# Patient Record
Sex: FEMALE | Race: WHITE | NOT HISPANIC OR LATINO | Employment: OTHER | URBAN - METROPOLITAN AREA
[De-identification: names, ages, dates, MRNs, and addresses within clinical notes are randomized per-mention and may not be internally consistent; named-entity substitution may affect disease eponyms.]

---

## 2017-04-21 RX ORDER — CITALOPRAM 20 MG/1
TABLET ORAL
Qty: 30 TABLET | Refills: 0 | Status: SHIPPED | OUTPATIENT
Start: 2017-04-21 | End: 2017-05-03

## 2017-05-03 ENCOUNTER — OFFICE VISIT (OUTPATIENT)
Dept: OBSTETRICS AND GYNECOLOGY | Facility: CLINIC | Age: 72
End: 2017-05-03

## 2017-05-03 VITALS
BODY MASS INDEX: 32.2 KG/M2 | HEIGHT: 62 IN | SYSTOLIC BLOOD PRESSURE: 116 MMHG | DIASTOLIC BLOOD PRESSURE: 78 MMHG | WEIGHT: 175 LBS

## 2017-05-03 DIAGNOSIS — Z12.31 ENCOUNTER FOR SCREENING MAMMOGRAM FOR MALIGNANT NEOPLASM OF BREAST: ICD-10-CM

## 2017-05-03 DIAGNOSIS — Z12.4 CERVICAL CANCER SCREENING: ICD-10-CM

## 2017-05-03 DIAGNOSIS — N32.81 OAB (OVERACTIVE BLADDER): ICD-10-CM

## 2017-05-03 DIAGNOSIS — Z13.820 ENCOUNTER FOR SCREENING FOR OSTEOPOROSIS: ICD-10-CM

## 2017-05-03 DIAGNOSIS — Z11.51 SPECIAL SCREENING EXAMINATION FOR HUMAN PAPILLOMAVIRUS (HPV): ICD-10-CM

## 2017-05-03 DIAGNOSIS — Z79.890 HORMONE REPLACEMENT THERAPY: ICD-10-CM

## 2017-05-03 DIAGNOSIS — Z13.9 SCREENING: Primary | ICD-10-CM

## 2017-05-03 LAB
BILIRUB BLD-MCNC: NEGATIVE MG/DL
CLARITY, POC: CLEAR
COLOR UR: YELLOW
GLUCOSE UR STRIP-MCNC: NEGATIVE MG/DL
KETONES UR QL: NEGATIVE
LEUKOCYTE EST, POC: NEGATIVE
NITRITE UR-MCNC: NEGATIVE MG/ML
PH UR: 5 [PH] (ref 5–8)
PROT UR STRIP-MCNC: NEGATIVE MG/DL
RBC # UR STRIP: NEGATIVE /UL
SP GR UR: 1.01 (ref 1–1.03)
UROBILINOGEN UR QL: NORMAL

## 2017-05-03 PROCEDURE — G0101 CA SCREEN;PELVIC/BREAST EXAM: HCPCS | Performed by: OBSTETRICS & GYNECOLOGY

## 2017-05-03 PROCEDURE — 81002 URINALYSIS NONAUTO W/O SCOPE: CPT | Performed by: OBSTETRICS & GYNECOLOGY

## 2017-05-05 LAB
CYTOLOGIST CVX/VAG CYTO: NORMAL
CYTOLOGY CVX/VAG DOC THIN PREP: NORMAL
DX ICD CODE: NORMAL
HIV 1 & 2 AB SER-IMP: NORMAL
HPV I/H RISK 1 DNA CVX QL PROBE+SIG AMP: NEGATIVE
OTHER STN SPEC: NORMAL
PATH REPORT.FINAL DX SPEC: NORMAL
STAT OF ADQ CVX/VAG CYTO-IMP: NORMAL

## 2017-05-08 PROBLEM — N32.81 OAB (OVERACTIVE BLADDER): Status: ACTIVE | Noted: 2017-05-08

## 2017-05-08 PROBLEM — Z79.890 HORMONE REPLACEMENT THERAPY: Status: ACTIVE | Noted: 2017-05-08

## 2017-05-08 RX ORDER — ESTRADIOL 0.5 MG/1
0.5 TABLET ORAL DAILY
Qty: 30 TABLET | Refills: 6 | Status: SHIPPED | OUTPATIENT
Start: 2017-05-08 | End: 2018-08-14

## 2017-05-08 RX ORDER — TOLTERODINE 2 MG/1
2 CAPSULE, EXTENDED RELEASE ORAL DAILY
Qty: 30 CAPSULE | Refills: 11 | Status: SHIPPED | OUTPATIENT
Start: 2017-05-08 | End: 2017-05-09

## 2017-05-09 ENCOUNTER — OFFICE VISIT (OUTPATIENT)
Dept: FAMILY MEDICINE CLINIC | Facility: CLINIC | Age: 72
End: 2017-05-09

## 2017-05-09 VITALS
BODY MASS INDEX: 32.7 KG/M2 | DIASTOLIC BLOOD PRESSURE: 79 MMHG | HEIGHT: 61 IN | TEMPERATURE: 97.7 F | HEART RATE: 81 BPM | SYSTOLIC BLOOD PRESSURE: 124 MMHG | WEIGHT: 173.2 LBS | OXYGEN SATURATION: 96 %

## 2017-05-09 DIAGNOSIS — K21.9 GASTROESOPHAGEAL REFLUX DISEASE WITHOUT ESOPHAGITIS: ICD-10-CM

## 2017-05-09 DIAGNOSIS — Z23 NEED FOR PNEUMOCOCCAL VACCINE: ICD-10-CM

## 2017-05-09 DIAGNOSIS — F41.9 ANXIETY: Primary | ICD-10-CM

## 2017-05-09 PROCEDURE — 99213 OFFICE O/P EST LOW 20 MIN: CPT | Performed by: NURSE PRACTITIONER

## 2017-05-09 RX ORDER — CITALOPRAM 20 MG/1
10 TABLET ORAL DAILY
Qty: 15 TABLET | Refills: 6 | Status: SHIPPED | OUTPATIENT
Start: 2017-05-09 | End: 2018-02-05 | Stop reason: SDUPTHER

## 2017-05-09 RX ORDER — OMEPRAZOLE 40 MG/1
40 CAPSULE, DELAYED RELEASE ORAL DAILY
Qty: 30 CAPSULE | Refills: 6 | Status: SHIPPED | OUTPATIENT
Start: 2017-05-09 | End: 2017-06-08

## 2017-05-10 LAB
ALBUMIN SERPL-MCNC: 4.3 G/DL (ref 3.5–4.8)
ALBUMIN/GLOB SERPL: 1.7 {RATIO} (ref 1.2–2.2)
ALP SERPL-CCNC: 136 IU/L (ref 39–117)
ALT SERPL-CCNC: 21 IU/L (ref 0–32)
AST SERPL-CCNC: 17 IU/L (ref 0–40)
BASOPHILS # BLD AUTO: 0 X10E3/UL (ref 0–0.2)
BASOPHILS NFR BLD AUTO: 0 %
BILIRUB SERPL-MCNC: 0.5 MG/DL (ref 0–1.2)
BUN SERPL-MCNC: 16 MG/DL (ref 8–27)
BUN/CREAT SERPL: 18 (ref 12–28)
CALCIUM SERPL-MCNC: 9.6 MG/DL (ref 8.7–10.3)
CHLORIDE SERPL-SCNC: 103 MMOL/L (ref 96–106)
CO2 SERPL-SCNC: 21 MMOL/L (ref 18–29)
CREAT SERPL-MCNC: 0.88 MG/DL (ref 0.57–1)
EOSINOPHIL # BLD AUTO: 0.1 X10E3/UL (ref 0–0.4)
EOSINOPHIL NFR BLD AUTO: 3 %
ERYTHROCYTE [DISTWIDTH] IN BLOOD BY AUTOMATED COUNT: 14.6 % (ref 12.3–15.4)
GLOBULIN SER CALC-MCNC: 2.5 G/DL (ref 1.5–4.5)
GLUCOSE SERPL-MCNC: 77 MG/DL (ref 65–99)
HCT VFR BLD AUTO: 43 % (ref 34–46.6)
HGB BLD-MCNC: 14.3 G/DL (ref 11.1–15.9)
IMM GRANULOCYTES # BLD: 0 X10E3/UL (ref 0–0.1)
IMM GRANULOCYTES NFR BLD: 0 %
LYMPHOCYTES # BLD AUTO: 1.4 X10E3/UL (ref 0.7–3.1)
LYMPHOCYTES NFR BLD AUTO: 27 %
MCH RBC QN AUTO: 30.4 PG (ref 26.6–33)
MCHC RBC AUTO-ENTMCNC: 33.3 G/DL (ref 31.5–35.7)
MCV RBC AUTO: 91 FL (ref 79–97)
MONOCYTES # BLD AUTO: 0.5 X10E3/UL (ref 0.1–0.9)
MONOCYTES NFR BLD AUTO: 10 %
NEUTROPHILS # BLD AUTO: 3.1 X10E3/UL (ref 1.4–7)
NEUTROPHILS NFR BLD AUTO: 60 %
PLATELET # BLD AUTO: 214 X10E3/UL (ref 150–379)
POTASSIUM SERPL-SCNC: 4.1 MMOL/L (ref 3.5–5.2)
PROT SERPL-MCNC: 6.8 G/DL (ref 6–8.5)
RBC # BLD AUTO: 4.71 X10E6/UL (ref 3.77–5.28)
SODIUM SERPL-SCNC: 142 MMOL/L (ref 134–144)
TSH SERPL DL<=0.005 MIU/L-ACNC: 2.13 UIU/ML (ref 0.45–4.5)
WBC # BLD AUTO: 5.3 X10E3/UL (ref 3.4–10.8)

## 2017-05-26 ENCOUNTER — HOSPITAL ENCOUNTER (OUTPATIENT)
Dept: MAMMOGRAPHY | Facility: HOSPITAL | Age: 72
Discharge: HOME | End: 2017-05-26
Attending: OBSTETRICS & GYNECOLOGY | Admitting: OBSTETRICS & GYNECOLOGY

## 2017-05-26 ENCOUNTER — HOSPITAL ENCOUNTER (OUTPATIENT)
Dept: BONE DENSITY | Facility: HOSPITAL | Age: 72
Discharge: HOME | End: 2017-05-26
Attending: OBSTETRICS & GYNECOLOGY

## 2017-05-26 DIAGNOSIS — Z13.820 ENCOUNTER FOR SCREENING FOR OSTEOPOROSIS: ICD-10-CM

## 2017-05-26 DIAGNOSIS — Z13.9 SCREENING: ICD-10-CM

## 2017-05-26 DIAGNOSIS — Z12.31 ENCOUNTER FOR SCREENING MAMMOGRAM FOR MALIGNANT NEOPLASM OF BREAST: ICD-10-CM

## 2017-05-26 PROCEDURE — G0202 SCR MAMMO BI INCL CAD: HCPCS

## 2017-05-26 PROCEDURE — 77080 DXA BONE DENSITY AXIAL: CPT

## 2018-02-05 DIAGNOSIS — F41.9 ANXIETY: ICD-10-CM

## 2018-02-06 ENCOUNTER — RESULTS ENCOUNTER (OUTPATIENT)
Dept: FAMILY MEDICINE CLINIC | Facility: CLINIC | Age: 73
End: 2018-02-06

## 2018-02-06 DIAGNOSIS — K21.9 GASTROESOPHAGEAL REFLUX DISEASE, ESOPHAGITIS PRESENCE NOT SPECIFIED: ICD-10-CM

## 2018-02-06 DIAGNOSIS — F32.A ANXIETY AND DEPRESSION: ICD-10-CM

## 2018-02-06 DIAGNOSIS — F32.A ANXIETY AND DEPRESSION: Primary | ICD-10-CM

## 2018-02-06 DIAGNOSIS — F41.9 ANXIETY AND DEPRESSION: ICD-10-CM

## 2018-02-06 DIAGNOSIS — F41.9 ANXIETY AND DEPRESSION: Primary | ICD-10-CM

## 2018-02-06 RX ORDER — CITALOPRAM 20 MG/1
TABLET ORAL
Qty: 15 TABLET | Refills: 0 | Status: SHIPPED | OUTPATIENT
Start: 2018-02-06 | End: 2018-02-15 | Stop reason: DRUGHIGH

## 2018-02-12 ENCOUNTER — RESULTS ENCOUNTER (OUTPATIENT)
Dept: FAMILY MEDICINE CLINIC | Facility: CLINIC | Age: 73
End: 2018-02-12

## 2018-02-12 DIAGNOSIS — I10 ESSENTIAL HYPERTENSION: ICD-10-CM

## 2018-02-12 DIAGNOSIS — I10 ESSENTIAL HYPERTENSION: Primary | ICD-10-CM

## 2018-02-12 LAB
ALBUMIN SERPL-MCNC: 4.4 G/DL (ref 3.5–5.2)
ALBUMIN/GLOB SERPL: 1.6 G/DL
ALP SERPL-CCNC: 121 U/L (ref 39–117)
ALT SERPL-CCNC: 16 U/L (ref 1–33)
AST SERPL-CCNC: 13 U/L (ref 1–32)
BASOPHILS # BLD AUTO: 0.03 10*3/MM3 (ref 0–0.2)
BASOPHILS NFR BLD AUTO: 0.5 % (ref 0–1.5)
BILIRUB SERPL-MCNC: 0.5 MG/DL (ref 0.1–1.2)
BUN SERPL-MCNC: 14 MG/DL (ref 8–23)
BUN/CREAT SERPL: 15.4 (ref 7–25)
CALCIUM SERPL-MCNC: 9.4 MG/DL (ref 8.6–10.5)
CHLORIDE SERPL-SCNC: 104 MMOL/L (ref 98–107)
CO2 SERPL-SCNC: 26.8 MMOL/L (ref 22–29)
CREAT SERPL-MCNC: 0.91 MG/DL (ref 0.57–1)
EOSINOPHIL # BLD AUTO: 0.16 10*3/MM3 (ref 0–0.7)
EOSINOPHIL NFR BLD AUTO: 2.7 % (ref 0.3–6.2)
ERYTHROCYTE [DISTWIDTH] IN BLOOD BY AUTOMATED COUNT: 14 % (ref 11.7–13)
GFR SERPLBLD CREATININE-BSD FMLA CKD-EPI: 61 ML/MIN/1.73
GFR SERPLBLD CREATININE-BSD FMLA CKD-EPI: 74 ML/MIN/1.73
GLOBULIN SER CALC-MCNC: 2.7 GM/DL
GLUCOSE SERPL-MCNC: 92 MG/DL (ref 65–99)
HCT VFR BLD AUTO: 40.6 % (ref 35.6–45.5)
HGB BLD-MCNC: 14.1 G/DL (ref 11.9–15.5)
IMM GRANULOCYTES # BLD: 0 10*3/MM3 (ref 0–0.03)
IMM GRANULOCYTES NFR BLD: 0 % (ref 0–0.5)
LYMPHOCYTES # BLD AUTO: 1.65 10*3/MM3 (ref 0.9–4.8)
LYMPHOCYTES NFR BLD AUTO: 27.7 % (ref 19.6–45.3)
MCH RBC QN AUTO: 32.1 PG (ref 26.9–32)
MCHC RBC AUTO-ENTMCNC: 34.7 G/DL (ref 32.4–36.3)
MCV RBC AUTO: 92.5 FL (ref 80.5–98.2)
MONOCYTES # BLD AUTO: 0.51 10*3/MM3 (ref 0.2–1.2)
MONOCYTES NFR BLD AUTO: 8.6 % (ref 5–12)
NEUTROPHILS # BLD AUTO: 3.61 10*3/MM3 (ref 1.9–8.1)
NEUTROPHILS NFR BLD AUTO: 60.5 % (ref 42.7–76)
PLATELET # BLD AUTO: 209 10*3/MM3 (ref 140–500)
POTASSIUM SERPL-SCNC: 4.4 MMOL/L (ref 3.5–5.2)
PROT SERPL-MCNC: 7.1 G/DL (ref 6–8.5)
RBC # BLD AUTO: 4.39 10*6/MM3 (ref 3.9–5.2)
SODIUM SERPL-SCNC: 143 MMOL/L (ref 136–145)
TSH SERPL DL<=0.005 MIU/L-ACNC: 2.76 MIU/ML (ref 0.27–4.2)
WBC # BLD AUTO: 5.96 10*3/MM3 (ref 4.5–10.7)

## 2018-02-15 ENCOUNTER — OFFICE VISIT (OUTPATIENT)
Dept: FAMILY MEDICINE CLINIC | Facility: CLINIC | Age: 73
End: 2018-02-15

## 2018-02-15 VITALS
TEMPERATURE: 98.4 F | OXYGEN SATURATION: 97 % | DIASTOLIC BLOOD PRESSURE: 70 MMHG | HEART RATE: 72 BPM | BODY MASS INDEX: 32.97 KG/M2 | SYSTOLIC BLOOD PRESSURE: 104 MMHG | WEIGHT: 174.6 LBS | HEIGHT: 61 IN

## 2018-02-15 DIAGNOSIS — M62.838 MUSCLE SPASM: ICD-10-CM

## 2018-02-15 DIAGNOSIS — K21.9 GASTROESOPHAGEAL REFLUX DISEASE WITHOUT ESOPHAGITIS: ICD-10-CM

## 2018-02-15 DIAGNOSIS — Z00.00 HEALTHCARE MAINTENANCE: ICD-10-CM

## 2018-02-15 DIAGNOSIS — F41.9 ANXIETY: Primary | ICD-10-CM

## 2018-02-15 PROCEDURE — 99213 OFFICE O/P EST LOW 20 MIN: CPT | Performed by: NURSE PRACTITIONER

## 2018-02-15 RX ORDER — BACLOFEN 10 MG/1
10 TABLET ORAL 3 TIMES DAILY PRN
Qty: 90 TABLET | Refills: 6 | Status: SHIPPED | OUTPATIENT
Start: 2018-02-15 | End: 2018-04-02

## 2018-02-15 RX ORDER — CITALOPRAM 10 MG/1
10 TABLET ORAL DAILY
Qty: 90 TABLET | Refills: 3 | Status: SHIPPED | OUTPATIENT
Start: 2018-02-15 | End: 2019-06-25

## 2018-02-15 RX ORDER — OMEPRAZOLE 40 MG/1
40 CAPSULE, DELAYED RELEASE ORAL DAILY
Qty: 90 CAPSULE | Refills: 3 | Status: SHIPPED | OUTPATIENT
Start: 2018-02-15 | End: 2018-04-11

## 2018-02-15 RX ORDER — OMEPRAZOLE 40 MG/1
40 CAPSULE, DELAYED RELEASE ORAL DAILY
COMMUNITY
End: 2018-02-15 | Stop reason: SDUPTHER

## 2018-02-15 NOTE — PROGRESS NOTES
Subjective   Kourtney Lorenzana is a 72 y.o. female. Patient is being seen today for medication check/refill for Anxiety and GERD. She had recent lab work and is to go over the results with you today in office.     History of Present Illness 72 yr.old white female here today for F/U on meds for anxiety, Celexa 20 mg QD. GERD treated with Prilosec 40 mg capsule once a day Offers no c/o med. Pt fasting for labs. Patient needs lipids to be drawn all other labs have been drawn and results. Will review during office visit.    The following portions of the patient's history were reviewed and updated as appropriate: allergies, current medications, past family history, past medical history, past social history, past surgical history and problem list.    Review of Systems   Gastrointestinal:        GERD.   Psychiatric/Behavioral:        Anxiety.    All other systems reviewed and are negative.      Objective   Physical Exam   Constitutional: She is oriented to person, place, and time. She appears well-developed and well-nourished.   HENT:   Head: Normocephalic and atraumatic.   Eyes: EOM are normal. Pupils are equal, round, and reactive to light.   Neck: Normal range of motion. Neck supple.   Cardiovascular: Normal rate, regular rhythm and normal heart sounds.    Pulmonary/Chest: Effort normal and breath sounds normal.   Abdominal: Soft. Bowel sounds are normal.   Musculoskeletal: Normal range of motion.   Neurological: She is alert and oriented to person, place, and time.   Skin: Skin is warm and dry.   Psychiatric: She has a normal mood and affect. Her behavior is normal. Judgment and thought content normal.   Nursing note and vitals reviewed.      Assessment/Plan   Kourtney was seen today for med refill.    Diagnoses and all orders for this visit:    Anxiety  -     citalopram (CELEXA) 10 MG tablet; Take 1 tablet by mouth Daily.  -     baclofen (LIORESAL) 10 MG tablet; Take 1 tablet by mouth 3 (Three) Times a Day As Needed for  Muscle Spasms.    Gastroesophageal reflux disease without esophagitis  -     Ambulatory Referral to Gastroenterology  -     omeprazole (priLOSEC) 40 MG capsule; Take 1 capsule by mouth Daily.    Muscle spasm  -     baclofen (LIORESAL) 10 MG tablet; Take 1 tablet by mouth 3 (Three) Times a Day As Needed for Muscle Spasms.    Patient will return fasting for lipid panel at her earliest convenience. Referral to gastroenterology for possible upper endoscopy. Renewed Prilosec 40 mg capsule once a day for her GERD, ordered baclofen for muscle spasms and to help her relax so she can sleep well at night. Will continue on Celexa 10 mg tablet per day. Patient had been cutting her 20 mg tablets in half. This will be easier for her. To continue eating a heart healthy diet full of fruits and vegetables, drinking 6-8 glasses of water a day as able and to exercise by walking briskly every day. This will help maintain bone density, muscle mass, heart health, and has been known to elevate the psyche. Patient will keep the office alert of any decline in health status. Will follow-up every 6 months and as needed with office. Patient's last T dap was in 2012 per patient she does not need another one until as and 23. Patient declines Pneumovax and influenza. Will consider zoster vaccine possibly at a later date. We will get hepatitis screening for hep C when she returns to get her lipid panel drawn. All health maintenance issues have now been addressed.

## 2018-04-11 ENCOUNTER — OFFICE VISIT (OUTPATIENT)
Dept: GASTROENTEROLOGY | Facility: CLINIC | Age: 73
End: 2018-04-11

## 2018-04-11 VITALS
BODY MASS INDEX: 32.23 KG/M2 | DIASTOLIC BLOOD PRESSURE: 72 MMHG | TEMPERATURE: 97.5 F | WEIGHT: 176.2 LBS | SYSTOLIC BLOOD PRESSURE: 140 MMHG

## 2018-04-11 DIAGNOSIS — R10.13 EPIGASTRIC PAIN: ICD-10-CM

## 2018-04-11 DIAGNOSIS — R13.10 DYSPHAGIA, UNSPECIFIED TYPE: ICD-10-CM

## 2018-04-11 DIAGNOSIS — Z23 NEED FOR PNEUMOCOCCAL VACCINE: Primary | ICD-10-CM

## 2018-04-11 DIAGNOSIS — K21.9 GASTROESOPHAGEAL REFLUX DISEASE WITHOUT ESOPHAGITIS: ICD-10-CM

## 2018-04-11 PROCEDURE — 99214 OFFICE O/P EST MOD 30 MIN: CPT | Performed by: NURSE PRACTITIONER

## 2018-04-11 RX ORDER — PANTOPRAZOLE SODIUM 40 MG/1
40 TABLET, DELAYED RELEASE ORAL DAILY
Qty: 30 TABLET | Refills: 11 | Status: SHIPPED | OUTPATIENT
Start: 2018-04-11 | End: 2018-04-24

## 2018-04-11 NOTE — PROGRESS NOTES
Chief Complaint   Patient presents with   • Heartburn       Kourtney Lorenzana is a  72 y.o. female here for a follow up visit for GERD and epigastric abd pain.     HPI  71 yo f presents today for follow up visit for GERD, dysphagia and epigastric abd pain. She is a patient of Dr. Andrade. She was last seen in the clinic on 10/2016. At the time she was having similar issues and had been taking Zantac 150 mg BID without relief. She was changed to Omeprazole 40 mg daily and Zantac 300 mg at night and patient was instructed to follow up in 1 year. Since then she admits the omeprazole 40 mg daily is not working anymore. She doesn't remember why she stopped taking the Zantac at bedtime. She is having worsening dysphagia, epigastric pain and nausea. She cannot remember ever having EGD done. If she did it was a long time ago per her report.     She denies any vomiting, constipation, diarrhea, rectal bleeding or melena. She admits her appetite is good and her weight has been stable.     Last Colonoscopy was done 11/2015 and was normal per patient report.     Past Medical History:   Diagnosis Date   • Anxiety    • Bruising tendency    • Cerumen impaction    • Depression    • Diverticula, intestine    • Diverticulitis of colon    • Essential familial hypercholesterolemia    • Gastroesophageal reflux disease without esophagitis 2/11/2016   • GERD (gastroesophageal reflux disease)    • H/O colonoscopy    • IBS (irritable bowel syndrome)    • Internal hemorrhoids    • Low back pain    • Lumbar canal stenosis    • Lumbosacral neuritis    • Multiple vitamin deficiency    • Nephrolithiasis     July 2014 passage of kidney stone.   • Panic disorder without agoraphobia    • Postmenopausal HRT (hormone replacement therapy)    • UTI symptoms    • Vagina, candidiasis        Past Surgical History:   Procedure Laterality Date   • CATARACT EXTRACTION     • CATARACT EXTRACTION  09/05/2017    Dr. Jefe Barakat OD, MD   • COLONOSCOPY  11/2015     "normal   • FOOT SURGERY     • HYSTERECTOMY     • INCONTINENCE SURGERY         Scheduled Meds:    Continuous Infusions:  No current facility-administered medications for this visit.     PRN Meds:.    Allergies   Allergen Reactions   • Other      Unknown \"strong\" pain killer   • Hydrocodone-Acetaminophen GI Intolerance   • Tramadol GI Intolerance       Social History     Social History   • Marital status:      Spouse name: N/A   • Number of children: N/A   • Years of education: N/A     Occupational History   • Not on file.     Social History Main Topics   • Smoking status: Never Smoker   • Smokeless tobacco: Never Used   • Alcohol use No   • Drug use: No   • Sexual activity: No     Other Topics Concern   • Not on file     Social History Narrative   • No narrative on file       Family History   Problem Relation Age of Onset   • Atrial fibrillation Father    • Atrial fibrillation Sister    • Atrial fibrillation Brother    • Other Brother      Coronary arteriosclerosis   • Cancer Mother      colon cancer   • Colon cancer Mother        Review of Systems   Constitutional: Negative for appetite change, chills, diaphoresis, fatigue, fever and unexpected weight change.   HENT: Positive for trouble swallowing. Negative for nosebleeds, postnasal drip, sore throat and voice change.    Respiratory: Positive for cough and choking. Negative for chest tightness, shortness of breath and wheezing.    Cardiovascular: Negative for chest pain.   Gastrointestinal: Positive for abdominal pain and nausea. Negative for abdominal distention, anal bleeding, blood in stool, constipation, diarrhea, rectal pain and vomiting.   Endocrine: Negative for polydipsia, polyphagia and polyuria.   Musculoskeletal: Negative for gait problem.   Skin: Negative for rash and wound.   Allergic/Immunologic: Negative for food allergies.   Neurological: Negative for dizziness, speech difficulty and light-headedness.   Psychiatric/Behavioral: Negative for " confusion, self-injury, sleep disturbance and suicidal ideas.       Vitals:    04/11/18 1302   BP: 140/72   Temp: 97.5 °F (36.4 °C)       Physical Exam   Constitutional: She is oriented to person, place, and time. She appears well-developed and well-nourished. She does not appear ill. No distress.   HENT:   Head: Normocephalic.   Eyes: Pupils are equal, round, and reactive to light.   Cardiovascular: Normal rate, regular rhythm and normal heart sounds.    Pulmonary/Chest: Effort normal and breath sounds normal.   Abdominal: Soft. Bowel sounds are normal. She exhibits no distension and no mass. There is no hepatosplenomegaly. There is no tenderness. There is no rebound and no guarding. No hernia.   Musculoskeletal: Normal range of motion.   Neurological: She is alert and oriented to person, place, and time.   Skin: Skin is warm and dry.   Psychiatric: She has a normal mood and affect. Her speech is normal and behavior is normal. Judgment normal.       No images are attached to the encounter.    Assessment & Plan       1. Gastroesophageal reflux disease without esophagitis  - Case Request; Standing  - Case Request    2. Epigastric pain  - Case Request; Standing  - Case Request    3. Dysphagia, unspecified type  - Case Request; Standing  - Case Request    I will change the omeprazole to Protonix 40 mg daily and see how that helps her. I think she needs EGD given her worsening symptoms. I did discuss with her the potential risks involved with the procedure and she has agreed to proceed with EGD with Dr. Andrade. Follow up with Dr. Andrade after the scope.

## 2018-04-24 ENCOUNTER — OFFICE VISIT (OUTPATIENT)
Dept: FAMILY MEDICINE CLINIC | Facility: CLINIC | Age: 73
End: 2018-04-24

## 2018-04-24 VITALS
OXYGEN SATURATION: 98 % | WEIGHT: 174.6 LBS | SYSTOLIC BLOOD PRESSURE: 110 MMHG | HEART RATE: 83 BPM | DIASTOLIC BLOOD PRESSURE: 72 MMHG | TEMPERATURE: 98.3 F | HEIGHT: 62 IN | BODY MASS INDEX: 32.13 KG/M2

## 2018-04-24 DIAGNOSIS — G89.29 CHRONIC PAIN OF LEFT KNEE: Primary | ICD-10-CM

## 2018-04-24 DIAGNOSIS — K21.9 GASTROESOPHAGEAL REFLUX DISEASE WITHOUT ESOPHAGITIS: ICD-10-CM

## 2018-04-24 DIAGNOSIS — M25.562 CHRONIC PAIN OF LEFT KNEE: Primary | ICD-10-CM

## 2018-04-24 PROCEDURE — 73560 X-RAY EXAM OF KNEE 1 OR 2: CPT | Performed by: NURSE PRACTITIONER

## 2018-04-24 PROCEDURE — 99214 OFFICE O/P EST MOD 30 MIN: CPT | Performed by: NURSE PRACTITIONER

## 2018-04-24 RX ORDER — RANITIDINE 150 MG/1
150 CAPSULE ORAL 2 TIMES DAILY PRN
Qty: 60 CAPSULE | Refills: 6 | Status: SHIPPED | OUTPATIENT
Start: 2018-04-24 | End: 2018-08-14

## 2018-04-24 RX ORDER — TRAMADOL HYDROCHLORIDE 50 MG/1
50 TABLET ORAL EVERY 8 HOURS PRN
Qty: 90 TABLET | Refills: 0 | Status: SHIPPED | OUTPATIENT
Start: 2018-04-24 | End: 2018-08-14

## 2018-04-24 NOTE — PROGRESS NOTES
Subjective   Kourtney Lorenzana is a 72 y.o. female. Patient is being evaluated today for left knee pain. The pain started about  2 months ago, the last 2 weeks has gotten a lot worse. She does not recall doing anything to cause the pain. She believes it to be arthritis.     History of Present Illness 72-year-old  female presents to the office today to evaluate sudden onset of intermittent left knee pain that began about 2 months ago and over the last 2 weeks has gotten worse. Patient believes it to be arthritis and has treated it with  Tylenol, warm wraps massage and slow stretches. Nothing really makes it better and walking makes worse. Cannot tolerate NSAIDs due to her history of GERD. Was taking Protonix but would like to try ranitidine since she can take it for a longer period of time . She does not recall injuring the knee in any way. On a 1-10 scale rates the knee pain as a 10.    The following portions of the patient's history were reviewed and updated as appropriate: allergies, current medications, past family history, past medical history, past social history, past surgical history and problem list.    Review of Systems   Gastrointestinal:        HX GERD   Musculoskeletal:        Knee pain.    All other systems reviewed and are negative.      Objective   Physical Exam   Constitutional: She is oriented to person, place, and time. She appears well-developed and well-nourished.   HENT:   Head: Normocephalic and atraumatic.   Eyes: EOM are normal. Pupils are equal, round, and reactive to light.   Neck: Normal range of motion. Neck supple.   Cardiovascular: Normal rate and regular rhythm.    Pulmonary/Chest: Effort normal and breath sounds normal.   Abdominal: Soft. Bowel sounds are normal.   Intermittent GERD. Patient concerned with osteoporosis encouraged to try ranitidine   Musculoskeletal:   Left Knee pain increased with movement described as aching. Patient has full range of motion but movement causes  increased pain   Neurological: She is alert and oriented to person, place, and time.   Skin: Skin is warm and dry. Capillary refill takes 2 to 3 seconds.   Psychiatric: She has a normal mood and affect. Her behavior is normal. Judgment and thought content normal.   Nursing note and vitals reviewed.      Assessment/Plan   Kourtney was seen today for knee pain.    Diagnoses and all orders for this visit:    Chronic pain of left knee  -     XR Knee 1 or 2 View Left (In Office)  -     Ambulatory Referral to Orthopedic Surgery  -     traMADol (ULTRAM) 50 MG tablet; Take 1 tablet by mouth Every 8 (Eight) Hours As Needed for Moderate Pain .  -     Compliance Drug Analysis, Ur - Urine, Clean Catch    Gastroesophageal reflux disease without esophagitis  -     ranitidine (ZANTAC) 150 MG capsule; Take 1 capsule by mouth 2 (Two) Times a Day As Needed for Indigestion or Heartburn.    Patient unable to tolerate baclofen, due to mental changes or meloxicam severe stomach upset. Will treat her chronic knee pain with low-dose tramadol 50 mg 3 times a day as needed. Will get in office x-ray of left knee to determine cause of her pain. No knee film available for comparison. Will send to radiology for formal over reading. Film read as osteoarthritic changes to the joint space in the left knee, with osteophyte/fragment formation of bone behind the knee. This would account for the pain she feels in the back of her knee whenever she stands straight or bends the knee. Referring to orthopedic surgeon Dr. Kramer if still in practice per patient request. This will call with date and time of visit. Medication agreement explained and signed by patient, Klever requested. Compliance drug screen collected. Patient will notify office immediately of any decline in health or adverse effects from tramadol. And understands its a controlled substance and she may only filled at one pharmacy and may only collect prescription from me. To follow-up with this  office after being seen by orthopedic surgery

## 2018-04-25 ENCOUNTER — TELEPHONE (OUTPATIENT)
Dept: GASTROENTEROLOGY | Facility: CLINIC | Age: 73
End: 2018-04-25

## 2018-04-25 NOTE — TELEPHONE ENCOUNTER
----- Message from Martha Mcdaniel sent at 4/25/2018 12:06 PM EDT -----  Regarding: PT CALLED - MED QUESTION  Contact: [phone]  PT HAS EGD ON FRI. IS IT OK TO TAKE TRAMADOL 50MG. PT WAS PRESCRIBED THIS FOR HER KNEES. SHE HASN'T GOT SCRIPT FILLED YET.

## 2018-04-26 ENCOUNTER — TELEPHONE (OUTPATIENT)
Dept: FAMILY MEDICINE CLINIC | Facility: CLINIC | Age: 73
End: 2018-04-26

## 2018-04-26 NOTE — TELEPHONE ENCOUNTER
Pt is having an upper GI scope done tomorrow. She is currently prescribed Tramadol by you. Should she be on this for procedure tomorrow? Pt will have anesthetic given to her.   Please advise, thank you!

## 2018-04-27 ENCOUNTER — OUTSIDE FACILITY SERVICE (OUTPATIENT)
Dept: GASTROENTEROLOGY | Facility: CLINIC | Age: 73
End: 2018-04-27

## 2018-04-27 PROCEDURE — 43235 EGD DIAGNOSTIC BRUSH WASH: CPT | Performed by: INTERNAL MEDICINE

## 2018-04-27 PROCEDURE — 43450 DILATE ESOPHAGUS 1/MULT PASS: CPT | Performed by: INTERNAL MEDICINE

## 2018-04-30 ENCOUNTER — TELEPHONE (OUTPATIENT)
Dept: FAMILY MEDICINE CLINIC | Facility: CLINIC | Age: 73
End: 2018-04-30

## 2018-04-30 LAB — DRUGS UR: NORMAL

## 2018-04-30 NOTE — TELEPHONE ENCOUNTER
Patient called and said since starting the Tramadol, she has been itching  Her best call back is [phone]

## 2018-04-30 NOTE — TELEPHONE ENCOUNTER
If the itching is not that intense she could take a Benadryl and drink more water. If the itching is bothering her we can stop the tramadol. She has any idea what else she would like to try for her pain?

## 2018-05-22 ENCOUNTER — OFFICE VISIT (OUTPATIENT)
Dept: ORTHOPEDIC SURGERY | Facility: CLINIC | Age: 73
End: 2018-05-22

## 2018-05-22 VITALS — HEIGHT: 62 IN | TEMPERATURE: 98.3 F | BODY MASS INDEX: 32.02 KG/M2 | WEIGHT: 174 LBS

## 2018-05-22 DIAGNOSIS — M25.562 CHRONIC PAIN OF LEFT KNEE: Primary | ICD-10-CM

## 2018-05-22 DIAGNOSIS — G89.29 CHRONIC PAIN OF LEFT KNEE: Primary | ICD-10-CM

## 2018-05-22 DIAGNOSIS — M70.52 PES ANSERINUS BURSITIS OF LEFT KNEE: ICD-10-CM

## 2018-05-22 PROCEDURE — 73562 X-RAY EXAM OF KNEE 3: CPT | Performed by: ORTHOPAEDIC SURGERY

## 2018-05-22 PROCEDURE — 99203 OFFICE O/P NEW LOW 30 MIN: CPT | Performed by: ORTHOPAEDIC SURGERY

## 2018-05-22 PROCEDURE — 20610 DRAIN/INJ JOINT/BURSA W/O US: CPT | Performed by: ORTHOPAEDIC SURGERY

## 2018-05-22 RX ORDER — METHYLPREDNISOLONE ACETATE 80 MG/ML
80 INJECTION, SUSPENSION INTRA-ARTICULAR; INTRALESIONAL; INTRAMUSCULAR; SOFT TISSUE
Status: COMPLETED | OUTPATIENT
Start: 2018-05-22 | End: 2018-05-22

## 2018-05-22 RX ORDER — BUPIVACAINE HYDROCHLORIDE 5 MG/ML
2 INJECTION, SOLUTION PERINEURAL
Status: COMPLETED | OUTPATIENT
Start: 2018-05-22 | End: 2018-05-22

## 2018-05-22 RX ORDER — BUPIVACAINE HYDROCHLORIDE 5 MG/ML
2 INJECTION, SOLUTION EPIDURAL; INTRACAUDAL
Status: COMPLETED | OUTPATIENT
Start: 2018-05-22 | End: 2018-05-22

## 2018-05-22 RX ADMIN — METHYLPREDNISOLONE ACETATE 80 MG: 80 INJECTION, SUSPENSION INTRA-ARTICULAR; INTRALESIONAL; INTRAMUSCULAR; SOFT TISSUE at 10:35

## 2018-05-22 RX ADMIN — BUPIVACAINE HYDROCHLORIDE 2 ML: 5 INJECTION, SOLUTION EPIDURAL; INTRACAUDAL at 10:35

## 2018-05-22 RX ADMIN — BUPIVACAINE HYDROCHLORIDE 2 ML: 5 INJECTION, SOLUTION PERINEURAL at 10:35

## 2018-05-22 NOTE — PROGRESS NOTES
"Patient: Kourtney Lorenzana  YOB: 1945 72 y.o. female  Medical Record Number: [id number]    Chief Complaints:   Chief Complaint   Patient presents with   • Left Knee - Pain, Establish Care       History of Present Illness:Kourtney Lorenzana is a 72 y.o. female who presents with Complains of moderate to severe left medial knee pain.  She has pain with any activity.  Describes a constant stabbing aching pain about the medial joint.  She rates it as a 4 out of 10.  This is worse with standing grabbing or walking.  She was sent by Sonam Zayas for consultation.    Allergies:   Allergies   Allergen Reactions   • Other      Unknown \"strong\" pain killer   • Hydrocodone-Acetaminophen GI Intolerance   • Tramadol GI Intolerance       Medications:   Current Outpatient Prescriptions   Medication Sig Dispense Refill   • citalopram (CELEXA) 10 MG tablet Take 1 tablet by mouth Daily. 90 tablet 3   • estradiol (ESTRACE) 0.5 MG tablet Take 1 tablet by mouth Daily. Patient takes every other night. (Patient taking differently: Take 0.5 mg by mouth 1 (One) Time Per Week. Patient takes every other night.  ) 30 tablet 6   • ranitidine (ZANTAC) 150 MG capsule Take 1 capsule by mouth 2 (Two) Times a Day As Needed for Indigestion or Heartburn. 60 capsule 6   • traMADol (ULTRAM) 50 MG tablet Take 1 tablet by mouth Every 8 (Eight) Hours As Needed for Moderate Pain . 90 tablet 0     No current facility-administered medications for this visit.          The following portions of the patient's history were reviewed and updated as appropriate: allergies, current medications, past family history, past medical history, past social history, past surgical history and problem list.    Review of Systems:   A 14 point review of systems was performed. All systems negative except pertinent positives/negative listed in HPI above    Physical Exam:   Vitals:    05/22/18 0917   Temp: 98.3 °F (36.8 °C)   Weight: 78.9 kg (174 lb)   Height: 157.5 cm " "(62\")   PainSc:   4       General: A and O x 3, ASA, NAD    SCLERA:    Normal    DENTITION:   Normal   Knee:  left    ALIGNMENT:     Neutral  ,   Patella tracks   midline    GAIT:     Nonantalgic    SKIN:    No abnormality    RANGE OF MOTION:   0  -  135   DEG    STRENGTH:   5 / 5    LIGAMENTS:    No varus / valgus instability.   Negative  Lachman.    MENISCUS:     Negative   Akiko       DISTAL PULSES:    Paplable    DISTAL SENSATION :   Intact    LYMPHATICS:     No   lymphadenopathy    OTHER:          - No  effusion      - No crepitance with ROM      +Swelling and tenderness to palpation pes anserine bursa       Radiology:  Xrays 3views left knee (ap,lateral, sunrise) were ordered and reviewed for evaluation of knee pain demonstrating advanced varus osteoarthritis with near bone on bone articulation, subchondral cysts, and periarticular osteophytes. There are no previous films for comparision.    Assessment/Plan: Left knee osteoarthritis and pes anserine bursitis.  She has severe bursitis today so we'll try and calm it down.  I injected the bursa as below.  I also recommended therapy but she is not interested in that.  I did explain to her she would get to the point where we have to discuss knee replacement I would not consider any surgery on her knee and less she will willing to 100% comply with my therapy request for prehabilitation and postop therapy.  I be happy to see her back at any point in the future.      Large Joint Arthrocentesis  Date/Time: 5/22/2018 10:35 AM  Consent given by: patient  Site marked: site marked  Timeout: Immediately prior to procedure a time out was called to verify the correct patient, procedure, equipment, support staff and site/side marked as required   Supporting Documentation  Indications: joint swelling and pain   Procedure Details  Location: knee - L knee  Preparation: Patient was prepped and draped in the usual sterile fashion  Needle size: 18 G  Approach: medial  Medications " administered: 2 mL bupivacaine (PF) 0.5 %; 2 mL bupivacaine 0.5 %; 80 mg methylPREDNISolone acetate 80 MG/ML  Patient tolerance: patient tolerated the procedure well with no immediate complications            Catarino De La Cruz MD  5/22/2018

## 2018-07-02 ENCOUNTER — OFFICE VISIT (OUTPATIENT)
Dept: GASTROENTEROLOGY | Facility: CLINIC | Age: 73
End: 2018-07-02

## 2018-07-02 VITALS
DIASTOLIC BLOOD PRESSURE: 78 MMHG | TEMPERATURE: 98.2 F | SYSTOLIC BLOOD PRESSURE: 108 MMHG | WEIGHT: 169.4 LBS | BODY MASS INDEX: 31.17 KG/M2 | HEIGHT: 62 IN

## 2018-07-02 DIAGNOSIS — K21.9 GASTROESOPHAGEAL REFLUX DISEASE WITHOUT ESOPHAGITIS: Primary | ICD-10-CM

## 2018-07-02 DIAGNOSIS — R10.13 EPIGASTRIC PAIN: ICD-10-CM

## 2018-07-02 DIAGNOSIS — R13.10 DYSPHAGIA, UNSPECIFIED TYPE: ICD-10-CM

## 2018-07-02 DIAGNOSIS — K44.9 HIATAL HERNIA: ICD-10-CM

## 2018-07-02 PROCEDURE — 99214 OFFICE O/P EST MOD 30 MIN: CPT | Performed by: NURSE PRACTITIONER

## 2018-07-02 RX ORDER — PANTOPRAZOLE SODIUM 40 MG/1
40 TABLET, DELAYED RELEASE ORAL DAILY
COMMUNITY
End: 2018-07-02 | Stop reason: SDUPTHER

## 2018-07-02 RX ORDER — MELATONIN
1000 DAILY
COMMUNITY
End: 2018-08-14

## 2018-07-02 RX ORDER — PANTOPRAZOLE SODIUM 40 MG/1
40 TABLET, DELAYED RELEASE ORAL 2 TIMES DAILY
Qty: 180 TABLET | Refills: 3 | Status: SHIPPED | OUTPATIENT
Start: 2018-07-02 | End: 2019-11-21 | Stop reason: SDUPTHER

## 2018-07-02 NOTE — PROGRESS NOTES
Chief Complaint   Patient presents with   • Follow-up     from EGD   • Heartburn       Kourtney Lorenzana is a  72 y.o. female here for a follow up visit for GERD, dysphagia and epigastric pain.     HPI  73 yo f presents today for follow up visit for GERD, dysphagia and epigastric abd pain. She is a patient of Dr. Andrade. She was last seen in the office on 4/11/18 by me. At that time she was having worsening reflux symptoms so I changed the omeprazole to pantoprazole 40 mg daily and scheduled her for EGD. EGD was done and it was normal except for a small hiatal hernia. Esophagus was dilated. No specimens were collected. Patient admits her swallowing has improved since the EGD but she is still having episodes of epigastric pain and worsening reflux. She does feel the protonix is working better than the omeprazole did but not 100%. She does seem to notice some foods make her issues worse like onions. She denies any dysphagia, N&V, diarrhea, constipation, rectal bleeding or melena. She admits her appetite is good and her weight is stable.     Past Medical History:   Diagnosis Date   • Anxiety    • Bruising tendency    • Cerumen impaction    • Depression    • Diverticula, intestine    • Diverticulitis of colon    • Essential familial hypercholesterolemia    • Gastroesophageal reflux disease without esophagitis 2/11/2016   • GERD (gastroesophageal reflux disease)    • H/O colonoscopy    • IBS (irritable bowel syndrome)    • Internal hemorrhoids    • Low back pain    • Lumbar canal stenosis    • Lumbosacral neuritis    • Multiple vitamin deficiency    • Nephrolithiasis     July 2014 passage of kidney stone.   • Panic disorder without agoraphobia    • Postmenopausal HRT (hormone replacement therapy)    • UTI symptoms    • Vagina, candidiasis        Past Surgical History:   Procedure Laterality Date   • CATARACT EXTRACTION     • CATARACT EXTRACTION  09/05/2017    Dr. Jefe Barakat OD, MD   • COLONOSCOPY  11/2015    normal   •  "ENDOSCOPY  04/27/2018    , no specimen collected   • FOOT SURGERY     • HYSTERECTOMY     • INCONTINENCE SURGERY         Scheduled Meds:    Continuous Infusions:  No current facility-administered medications for this visit.     PRN Meds:.    Allergies   Allergen Reactions   • Other      Unknown \"strong\" pain killer   • Hydrocodone-Acetaminophen GI Intolerance   • Tramadol GI Intolerance       Social History     Social History   • Marital status:      Spouse name: N/A   • Number of children: N/A   • Years of education: N/A     Occupational History   • Not on file.     Social History Main Topics   • Smoking status: Never Smoker   • Smokeless tobacco: Never Used   • Alcohol use No   • Drug use: No   • Sexual activity: No     Other Topics Concern   • Not on file     Social History Narrative   • No narrative on file       Family History   Problem Relation Age of Onset   • Atrial fibrillation Father    • Atrial fibrillation Sister    • Atrial fibrillation Brother    • Other Brother         Coronary arteriosclerosis   • Cancer Mother         colon cancer   • Colon cancer Mother        Review of Systems   Constitutional: Negative for appetite change, chills, diaphoresis, fatigue, fever and unexpected weight change.   HENT: Negative for nosebleeds, postnasal drip, sore throat, trouble swallowing and voice change.    Respiratory: Negative for cough, choking, chest tightness, shortness of breath and wheezing.    Cardiovascular: Negative for chest pain.   Gastrointestinal: Positive for abdominal pain. Negative for abdominal distention, anal bleeding, blood in stool, constipation, diarrhea, nausea, rectal pain and vomiting.   Endocrine: Negative for polydipsia, polyphagia and polyuria.   Musculoskeletal: Negative for gait problem.   Skin: Negative for rash and wound.   Allergic/Immunologic: Negative for food allergies.   Neurological: Negative for dizziness, speech difficulty and light-headedness. "   Psychiatric/Behavioral: Negative for confusion, self-injury, sleep disturbance and suicidal ideas.       Vitals:    07/02/18 1102   BP: 108/78   Temp: 98.2 °F (36.8 °C)       Physical Exam   Constitutional: She is oriented to person, place, and time. She appears well-developed and well-nourished. She does not appear ill. No distress.   HENT:   Head: Normocephalic.   Eyes: Pupils are equal, round, and reactive to light.   Cardiovascular: Normal rate, regular rhythm and normal heart sounds.    Pulmonary/Chest: Effort normal and breath sounds normal.   Abdominal: Soft. Bowel sounds are normal. She exhibits no distension and no mass. There is no hepatosplenomegaly. There is no tenderness. There is no rebound and no guarding. No hernia.   Musculoskeletal: Normal range of motion.   Neurological: She is alert and oriented to person, place, and time.   Skin: Skin is warm and dry.   Psychiatric: She has a normal mood and affect. Her speech is normal and behavior is normal. Judgment normal.       No images are attached to the encounter.    Assessment & Plan     1. Gastroesophageal reflux disease without esophagitis  - pantoprazole (PROTONIX) 40 MG EC tablet; Take 1 tablet by mouth 2 (Two) Times a Day.  Dispense: 180 tablet; Refill: 3    2. Dysphagia, unspecified type    3. Epigastric pain    4. Hiatal hernia    I reviewed EGD results with the patient. Swallowing is better after the dilation. She is still having reflux and epigastric abd pain worse with certain foods. Will increase the protonix to 40 mg BID. Patient to call office in 1-2 weeks with update. Follow up with Dr. Andrade in 3 months.

## 2018-07-05 ENCOUNTER — DOCUMENTATION (OUTPATIENT)
Dept: GASTROENTEROLOGY | Facility: CLINIC | Age: 73
End: 2018-07-05

## 2018-07-05 NOTE — PROGRESS NOTES
PA for Pantoprazole in progress thru [facility]    PA approved for Pantoprazole BID dosing yhru 12/31/2018  Approval scanned into Media tab

## 2018-08-14 ENCOUNTER — OFFICE VISIT (OUTPATIENT)
Dept: FAMILY MEDICINE CLINIC | Facility: CLINIC | Age: 73
End: 2018-08-14

## 2018-08-14 VITALS
HEIGHT: 62 IN | RESPIRATION RATE: 16 BRPM | BODY MASS INDEX: 31.14 KG/M2 | HEART RATE: 63 BPM | DIASTOLIC BLOOD PRESSURE: 72 MMHG | TEMPERATURE: 98.4 F | WEIGHT: 169.2 LBS | SYSTOLIC BLOOD PRESSURE: 112 MMHG | OXYGEN SATURATION: 99 %

## 2018-08-14 DIAGNOSIS — N89.8 VAGINAL DISCHARGE: Primary | ICD-10-CM

## 2018-08-14 DIAGNOSIS — R10.2 PELVIC PAIN: ICD-10-CM

## 2018-08-14 LAB
BILIRUB BLD-MCNC: NEGATIVE MG/DL
CLARITY, POC: CLEAR
COLOR UR: YELLOW
GLUCOSE UR STRIP-MCNC: NEGATIVE MG/DL
KETONES UR QL: NEGATIVE
LEUKOCYTE EST, POC: ABNORMAL
NITRITE UR-MCNC: NEGATIVE MG/ML
PH UR: 7 [PH] (ref 5–8)
PROT UR STRIP-MCNC: NEGATIVE MG/DL
RBC # UR STRIP: NEGATIVE /UL
SP GR UR: 1.01 (ref 1–1.03)
UROBILINOGEN UR QL: NORMAL

## 2018-08-14 PROCEDURE — 99213 OFFICE O/P EST LOW 20 MIN: CPT | Performed by: NURSE PRACTITIONER

## 2018-08-14 PROCEDURE — 81003 URINALYSIS AUTO W/O SCOPE: CPT | Performed by: NURSE PRACTITIONER

## 2018-08-14 NOTE — PROGRESS NOTES
Subjective   Kourtney Lorenzana is a 72 y.o. female. Presents today for vaginal discharge, occurring off and on for approximately 3 weeks.     History of Present Illness 72 yr old white female here today for sudden onset of brownish vaginal discharge that has been occurring intermittently over the last 3 weeks. Patient has had a full hysterectomy and a bladder lift several years ago.       The following portions of the patient's history were reviewed and updated as appropriate: allergies, current medications, past family history, past medical history, past social history, past surgical history and problem list.    Review of Systems   Genitourinary: Positive for frequency and vaginal discharge.   All other systems reviewed and are negative.      Objective   Physical Exam   Constitutional: She is oriented to person, place, and time. She appears well-developed and well-nourished.   HENT:   Head: Normocephalic and atraumatic.   Eyes: Pupils are equal, round, and reactive to light. EOM are normal.   Neck: Normal range of motion. Neck supple.   Cardiovascular: Normal rate and regular rhythm.    Pulmonary/Chest: Effort normal and breath sounds normal.   Abdominal: Soft. Bowel sounds are normal.   Genitourinary:   Genitourinary Comments: Spotting brown vaginal discharge. Postmenopausal total hysterectomy.   Musculoskeletal: Normal range of motion.   Neurological: She is alert and oriented to person, place, and time.   Skin: Skin is warm and dry. Capillary refill takes less than 2 seconds.   Psychiatric: She has a normal mood and affect. Her behavior is normal. Judgment and thought content normal.   Nursing note and vitals reviewed.      Assessment/Plan   Kourtney was seen today for vaginal discharge.    Diagnoses and all orders for this visit:    Vaginal discharge  -     POCT urinalysis dipstick, automated  -     Ambulatory Referral to Gynecology    Pelvic pain  -     Ambulatory Referral to Gynecology  -     Urinalysis With  Culture If Indicated - Urine, Clean Catch      Referring to gynecology for further workup. In office UA read as trace leukocytes. Based on patient's symptoms will send to lab for further study.

## 2018-08-15 ENCOUNTER — OFFICE VISIT (OUTPATIENT)
Dept: OBSTETRICS AND GYNECOLOGY | Facility: CLINIC | Age: 73
End: 2018-08-15

## 2018-08-15 VITALS
WEIGHT: 170.4 LBS | SYSTOLIC BLOOD PRESSURE: 118 MMHG | DIASTOLIC BLOOD PRESSURE: 74 MMHG | HEIGHT: 62 IN | BODY MASS INDEX: 31.36 KG/M2

## 2018-08-15 DIAGNOSIS — N83.8 OVARIAN MASS: ICD-10-CM

## 2018-08-15 DIAGNOSIS — R10.30 LOWER ABDOMINAL PAIN: ICD-10-CM

## 2018-08-15 DIAGNOSIS — N76.0 ACUTE VAGINITIS: ICD-10-CM

## 2018-08-15 DIAGNOSIS — R14.0 ABDOMINAL BLOATING: Primary | ICD-10-CM

## 2018-08-15 DIAGNOSIS — R31.9 HEMATURIA, UNSPECIFIED TYPE: ICD-10-CM

## 2018-08-15 DIAGNOSIS — R19.09 OTHER INTRA-ABDOMINAL AND PELVIC SWELLING, MASS AND LUMP: ICD-10-CM

## 2018-08-15 PROCEDURE — 99213 OFFICE O/P EST LOW 20 MIN: CPT | Performed by: OBSTETRICS & GYNECOLOGY

## 2018-08-15 RX ORDER — NAPROXEN SODIUM 220 MG
220 TABLET ORAL 2 TIMES DAILY PRN
COMMUNITY
End: 2019-10-02

## 2018-08-15 NOTE — PROGRESS NOTES
"      Kourtney Lorenzana is a 72 y.o. patient who presents for follow up of   Chief Complaint   Patient presents with   • Follow-up     brownish discharge       73 yo est pt here for brownish discharge when she wipes and in the toilet when urinating. . She thinks it is coming from her bladder and her primary MD found blood in her urine and  sent a culture earlier this week and the results are pending. She denies dysuria, fevers, frequency or pressure She has had a hysterectomy with BSO. She is positive her bleeding is not rectal. She is having abdominal bloating as well.       The following portions of the patient's history were reviewed and updated as appropriate: allergies, current medications and problem list.    Review of Systems   Constitutional: Negative for chills, fever and unexpected weight change.   Gastrointestinal: Positive for abdominal distention.   Genitourinary: Positive for hematuria. Negative for difficulty urinating, dysuria, flank pain, frequency, genital sores, pelvic pain, urgency, vaginal bleeding and vaginal discharge.   Musculoskeletal: Negative for back pain.   All other systems reviewed and are negative.      /74   Ht 157.5 cm (62\")   Wt 77.3 kg (170 lb 6.4 oz)   BMI 31.17 kg/m²     Physical Exam   Constitutional: She is oriented to person, place, and time. She appears well-developed and well-nourished.   HENT:   Head: Normocephalic and atraumatic.   Abdominal: Soft. Bowel sounds are normal. She exhibits no distension and no mass. There is no tenderness. There is no rebound and no guarding. No hernia.   Genitourinary: Pelvic exam was performed with patient supine. There is no rash, tenderness, lesion or injury on the right labia. There is no rash, tenderness, lesion or injury on the left labia. Right adnexum displays no mass, no tenderness and no fullness. Left adnexum displays no mass, no tenderness and no fullness. No erythema, tenderness or bleeding in the vagina. No foreign body " in the vagina. No signs of injury around the vagina. No vaginal discharge found.   Genitourinary Comments: Normal cuff   no blood noted  No masses   Neurological: She is alert and oriented to person, place, and time.   Skin: Skin is warm and dry.   Psychiatric: She has a normal mood and affect. Her behavior is normal. Judgment and thought content normal.   Nursing note and vitals reviewed.      A/P:  1. Hematuria- culture pending with primary MD. Check NuSwab Y/M/L. Doubt vaginal origin.   2. Abdominal bloating- s/p JASPREET BSO. Check .   3. RHM- UTD 5/2017    Assessment/Plan   Kourtney was seen today for follow-up.    Diagnoses and all orders for this visit:    Abdominal bloating  -         Ovarian mass    Lower abdominal pain  -         Other intra-abdominal and pelvic swelling, mass and lump   -         Acute vaginitis  -     Genital Mycoplasmas BOB, Swab - Swab, Vagina  -     NuSwab BV & Candida - Swab, Vagina                   No Follow-up on file.      Maria Luisa Rojas MD    8/15/18  11:10 PM

## 2018-08-16 ENCOUNTER — TELEPHONE (OUTPATIENT)
Dept: FAMILY MEDICINE CLINIC | Facility: CLINIC | Age: 73
End: 2018-08-16

## 2018-08-16 LAB
APPEARANCE UR: CLEAR
BACTERIA #/AREA URNS HPF: NORMAL /[HPF]
BACTERIA UR CULT: NORMAL
BACTERIA UR CULT: NORMAL
BILIRUB UR QL STRIP: NEGATIVE
CANCER AG125 SERPL-ACNC: 22.8 U/ML (ref 0–38.1)
COLOR UR: YELLOW
EPI CELLS #/AREA URNS HPF: NORMAL /HPF
GLUCOSE UR QL: NEGATIVE
HGB UR QL STRIP: NEGATIVE
KETONES UR QL STRIP: NEGATIVE
LEUKOCYTE ESTERASE UR QL STRIP: ABNORMAL
MICRO URNS: ABNORMAL
NITRITE UR QL STRIP: NEGATIVE
PH UR STRIP: 7 [PH] (ref 5–7.5)
PROT UR QL STRIP: NEGATIVE
RBC #/AREA URNS HPF: NORMAL /HPF
SP GR UR: 1.01 (ref 1–1.03)
URINALYSIS REFLEX: ABNORMAL
UROBILINOGEN UR STRIP-MCNC: 0.2 MG/DL (ref 0.2–1)
WBC #/AREA URNS HPF: NORMAL /HPF

## 2018-08-16 NOTE — TELEPHONE ENCOUNTER
Pt saw her GYN yesterday. All of the information is in her chart. She is awaiting those test results. They did suggest she see a urologist.

## 2018-08-20 DIAGNOSIS — R31.0 GROSS HEMATURIA: Primary | ICD-10-CM

## 2018-08-20 LAB
A VAGINAE DNA VAG QL NAA+PROBE: NORMAL SCORE
BVAB2 DNA VAG QL NAA+PROBE: NORMAL SCORE
C ALBICANS DNA VAG QL NAA+PROBE: NEGATIVE
C GLABRATA DNA VAG QL NAA+PROBE: NEGATIVE
LABORATORY COMMENT REPORT: NORMAL
M GENITALIUM DNA SPEC QL NAA+PROBE: NEGATIVE
M HOMINIS DNA SPEC QL NAA+PROBE: NEGATIVE
MEGA1 DNA VAG QL NAA+PROBE: NORMAL SCORE
UREAPLASMA DNA SPEC QL NAA+PROBE: NEGATIVE

## 2018-08-20 NOTE — TELEPHONE ENCOUNTER
Patient has been sent to [facility].  [facility] does not have Urology OB/GYN

## 2018-08-20 NOTE — TELEPHONE ENCOUNTER
PT is needing for you to place the referral. All of the test from Gynecologist came back negative. She would like for her Urologist to be a female, if possible.

## 2018-08-21 NOTE — PROGRESS NOTES
PIP= vaginal swabs are all normal. I do not think that her bleeding is vaginal and I recommend she continue her workup with urology.

## 2018-11-01 ENCOUNTER — OFFICE VISIT (OUTPATIENT)
Dept: GASTROENTEROLOGY | Facility: CLINIC | Age: 73
End: 2018-11-01

## 2018-11-01 VITALS
TEMPERATURE: 98.7 F | SYSTOLIC BLOOD PRESSURE: 116 MMHG | HEIGHT: 62 IN | DIASTOLIC BLOOD PRESSURE: 70 MMHG | BODY MASS INDEX: 31.65 KG/M2 | WEIGHT: 172 LBS

## 2018-11-01 DIAGNOSIS — K21.9 GASTROESOPHAGEAL REFLUX DISEASE WITHOUT ESOPHAGITIS: ICD-10-CM

## 2018-11-01 DIAGNOSIS — R10.10 UPPER ABDOMINAL PAIN: Primary | ICD-10-CM

## 2018-11-01 DIAGNOSIS — K44.9 HIATAL HERNIA: ICD-10-CM

## 2018-11-01 DIAGNOSIS — K58.0 IRRITABLE BOWEL SYNDROME WITH DIARRHEA: ICD-10-CM

## 2018-11-01 PROCEDURE — 99213 OFFICE O/P EST LOW 20 MIN: CPT | Performed by: INTERNAL MEDICINE

## 2018-11-01 RX ORDER — LORATADINE 10 MG/1
10 TABLET ORAL DAILY
COMMUNITY
End: 2019-06-25

## 2018-11-01 NOTE — PROGRESS NOTES
Chief Complaint   Patient presents with   • Heartburn   • loose bowels-change of bowel habits       Kourtney Lorenzana is a  73 y.o. female here for a follow up visit for GERD, dysphagia and epigastric pain.      HPI  71 yo f presents today for follow up visit for GERD, dysphagia and epigastric abd pain. She was last seen in the office on 7/18 by June Castanon. She has a history of worsening reflux symptoms so changed omeprazole changed to pantoprazole 40 mg daily.    EGD was done and it was normal except for a small hiatal hernia. Esophagus was dilated. No specimens were collected.     Swallowing  improved since the EGD but she at 7/18 visit she was still having episodes of epigastric pain and worsening reflux. She felt like the protonix is working better than the omeprazole did but not 100%. She does seem to notice some foods make her issues worse like onions. She denies any dysphagia, N&V, diarrhea, constipation, rectal bleeding or melena. She admits her appetite is good and her weight is stable.     She complains of upper abdominal discomfort and sensitivity.  Feels like she cant wear a bra all day.  She complains of change in her BMs - bowels are soft, hard to get clean.  Not diarrhea - no blood in stool.  HPI  Past Medical History:   Diagnosis Date   • Anxiety    • Bruising tendency (CMS/HCC)    • Cerumen impaction    • Depression    • Diverticula, intestine    • Diverticulitis of colon    • Essential familial hypercholesterolemia    • Gastroesophageal reflux disease without esophagitis 2/11/2016   • GERD (gastroesophageal reflux disease)    • H/O colonoscopy    • IBS (irritable bowel syndrome)    • Internal hemorrhoids    • Low back pain    • Lumbar canal stenosis    • Lumbosacral neuritis    • Multiple vitamin deficiency    • Nephrolithiasis     July 2014 passage of kidney stone.   • Panic disorder without agoraphobia    • Postmenopausal HRT (hormone replacement therapy)    • UTI symptoms    • Vagina, candidiasis   "    Past Surgical History:   Procedure Laterality Date   • CATARACT EXTRACTION     • CATARACT EXTRACTION  09/05/2017    Dr. Jefe Barakat OD, MD   • COLONOSCOPY  11/2015    normal   • ENDOSCOPY  04/27/2018    [facility], no specimen collected   • FOOT SURGERY     • HYSTERECTOMY     • INCONTINENCE SURGERY         Current Outpatient Prescriptions:   •  citalopram (CELEXA) 10 MG tablet, Take 1 tablet by mouth Daily., Disp: 90 tablet, Rfl: 3  •  loratadine (CLARITIN) 10 MG tablet, Take 10 mg by mouth Daily., Disp: , Rfl:   •  naproxen sodium (ALEVE) 220 MG tablet, Take 220 mg by mouth 2 (Two) Times a Day As Needed., Disp: , Rfl:   •  pantoprazole (PROTONIX) 40 MG EC tablet, Take 1 tablet by mouth 2 (Two) Times a Day., Disp: 180 tablet, Rfl: 3  •  rifaximin (XIFAXAN) 550 MG tablet, Take 1 tablet by mouth 3 (Three) Times a Day., Disp: 42 tablet, Rfl: 2  PRN Meds:.  Allergies   Allergen Reactions   • Other      Unknown \"strong\" pain killer   • Hydrocodone-Acetaminophen GI Intolerance   • Tramadol GI Intolerance     Social History     Social History   • Marital status:      Spouse name: N/A   • Number of children: N/A   • Years of education: N/A     Occupational History   • Not on file.     Social History Main Topics   • Smoking status: Never Smoker   • Smokeless tobacco: Never Used   • Alcohol use No   • Drug use: No   • Sexual activity: No     Other Topics Concern   • Not on file     Social History Narrative   • No narrative on file     Family History   Problem Relation Age of Onset   • Atrial fibrillation Father    • Atrial fibrillation Sister    • Atrial fibrillation Brother    • Other Brother         Coronary arteriosclerosis   • Cancer Mother         colon cancer   • Colon cancer Mother      Review of Systems   Constitutional: Negative for appetite change and unexpected weight change.   Gastrointestinal: Positive for abdominal distention and abdominal pain.   All other systems reviewed and are negative.    Vitals:    " 11/01/18 1127   BP: 116/70   Temp: 98.7 °F (37.1 °C)     1    11/01/18  1127   Weight: 78 kg (172 lb)     Physical Exam   Constitutional: She appears well-developed and well-nourished.   HENT:   Head: Normocephalic and atraumatic.   Eyes: No scleral icterus.   Pulmonary/Chest: Effort normal.   Abdominal: Soft. She exhibits no distension.   Neurological: She is alert.   Skin: Skin is warm and dry.   Psychiatric: She has a normal mood and affect.     No images are attached to the encounter.  Diagnoses and all orders for this visit:    Upper abdominal pain  -     CT Abdomen Pelvis With Contrast; Future    Hiatal hernia    Gastroesophageal reflux disease without esophagitis    Irritable bowel syndrome with diarrhea    Other orders  -     loratadine (CLARITIN) 10 MG tablet; Take 10 mg by mouth Daily.  -     rifaximin (XIFAXAN) 550 MG tablet; Take 1 tablet by mouth 3 (Three) Times a Day.         Plan:  - decrease pantoprazole to once daily - no sig improvement with bid dosing  - get CT abd/pelvis for further evaluation of abdominal discomfort  - trial xifaxan for IBs-d

## 2018-11-06 ENCOUNTER — OFFICE VISIT (OUTPATIENT)
Dept: ORTHOPEDIC SURGERY | Facility: CLINIC | Age: 73
End: 2018-11-06

## 2018-11-06 ENCOUNTER — PRIOR AUTHORIZATION (OUTPATIENT)
Dept: GASTROENTEROLOGY | Facility: CLINIC | Age: 73
End: 2018-11-06

## 2018-11-06 VITALS — WEIGHT: 170 LBS | BODY MASS INDEX: 31.28 KG/M2 | HEIGHT: 62 IN

## 2018-11-06 DIAGNOSIS — M25.562 CHRONIC PAIN OF BOTH KNEES: Primary | ICD-10-CM

## 2018-11-06 DIAGNOSIS — G89.29 CHRONIC PAIN OF BOTH KNEES: Primary | ICD-10-CM

## 2018-11-06 DIAGNOSIS — M25.561 CHRONIC PAIN OF BOTH KNEES: Primary | ICD-10-CM

## 2018-11-06 PROCEDURE — 20610 DRAIN/INJ JOINT/BURSA W/O US: CPT | Performed by: NURSE PRACTITIONER

## 2018-11-06 PROCEDURE — 73562 X-RAY EXAM OF KNEE 3: CPT | Performed by: NURSE PRACTITIONER

## 2018-11-06 PROCEDURE — 99213 OFFICE O/P EST LOW 20 MIN: CPT | Performed by: NURSE PRACTITIONER

## 2018-11-06 RX ORDER — METHYLPREDNISOLONE ACETATE 80 MG/ML
80 INJECTION, SUSPENSION INTRA-ARTICULAR; INTRALESIONAL; INTRAMUSCULAR; SOFT TISSUE
Status: COMPLETED | OUTPATIENT
Start: 2018-11-06 | End: 2018-11-06

## 2018-11-06 RX ORDER — LIDOCAINE HYDROCHLORIDE 10 MG/ML
2 INJECTION, SOLUTION EPIDURAL; INFILTRATION; INTRACAUDAL; PERINEURAL
Status: COMPLETED | OUTPATIENT
Start: 2018-11-06 | End: 2018-11-06

## 2018-11-06 RX ADMIN — LIDOCAINE HYDROCHLORIDE 2 ML: 10 INJECTION, SOLUTION EPIDURAL; INFILTRATION; INTRACAUDAL; PERINEURAL at 11:00

## 2018-11-06 RX ADMIN — METHYLPREDNISOLONE ACETATE 80 MG: 80 INJECTION, SUSPENSION INTRA-ARTICULAR; INTRALESIONAL; INTRAMUSCULAR; SOFT TISSUE at 11:00

## 2018-11-06 NOTE — PROGRESS NOTES
"Patient: Kourtney Lorenzana  YOB: 1945 73 y.o. female  Medical Record Number: [id number]    Chief Complaints:   Chief Complaint   Patient presents with   • Left Knee - Pain, Follow-up   • Right Knee - Pain, Establish Care       History of Present Illness:Kourtney Lorenzana is a 73 y.o. female who presents with complaints of bilateral knee pain.  She has been seen previously for her left knee but reports her right knee became very sore and painful just a few weeks ago after a fall.  She describes the bilateral knee pain as a severe constant burning type pain with clicking and swelling.  Pain is worse with sitting standing driving walking, better with anti-inflammatories.    Allergies:   Allergies   Allergen Reactions   • Other      Unknown \"strong\" pain killer   • Hydrocodone-Acetaminophen GI Intolerance   • Tramadol GI Intolerance       Medications:   Current Outpatient Prescriptions   Medication Sig Dispense Refill   • citalopram (CELEXA) 10 MG tablet Take 1 tablet by mouth Daily. 90 tablet 3   • loratadine (CLARITIN) 10 MG tablet Take 10 mg by mouth Daily.     • pantoprazole (PROTONIX) 40 MG EC tablet Take 1 tablet by mouth 2 (Two) Times a Day. 180 tablet 3   • naproxen sodium (ALEVE) 220 MG tablet Take 220 mg by mouth 2 (Two) Times a Day As Needed.     • rifaximin (XIFAXAN) 550 MG tablet Take 1 tablet by mouth 3 (Three) Times a Day. 42 tablet 2     No current facility-administered medications for this visit.          The following portions of the patient's history were reviewed and updated as appropriate: allergies, current medications, past family history, past medical history, past social history, past surgical history and problem list.    Review of Systems:   A 14 point review of systems was performed. All systems negative except pertinent positives/negative listed in HPI above    Physical Exam:   Vitals:    11/06/18 1034   Weight: 77.1 kg (170 lb)   Height: 157.5 cm (62\")       General: A and O x 3, " ASA, NAD    SCLERA:    Normal    DENTITION:   Normal  Skin clear no unusual lesions noted  Bilateral knees patient has trace amount of effusion noted bilaterally with 100° flexion neutron extension with a positive Akiko negative Lockman calf soft nontender    Radiology:  Xrays 3views (ap,lateral, sunrise) bilateral knees were ordered and reviewed today secondary to pain and show significant arthritic changes.  Comparative views are unchanged     Assessment/Plan:  Osteoarthritis bilateral knees    Patient and I discussed treatment options.  She would like to proceed with bilateral knee cortisone injections.  Prior to injections risks were discussed including pain, infection, elevated blood sugar.  Patient verbalized understanding would elect proceed with injections.  She was referred outpatient physical therapy and we will see her back as needed also she was given samples of Pennsaid gel will let us know she would like a prescription    Large Joint Arthrocentesis  Date/Time: 11/6/2018 11:00 AM  Consent given by: patient  Site marked: site marked  Timeout: Immediately prior to procedure a time out was called to verify the correct patient, procedure, equipment, support staff and site/side marked as required   Supporting Documentation  Indications: pain and joint swelling   Procedure Details  Location: knee - R knee  Preparation: Patient was prepped and draped in the usual sterile fashion  Needle size: 22 G  Approach: anterolateral  Medications administered: 80 mg methylPREDNISolone acetate 80 MG/ML; 2 mL lidocaine PF 1% 1 %  Patient tolerance: patient tolerated the procedure well with no immediate complications    Large Joint Arthrocentesis  Date/Time: 11/6/2018 11:00 AM  Consent given by: patient  Site marked: site marked  Timeout: Immediately prior to procedure a time out was called to verify the correct patient, procedure, equipment, support staff and site/side marked as required   Supporting  Documentation  Indications: pain and joint swelling   Procedure Details  Location: knee - L knee  Preparation: Patient was prepped and draped in the usual sterile fashion  Needle size: 22 G  Approach: anterolateral  Medications administered: 2 mL lidocaine PF 1% 1 %; 80 mg methylPREDNISolone acetate 80 MG/ML  Patient tolerance: patient tolerated the procedure well with no immediate complications

## 2018-11-07 ENCOUNTER — TELEPHONE (OUTPATIENT)
Dept: GASTROENTEROLOGY | Facility: CLINIC | Age: 73
End: 2018-11-07

## 2018-11-07 ENCOUNTER — HOSPITAL ENCOUNTER (OUTPATIENT)
Dept: CT IMAGING | Facility: HOSPITAL | Age: 73
Discharge: HOME | End: 2018-11-07
Attending: INTERNAL MEDICINE | Admitting: INTERNAL MEDICINE

## 2018-11-07 DIAGNOSIS — R10.10 UPPER ABDOMINAL PAIN: ICD-10-CM

## 2018-11-07 PROCEDURE — 0 DIATRIZOATE MEGLUMINE & SODIUM PER 1 ML: Performed by: INTERNAL MEDICINE

## 2018-11-07 PROCEDURE — 82565 ASSAY OF CREATININE: CPT

## 2018-11-07 PROCEDURE — 74177 CT ABD & PELVIS W/CONTRAST: CPT

## 2018-11-07 PROCEDURE — 25010000002 IOPAMIDOL 61 % SOLUTION: Performed by: INTERNAL MEDICINE

## 2018-11-07 RX ADMIN — IOPAMIDOL 85 ML: 612 INJECTION, SOLUTION INTRAVENOUS at 11:31

## 2018-11-07 RX ADMIN — DIATRIZOATE MEGLUMINE AND DIATRIZOATE SODIUM 30 ML: 660; 100 LIQUID ORAL; RECTAL at 10:35

## 2018-11-07 NOTE — TELEPHONE ENCOUNTER
----- Message from Ivan aVzquez sent at 11/7/2018  2:01 PM EST -----  Regarding: xifaxan   Contact: [phone]  Can't afford medication

## 2018-11-07 NOTE — TELEPHONE ENCOUNTER
Called pt and the xifaxan would cost her $682.  She states she can not afford this.  Also pt is wanting to know if taking a probiotic would help her.  Advised pt would send message to Dr Andrade.

## 2018-11-08 LAB — CREAT BLDA-MCNC: 0.9 MG/DL (ref 0.6–1.3)

## 2018-11-08 NOTE — TELEPHONE ENCOUNTER
Call to pt.  Advise per Dr Andrade that can try a probiotic - no comparable alternative to xifaxan.  Call with progress report in 2-4 weeks.  Pt verb understanding.

## 2018-11-08 NOTE — TELEPHONE ENCOUNTER
Call to pt.  Advise per Dr Andrade that no explanation for abd discomfort seen on recent ct - no concerning changes were seen. Pt verb understanding.

## 2018-11-08 NOTE — TELEPHONE ENCOUNTER
She can try a probiotic - no comparable alternative to xifaxan - call with progress report in 2-4 weeks

## 2018-11-19 ENCOUNTER — TREATMENT (OUTPATIENT)
Dept: PHYSICAL THERAPY | Facility: CLINIC | Age: 73
End: 2018-11-19

## 2018-11-19 DIAGNOSIS — M17.0 PRIMARY OSTEOARTHRITIS OF BOTH KNEES: ICD-10-CM

## 2018-11-19 DIAGNOSIS — M25.562 BILATERAL CHRONIC KNEE PAIN: Primary | ICD-10-CM

## 2018-11-19 DIAGNOSIS — G89.29 BILATERAL CHRONIC KNEE PAIN: Primary | ICD-10-CM

## 2018-11-19 DIAGNOSIS — M25.561 BILATERAL CHRONIC KNEE PAIN: Primary | ICD-10-CM

## 2018-11-19 PROCEDURE — G8979 MOBILITY GOAL STATUS: HCPCS | Performed by: PHYSICAL THERAPIST

## 2018-11-19 PROCEDURE — G8978 MOBILITY CURRENT STATUS: HCPCS | Performed by: PHYSICAL THERAPIST

## 2018-11-19 PROCEDURE — 97530 THERAPEUTIC ACTIVITIES: CPT | Performed by: PHYSICAL THERAPIST

## 2018-11-19 PROCEDURE — 97162 PT EVAL MOD COMPLEX 30 MIN: CPT | Performed by: PHYSICAL THERAPIST

## 2018-11-19 PROCEDURE — 97110 THERAPEUTIC EXERCISES: CPT | Performed by: PHYSICAL THERAPIST

## 2018-11-19 NOTE — PROGRESS NOTES
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[place] -  Pain  Current pain ratin  At worst pain rating: 10 (Before shots - )  Location: (B) Medial Knee and anterior knee -   Quality: Ache; Sharp stabbing -   Alleviating factors: Injection (most recent 18); Rest; NWB; Tylenol; Alleve;   Exacerbated by: Prolonged sitting; standing; walking; Stairs; squatting; LLE activities -   Progression: improved (Since injection 18 - )    Social Support  Patient lives at: Single story home w/o basement - with Granddaughter -     Diagnostic Tests  X-ray: abnormal (Per Patient - OA )    Treatments  Current treatment: injection treatment  Patient Goals  Patient/family treatment goals: Decreased pain; Improved gait, strength, function, and return to hobby activities -          ___________________________________________________  Objective       Observations     Additional Observation Details  L>R knee diffusely swollen anterior, medial, posterior -   Genu Vaurs L>R; Genu recurvatuum    WS to (R) with flexed (L) Knee  (B) Pes planus   Mildly antalgic gait -     Palpation     Additional Palpation Details  Severely tender anterior knee / Pat tendon and infrapatellar region; Medial joint line and pes region  and ant/medial prox tib (R) >(L) -       Active Range of Motion   Left Knee   Normal active range  of motion    Right Knee   Normal active range of motion    Tests     Additional Tests Details  (+) Step-Up; Mini Squat (B) knees (PF Jt)  (-) ACL, MCL/LCL (B)  Pain medial knee w/ Thessaly (B)       See Treatment Flow sheet for Exercises, Manual therapy, and modalities.   FUNCTIONAL ACTIVITIES: X 25 min  · TAPING / BRACING: K-Tape to Unload PF Jt (B)  · Extensive instruction in anatomy, OA, why PT is required and important, etc  · Jt protection, ADL modification; Posture and     ___________________________________________________  Assessment & Plan     Assessment  Assessment details: (B) Knee pain; OA; PF Jt Dysfunction (B);   Taping very helpful in alleviating knee pain    PROBLEMS: Pain; Abnormal gait; Weakness; Intolerance to ADL's and hobby activities -   PROGNOSIS: Good    GOALS:  SHORT TERM GOALS: 2 weeks:  1) HEP Initiated; 2) Pain decreased 50%:   3) AROM  increased:  4) Improved gait / functional ability grossly;     LONG TERM GOALS: 4 weeks (or at time of DISCHARGE): 1) (I) HEP; 2) AROM WFL and pain free; 3) Strength / mobility / gait to be able to perform all ADL's and hobby-related activities w/o restrictions;       Plan  Planned therapy interventions: flexibility, home exercise program, joint mobilization, manual therapy, neuromuscular re-education, orthotic fitting/training, soft tissue mobilization, stretching, therapeutic activities and strengthening (Modalities prn; Taping prn; Bracing prn; )  Frequency: 2x week  Duration in weeks: 4  Treatment plan discussed with: patient    ___________________________________________________  Manual Therapy:         mins  68603;   Therapeutic Exercise:    15     mins  27101;     Neuromuscular Jenn:        mins  63884;   Therapeutic Activity:     25     mins  87771;     Ultrasound:          mins  14964;    Electrical Stimulation:        mins  25960 ( );  Dry Needling          mins self-pay   Gait Training:          mins  88539;  EVAL TIME:   25  mins    Timed Treatment:   40   mins                Total Treatment:     65   mins    PT SIGNATURE:   Jason Bower, PT  DATE TREATMENT INITIATED: 11/19/2018  ___________________________________________________  Initial Certification  Certification Period: 2/17/2019  I certify that the therapy services are furnished while this patient is under my care.  The services outlined above are required by this patient, and will be reviewed every 90 days.     PHYSICIAN: ________________________________  DATE: ______  Elsa Chiang APRN        Please sign and return via fax to [phone].. Thank you, [facility].  ______________________________________________________________________  [address] [place] [address]  Phone: [phone] Fax: [phone]

## 2018-11-28 ENCOUNTER — TREATMENT (OUTPATIENT)
Dept: PHYSICAL THERAPY | Facility: CLINIC | Age: 73
End: 2018-11-28

## 2018-11-28 DIAGNOSIS — M25.561 BILATERAL CHRONIC KNEE PAIN: Primary | ICD-10-CM

## 2018-11-28 DIAGNOSIS — M25.562 BILATERAL CHRONIC KNEE PAIN: Primary | ICD-10-CM

## 2018-11-28 DIAGNOSIS — M17.0 PRIMARY OSTEOARTHRITIS OF BOTH KNEES: ICD-10-CM

## 2018-11-28 DIAGNOSIS — G89.29 BILATERAL CHRONIC KNEE PAIN: Primary | ICD-10-CM

## 2018-11-28 PROCEDURE — 97110 THERAPEUTIC EXERCISES: CPT | Performed by: PHYSICAL THERAPIST

## 2018-11-28 PROCEDURE — 97140 MANUAL THERAPY 1/> REGIONS: CPT | Performed by: PHYSICAL THERAPIST

## 2018-11-28 PROCEDURE — 97530 THERAPEUTIC ACTIVITIES: CPT | Performed by: PHYSICAL THERAPIST

## 2018-12-03 ENCOUNTER — TREATMENT (OUTPATIENT)
Dept: PHYSICAL THERAPY | Facility: CLINIC | Age: 73
End: 2018-12-03

## 2018-12-03 DIAGNOSIS — M17.0 PRIMARY OSTEOARTHRITIS OF BOTH KNEES: ICD-10-CM

## 2018-12-03 DIAGNOSIS — M25.562 BILATERAL CHRONIC KNEE PAIN: Primary | ICD-10-CM

## 2018-12-03 DIAGNOSIS — G89.29 BILATERAL CHRONIC KNEE PAIN: Primary | ICD-10-CM

## 2018-12-03 DIAGNOSIS — M25.561 BILATERAL CHRONIC KNEE PAIN: Primary | ICD-10-CM

## 2018-12-03 PROCEDURE — 97140 MANUAL THERAPY 1/> REGIONS: CPT | Performed by: PHYSICAL THERAPIST

## 2018-12-03 PROCEDURE — 97110 THERAPEUTIC EXERCISES: CPT | Performed by: PHYSICAL THERAPIST

## 2018-12-03 PROCEDURE — 97530 THERAPEUTIC ACTIVITIES: CPT | Performed by: PHYSICAL THERAPIST

## 2018-12-03 NOTE — PROGRESS NOTES
Physical Therapy Daily Progress Note    Patient Name: Kourtney Lorenzana         :  1945  Referring Physician: Elsa Chiang APRN    Subjective   Kourtney Lorenzana reports: Taping very helpful in decreasing pain and allowing improved gait and function - Notes some cramping in (L) lower leg - Today pain in medial (L) knee and minimal in (R) knee -     Objective   Improved / less antalgic gait -   Swelling medial (L) knee -   Very tender medial joint line (L) knee and medial PF Jt -     See Exercise, Manual, and Modality Logs for complete treatment.     Functional / Therapeutic Activities:  10 min  · TAPING / BRACING: K-Tape to 1) Unload PF Jt; 2) Unload Medial knee (L)  · Jt protection, ADL modification; Posture and      Assessment/Plan  (B) Knee pain; OA; PF Jt Dysfunction (B); (L>R)  Taping very helpful in alleviating knee pain    Progress strengthening /stabilization /functional activity     _________________________________________________  Manual Therapy:    10     mins  78396;  Therapeutic Exercise:    25     mins  13061;     Neuromuscular Jenn:        mins  73182;    Therapeutic Activity:     10     mins  78397;     Gait Training:           mins  99343;     Ultrasound:     10     mins  15756;    Electrical Stimulation:         mins  50695 ( );  Dry Needling          mins self-pay    Timed Treatment:   55   mins                  Total Treatment:     55   mins    Jason Bower PT  Physical Therapist

## 2018-12-06 NOTE — PROGRESS NOTES
Physical Therapy Daily Progress Note     Patient Name: Kourtney Lorenzana         :  1945  Referring Physician: Elsa Chiang APRN     Subjective   Kourtney Lorenzana reports: Taping very helpful in decreasing pain and allowing improved gait and function - - Today pain in medial (L) knee and minimal in (R) knee -      Objective   Improved / less antalgic gait -   Swelling medial (L) knee -   Very tender medial joint line (L) knee and medial PF Jt -      See Exercise, Manual, and Modality Logs for complete treatment.     Functional / Therapeutic Activities:  10 min  · TAPING / BRACING: K-Tape to 1) Unload PF Jt; 2) Unload Medial knee (L)  · Jt protection, ADL modification; Posture and       Assessment/Plan  (B) Knee pain; OA; PF Jt Dysfunction (B); (L>R)  Taping very helpful in alleviating knee pain     Progress strengthening /stabilization /functional activity  _________________________________________________  Manual Therapy:            10     mins  82569;  Therapeutic Exercise:    25     mins  38950;     Neuromuscular Jenn:        mins  88336;    Therapeutic Activity:      10     mins  30358;     Gait Training:                      mins  85169;     Ultrasound:                     10     mins  83950;    Electrical Stimulation:         mins  88915 ( );  Dry Needling                       mins self-pay     Timed Treatment:   55   mins                  Total Treatment:     55   mins     Jason Bower PT  Physical Therapist

## 2018-12-10 ENCOUNTER — TREATMENT (OUTPATIENT)
Dept: PHYSICAL THERAPY | Facility: CLINIC | Age: 73
End: 2018-12-10

## 2018-12-10 DIAGNOSIS — M25.562 BILATERAL CHRONIC KNEE PAIN: Primary | ICD-10-CM

## 2018-12-10 DIAGNOSIS — G89.29 BILATERAL CHRONIC KNEE PAIN: Primary | ICD-10-CM

## 2018-12-10 DIAGNOSIS — M17.0 PRIMARY OSTEOARTHRITIS OF BOTH KNEES: ICD-10-CM

## 2018-12-10 DIAGNOSIS — M25.561 BILATERAL CHRONIC KNEE PAIN: Primary | ICD-10-CM

## 2018-12-10 PROCEDURE — 97110 THERAPEUTIC EXERCISES: CPT | Performed by: PHYSICAL THERAPIST

## 2018-12-10 PROCEDURE — 97530 THERAPEUTIC ACTIVITIES: CPT | Performed by: PHYSICAL THERAPIST

## 2018-12-10 PROCEDURE — 97140 MANUAL THERAPY 1/> REGIONS: CPT | Performed by: PHYSICAL THERAPIST

## 2018-12-17 NOTE — PROGRESS NOTES
Physical Therapy Daily Progress Note     Patient Name: Kourtney Lorenzana         :  1945  Referring Physician: Elsa Chiang APRN     Subjective   Kourtney Lorenzana reports: Taping very helpful in decreasing pain and allowing improved gait and function - - Today pain in medial (L) knee and minimal in (R) knee -      Objective   Improved / less antalgic gait -   Swelling medial (L) knee -   Very tender medial joint line (L) knee and medial PF Jt -      See Exercise, Manual, and Modality Logs for complete treatment.     Functional / Therapeutic Activities:  10 min  · TAPING / BRACING: K-Tape to 1) Unload PF Jt; 2) Unload Medial knee (L)  · Jt protection, ADL modification; Posture and       Assessment/Plan  (B) Knee pain; OA; PF Jt Dysfunction (B); (L>R)  Taping very helpful in alleviating knee pain     Progress strengthening /stabilization /functional activity  _________________________________________________  Manual Therapy:            10     mins  26393;  Therapeutic Exercise:    25     mins  59375;     Neuromuscular Jenn:        mins  70847;    Therapeutic Activity:      10     mins  55775;     Gait Training:                      mins  64384;     Ultrasound:                     10     mins  52971;    Electrical Stimulation:         mins  35717 ( );  Dry Needling                       mins self-pay     Timed Treatment:   55   mins                  Total Treatment:     55   mins     Jason Bower PT  Physical Therapist

## 2019-01-11 ENCOUNTER — TREATMENT (OUTPATIENT)
Dept: PHYSICAL THERAPY | Facility: CLINIC | Age: 74
End: 2019-01-11

## 2019-01-11 DIAGNOSIS — M25.561 BILATERAL CHRONIC KNEE PAIN: Primary | ICD-10-CM

## 2019-01-11 DIAGNOSIS — G89.29 BILATERAL CHRONIC KNEE PAIN: Primary | ICD-10-CM

## 2019-01-11 DIAGNOSIS — M25.562 BILATERAL CHRONIC KNEE PAIN: Primary | ICD-10-CM

## 2019-01-11 DIAGNOSIS — M17.0 PRIMARY OSTEOARTHRITIS OF BOTH KNEES: ICD-10-CM

## 2019-01-11 PROCEDURE — 97110 THERAPEUTIC EXERCISES: CPT | Performed by: PHYSICAL THERAPIST

## 2019-01-11 PROCEDURE — 97530 THERAPEUTIC ACTIVITIES: CPT | Performed by: PHYSICAL THERAPIST

## 2019-01-14 NOTE — PROGRESS NOTES
Physical Therapy Daily Progress Note     Patient Name: Kourtney Lorenzana         :  1945  Referring Physician: Elsa Chiang APRN     Subjective   Kourtney Lorenzana reports: doing much better with decreased pain and improved function - even w/o tape - Taping very helpful in decreasing pain and allowing improved gait and function - - Minimal pain today and for some time -      Objective   Much improved gait -   Less tender       See Exercise, Manual, and Modality Logs for complete treatment.     Functional / Therapeutic Activities:  10 min  · TAPING / BRACING: K-Tape to 1) Unload PF Jt; 2) Unload Medial knee (L)- HELD  · SEE EXERCISE FLOW SHEET -   · Jt protection, ADL modification; Posture and       Assessment/Plan  (B) Knee pain; OA; PF Jt Dysfunction (B); (L>R)  Taping very helpful in alleviating knee pain     Progress strengthening /stabilization /functional activity prn  _________________________________________________  Manual Therapy:                 mins  61677;  Therapeutic Exercise:    45     mins  64833;     Neuromuscular Jenn:        mins  70754;    Therapeutic Activity:      10     mins  92117;     Gait Training:                      mins  70504;     Ultrasound:                          mins  27452;    Electrical Stimulation:         mins  45741 ( );  Dry Needling                       mins self-pay     Timed Treatment:   55   mins                  Total Treatment:     55   mins     Jason Bower PT  Physical Therapist

## 2019-06-25 ENCOUNTER — OFFICE VISIT (OUTPATIENT)
Dept: FAMILY MEDICINE CLINIC | Facility: CLINIC | Age: 74
End: 2019-06-25

## 2019-06-25 VITALS
HEIGHT: 62 IN | HEART RATE: 80 BPM | TEMPERATURE: 97.6 F | SYSTOLIC BLOOD PRESSURE: 114 MMHG | DIASTOLIC BLOOD PRESSURE: 70 MMHG | BODY MASS INDEX: 32.97 KG/M2 | WEIGHT: 179.2 LBS | OXYGEN SATURATION: 97 %

## 2019-06-25 DIAGNOSIS — R53.82 CHRONIC FATIGUE: Primary | ICD-10-CM

## 2019-06-25 DIAGNOSIS — M54.12 CERVICAL RADICULOPATHY: ICD-10-CM

## 2019-06-25 DIAGNOSIS — M54.2 CERVICALGIA: ICD-10-CM

## 2019-06-25 DIAGNOSIS — R20.2 PARESTHESIAS: ICD-10-CM

## 2019-06-25 DIAGNOSIS — B37.2 CANDIDIASIS, CUTANEOUS: ICD-10-CM

## 2019-06-25 DIAGNOSIS — R79.9 ABNORMAL FINDING OF BLOOD CHEMISTRY: ICD-10-CM

## 2019-06-25 DIAGNOSIS — E55.9 VITAMIN D DEFICIENCY: ICD-10-CM

## 2019-06-25 PROCEDURE — 99214 OFFICE O/P EST MOD 30 MIN: CPT | Performed by: PHYSICIAN ASSISTANT

## 2019-06-25 RX ORDER — ACETAMINOPHEN 500 MG
500-1000 TABLET ORAL EVERY 6 HOURS PRN
COMMUNITY

## 2019-06-25 NOTE — PROGRESS NOTES
Subjective   Kourtney Lorenzana is a 73 y.o. female here today to discuss increased fatigue, rash under breasts     History of Present Illness     Talked with Urogyn and was advised that spotting was from vaginal dryness and is no longer having abdominal symptoms and rarely spotting.     Orthopedist- got PT and shots in her knee. States right knee improved and left knee still hurts. Return as needed for shots.     Was seeing GI- was to follow up and missed appt. Continues with GERD    States she has a scar on her abdomen- states she has pain in the area of her scar from hysterectomy. When rubs it, the skin comes off.     Also itching under breasts - now also has a rash- scratches.     Has been very fatigued all the time- does something for a few hours and is tired for the rest of the day. Sometimes is out of breath and fatigued with exertion. Bilateral toes are blue- noticed with last pedicure.     Pain in neck and down left upper arm. Not sore to touch or feel but is a deep pain. Bilateral hands go numb.     Now is cold all the time.    Also feels heart pounding in her ears.     Concerned about diabetes- father had that late in life. States has had elevated glucose in the past.     The following portions of the patient's history were reviewed and updated as appropriate: allergies, current medications, past family history, past medical history, past social history, past surgical history and problem list.    Review of Systems   Constitutional: Positive for fatigue.   Skin: Positive for rash (under bilateral breasts ).   Psychiatric/Behavioral:        Insomnia        Objective   Physical Exam   Constitutional: She is oriented to person, place, and time. She appears well-developed and well-nourished.   HENT:   Head: Normocephalic and atraumatic.   Right Ear: External ear normal.   Left Ear: External ear normal.   Nose: Nose normal.   Eyes: Conjunctivae and lids are normal.   Neck: Neck supple. Carotid bruit is not present.    Cardiovascular: Normal rate, regular rhythm, normal heart sounds and intact distal pulses. Exam reveals no gallop and no friction rub.   No murmur heard.  Pulmonary/Chest: Effort normal and breath sounds normal. No respiratory distress. She has no wheezes. She has no rhonchi. She has no rales.   Erythema with whitish d/c and flaking/ sloughing skin and at lower abdomen, has pink base with slight flaking   Musculoskeletal: She exhibits no edema or deformity.   Neurological: She is alert and oriented to person, place, and time. Gait normal.   Skin: Skin is warm and dry.   Psychiatric: She has a normal mood and affect. Her speech is normal and behavior is normal. Judgment and thought content normal. Cognition and memory are normal.   Nursing note and vitals reviewed.      Assessment/Plan   Kourtney was seen today for insomnia, rash and fatigue.    Diagnoses and all orders for this visit:    Chronic fatigue  -     CBC & Differential  -     Comprehensive Metabolic Panel  -     CK  -     Lipid Panel With LDL / HDL Ratio  -     Thyroid Panel With TSH  -     Iron and TIBC  -     Ferritin  -     Vitamin B12 & Folate  -     Vitamin D 25 Hydroxy  -     Urinalysis With Microscopic - Urine, Clean Catch  -     Hemoglobin A1c    Candidiasis, cutaneous  -     CBC & Differential  -     Comprehensive Metabolic Panel  -     CK  -     Lipid Panel With LDL / HDL Ratio  -     Thyroid Panel With TSH  -     Iron and TIBC  -     Ferritin  -     Vitamin B12 & Folate  -     Vitamin D 25 Hydroxy  -     Urinalysis With Microscopic - Urine, Clean Catch  -     Hemoglobin A1c    Cervicalgia  -     CBC & Differential  -     Comprehensive Metabolic Panel  -     CK  -     Lipid Panel With LDL / HDL Ratio  -     Thyroid Panel With TSH  -     Iron and TIBC  -     Ferritin  -     Vitamin B12 & Folate  -     Vitamin D 25 Hydroxy  -     Urinalysis With Microscopic - Urine, Clean Catch  -     Hemoglobin A1c    Cervical radiculopathy  -     CBC &  Differential  -     Comprehensive Metabolic Panel  -     CK  -     Lipid Panel With LDL / HDL Ratio  -     Thyroid Panel With TSH  -     Iron and TIBC  -     Ferritin  -     Vitamin B12 & Folate  -     Vitamin D 25 Hydroxy  -     Urinalysis With Microscopic - Urine, Clean Catch  -     Hemoglobin A1c    Paresthesias  -     CBC & Differential  -     Comprehensive Metabolic Panel  -     CK  -     Lipid Panel With LDL / HDL Ratio  -     Thyroid Panel With TSH  -     Iron and TIBC  -     Ferritin  -     Vitamin B12 & Folate  -     Vitamin D 25 Hydroxy  -     Urinalysis With Microscopic - Urine, Clean Catch  -     Hemoglobin A1c    Abnormal finding of blood chemistry   -     Lipid Panel With LDL / HDL Ratio  -     Iron and TIBC  -     Ferritin  -     Hemoglobin A1c    Vitamin D deficiency  -     Vitamin D 25 Hydroxy    Other orders  -     Microscopic Examination -      Patient Instructions   73 year old female who presents today in follow up of specialists with complaints of chronic fatigue and rash. Patient talked with Urogyn and was advised that her spotting was from vaginal dryness. She is no longer having abdominal symptoms and rarely has spotting. She should see them as directed.      She was seeing GI and was to follow up. She missed appt and continues with GERD symptoms. I advised that she follow up with GI. Patient is agreeable.     Patient was also seeing an orthopedist. She got PT and got shots in her knee. She states her right knee pain improved and her left knee still hurts. She will return as needed for shots.     She has been very fatigued all the time. She will do something for a few hours then is tired for the rest of the day. Sometimes she is out of breath and fatigued with exertion. Patient has also noted bilateral toes are blue. She noticed this with her last pedicure. She has normal DP and PT pulses bilaterally with normal temperature of the feet as well. She feels overall cool/ cold all the time. I  will check labs today- call if no results in 1 week. Further recommendations and follow up pending results. Consideration of sleep study.       She states she has a scar on her abdomen and states she has pain in the area of her scar from hysterectomy. When she rubs it, the skin comes off. Also, she has itching under bilateral breasts and now also has a rash. On exam, she appears to have cutaneous candidiasis. I will have her use Nystatin cream and powder in both areas and call or return if worsening, new or changing symptoms. I will check labs to include A1c today. She is concerned about diabetes as well due to family history of diabetes.     Patient also noted pain in her neck and down her left upper arm. She is not sore to touch or feel but is a deep pain. Bilaterally, hands go numb. We will start with labs but will consider PT and additional workup.     She also feels heart pounding in her ears. Benign exam today.     I will check labs today- call if no results in 1 week. Further recommendations pending labs.

## 2019-06-26 LAB
25(OH)D3+25(OH)D2 SERPL-MCNC: 30.3 NG/ML (ref 30–100)
ALBUMIN SERPL-MCNC: 4.4 G/DL (ref 3.5–4.8)
ALBUMIN/GLOB SERPL: 1.6 {RATIO} (ref 1.2–2.2)
ALP SERPL-CCNC: 143 IU/L (ref 39–117)
ALT SERPL-CCNC: 20 IU/L (ref 0–32)
APPEARANCE UR: CLEAR
AST SERPL-CCNC: 9 IU/L (ref 0–40)
BACTERIA #/AREA URNS HPF: NORMAL /[HPF]
BASOPHILS # BLD AUTO: 0 X10E3/UL (ref 0–0.2)
BASOPHILS NFR BLD AUTO: 0 %
BILIRUB SERPL-MCNC: 0.5 MG/DL (ref 0–1.2)
BILIRUB UR QL STRIP: NEGATIVE
BUN SERPL-MCNC: 18 MG/DL (ref 8–27)
BUN/CREAT SERPL: 20 (ref 12–28)
CALCIUM SERPL-MCNC: 9.6 MG/DL (ref 8.7–10.3)
CHLORIDE SERPL-SCNC: 107 MMOL/L (ref 96–106)
CHOLEST SERPL-MCNC: 227 MG/DL (ref 100–199)
CK SERPL-CCNC: 92 U/L (ref 24–173)
CO2 SERPL-SCNC: 21 MMOL/L (ref 20–29)
COLOR UR: YELLOW
CREAT SERPL-MCNC: 0.92 MG/DL (ref 0.57–1)
EOSINOPHIL # BLD AUTO: 0.1 X10E3/UL (ref 0–0.4)
EOSINOPHIL NFR BLD AUTO: 2 %
EPI CELLS #/AREA URNS HPF: NORMAL /HPF
ERYTHROCYTE [DISTWIDTH] IN BLOOD BY AUTOMATED COUNT: 14.2 % (ref 12.3–15.4)
FERRITIN SERPL-MCNC: 96 NG/ML (ref 15–150)
FOLATE SERPL-MCNC: 8.8 NG/ML
FT4I SERPL CALC-MCNC: 1.8 (ref 1.2–4.9)
GLOBULIN SER CALC-MCNC: 2.7 G/DL (ref 1.5–4.5)
GLUCOSE SERPL-MCNC: 83 MG/DL (ref 65–99)
GLUCOSE UR QL: NEGATIVE
HBA1C MFR BLD: 5.6 % (ref 4.8–5.6)
HCT VFR BLD AUTO: 38.6 % (ref 34–46.6)
HDLC SERPL-MCNC: 58 MG/DL
HGB BLD-MCNC: 13.1 G/DL (ref 11.1–15.9)
HGB UR QL STRIP: NEGATIVE
IMM GRANULOCYTES # BLD AUTO: 0 X10E3/UL (ref 0–0.1)
IMM GRANULOCYTES NFR BLD AUTO: 0 %
IRON SATN MFR SERPL: 33 % (ref 15–55)
IRON SERPL-MCNC: 104 UG/DL (ref 27–139)
KETONES UR QL STRIP: NEGATIVE
LDLC SERPL CALC-MCNC: 148 MG/DL (ref 0–99)
LDLC/HDLC SERPL: 2.6 RATIO (ref 0–3.2)
LEUKOCYTE ESTERASE UR QL STRIP: NEGATIVE
LYMPHOCYTES # BLD AUTO: 1.9 X10E3/UL (ref 0.7–3.1)
LYMPHOCYTES NFR BLD AUTO: 32 %
MCH RBC QN AUTO: 30.5 PG (ref 26.6–33)
MCHC RBC AUTO-ENTMCNC: 33.9 G/DL (ref 31.5–35.7)
MCV RBC AUTO: 90 FL (ref 79–97)
MICRO URNS: (no result)
MICRO URNS: (no result)
MONOCYTES # BLD AUTO: 0.7 X10E3/UL (ref 0.1–0.9)
MONOCYTES NFR BLD AUTO: 12 %
NEUTROPHILS # BLD AUTO: 3.3 X10E3/UL (ref 1.4–7)
NEUTROPHILS NFR BLD AUTO: 54 %
NITRITE UR QL STRIP: NEGATIVE
PH UR STRIP: 6.5 [PH] (ref 5–7.5)
PLATELET # BLD AUTO: 217 X10E3/UL (ref 150–450)
POTASSIUM SERPL-SCNC: 3.9 MMOL/L (ref 3.5–5.2)
PROT SERPL-MCNC: 7.1 G/DL (ref 6–8.5)
PROT UR QL STRIP: NEGATIVE
RBC # BLD AUTO: 4.29 X10E6/UL (ref 3.77–5.28)
RBC #/AREA URNS HPF: NORMAL /HPF
SODIUM SERPL-SCNC: 144 MMOL/L (ref 134–144)
SP GR UR: 1 (ref 1–1.03)
T3RU NFR SERPL: 25 % (ref 24–39)
T4 SERPL-MCNC: 7.1 UG/DL (ref 4.5–12)
TIBC SERPL-MCNC: 319 UG/DL (ref 250–450)
TRIGL SERPL-MCNC: 105 MG/DL (ref 0–149)
TSH SERPL DL<=0.005 MIU/L-ACNC: 1.62 UIU/ML (ref 0.45–4.5)
UIBC SERPL-MCNC: 215 UG/DL (ref 118–369)
UROBILINOGEN UR STRIP-MCNC: 0.2 MG/DL (ref 0.2–1)
VIT B12 SERPL-MCNC: 309 PG/ML (ref 232–1245)
VLDLC SERPL CALC-MCNC: 21 MG/DL (ref 5–40)
WBC # BLD AUTO: 6 X10E3/UL (ref 3.4–10.8)
WBC #/AREA URNS HPF: NORMAL /HPF

## 2019-07-02 ENCOUNTER — TELEPHONE (OUTPATIENT)
Dept: FAMILY MEDICINE CLINIC | Facility: CLINIC | Age: 74
End: 2019-07-02

## 2019-07-02 NOTE — TELEPHONE ENCOUNTER
Patient called and she is needing her lab results from last week, her best call back is [phone]

## 2019-07-12 DIAGNOSIS — R74.8 ELEVATED ALKALINE PHOSPHATASE LEVEL: Primary | ICD-10-CM

## 2019-10-02 ENCOUNTER — OFFICE VISIT (OUTPATIENT)
Dept: FAMILY MEDICINE CLINIC | Facility: CLINIC | Age: 74
End: 2019-10-02

## 2019-10-02 VITALS
DIASTOLIC BLOOD PRESSURE: 71 MMHG | HEART RATE: 67 BPM | TEMPERATURE: 98.4 F | BODY MASS INDEX: 29.52 KG/M2 | RESPIRATION RATE: 16 BRPM | HEIGHT: 62 IN | OXYGEN SATURATION: 94 % | SYSTOLIC BLOOD PRESSURE: 108 MMHG | WEIGHT: 160.4 LBS

## 2019-10-02 DIAGNOSIS — R53.82 CHRONIC FATIGUE: ICD-10-CM

## 2019-10-02 DIAGNOSIS — E78.49 OTHER HYPERLIPIDEMIA: Primary | ICD-10-CM

## 2019-10-02 DIAGNOSIS — R74.8 ELEVATED ALKALINE PHOSPHATASE LEVEL: ICD-10-CM

## 2019-10-02 DIAGNOSIS — E53.8 B12 DEFICIENCY: ICD-10-CM

## 2019-10-02 DIAGNOSIS — E55.9 VITAMIN D DEFICIENCY: ICD-10-CM

## 2019-10-02 PROCEDURE — 99214 OFFICE O/P EST MOD 30 MIN: CPT | Performed by: PHYSICIAN ASSISTANT

## 2019-10-02 RX ORDER — CHOLECALCIFEROL (VITAMIN D3) 125 MCG
1000 CAPSULE ORAL DAILY
COMMUNITY
End: 2020-03-04

## 2019-10-02 RX ORDER — CHOLECALCIFEROL (VITAMIN D3) 125 MCG
2000 CAPSULE ORAL EVERY MORNING
COMMUNITY
End: 2021-05-15 | Stop reason: HOSPADM

## 2019-10-02 NOTE — PROGRESS NOTES
Subjective   Kourtney Lorenzana is a 73 y.o. female present today to follow up on lipid panel.     History of Present Illness     Has joined weight [employer] 3 months ago. Has lost 19 lbs.     Was taking Aleve and stopped that after visit. Taking Tylenol- 2 at bedtime.     Waking up 4 x nightly to urinate but can usually go back to sleep.     The following portions of the patient's history were reviewed and updated as appropriate: allergies, current medications, past family history, past medical history, past social history, past surgical history and problem list.    Review of Systems   All other systems reviewed and are negative.      Objective    Vitals:    10/02/19 1001   BP: 108/71   Pulse: 67   Resp: 16   Temp: 98.4 °F (36.9 °C)   SpO2: 94%     Body mass index is 29.33 kg/m².    Physical Exam   Constitutional: She is oriented to person, place, and time. She appears well-developed and well-nourished.   HENT:   Head: Normocephalic and atraumatic.   Right Ear: External ear normal.   Left Ear: External ear normal.   Nose: Nose normal.   Eyes: Conjunctivae and lids are normal.   Neck: Neck supple. Carotid bruit is not present.   Cardiovascular: Normal rate, regular rhythm, normal heart sounds and intact distal pulses. Exam reveals no gallop and no friction rub.   No murmur heard.  Pulmonary/Chest: Effort normal and breath sounds normal. No respiratory distress. She has no wheezes. She has no rhonchi. She has no rales.   Musculoskeletal: She exhibits no edema or deformity.   Neurological: She is alert and oriented to person, place, and time. Gait normal.   Skin: Skin is warm and dry.   Psychiatric: She has a normal mood and affect. Her speech is normal and behavior is normal. Judgment and thought content normal. Cognition and memory are normal.   Nursing note and vitals reviewed.      Assessment/Plan   Kourtney was seen today for follow-up.    Diagnoses and all orders for this visit:    Other hyperlipidemia  -      Comprehensive Metabolic Panel  -     CK  -     Lipid Panel With LDL / HDL Ratio    Elevated alkaline phosphatase level  -     Comprehensive Metabolic Panel  -     Alkaline Phosphatase, Isoenzymes    Chronic fatigue  -     CBC & Differential  -     Comprehensive Metabolic Panel  -     Vitamin B12 & Folate    Vitamin D deficiency  -     Comprehensive Metabolic Panel  -     Vitamin D 25 Hydroxy    B12 deficiency  -     CBC & Differential  -     Alkaline Phosphatase, Isoenzymes      Patient Instructions   73 year old female who presents today in follow up of hyperlipidemia, elevated alkaline phosphatase, and B12 and vitamin D deficiencies, as well as specialists and chronic fatigue. She was seen 6/2019 and had been very fatigued all the time. She will do something for a few hours then is tired for the rest of the day. Sometimes she is out of breath and fatigued with exertion. Patient had also noted bilateral toes were blue. She noticed this with her last pedicure. She had normal DP and PT pulses bilaterally with normal temperature of the feet as well. She felt overall cool/ cold all the time. Since last appt, she joined weight [employer] 3 months ago and lost 19 lbs. She was taking Aleve and stopped that after last visit. She is now taking 2 Tylenol at bedtime. Patient continues waking up 4 x nightly to urinate but states she can usually go back to sleep. She is overall feeling better. I will check labs today. Call if no results in 1 week. Further recommendations, stability of conditions, plan, and follow up pending labs. If she has persistent sleep disturbances or fatigue, we will consider sleep study.     At her previous visit, she stated she has a scar on her abdomen with pain in the area of her scar from hysterectomy. When she rubbed it, the skin came off. Also, she had itching under bilateral breasts with a rash. On exam, she appeared to have cutaneous candidiasis and was treated with Nystatin cream and powder in  both areas. She has had improvement.       Patient also noted pain in her neck and down her left upper arm. She was not sore to touch or feel but is a deep pain. Bilaterally, her hands go numb. We started with labs and advised we would consider PT and additional workup if no improvement or recurrence.      She talked with Urogyn and was advised that her spotting was from vaginal dryness. She is no longer having abdominal symptoms and rarely has spotting. She should see them as directed.       She was seeing GI and was to follow up but missed her appt and continued with GERD symptoms. I advised that she follow up with GI. Patient did not see them in follow up but has had improvement with dietary changes.      Patient was also seeing an orthopedist. She got PT and knee injections. She states her right knee pain improved and her left knee still hurts. She will return as needed for injections with orthopedist.

## 2019-10-03 LAB
25(OH)D3+25(OH)D2 SERPL-MCNC: 55.6 NG/ML (ref 30–100)
ALBUMIN SERPL-MCNC: 4.4 G/DL (ref 3.5–5.2)
ALBUMIN/GLOB SERPL: 1.6 G/DL
ALP BONE CFR SERPL: 50 % (ref 14–68)
ALP INTEST CFR SERPL: 1 % (ref 0–18)
ALP LIVER CFR SERPL: 49 % (ref 18–85)
ALP SERPL-CCNC: 137 U/L (ref 39–117)
ALT SERPL-CCNC: 33 U/L (ref 1–33)
AST SERPL-CCNC: 13 U/L (ref 1–32)
BASOPHILS # BLD AUTO: 0.02 10*3/MM3 (ref 0–0.2)
BASOPHILS NFR BLD AUTO: 0.5 % (ref 0–1.5)
BILIRUB SERPL-MCNC: 0.5 MG/DL (ref 0.2–1.2)
BUN SERPL-MCNC: 12 MG/DL (ref 8–23)
BUN/CREAT SERPL: 12.4 (ref 7–25)
CALCIUM SERPL-MCNC: 9.8 MG/DL (ref 8.6–10.5)
CHLORIDE SERPL-SCNC: 105 MMOL/L (ref 98–107)
CHOLEST SERPL-MCNC: 214 MG/DL (ref 0–200)
CK SERPL-CCNC: 76 U/L (ref 20–180)
CO2 SERPL-SCNC: 25.2 MMOL/L (ref 22–29)
CREAT SERPL-MCNC: 0.97 MG/DL (ref 0.57–1)
EOSINOPHIL # BLD AUTO: 0.09 10*3/MM3 (ref 0–0.4)
EOSINOPHIL NFR BLD AUTO: 2 % (ref 0.3–6.2)
ERYTHROCYTE [DISTWIDTH] IN BLOOD BY AUTOMATED COUNT: 13.3 % (ref 12.3–15.4)
FOLATE SERPL-MCNC: 7.4 NG/ML (ref 4.78–24.2)
GLOBULIN SER CALC-MCNC: 2.8 GM/DL
GLUCOSE SERPL-MCNC: 94 MG/DL (ref 65–99)
HCT VFR BLD AUTO: 38.8 % (ref 34–46.6)
HDLC SERPL-MCNC: 52 MG/DL (ref 40–60)
HGB BLD-MCNC: 14.2 G/DL (ref 12–15.9)
IMM GRANULOCYTES # BLD AUTO: 0.02 10*3/MM3 (ref 0–0.05)
IMM GRANULOCYTES NFR BLD AUTO: 0.5 % (ref 0–0.5)
LDLC SERPL CALC-MCNC: 137 MG/DL (ref 0–100)
LDLC/HDLC SERPL: 2.64 {RATIO}
LYMPHOCYTES # BLD AUTO: 1.35 10*3/MM3 (ref 0.7–3.1)
LYMPHOCYTES NFR BLD AUTO: 30.4 % (ref 19.6–45.3)
MCH RBC QN AUTO: 33.4 PG (ref 26.6–33)
MCHC RBC AUTO-ENTMCNC: 36.6 G/DL (ref 31.5–35.7)
MCV RBC AUTO: 91.3 FL (ref 79–97)
MONOCYTES # BLD AUTO: 0.35 10*3/MM3 (ref 0.1–0.9)
MONOCYTES NFR BLD AUTO: 7.9 % (ref 5–12)
NEUTROPHILS # BLD AUTO: 2.61 10*3/MM3 (ref 1.7–7)
NEUTROPHILS NFR BLD AUTO: 58.7 % (ref 42.7–76)
NRBC BLD AUTO-RTO: 0 /100 WBC (ref 0–0.2)
PLATELET # BLD AUTO: 206 10*3/MM3 (ref 140–450)
POTASSIUM SERPL-SCNC: 4.5 MMOL/L (ref 3.5–5.2)
PROT SERPL-MCNC: 7.2 G/DL (ref 6–8.5)
RBC # BLD AUTO: 4.25 10*6/MM3 (ref 3.77–5.28)
SODIUM SERPL-SCNC: 143 MMOL/L (ref 136–145)
TRIGL SERPL-MCNC: 123 MG/DL (ref 0–150)
VIT B12 SERPL-MCNC: 690 PG/ML (ref 211–946)
VLDLC SERPL CALC-MCNC: 24.6 MG/DL
WBC # BLD AUTO: 4.44 10*3/MM3 (ref 3.4–10.8)

## 2019-10-07 ENCOUNTER — TELEPHONE (OUTPATIENT)
Dept: FAMILY MEDICINE CLINIC | Facility: CLINIC | Age: 74
End: 2019-10-07

## 2019-10-07 ENCOUNTER — TELEPHONE (OUTPATIENT)
Dept: ORTHOPEDIC SURGERY | Facility: CLINIC | Age: 74
End: 2019-10-07

## 2019-10-07 DIAGNOSIS — G89.29 CHRONIC PAIN OF RIGHT KNEE: Primary | ICD-10-CM

## 2019-10-07 DIAGNOSIS — M25.561 CHRONIC PAIN OF BOTH KNEES: ICD-10-CM

## 2019-10-07 DIAGNOSIS — M25.562 CHRONIC PAIN OF BOTH KNEES: ICD-10-CM

## 2019-10-07 DIAGNOSIS — M25.561 CHRONIC PAIN OF RIGHT KNEE: Primary | ICD-10-CM

## 2019-10-07 DIAGNOSIS — G89.29 CHRONIC PAIN OF BOTH KNEES: ICD-10-CM

## 2019-10-07 NOTE — TELEPHONE ENCOUNTER
----- Message from Romina Graymarytien sent at 10/7/2019 11:04 AM EDT -----  Regarding: REFERRAL TO PT FOR KNEE  Patient called and I advised her that Romina had not seen her for this problem and we should schedule appt.  She is going to call Dr. De La Cruz office and discuss if he would send her to PT since he is treating her for this.

## 2019-10-07 NOTE — TELEPHONE ENCOUNTER
Okay.  She should let me know if she needs evaluation for this and we can determine most appropriate treatment.

## 2019-10-08 ENCOUNTER — TREATMENT (OUTPATIENT)
Dept: PHYSICAL THERAPY | Facility: CLINIC | Age: 74
End: 2019-10-08

## 2019-10-08 DIAGNOSIS — R26.9 GAIT DISTURBANCE: ICD-10-CM

## 2019-10-08 DIAGNOSIS — M17.11 PRIMARY OSTEOARTHRITIS OF RIGHT KNEE: ICD-10-CM

## 2019-10-08 DIAGNOSIS — M25.561 CHRONIC PAIN OF RIGHT KNEE: Primary | ICD-10-CM

## 2019-10-08 DIAGNOSIS — G89.29 CHRONIC PAIN OF RIGHT KNEE: Primary | ICD-10-CM

## 2019-10-08 PROCEDURE — G0283 ELEC STIM OTHER THAN WOUND: HCPCS | Performed by: PHYSICAL THERAPIST

## 2019-10-08 PROCEDURE — 97140 MANUAL THERAPY 1/> REGIONS: CPT | Performed by: PHYSICAL THERAPIST

## 2019-10-08 PROCEDURE — 97162 PT EVAL MOD COMPLEX 30 MIN: CPT | Performed by: PHYSICAL THERAPIST

## 2019-10-08 PROCEDURE — 97530 THERAPEUTIC ACTIVITIES: CPT | Performed by: PHYSICAL THERAPIST

## 2019-10-08 NOTE — PROGRESS NOTES
Orthopedic / Sports / Industrial Physical Therapy  Physical Therapy Initial Evaluation and Plan of Care    Patient Name: Kourtney Lorenzana          :  1945  Referring Physician: Catarino De La Cruz MD  Diagnosis: Chronic pain of right knee [M25.561, G89.29]    Date of Evaluation: 10/8/2019  ______________________________________________________________________    Subjective Evaluation    History of Present Illness  Onset date: Long history of KNee pain - Most recently 2 weeks ago -   Mechanism of injury: Insidious onset (R) knee pain vs Driving car a lot more than normal [place] [facility] [place] then [place] [facility] [place] a [place] and helped friend get in/out of car and friend lived on 2nd floor and used stairs to go down due to faulty elevator for multiple days ;  Pain started that night and progressively got worse -       Patient Occupation: Retired -  Pain  Location: Posterior and anterior, down calf and up posterior thigh (R) - Swelling diffusely - Knee will pop and painful -   Quality: Throbbing; Sharp stabbing pain with walking.  Alleviating factors: Rest and tylenol   Exacerbated by: Walking, RLE WB-ing; Stairs; squatting / RLE Activities -  Full knee extension -   Progression: worsening    Social Support  Patient lives at: Lives alone in a home w/o stairs-    Diagnostic Tests  Abnormal x-ray: None since 2018 which were positive for OA per Provider encounter     Treatments  Previous treatment: physical therapy and injection treatment  Current treatment comments: Nothing other than Tylenol - No longer ibuprofen  - .     Patient Goals  Patient/family treatment goals: Pain decreased; Functional mobility / ROM; Strength; Endurance and gait to perform ADL's and hobby activities -          ___________________________________________________  Objective       Observations     Additional Observation Details  Pt presents with diffusely swollen (R) knee; Pt stands with WS to  (L) and flexed (R) knee posture- Antalgic gait with decreased WB-ing and flexed (R) LE -   Genu Valgus (R) knee;     Palpation     Additional Palpation Details  (R) Knee; Severely tender medial joint line; Medial PF Jt; Pes region; Infrapatellar region; Mildly tender popliteal region (R) knee; Tender medial knee jt and distal ITB; Tender post/medial and post-lat knee/HS region (R);     Active Range of Motion     Additional Active Range of Motion Details  (R) Knee; Flexion 110-deg w/ pain anterior knee; Extension near full with pain across anterior knee joint;   (L) Knee: WFL    Tests     Additional Tests Details  (+) Step-Up; Squat Test (R) knee (PF Jt)  (+) McMurrays Medial knee (R);  Pseudo Valgus / Varus w/o pain (R) knee;     Swelling     Right Knee Girth Measurement (cm)   Joint line: 1.5 cm > (L)    See Treatment Flow sheet for Exercises, Manual therapy, and modalities.   FUNCTIONAL ACTIVITIES: X 25 min  · TAPING / BRACING: K-Tape to 1) Unload PF Jt; 2) Unload infrapatellar region; 3) Unload Medial knee x 2 strips; 4) Unload Post-lat knee; 5) Unload Post-Medial knee; 6: Unload lateral knee / ITB vs jt pain;  · Jt protection, ADL modification; Posture and     ___________________________________________________  Assessment & Plan     Assessment  Assessment details: R>L Knee pain; Knee OA;     PROBLEMS: Pain; limited mobility / ROM; Antalgic gait; Intolerance to ADL's and hobby activities involving RLE  PROGNOSIS: Good    GOALS:   SHORT TERM GOALS: 2 weeks:  1) HEP Initiated; 2) Pain decreased 50%:   3) AROM  increased:  4) Improved gait / functional ability grossly;     LONG TERM GOALS: 4 weeks (or at time of DISCHARGE): 1) (I) HEP; 2) AROM WFL and pain free; 3) Strength / mobility / Gait / Endurance to be able to perform all ADL's and job-related activities w/o restrictions;       Plan  Planned therapy interventions: flexibility, home exercise program, joint mobilization, manual therapy,  neuromuscular re-education, postural training, soft tissue mobilization, strengthening, stretching and therapeutic activities (Modalities prn; Taping / bracing prn; )  Frequency: 2-3x week.  Duration in weeks: 4  Treatment plan discussed with: patient  Plan details: Pt has an appointment with Dr. Catarino De La Cruz's office on 10/18/19.         ___________________________________________________  Manual Therapy:    10     mins  61764;   Therapeutic Exercise:    05     mins  57514;     Neuromuscular Jenn:        mins  14412;   Therapeutic Activity:     25     mins  06249;     Ultrasound:     05     mins  18182;    Electrical Stimulation:   20     mins  95070 ( );  Dry Needling          mins self-pay   Gait Training:          mins  82308;  EVAL TIME:   25 min    Timed Treatment:   45   mins                Total Treatment:     95   mins    PT SIGNATURE:   Jason Bower PT  DATE TREATMENT INITIATED: 10/8/2019  ___________________________________________________  Initial Certification  Certification Period: 1/6/2020  I certify that the therapy services are furnished while this patient is under my care.  The services outlined above are required by this patient, and will be reviewed every 90 days.     PHYSICIAN: ________________________________  DATE: ______  Catarino De La Cruz MD        Please sign and return via fax to [phone].. Thank you, [facility].  ______________________________________________________________________  [address] [place] [address]  Phone: [phone] Fax: [phone]

## 2019-10-09 ENCOUNTER — TELEPHONE (OUTPATIENT)
Dept: FAMILY MEDICINE CLINIC | Facility: CLINIC | Age: 74
End: 2019-10-09

## 2019-10-09 DIAGNOSIS — R74.8 ELEVATED ALKALINE PHOSPHATASE LEVEL: Primary | ICD-10-CM

## 2019-10-09 NOTE — TELEPHONE ENCOUNTER
Regarding: Test Results Question  Contact: [phone]  ----- Message from [employer], Generic sent at 10/8/2019  5:32 PM EDT -----    Junito Vanegas  I saw my test results. The numbers DID go down on total and ldl  cholesterol levels. Yayyy. As I continue on my new way of eating I'm believing they will go down more.  What can you tell me about the  isoenzymes  results? Or anything else that I don't have a clue of what they mean.? Lito.  Kourtney Lorenzana

## 2019-10-09 NOTE — TELEPHONE ENCOUNTER
The numbers did go down and the 10-year risk also went down.  Continue working diligently on diet and exercise and we will recheck in 3 months.    I do not have a good answer for the isoenzymes right now.  It just shows that the majority of the alkaline phosphatase level is coming from the bone and then liver.  I would like to look at them better with imaging-bone scan and liver ultrasound.  I should know more once we have these tests.  If all testing is normal, I may refer her to a specialist.

## 2019-10-11 ENCOUNTER — TREATMENT (OUTPATIENT)
Dept: PHYSICAL THERAPY | Facility: CLINIC | Age: 74
End: 2019-10-11

## 2019-10-11 DIAGNOSIS — M25.561 CHRONIC PAIN OF RIGHT KNEE: Primary | ICD-10-CM

## 2019-10-11 DIAGNOSIS — M17.11 PRIMARY OSTEOARTHRITIS OF RIGHT KNEE: ICD-10-CM

## 2019-10-11 DIAGNOSIS — G89.29 CHRONIC PAIN OF RIGHT KNEE: Primary | ICD-10-CM

## 2019-10-11 DIAGNOSIS — R26.9 GAIT DISTURBANCE: ICD-10-CM

## 2019-10-11 PROCEDURE — 97140 MANUAL THERAPY 1/> REGIONS: CPT | Performed by: PHYSICAL THERAPIST

## 2019-10-11 PROCEDURE — 97110 THERAPEUTIC EXERCISES: CPT | Performed by: PHYSICAL THERAPIST

## 2019-10-11 PROCEDURE — 97530 THERAPEUTIC ACTIVITIES: CPT | Performed by: PHYSICAL THERAPIST

## 2019-10-13 NOTE — PATIENT INSTRUCTIONS
73 year old female who presents today in follow up of hyperlipidemia, elevated alkaline phosphatase, and B12 and vitamin D deficiencies, as well as specialists and chronic fatigue. She was seen 6/2019 and had been very fatigued all the time. She will do something for a few hours then is tired for the rest of the day. Sometimes she is out of breath and fatigued with exertion. Patient had also noted bilateral toes were blue. She noticed this with her last pedicure. She had normal DP and PT pulses bilaterally with normal temperature of the feet as well. She felt overall cool/ cold all the time. Since last appt, she joined weight [employer] 3 months ago and lost 19 lbs. She was taking Aleve and stopped that after last visit. She is now taking 2 Tylenol at bedtime. Patient continues waking up 4 x nightly to urinate but states she can usually go back to sleep. She is overall feeling better. I will check labs today. Call if no results in 1 week. Further recommendations, stability of conditions, plan, and follow up pending labs. If she has persistent sleep disturbances or fatigue, we will consider sleep study.     At her previous visit, she stated she has a scar on her abdomen with pain in the area of her scar from hysterectomy. When she rubbed it, the skin came off. Also, she had itching under bilateral breasts with a rash. On exam, she appeared to have cutaneous candidiasis and was treated with Nystatin cream and powder in both areas. She has had improvement.       Patient also noted pain in her neck and down her left upper arm. She was not sore to touch or feel but is a deep pain. Bilaterally, her hands go numb. We started with labs and advised we would consider PT and additional workup if no improvement or recurrence.      She talked with Urogyn and was advised that her spotting was from vaginal dryness. She is no longer having abdominal symptoms and rarely has spotting. She should see them as directed.       She was  seeing GI and was to follow up but missed her appt and continued with GERD symptoms. I advised that she follow up with GI. Patient did not see them in follow up but has had improvement with dietary changes.      Patient was also seeing an orthopedist. She got PT and knee injections. She states her right knee pain improved and her left knee still hurts. She will return as needed for injections with orthopedist.

## 2019-10-14 ENCOUNTER — TREATMENT (OUTPATIENT)
Dept: PHYSICAL THERAPY | Facility: CLINIC | Age: 74
End: 2019-10-14

## 2019-10-14 DIAGNOSIS — G89.29 CHRONIC PAIN OF RIGHT KNEE: Primary | ICD-10-CM

## 2019-10-14 DIAGNOSIS — G89.29 BILATERAL CHRONIC KNEE PAIN: ICD-10-CM

## 2019-10-14 DIAGNOSIS — M25.561 CHRONIC PAIN OF RIGHT KNEE: Primary | ICD-10-CM

## 2019-10-14 DIAGNOSIS — R26.9 GAIT DISTURBANCE: ICD-10-CM

## 2019-10-14 DIAGNOSIS — M25.561 BILATERAL CHRONIC KNEE PAIN: ICD-10-CM

## 2019-10-14 DIAGNOSIS — M17.11 PRIMARY OSTEOARTHRITIS OF RIGHT KNEE: ICD-10-CM

## 2019-10-14 DIAGNOSIS — M25.562 BILATERAL CHRONIC KNEE PAIN: ICD-10-CM

## 2019-10-14 PROCEDURE — 97530 THERAPEUTIC ACTIVITIES: CPT | Performed by: PHYSICAL THERAPIST

## 2019-10-14 PROCEDURE — 97140 MANUAL THERAPY 1/> REGIONS: CPT | Performed by: PHYSICAL THERAPIST

## 2019-10-14 PROCEDURE — 97110 THERAPEUTIC EXERCISES: CPT | Performed by: PHYSICAL THERAPIST

## 2019-10-14 NOTE — PROGRESS NOTES
Physical Therapy Daily Progress Note    Patient Name: Kourtney Lorenzana         :  1945  Referring Physician: Catarino De La Cruz MD    Subjective   Kourtney Lorenzana reports: tape helpful in decreasing pain and improved mobility and function -   Pain posterior knee into back of thigh and into upper gastroc (R) - Pain ant/medial knee (R)   Pain with walking -     Objective   Pt ambulating with antalgic gait RLE -  Swelling ant/medial (R) knee into pes region -   Very tender with multiple painful TP's along distal lateral HS, central popliteal fossa and proximal lateral gastrocs > medial (R)-    Tender ant/medial knee jt and pes region (R)-    See Exercise, Manual, and Modality Logs for complete treatment.     Functional / Therapeutic Activities:  15 min  · TAPING / BRACING: K-Tape to 1) Unload HS Med/Lat; 2) Unload Popliteal region X; 3) Unload ant/medial (R) knee x 2 strips  · Jt protection, ADL modification; Posture and      Assessment/Plan  R>L Knee pain; Knee OA;  HS strain (R) LE -   Pain decreased with taping and had couple of days w/ less pain -     Progress strengthening /stabilization /functional activity       _________________________________________________  Manual Therapy:    10     mins  57023;  Therapeutic Exercise:    30     mins  26105;     Neuromuscular Jenn:        mins  51909;    Therapeutic Activity:     15     mins  23455;     Gait Training:           mins  74044;     Ultrasound:     05     mins  26931;    Electrical Stimulation:         mins  27818 ( );  Dry Needling          mins self-pay    Timed Treatment:   60   mins                  Total Treatment:     75   mins    Jason Bower, PT  Physical Therapist

## 2019-10-16 NOTE — PROGRESS NOTES
Physical Therapy Daily Progress Note     Patient Name: Kourtney Lorenzana         :  1945  Referring Physician: Caatrino De La Cruz MD     Subjective   Kourtney Lorenzana reports: tape helpful in decreasing pain and improved mobility and function -   Pain posterior knee into back of thigh and into upper gastroc (R) - Pain ant/medial knee (R)   Pain with walking - Pain is decreasing overall -      Objective   Pt ambulating with antalgic gait RLE -  Swelling ant/medial (R) knee into pes region -   Very tender with multiple painful TP's along distal lateral HS, central popliteal fossa and proximal lateral gastrocs > medial (R)-    Tender ant/medial knee jt and pes region (R)-     See Exercise, Manual, and Modality Logs for complete treatment.     Functional / Therapeutic Activities:  15 min  · TAPING / BRACING: K-Tape to 1) Unload HS Med/Lat; 2) Unload Popliteal region X; 3) Unload ant/medial (R) knee x 2 strips  · SEE EXERCISE FLOW SHEET -   · Jt protection, ADL modification; Posture and       Assessment/Plan  R>L Knee pain; Knee OA;  HS strain (R) LE -   Decreasing pain and improved mobility and function -   Pain decreased with taping a -   Poor tolerance to STM/DTM/TPR -      Progress strengthening /stabilization /functional activity     _________________________________________________  Manual Therapy:            10     mins  87944;  Therapeutic Exercise:    30     mins  56016;     Neuromuscular Jenn:        mins  79938;    Therapeutic Activity:      15     mins  15639;     Gait Training:                      mins  20438;     Ultrasound:                     05     mins  16682;    Electrical Stimulation:         mins  77085 ( );  Dry Needling                       mins self-pay     Timed Treatment:   60   mins                  Total Treatment:     75   mins     Jason Bower PT  Physical Therapist

## 2019-10-17 ENCOUNTER — TREATMENT (OUTPATIENT)
Dept: PHYSICAL THERAPY | Facility: CLINIC | Age: 74
End: 2019-10-17

## 2019-10-17 DIAGNOSIS — R26.9 GAIT DISTURBANCE: ICD-10-CM

## 2019-10-17 DIAGNOSIS — M17.11 PRIMARY OSTEOARTHRITIS OF RIGHT KNEE: ICD-10-CM

## 2019-10-17 DIAGNOSIS — M25.561 CHRONIC PAIN OF RIGHT KNEE: Primary | ICD-10-CM

## 2019-10-17 DIAGNOSIS — G89.29 CHRONIC PAIN OF RIGHT KNEE: Primary | ICD-10-CM

## 2019-10-17 DIAGNOSIS — M17.0 PRIMARY OSTEOARTHRITIS OF BOTH KNEES: ICD-10-CM

## 2019-10-17 DIAGNOSIS — G89.29 BILATERAL CHRONIC KNEE PAIN: ICD-10-CM

## 2019-10-17 DIAGNOSIS — M25.562 BILATERAL CHRONIC KNEE PAIN: ICD-10-CM

## 2019-10-17 DIAGNOSIS — M25.561 BILATERAL CHRONIC KNEE PAIN: ICD-10-CM

## 2019-10-17 PROCEDURE — G0283 ELEC STIM OTHER THAN WOUND: HCPCS | Performed by: PHYSICAL THERAPIST

## 2019-10-17 PROCEDURE — 97530 THERAPEUTIC ACTIVITIES: CPT | Performed by: PHYSICAL THERAPIST

## 2019-10-17 PROCEDURE — 97110 THERAPEUTIC EXERCISES: CPT | Performed by: PHYSICAL THERAPIST

## 2019-10-18 ENCOUNTER — CLINICAL SUPPORT (OUTPATIENT)
Dept: ORTHOPEDIC SURGERY | Facility: CLINIC | Age: 74
End: 2019-10-18

## 2019-10-18 VITALS — BODY MASS INDEX: 28.89 KG/M2 | WEIGHT: 157 LBS | HEIGHT: 62 IN

## 2019-10-18 DIAGNOSIS — M25.561 CHRONIC PAIN OF BOTH KNEES: Primary | ICD-10-CM

## 2019-10-18 DIAGNOSIS — G89.29 CHRONIC PAIN OF BOTH KNEES: Primary | ICD-10-CM

## 2019-10-18 DIAGNOSIS — M17.11 PRIMARY OSTEOARTHRITIS OF RIGHT KNEE: ICD-10-CM

## 2019-10-18 DIAGNOSIS — M25.562 CHRONIC PAIN OF BOTH KNEES: Primary | ICD-10-CM

## 2019-10-18 PROCEDURE — 20610 DRAIN/INJ JOINT/BURSA W/O US: CPT | Performed by: NURSE PRACTITIONER

## 2019-10-18 RX ORDER — METHYLPREDNISOLONE ACETATE 80 MG/ML
80 INJECTION, SUSPENSION INTRA-ARTICULAR; INTRALESIONAL; INTRAMUSCULAR; SOFT TISSUE
Status: COMPLETED | OUTPATIENT
Start: 2019-10-18 | End: 2019-10-18

## 2019-10-18 RX ADMIN — METHYLPREDNISOLONE ACETATE 80 MG: 80 INJECTION, SUSPENSION INTRA-ARTICULAR; INTRALESIONAL; INTRAMUSCULAR; SOFT TISSUE at 07:44

## 2019-10-18 NOTE — PROGRESS NOTES
10/18/2019    Kourtney Lorenzana is here today for worsening knee pain. Pt has undergone injection of the knee in the past with good resolution of symptoms. Pt is requesting a repeat injection.     KNEE Injection Procedure Note:    Large Joint Arthrocentesis: R knee  Date/Time: 10/18/2019 7:44 AM  Consent given by: patient  Site marked: site marked  Timeout: Immediately prior to procedure a time out was called to verify the correct patient, procedure, equipment, support staff and site/side marked as required   Supporting Documentation  Indications: pain and joint swelling   Procedure Details  Location: knee - R knee  Preparation: Patient was prepped and draped in the usual sterile fashion  Needle gauge: 21.  Approach: anterolateral  Medications administered: 4 mL lidocaine (cardiac); 80 mg methylPREDNISolone acetate 80 MG/ML  Patient tolerance: patient tolerated the procedure well with no immediate complications      Prior to injection risks were discussed including pain and infection.  Patient verbalized understanding would like to proceed with injection    At the conclusion of the injection I discussed the importance of continued quad strengthening exercises on a daily basis. I will see the patient back if the symptoms should fail to improve or worsen.    Elsa Chiang APRN  10/18/2019

## 2019-10-20 NOTE — PROGRESS NOTES
Physical Therapy Daily Progress Note     Patient Name: Kourtney Lorenzana         :  1945  Referring Physician: Catarino De La Cruz MD     Subjective   Kourtney Lorenzana reports: tape helpful in decreasing pain and improved mobility and function -   Pain posterior knee into back of thigh and into upper gastroc (R) - Pain ant/medial knee (R)   Pain with walking - Pain is decreasing overall - but increased after walking thru [facility] -  To see Dr. De La Cruz tomorrow -     Objective   Pt ambulating with antalgic gait RLE -  Swelling ant/medial (R) knee into pes region -   Very tender with multiple painful TP's along distal lateral HS, central popliteal fossa and proximal lateral gastrocs > medial (R)-    Tender ant/medial knee jt and pes region (R)-     See Exercise, Manual, and Modality Logs for complete treatment.     Functional / Therapeutic Activities:  10 min  · TAPING / BRACING: K-Tape to 1) Unload HS Med/Lat; 2) Unload Popliteal region X; 3) Unload ant/medial (R) knee x 2 strips  · Jt protection, ADL modification; Posture and       Assessment/Plan  R>L Knee pain; Knee OA;  HS strain (R) LE -   Decreasing pain and improved mobility and function -   Pain decreased with taping  -   Poor tolerance to STM/DTM/TPR -   Pt may benefit from further intervention via Dr De La Cruz -      Progress strengthening /stabilization /functional activity - Pt to see Dr. De La Cruz tomorrow -      _________________________________________________  Manual Therapy:            05     mins  32015;  Therapeutic Exercise:    25     mins  22775;     Neuromuscular Jenn:        mins  21367;    Therapeutic Activity:      10     mins  88705;     Gait Training:                      mins  41899;     Ultrasound:                     05     mins  38278;    Electrical Stimulation:    20     mins  40340 ( );  Dry Needling                       mins self-pay     Timed Treatment:   45   mins                  Total Treatment:     75   mins     Jason Bower  PT  Physical Therapist

## 2019-10-22 ENCOUNTER — TREATMENT (OUTPATIENT)
Dept: PHYSICAL THERAPY | Facility: CLINIC | Age: 74
End: 2019-10-22

## 2019-10-22 DIAGNOSIS — M25.562 BILATERAL CHRONIC KNEE PAIN: ICD-10-CM

## 2019-10-22 DIAGNOSIS — G89.29 CHRONIC PAIN OF RIGHT KNEE: Primary | ICD-10-CM

## 2019-10-22 DIAGNOSIS — M17.11 PRIMARY OSTEOARTHRITIS OF RIGHT KNEE: ICD-10-CM

## 2019-10-22 DIAGNOSIS — R26.9 GAIT DISTURBANCE: ICD-10-CM

## 2019-10-22 DIAGNOSIS — G89.29 BILATERAL CHRONIC KNEE PAIN: ICD-10-CM

## 2019-10-22 DIAGNOSIS — M25.561 CHRONIC PAIN OF RIGHT KNEE: Primary | ICD-10-CM

## 2019-10-22 DIAGNOSIS — M25.561 BILATERAL CHRONIC KNEE PAIN: ICD-10-CM

## 2019-10-22 PROCEDURE — 97530 THERAPEUTIC ACTIVITIES: CPT | Performed by: PHYSICAL THERAPIST

## 2019-10-22 PROCEDURE — G0283 ELEC STIM OTHER THAN WOUND: HCPCS | Performed by: PHYSICAL THERAPIST

## 2019-10-22 PROCEDURE — 97110 THERAPEUTIC EXERCISES: CPT | Performed by: PHYSICAL THERAPIST

## 2019-10-22 PROCEDURE — 97140 MANUAL THERAPY 1/> REGIONS: CPT | Performed by: PHYSICAL THERAPIST

## 2019-10-23 ENCOUNTER — HOSPITAL ENCOUNTER (OUTPATIENT)
Dept: ULTRASOUND IMAGING | Facility: HOSPITAL | Age: 74
Discharge: HOME | End: 2019-10-23
Admitting: PHYSICIAN ASSISTANT

## 2019-10-23 ENCOUNTER — HOSPITAL ENCOUNTER (OUTPATIENT)
Dept: NUCLEAR MEDICINE | Facility: HOSPITAL | Age: 74
Discharge: HOME | End: 2019-10-23

## 2019-10-23 PROCEDURE — 78306 BONE IMAGING WHOLE BODY: CPT

## 2019-10-23 PROCEDURE — 0 TECHNETIUM MEDRONATE KIT: Performed by: PHYSICIAN ASSISTANT

## 2019-10-23 PROCEDURE — 76705 ECHO EXAM OF ABDOMEN: CPT

## 2019-10-23 PROCEDURE — A9503 TC99M MEDRONATE: HCPCS | Performed by: PHYSICIAN ASSISTANT

## 2019-10-23 RX ORDER — TC 99M MEDRONATE 20 MG/10ML
20.9 INJECTION, POWDER, LYOPHILIZED, FOR SOLUTION INTRAVENOUS
Status: COMPLETED | OUTPATIENT
Start: 2019-10-23 | End: 2019-10-23

## 2019-10-23 RX ADMIN — Medication 20.9 MILLICURIE: at 09:25

## 2019-10-25 ENCOUNTER — TREATMENT (OUTPATIENT)
Dept: PHYSICAL THERAPY | Facility: CLINIC | Age: 74
End: 2019-10-25

## 2019-10-25 DIAGNOSIS — G89.29 CHRONIC PAIN OF RIGHT KNEE: Primary | ICD-10-CM

## 2019-10-25 DIAGNOSIS — M25.561 BILATERAL CHRONIC KNEE PAIN: ICD-10-CM

## 2019-10-25 DIAGNOSIS — M17.11 PRIMARY OSTEOARTHRITIS OF RIGHT KNEE: ICD-10-CM

## 2019-10-25 DIAGNOSIS — G89.29 BILATERAL CHRONIC KNEE PAIN: ICD-10-CM

## 2019-10-25 DIAGNOSIS — R26.9 GAIT DISTURBANCE: ICD-10-CM

## 2019-10-25 DIAGNOSIS — M25.562 BILATERAL CHRONIC KNEE PAIN: ICD-10-CM

## 2019-10-25 DIAGNOSIS — M25.561 CHRONIC PAIN OF RIGHT KNEE: Primary | ICD-10-CM

## 2019-10-25 PROCEDURE — 97140 MANUAL THERAPY 1/> REGIONS: CPT | Performed by: PHYSICAL THERAPIST

## 2019-10-25 PROCEDURE — 97110 THERAPEUTIC EXERCISES: CPT | Performed by: PHYSICAL THERAPIST

## 2019-10-25 PROCEDURE — G0283 ELEC STIM OTHER THAN WOUND: HCPCS | Performed by: PHYSICAL THERAPIST

## 2019-10-25 PROCEDURE — 97530 THERAPEUTIC ACTIVITIES: CPT | Performed by: PHYSICAL THERAPIST

## 2019-10-27 NOTE — PROGRESS NOTES
Physical Therapy Daily Progress Note     Patient Name: Kourtney Lorenzana         :  1945  Referring Physician: Catarino De La Cruz MD     Subjective   Kourtney Lorenzana reports: tape helpful in decreasing pain and improved mobility and function -   Pain posterior knee into back of thigh and into upper gastroc (R) - Pain ant/medial knee (R)   Pain with walking - Pain is decreasing overall - but increased after walking thru [facility] -  To see Dr. De La Cruz tomorrow -     Objective   Pt ambulating with antalgic gait RLE -  Swelling ant/medial (R) knee into pes region -   Very tender with multiple painful TP's along distal lateral HS, central popliteal fossa and proximal lateral gastrocs > medial (R)-    Tender ant/medial knee jt and pes region (R)-     See Exercise, Manual, and Modality Logs for complete treatment.     Functional / Therapeutic Activities:  15 min  · TAPING / BRACING: K-Tape to 1) Unload HS Med/Lat; 2) Unload PF Jt and infrapatellar region 3) Unload ant/medial (R) knee x 2 strips  · Jt protection, ADL modification; Posture and       Assessment/Plan  R>L Knee pain; Knee OA;  HS strain (R) LE -   Decreasing pain and improved mobility and function -   Pain decreased with taping  -   Poor tolerance to STM/DTM/TPR -   Pt may benefit from further intervention via Dr De La Cruz -      Progress strengthening /stabilization /functional activity - Pt to see Dr. De La Cruz tomorrow -      _________________________________________________  Manual Therapy:            10     mins  52105;  Therapeutic Exercise:    30     mins  79128;     Neuromuscular Jenn:        mins  38398;    Therapeutic Activity:      15     mins  57083;     Gait Training:                      mins  19017;     Ultrasound:                     05     mins  81622;    Electrical Stimulation:    20     mins  77910 ( );  Dry Needling                       mins self-pay     Timed Treatment:   60   mins                  Total Treatment:      90   mins     Jason Bower, PT  Physical Therapist

## 2019-10-28 NOTE — PROGRESS NOTES
Physical Therapy Daily Progress Note     Patient Name: Kourtney Lorenzana         :  1945  Referring Physician: Catarino De La Cruz MD     Subjective   Kourtney Lorenzana reports: tape helpful in decreasing pain and improved mobility and function -   Pain posterior knee into back of thigh and into upper gastroc (R) - Pain ant/medial knee (R)   Pain with walking - Pain is decreasing overall - but increased with prolonged walking -  Pt reports having bone scan -  Complaining of anterior knee pain (B) as well -      Objective   Pt ambulating with antalgic gait RLE -  Swelling ant/medial (R) knee into pes region -   Very tender with multiple painful TP's along distal lateral HS, central popliteal fossa and proximal lateral gastrocs > medial (R)-    Tender ant/medial knee jt and pes region (R) and (L)-     See Exercise, Manual, and Modality Logs for complete treatment.     Functional / Therapeutic Activities:  15 min  · TAPING / BRACING: K-Tape to 1)  Unload PF Jt and infrapatellar region (B) knees   · Reviewed Bone Scan report w/ Pt -   · Jt protection, ADL modification; Posture and       Assessment/Plan  R>L Knee pain; Knee OA;  HS strain (R) LE -   Decreasing pain and improved mobility and function -   Pain decreased with taping  -     Pt may benefit from further intervention via Dr De La Cruz -      Progress strengthening /stabilization /functional activity - Pt to see Dr. De La Cruz tomorrow -      _________________________________________________  Manual Therapy:            10     mins  94460;  Therapeutic Exercise:    30     mins  80944;     Neuromuscular Jenn:        mins  62592;    Therapeutic Activity:      15     mins  19713;     Gait Training:                      mins  07075;     Ultrasound:                     05     mins  89052;    Electrical Stimulation:    20     mins  64894 ( );  Dry Needling                       mins self-pay     Timed Treatment:   60   mins                  Total Treatment:      90   mins     Jason Bower, PT  Physical Therapist

## 2019-11-06 DIAGNOSIS — R74.8 ELEVATED ALKALINE PHOSPHATASE LEVEL: Primary | ICD-10-CM

## 2019-11-08 LAB
GGT SERPL-CCNC: 16 U/L (ref 5–36)
MITOCHONDRIA M2 IGG SER-ACNC: 43.8 UNITS (ref 0–20)

## 2019-11-11 ENCOUNTER — OFFICE VISIT (OUTPATIENT)
Dept: FAMILY MEDICINE CLINIC | Facility: CLINIC | Age: 74
End: 2019-11-11

## 2019-11-11 VITALS
RESPIRATION RATE: 16 BRPM | BODY MASS INDEX: 28.34 KG/M2 | WEIGHT: 154 LBS | HEART RATE: 70 BPM | SYSTOLIC BLOOD PRESSURE: 128 MMHG | DIASTOLIC BLOOD PRESSURE: 78 MMHG | OXYGEN SATURATION: 99 % | TEMPERATURE: 98 F | HEIGHT: 62 IN

## 2019-11-11 DIAGNOSIS — M54.50 ACUTE BILATERAL LOW BACK PAIN WITHOUT SCIATICA: Primary | ICD-10-CM

## 2019-11-11 PROCEDURE — 96372 THER/PROPH/DIAG INJ SC/IM: CPT | Performed by: FAMILY MEDICINE

## 2019-11-11 PROCEDURE — 99214 OFFICE O/P EST MOD 30 MIN: CPT | Performed by: FAMILY MEDICINE

## 2019-11-11 RX ORDER — BACLOFEN 10 MG/1
10 TABLET ORAL NIGHTLY PRN
Qty: 14 TABLET | Refills: 0 | Status: SHIPPED | OUTPATIENT
Start: 2019-11-11 | End: 2019-11-26

## 2019-11-11 RX ORDER — KETOROLAC TROMETHAMINE 30 MG/ML
15 INJECTION, SOLUTION INTRAMUSCULAR; INTRAVENOUS ONCE
Status: COMPLETED | OUTPATIENT
Start: 2019-11-11 | End: 2019-11-11

## 2019-11-11 RX ORDER — MELOXICAM 7.5 MG/1
7.5 TABLET ORAL DAILY
Qty: 14 TABLET | Refills: 0 | Status: SHIPPED | OUTPATIENT
Start: 2019-11-11 | End: 2019-12-10

## 2019-11-11 RX ADMIN — KETOROLAC TROMETHAMINE 15 MG: 30 INJECTION, SOLUTION INTRAMUSCULAR; INTRAVENOUS at 13:44

## 2019-11-11 NOTE — PROGRESS NOTES
Subjective   Kourtney Lorenzana is a 74 y.o. female. Presents today to be evaluated for back pain.     History of Present Illness     Lumbar back pain x 1 day starting at 3am.  It hurts all across the bottom of the back.  Hx of arthritis and no changes in urination.  Taken Tylenol and some aleve with only modest results.  Bone scan last month showed some arthritis of the spine.      The following portions of the patient's history were reviewed and updated as appropriate: allergies, current medications, past family history, past medical history, past social history, past surgical history and problem list.    Review of Systems   Musculoskeletal: Positive for back pain.   All other systems reviewed and are negative.      Objective   Physical Exam   Constitutional: She appears well-developed and well-nourished. No distress.   Cardiovascular: Normal rate, regular rhythm and normal heart sounds. Exam reveals no gallop and no friction rub.   No murmur heard.  Pulmonary/Chest: Effort normal and breath sounds normal. She has no wheezes. She has no rales.   Musculoskeletal:        Cervical back: Normal.        Thoracic back: Normal.        Lumbar back: She exhibits pain and spasm.   Skin: Skin is warm. Capillary refill takes less than 2 seconds. She is not diaphoretic.   Nursing note and vitals reviewed.      Assessment/Plan   Kourtney was seen today for back pain.    Diagnoses and all orders for this visit:    Acute bilateral low back pain without sciatica  -     ketorolac (TORADOL) injection 15 mg  -     meloxicam (MOBIC) 7.5 MG tablet; Take 1 tablet by mouth Daily.  -     baclofen (LIORESAL) 10 MG tablet; Take 1 tablet by mouth At Night As Needed for Muscle Spasms.      Will give low-dose Toradol in the office.  Tomorrow she can start meloxicam for a total of 2 weeks same thing with baclofen at night only as needed.  Could consider steroids if no better in a few weeks.

## 2019-11-11 NOTE — PATIENT INSTRUCTIONS
Acute Back Pain, Adult  Acute back pain is sudden and usually short-lived. It is often caused by an injury to the muscles and tissues in the back. The injury may result from:  · A muscle or ligament getting overstretched or torn (strained). Ligaments are tissues that connect bones to each other. Lifting something improperly can cause a back strain.  · Wear and tear (degeneration) of the spinal disks. Spinal disks are circular tissue that provides cushioning between the bones of the spine (vertebrae).  · Twisting motions, such as while playing sports or doing yard work.  · A hit to the back.  · Arthritis.  You may have a physical exam, lab tests, and imaging tests to find the cause of your pain. Acute back pain usually goes away with rest and home care.  Follow these instructions at home:  Managing pain, stiffness, and swelling  · Take over-the-counter and prescription medicines only as told by your health care provider.  · Your health care provider may recommend applying ice during the first 24-48 hours after your pain starts. To do this:  ? Put ice in a plastic bag.  ? Place a towel between your skin and the bag.  ? Leave the ice on for 20 minutes, 2-3 times a day.  · If directed, apply heat to the affected area as often as told by your health care provider. Use the heat source that your health care provider recommends, such as a moist heat pack or a heating pad.  ? Place a towel between your skin and the heat source.  ? Leave the heat on for 20-30 minutes.  ? Remove the heat if your skin turns bright red. This is especially important if you are unable to feel pain, heat, or cold. You have a greater risk of getting burned.  Activity    · Do not stay in bed. Staying in bed for more than 1-2 days can delay your recovery.  · Sit up and stand up straight. Avoid leaning forward when you sit, or hunching over when you stand.  ? If you work at a desk, sit close to it so you do not need to lean over. Keep your chin tucked  "in. Keep your neck drawn back, and keep your elbows bent at a right angle. Your arms should look like the letter \"L.\"  ? Sit high and close to the steering wheel when you drive. Add lower back (lumbar) support to your car seat, if needed.  · Take short walks on even surfaces as soon as you are able. Try to increase the length of time you walk each day.  · Do not sit, drive, or  one place for more than 30 minutes at a time. Sitting or standing for long periods of time can put stress on your back.  · Do not drive or use heavy machinery while taking prescription pain medicine.  · Use proper lifting techniques. When you bend and lift, use positions that put less stress on your back:  ? Bend your knees.  ? Keep the load close to your body.  ? Avoid twisting.  · Exercise regularly as told by your health care provider. Exercising helps your back heal faster and helps prevent back injuries by keeping muscles strong and flexible.  · Work with a physical therapist to make a safe exercise program, as recommended by your health care provider. Do any exercises as told by your physical therapist.  Lifestyle  · Maintain a healthy weight. Extra weight puts stress on your back and makes it difficult to have good posture.  · Avoid activities or situations that make you feel anxious or stressed. Stress and anxiety increase muscle tension and can make back pain worse. Learn ways to manage anxiety and stress, such as through exercise.  General instructions  · Sleep on a firm mattress in a comfortable position. Try lying on your side with your knees slightly bent. If you lie on your back, put a pillow under your knees.  · Follow your treatment plan as told by your health care provider. This may include:  ? Cognitive or behavioral therapy.  ? Acupuncture or massage therapy.  ? Meditation or yoga.  Contact a health care provider if:  · You have pain that is not relieved with rest or medicine.  · You have increasing pain going down " into your legs or buttocks.  · Your pain does not improve after 2 weeks.  · You have pain at night.  · You lose weight without trying.  · You have a fever or chills.  Get help right away if:  · You develop new bowel or bladder control problems.  · You have unusual weakness or numbness in your arms or legs.  · You develop nausea or vomiting.  · You develop abdominal pain.  · You feel faint.  Summary  · Acute back pain is sudden and usually short-lived.  · Use proper lifting techniques. When you bend and lift, use positions that put less stress on your back.  · Take over-the-counter and prescription medicines and apply heat or ice as directed by your health care provider.  This information is not intended to replace advice given to you by your health care provider. Make sure you discuss any questions you have with your health care provider.  Document Released: 12/18/2006 Document Revised: 07/25/2019 Document Reviewed: 08/01/2018  [employer] Patient Education © 2019 [employer]

## 2019-11-12 DIAGNOSIS — R76.8 ANTIMITOCHONDRIAL ANTIBODY POSITIVE: ICD-10-CM

## 2019-11-12 DIAGNOSIS — R74.8 ELEVATED ALKALINE PHOSPHATASE LEVEL: Primary | ICD-10-CM

## 2019-11-12 DIAGNOSIS — K21.9 GASTROESOPHAGEAL REFLUX DISEASE WITHOUT ESOPHAGITIS: ICD-10-CM

## 2019-11-15 ENCOUNTER — OFFICE VISIT (OUTPATIENT)
Dept: FAMILY MEDICINE CLINIC | Facility: CLINIC | Age: 74
End: 2019-11-15

## 2019-11-15 VITALS
WEIGHT: 154 LBS | SYSTOLIC BLOOD PRESSURE: 122 MMHG | HEART RATE: 68 BPM | TEMPERATURE: 97.7 F | DIASTOLIC BLOOD PRESSURE: 68 MMHG | HEIGHT: 62 IN | BODY MASS INDEX: 28.34 KG/M2 | OXYGEN SATURATION: 98 %

## 2019-11-15 DIAGNOSIS — M54.50 ACUTE BILATERAL LOW BACK PAIN WITHOUT SCIATICA: Primary | ICD-10-CM

## 2019-11-15 DIAGNOSIS — R74.8 ELEVATED ALKALINE PHOSPHATASE LEVEL: ICD-10-CM

## 2019-11-15 DIAGNOSIS — R76.8 ANTIMITOCHONDRIAL ANTIBODY POSITIVE: ICD-10-CM

## 2019-11-15 PROBLEM — R10.2 PELVIC PAIN: Status: RESOLVED | Noted: 2018-08-14 | Resolved: 2019-11-15

## 2019-11-15 PROBLEM — N89.8 VAGINAL DISCHARGE: Status: RESOLVED | Noted: 2018-08-14 | Resolved: 2019-11-15

## 2019-11-15 PROBLEM — Z79.890 HORMONE REPLACEMENT THERAPY: Status: RESOLVED | Noted: 2017-05-08 | Resolved: 2019-11-15

## 2019-11-15 PROCEDURE — 99213 OFFICE O/P EST LOW 20 MIN: CPT | Performed by: PHYSICIAN ASSISTANT

## 2019-11-15 NOTE — PROGRESS NOTES
Subjective   Kourtney Lorenzana is a 74 y.o. female here today to discuss lab results from 10/2019, low back pain seen by Dr Mitchell on Monday     History of Present Illness     Made a trip to [place]- drove 6 hours up and 6 hours back. Stopped every 2 hours to walk around. Reports she left on 10/29/19 because her aunt was dying. After her death, aunt's  fell and had to go to the hospital. She reports she then sat at the hospital with him. She reports she was ok initially, however, 11/10/19, she woke up in the middle of the night- 3 am and could not move. She was seen 19. No GI symptoms.     Family History - Cousin with Lupus, Aunt just  of metastatic liver cancer- dx at 80,  at age 85.     Patient has never had auto-immune testing.     The following portions of the patient's history were reviewed and updated as appropriate: allergies, current medications, past family history, past medical history, past social history, past surgical history and problem list.    Review of Systems   Constitutional: Negative.    HENT: Negative.    Eyes: Negative.    Respiratory: Negative.    Cardiovascular: Negative.    Gastrointestinal: Negative.    Endocrine: Negative.    Genitourinary: Negative.    Musculoskeletal: Positive for back pain (low back ).   Skin: Negative.    Neurological: Negative.    Hematological: Negative.    Psychiatric/Behavioral: Negative.        Objective    Vitals:    11/15/19 1301   BP: 122/68   Pulse: 68   Temp: 97.7 °F (36.5 °C)   SpO2: 98%     Body mass index is 28.16 kg/m².    Physical Exam   Constitutional: She is oriented to person, place, and time. She appears well-developed and well-nourished.   HENT:   Head: Normocephalic and atraumatic.   Right Ear: External ear normal.   Left Ear: External ear normal.   Cardiovascular: Normal rate, regular rhythm, normal heart sounds and intact distal pulses.   Pulmonary/Chest: Effort normal and breath sounds normal. She has no wheezes. She has no  rales.   Musculoskeletal: Normal range of motion. She exhibits no edema or deformity.        Lumbar back: She exhibits no tenderness, no bony tenderness, no swelling, no edema, no spasm and normal pulse.   Neurological: She is alert and oriented to person, place, and time. She has normal strength.   Reflex Scores:       Patellar reflexes are 2+ on the right side and 2+ on the left side.       Achilles reflexes are 2+ on the right side and 2+ on the left side.  Psychiatric: She has a normal mood and affect. Her speech is normal and behavior is normal. Judgment and thought content normal. Cognition and memory are normal.   Nursing note and vitals reviewed.      Assessment/Plan   Kourtney was seen today for discuss lab results and back pain.    Diagnoses and all orders for this visit:    Acute bilateral low back pain without sciatica  -     Cancel: Ambulatory Referral to Physical Therapy  -     Ambulatory Referral to Physical Therapy    Elevated alkaline phosphatase level    Antimitochondrial antibody positive      Patient Instructions   74 year old female who presents today with low back pain since 11/10/19. Patient traveled to [place], driving 6 hours each way 10/29/19 due to her aunts decline in health. She stopped every 2 hours to walk around. After her aunt's death, her aunt's  fell and had to go to the hospital. She then sat at the hospital with him. 11/10/19, she woke up at 3 am and could not move due to low back pain without radicular symptoms. She denies weakness or numbness/ tingling or GI/ symptoms as well as saddle anesthesia. She was seen 11/11/19 by Dr Mitchell, given Toradol injection, Mobic and Baclofen. Patient reports she noticed improvement in pain with medication. I will refer to physical therapy as well. To be seen ASAP if worsening, new or changing symptoms.     Patient had elevated alk phos with Liver and Bone etiology on isoenzymes. Patient had liver US with extensive cholelithiasis and bone  scan. She denies symptoms with gallstones. Her GI wanted further labs. She has positive AMA and will see GI in follow up. She does have family history significant for a cousin with Lupus. Patient has never had auto-immune testing. I will await GI evaluation to see if we should do further workup for bone etiology or autoimmune testing.

## 2019-11-21 ENCOUNTER — OFFICE VISIT (OUTPATIENT)
Dept: GASTROENTEROLOGY | Facility: CLINIC | Age: 74
End: 2019-11-21

## 2019-11-21 VITALS
WEIGHT: 151 LBS | SYSTOLIC BLOOD PRESSURE: 110 MMHG | HEIGHT: 62 IN | BODY MASS INDEX: 27.79 KG/M2 | DIASTOLIC BLOOD PRESSURE: 84 MMHG | TEMPERATURE: 97.5 F

## 2019-11-21 DIAGNOSIS — K21.9 GASTROESOPHAGEAL REFLUX DISEASE WITHOUT ESOPHAGITIS: ICD-10-CM

## 2019-11-21 DIAGNOSIS — R74.8 ELEVATED ALKALINE PHOSPHATASE LEVEL: ICD-10-CM

## 2019-11-21 DIAGNOSIS — R10.10 PAIN OF UPPER ABDOMEN: ICD-10-CM

## 2019-11-21 DIAGNOSIS — K80.20 GALLSTONES: Primary | ICD-10-CM

## 2019-11-21 PROCEDURE — 99214 OFFICE O/P EST MOD 30 MIN: CPT | Performed by: NURSE PRACTITIONER

## 2019-11-21 RX ORDER — PANTOPRAZOLE SODIUM 40 MG/1
40 TABLET, DELAYED RELEASE ORAL DAILY
Qty: 90 TABLET | Refills: 3 | Status: SHIPPED | OUTPATIENT
Start: 2019-11-21 | End: 2020-03-04

## 2019-11-21 NOTE — PROGRESS NOTES
Chief Complaint   Patient presents with   • Abnormal US   • Abdominal Pain       Kourtney Lorenzana is a  74 y.o. female here for a follow up visit for upper abdominal pain.    HPI  74-year-old female presents today for follow-up visit for upper abdominal pain and gallstones.  She is a patient of Dr. Andrade.  She was last seen in the office on 11/1/2018.  She is a history of GERD and admits she is done really well lately on Protonix 40 mg once daily until she ran out.  When she ran out she admits she has had horrible breakthrough reflux symptoms.  She does have a history of chronic right upper quadrant pain as well as upper abdominal pain and did have a liver ultrasound done recently that showed extensive gallstones.  She had a CT scan of the abdomen and pelvis last year but did not show any gallstones at that time.  Patient also had a slightly elevated alkaline phosphatase of 137 and her primary care did a GGT which was negative.  She also did a fractionation of bone and liver and it did show significantly higher with bone.  Patient does have an extensive history of arthritis.  All other liver enzymes were normal.  Patient also had a slightly elevated mitochondrial antibody at 43.8.  Her last EGD was done on 4/2018.  She continues to have upper abdominal pain from time to time especially depending on when she eats.  She denies any dysphasia, nausea and vomiting, diarrhea, constipation, rectal bleeding or melena.  She notes her appetite is good and her weight is stable.  Past Medical History:   Diagnosis Date   • Anxiety    • Bruising tendency (CMS/HCC)    • Cerumen impaction    • Cholelithiasis Nov 7, 2019    Discovered by liver ultrasound   • Depression    • Diverticula, intestine    • Diverticulitis of colon    • Essential familial hypercholesterolemia    • Gastroesophageal reflux disease without esophagitis 2/11/2016   • GERD (gastroesophageal reflux disease)    • H/O colonoscopy    • IBS (irritable bowel syndrome)   "  • Internal hemorrhoids    • Low back pain    • Lumbar canal stenosis    • Lumbosacral neuritis    • Multiple vitamin deficiency    • Nephrolithiasis     July 2014 passage of kidney stone.   • Panic disorder without agoraphobia    • Postmenopausal HRT (hormone replacement therapy)    • UTI symptoms    • Vagina, candidiasis        Past Surgical History:   Procedure Laterality Date   • CATARACT EXTRACTION     • CATARACT EXTRACTION  09/05/2017    Dr. Jefe Barakta OD, MD   • COLONOSCOPY  11/2015    normal   • ENDOSCOPY  04/27/2018    HH, no specimen collected   • FOOT SURGERY     • HYSTERECTOMY     • INCONTINENCE SURGERY     • UPPER GASTROINTESTINAL ENDOSCOPY  2017?       Scheduled Meds:    Continuous Infusions:  No current facility-administered medications for this visit.     PRN Meds:.    Allergies   Allergen Reactions   • Other      Unknown \"strong\" pain killer   • Hydrocodone-Acetaminophen GI Intolerance   • Tramadol GI Intolerance       Social History     Socioeconomic History   • Marital status:      Spouse name: Not on file   • Number of children: Not on file   • Years of education: Not on file   • Highest education level: Not on file   Tobacco Use   • Smoking status: Never Smoker   • Smokeless tobacco: Never Used   Substance and Sexual Activity   • Alcohol use: No   • Drug use: No   • Sexual activity: No       Family History   Problem Relation Age of Onset   • Atrial fibrillation Father    • Atrial fibrillation Sister    • Atrial fibrillation Brother    • Other Brother         Coronary arteriosclerosis   • Cancer Mother         colon cancer   • Colon cancer Mother    • Liver disease Paternal Aunt        Review of Systems   Constitutional: Negative for appetite change, chills, diaphoresis, fatigue, fever and unexpected weight change.   HENT: Negative for nosebleeds, postnasal drip, sore throat, trouble swallowing and voice change.    Respiratory: Negative for cough, choking, chest tightness, shortness of " breath and wheezing.    Cardiovascular: Negative for chest pain, palpitations and leg swelling.   Gastrointestinal: Positive for abdominal distention and abdominal pain. Negative for anal bleeding, blood in stool, constipation, diarrhea, nausea, rectal pain and vomiting.   Endocrine: Negative for polydipsia, polyphagia and polyuria.   Musculoskeletal: Negative for gait problem.   Skin: Negative for rash and wound.   Allergic/Immunologic: Negative for food allergies.   Neurological: Negative for dizziness, speech difficulty and light-headedness.   Psychiatric/Behavioral: Negative for confusion, self-injury, sleep disturbance and suicidal ideas.       Vitals:    11/21/19 1507   BP: 110/84   Temp: 97.5 °F (36.4 °C)       Physical Exam   Constitutional: She is oriented to person, place, and time. She appears well-developed and well-nourished. She does not appear ill. No distress.   HENT:   Head: Normocephalic.   Eyes: Pupils are equal, round, and reactive to light.   Cardiovascular: Normal rate, regular rhythm and normal heart sounds.   Pulmonary/Chest: Effort normal and breath sounds normal.   Abdominal: Soft. Bowel sounds are normal. She exhibits distension. She exhibits no mass. There is no hepatosplenomegaly. There is tenderness. There is no rebound and no guarding. No hernia.       Musculoskeletal: Normal range of motion.   Neurological: She is alert and oriented to person, place, and time.   Skin: Skin is warm and dry.   Psychiatric: She has a normal mood and affect. Her speech is normal and behavior is normal. Judgment normal.       No images are attached to the encounter.    Assessment and plan    1. Gallstones    2. Pain of upper abdomen    3. Gastroesophageal reflux disease without esophagitis  - pantoprazole (PROTONIX) 40 MG EC tablet; Take 1 tablet by mouth Daily.  Dispense: 90 tablet; Refill: 3    4. Elevated alkaline phosphatase level        Reviewed her liver imaging and her most recent labs with her  today.  Given her continued symptoms and history we will refer her to Dr. Schneider to evaluate her gallstones.  GERD seems well controlled at this time on Protonix 40 mill grams once daily.  Continue GERD precautions.  Her elevated alkaline phosphatase is only minimally elevated and her GGT was normal.  The fractionation did show higher evidence in the bone not the liver.  Patient to call the office with any issues.  Patient to follow-up with me or Dr. Andrade in 3 months.

## 2019-11-22 ENCOUNTER — TREATMENT (OUTPATIENT)
Dept: PHYSICAL THERAPY | Facility: CLINIC | Age: 74
End: 2019-11-22

## 2019-11-22 DIAGNOSIS — M54.50 ACUTE BILATERAL LOW BACK PAIN WITHOUT SCIATICA: Primary | ICD-10-CM

## 2019-11-22 PROCEDURE — 97140 MANUAL THERAPY 1/> REGIONS: CPT | Performed by: PHYSICAL THERAPIST

## 2019-11-22 PROCEDURE — G0283 ELEC STIM OTHER THAN WOUND: HCPCS | Performed by: PHYSICAL THERAPIST

## 2019-11-22 PROCEDURE — 97110 THERAPEUTIC EXERCISES: CPT | Performed by: PHYSICAL THERAPIST

## 2019-11-22 PROCEDURE — 97162 PT EVAL MOD COMPLEX 30 MIN: CPT | Performed by: PHYSICAL THERAPIST

## 2019-11-22 PROCEDURE — 97530 THERAPEUTIC ACTIVITIES: CPT | Performed by: PHYSICAL THERAPIST

## 2019-11-22 NOTE — PROGRESS NOTES
Orthopedic / Sports / Industrial Physical Therapy  Physical Therapy Initial Evaluation and Plan of Care    Patient Name: Kourtney Lorenzana          :  1945  Referring Physician: Romina George PA  Diagnosis: Acute bilateral low back pain without sciatica [M54.5]    Date of Evaluation: 2019  ______________________________________________________________________    Subjective Evaluation    History of Present Illness  Date of onset: 11/10/2019  Mechanism of injury: Per OV note w/ RENE Robbins on 11/15/2019:  (Made a trip to [place]- drove 6 hours up and 6 hours back. Stopped every 2 hours to walk around. Reports she left on 10/29/19 because her aunt was dying. After her death, aunt's  fell and had to go to the hospital. She reports she then sat at the hospital with him. She reports she was ok initially, however, 11/10/19, she woke up in the middle of the night- 3 am and could not move.)      Patient Occupation: Retired -  Pain  Current pain ratin  At worst pain rating: 10  Location: Central LBP L>R; No LE symptoms;   Alleviating factors: Medication; Heat; (R) Side-lying;   Exacerbated by: Off medicine (meloxicam and Baclofen); Bending, walking, standing, damp cold weather;  Rising ;   Progression: improved (After starting medication; )    Diagnostic Tests  Bone scan: abnormal (10/23/2019 - See report in Epic)    Treatments  Current treatment: medication  Patient Goals  Patient/family treatment goals: Decreased pain; Mobility, strength, endurance to perform ADL's, etc           ___________________________________________________  Objective       Postural Observations    Additional Postural Observation Details  (R) Ilium / ASIS / PSIS higher vs (L)  Hyperlordosis    Palpation     Additional Palpation Details  Tender (R) SI region; Tender L4-S1 central and PS R>L    Active Range of Motion     Additional Active Range of Motion Details  Lumbar Spine: Flexion 1/2 norm with limited reversal of  lumbar lordosis;                            Extension; 1/2 norm with limited mobility (R)                           SB 1/3 norm to (R) w/ pain (R) SI region                           SB 2/3 norm     Strength/Myotome Testing     Additional Strength Details  LE Motomes WNL (B)     Tests     Additional Tests Details  (-) SLR;   (+) SI Jt Dysfunction / upshear (R)  (+) Flexed sacrum        See Treatment Flow sheet for Exercises, Manual therapy, and modalities.   FUNCTIONAL ACTIVITIES: X 10 min  · TAPING / BRACING: NA  · Jt protection, ADL modification; Posture and     ___________________________________________________  Assessment & Plan     Assessment  Assessment details: (B) LBP / SI Jt dysfunction -     PROBLEMS: Pain; Limited mobility;  Postural dysfunction; Intolerance to ADL's.   PROGNOSIS: Fair/Good    GOALS:  SHORT TERM GOALS: 2 weeks:  1) HEP Initiated; 2) Pain decreased 50%:   3) AROM  increased:  4) Improved functional ability grossly;     LONG TERM GOALS: 4 weeks (or at time of DISCHARGE): 1) (I) HEP; 2) AROM WFL and pain free; 3) Strength / mobility to be able to perform all ADL's and job-related activities w/o restrictions;       Plan  Planned therapy interventions: abdominal trunk stabilization, body mechanics training, flexibility, home exercise program, manual therapy, neuromuscular re-education, postural training, soft tissue mobilization, spinal/joint mobilization, strengthening, stretching and therapeutic activities (Modalities prn; Bracing / taping prn; )  Frequency: 2x week  Duration in weeks: 4  Treatment plan discussed with: patient      ___________________________________________________  Manual Therapy:    15     mins  20527;   Therapeutic Exercise:    15     mins  54647;     Neuromuscular Jenn:        mins  95629;   Therapeutic Activity:     10     mins  85093;     Ultrasound:     06     mins  05423;    Electrical Stimulation:   15     mins  40957 ( );  Dry Needling           mins self-pay   Gait Training:          mins  31018;  EVAL TIME:   25 min    Timed Treatment:   46   mins                Total Treatment:     95   mins    PT SIGNATURE:   Jason Bower, PT  DATE TREATMENT INITIATED: 11/22/2019  ___________________________________________________  Initial Certification  Certification Period: 2/20/2020  I certify that the therapy services are furnished while this patient is under my care.  The services outlined above are required by this patient, and will be reviewed every 90 days.     PHYSICIAN: ________________________________  DATE: ______  Romina George PA        Please sign and return via fax to [phone].. Thank you, [facility].  ______________________________________________________________________  [address] [place] [address]  Phone: [phone] Fax: [phone]

## 2019-11-26 ENCOUNTER — OFFICE VISIT (OUTPATIENT)
Dept: SURGERY | Facility: CLINIC | Age: 74
End: 2019-11-26

## 2019-11-26 VITALS — BODY MASS INDEX: 27.97 KG/M2 | OXYGEN SATURATION: 97 % | HEART RATE: 78 BPM | HEIGHT: 62 IN | WEIGHT: 152 LBS

## 2019-11-26 DIAGNOSIS — R10.13 EPIGASTRIC PAIN: ICD-10-CM

## 2019-11-26 DIAGNOSIS — K80.20 CALCULUS OF GALLBLADDER WITHOUT CHOLECYSTITIS WITHOUT OBSTRUCTION: Primary | ICD-10-CM

## 2019-11-26 DIAGNOSIS — R79.89 ELEVATED LFTS: ICD-10-CM

## 2019-11-26 DIAGNOSIS — K21.9 GASTROESOPHAGEAL REFLUX DISEASE WITHOUT ESOPHAGITIS: ICD-10-CM

## 2019-11-26 PROCEDURE — 99214 OFFICE O/P EST MOD 30 MIN: CPT | Performed by: PHYSICIAN ASSISTANT

## 2019-11-26 NOTE — PROGRESS NOTES
"CC:    Cholelithiasis and elevated alkaline phosphatase    HPI:    This is a 74-year-old lady presenting to the office today at the request of [facility] for consultation.  She states that over the last several months she has noticed an occasional bout of nausea that was unexplained as well as epigastric abdominal pain and continued reflux.  She has been monitored by her gastroenterologist and was recently also found to have an elevated alkaline phosphatase.  As a result she was sent for a right upper quadrant ultrasound which demonstrated that she had extensive cholelithiasis.  She denies vomiting, abdominal distention, changes to her bowel habits, dark tarry stools or bright red blood in her bowel movements    PMH:    GERD  Anxiety  Arthritis  Diverticulitis history  Hypercholesterolemia  IBS  Back pain  Kidney stone  Panic disorder    PSH:     Colonoscopy 2015  EGD 2018  Cataract surgery  Pubovaginal sling  Hysterectomy  Toe surgery    FMH:  Mother with a history of colon cancer and gallbladder disease    ALLERGIES:   Hydrocodone/acetaminophen GI intolerance  Tramadol GI intolerance    MEDICATIONS:  Reviewed and reconciled in Epic.    ROS:    Positive for fatigue, postnasal drip, cough, diarrhea, nausea, reflux, excessive urination, urinary frequency, joint pain, back pain, muscle pain, neck pain and environmental allergies.  All other systems reviewed and negative other than presenting complaints.    PE:  Vitals: HR 78 O2 97% weight 152 height 62\" BMI 27.8  Constitutional: Well-nourished, well-developed female in no acute distress  HEENT: Normocephalic, atraumatic, sclera anicteric, normal conjunctiva  Pulmonary: Clear to auscultation bilaterally, normal respiratory effort, no wheezes, rales or rhonchi noted  Cardiac: Regular rate and rhythm, no murmurs, gallops or rubs noted  Abdomen: Soft, nontender, nondistended, normal bowel sounds present  Skin: Warm, dry, intact, no rashes noted  Musculoskeletal: " Normal gait, no joint asymmetries noted  Psych: Alert and oriented x3, normal affect, normal judgment    CLINICAL SUMMARY (A/P):    This is a 74-year-old lady presenting with symptomatic cholelithiasis and elevated alkaline phosphatase.  As a result of her symptoms I recommended that we proceed with a laparoscopic cholecystectomy with intraoperative cholangiogram as well as liver biopsy.  I did discuss the risks and rationale and she understands the options and wishes to proceed with laparoscopic cholecystectomy.  She understands nature of the procedure and the risks including but not limited to bleeding, infection, conversion to open procedure, postoperative bile leak, and the bowel function changes which can accompany cholecystectomy.  All questions were answered at the time of this visit and she was willing to proceed with all recommendations.      Senthil Zapata PA-C

## 2019-11-27 ENCOUNTER — APPOINTMENT (OUTPATIENT)
Dept: PREADMISSION TESTING | Facility: HOSPITAL | Age: 74
End: 2019-11-27

## 2019-11-27 VITALS
HEIGHT: 62 IN | SYSTOLIC BLOOD PRESSURE: 125 MMHG | OXYGEN SATURATION: 100 % | TEMPERATURE: 97.9 F | RESPIRATION RATE: 16 BRPM | BODY MASS INDEX: 28.26 KG/M2 | WEIGHT: 153.6 LBS | HEART RATE: 72 BPM | DIASTOLIC BLOOD PRESSURE: 77 MMHG

## 2019-11-27 LAB
ANION GAP SERPL CALCULATED.3IONS-SCNC: 10.4 MMOL/L (ref 5–15)
BUN BLD-MCNC: 17 MG/DL (ref 8–23)
BUN/CREAT SERPL: 19.5 (ref 7–25)
CALCIUM SPEC-SCNC: 9.3 MG/DL (ref 8.6–10.5)
CHLORIDE SERPL-SCNC: 106 MMOL/L (ref 98–107)
CO2 SERPL-SCNC: 24.6 MMOL/L (ref 22–29)
CREAT BLD-MCNC: 0.87 MG/DL (ref 0.57–1)
DEPRECATED RDW RBC AUTO: 42.9 FL (ref 37–54)
ERYTHROCYTE [DISTWIDTH] IN BLOOD BY AUTOMATED COUNT: 13.3 % (ref 12.3–15.4)
GFR SERPL CREATININE-BSD FRML MDRD: 64 ML/MIN/1.73
GLUCOSE BLD-MCNC: 89 MG/DL (ref 65–99)
HCT VFR BLD AUTO: 39.7 % (ref 34–46.6)
HGB BLD-MCNC: 13.3 G/DL (ref 12–15.9)
MCH RBC QN AUTO: 30 PG (ref 26.6–33)
MCHC RBC AUTO-ENTMCNC: 33.5 G/DL (ref 31.5–35.7)
MCV RBC AUTO: 89.6 FL (ref 79–97)
PLATELET # BLD AUTO: 220 10*3/MM3 (ref 140–450)
PMV BLD AUTO: 9.9 FL (ref 6–12)
POTASSIUM BLD-SCNC: 4.3 MMOL/L (ref 3.5–5.2)
RBC # BLD AUTO: 4.43 10*6/MM3 (ref 3.77–5.28)
SODIUM BLD-SCNC: 141 MMOL/L (ref 136–145)
WBC NRBC COR # BLD: 5.31 10*3/MM3 (ref 3.4–10.8)

## 2019-11-27 PROCEDURE — 93005 ELECTROCARDIOGRAM TRACING: CPT

## 2019-11-27 PROCEDURE — 85027 COMPLETE CBC AUTOMATED: CPT | Performed by: SURGERY

## 2019-11-27 PROCEDURE — 36415 COLL VENOUS BLD VENIPUNCTURE: CPT

## 2019-11-27 PROCEDURE — 80048 BASIC METABOLIC PNL TOTAL CA: CPT | Performed by: SURGERY

## 2019-11-27 PROCEDURE — 93010 ELECTROCARDIOGRAM REPORT: CPT | Performed by: INTERNAL MEDICINE

## 2019-11-27 RX ORDER — BACLOFEN 10 MG/1
5 TABLET ORAL NIGHTLY PRN
COMMUNITY
End: 2019-12-03 | Stop reason: HOSPADM

## 2019-11-27 NOTE — DISCHARGE INSTRUCTIONS
Take the following medications the morning of surgery with a small sip of water: NONE        General Instructions:  • Do not eat solid food after midnight the night before surgery.  • You may drink clear liquids day of surgery but must stop at least one hour before your hospital arrival time.  It is beneficial for you to have a clear drink that contains carbohydrates the day of surgery.  We suggest a 12 to 20 ounce bottle of Gatorade or Powerade for non-diabetic patients      Clear liquids are liquids you can see through.  Nothing red in color.     Plain water                               Sports drinks  Sodas                                   Gelatin (Jell-O)  Fruit juices without pulp such as white grape juice and apple juice  Popsicles that contain no fruit or yogurt  Tea or coffee (no cream or milk added)  Gatorade / Powerade  G2 / Powerade Zero    • Bring any papers given to you in the doctor’s office.  • Wear clean comfortable clothes.  • Do not wear contact lenses, false eyelashes or make-up.  Bring a case for your glasses.   • Remove all piercings.  Leave jewelry and any other valuables at home.  • The Pre-Admission Testing nurse will instruct you to bring medications if unable to obtain an accurate list in Pre-Admission Testing.        Preventing a Surgical Site Infection:  • For 2 to 3 days before surgery, avoid shaving with a razor because the razor can irritate skin and make it easier to develop an infection.    • Any areas of open skin can increase the risk of a post-operative wound infection by allowing bacteria to enter and travel throughout the body.  Notify your surgeon if you have any skin wounds / rashes even if it is not near the expected surgical site.  The area will need assessed to determine if surgery should be delayed until it is healed.  • The night prior to surgery sleep in a clean bed with clean clothing.  Do not allow pets to sleep with you.  • Shower on the morning of surgery using a  fresh bar of anti-bacterial soap (such as Dial) and clean washcloth.  Dry with a clean towel and dress in clean clothing.  • Ask your surgeon if you will be receiving antibiotics prior to surgery.  • Make sure you, your family, and all healthcare providers clean their hands with soap and water or an alcohol based hand  before caring for you or your wound.    Day of surgery: 12/3/2019. MAIN OR. ARRIVAL TIME 11 AM  Your arrival time is approximately two hours before your scheduled surgery time.  Upon arrival, a Pre-op nurse and Anesthesiologist will review your health history, obtain vital signs, and answer questions you may have.  The only belongings needed at this time will be a list of your home medications and if applicable your C-PAP/BI-PAP machine.  If you are staying overnight your family can leave the rest of your belongings in the car and bring them to your room later.  A Pre-op nurse will start an IV and you may receive medication in preparation for surgery, including something to help you relax.  Your family will be able to see you in the Pre-op area.  Two visitors at a time will be allowed in the Pre-op room.  While you are in surgery your family should notify the waiting room  if they leave the waiting room area and provide a contact phone number.    Please be aware that surgery does come with discomfort.  We want to make every effort to control your discomfort so please discuss any uncontrolled symptoms with your nurse.   Your doctor will most likely have prescribed pain medications.      If you are going home after surgery you will receive individualized written care instructions before being discharged.  A responsible adult must drive you to and from the hospital on the day of your surgery and stay with you for 24 hours.        You have received a list of surgical assistants for your reference.  If you have any questions please call Pre-Admission Testing at [phone].  Deductibles  and co-payments are collected on the day of service. Please be prepared to pay the required co-pay, deductible or deposit on the day of service as defined by your plan.        2% CHLORAHEXIDINE GLUCONATE* CLOTH  Preparing or “prepping” skin before surgery can reduce the risk of infection at the surgical site. To make the process easier, [facility] has chosen disposable cloths moistened with a rinse-free, 2% Chlorhexidine Gluconate (CHG) antiseptic solution. The steps below outline the prepping process and should be carefully followed.        Use the prep cloth on the area that is circled in the diagram             Directions Night before Surgery  1) Shower using a fresh bar of anti-bacterial soap (such as Dial) and clean washcloth.  Use a clean towel to completely dry your skin.  2) Do not use any lotions, oils or creams on your skin.  3) Open the package and remove 1 cloth, wipe your skin for 30 seconds in a circular motion.  Allow to dry for 3 minutes.  4) Repeat #3 with second cloth.  5) Do not touch your eyes, ears, or mouth with the prep cloth.  6) Allow the wet prep solution to air dry.  7) Discard the prep cloth and wash your hands with soap and water.   8) Dress in clean bed clothes and sleep on fresh clean bed sheets.   9) You may experience some temporary itching after the prep.    Directions Day of Surgery  1) Repeat steps 1,2,3,4,5,6,7, and 9.   2) Dress in clean clothes before coming to the hospital.

## 2019-11-29 NOTE — PATIENT INSTRUCTIONS
74 year old female who presents today with low back pain since 11/10/19. Patient traveled to [place], driving 6 hours each way 10/29/19 due to her aunts decline in health. She stopped every 2 hours to walk around. After her aunt's death, her aunt's  fell and had to go to the hospital. She then sat at the hospital with him. 11/10/19, she woke up at 3 am and could not move due to low back pain without radicular symptoms. She denies weakness or numbness/ tingling or GI/ symptoms as well as saddle anesthesia. She was seen 11/11/19 by Dr Mitchell, given Toradol injection, Mobic and Baclofen. Patient reports she noticed improvement in pain with medication. I will refer to physical therapy as well. To be seen ASAP if worsening, new or changing symptoms.     Patient had elevated alk phos with Liver and Bone etiology on isoenzymes. Patient had liver US with extensive cholelithiasis and bone scan. She denies symptoms with gallstones. Her GI wanted further labs. She has positive AMA and will see GI in follow up. She does have family history significant for a cousin with Lupus. Patient has never had auto-immune testing. I will await GI evaluation to see if we should do further workup for bone etiology or autoimmune testing.

## 2019-12-03 ENCOUNTER — APPOINTMENT (OUTPATIENT)
Dept: GENERAL RADIOLOGY | Facility: HOSPITAL | Age: 74
End: 2019-12-03

## 2019-12-03 ENCOUNTER — ANESTHESIA (OUTPATIENT)
Dept: PERIOP | Facility: HOSPITAL | Age: 74
End: 2019-12-03

## 2019-12-03 ENCOUNTER — ANESTHESIA EVENT (OUTPATIENT)
Dept: PERIOP | Facility: HOSPITAL | Age: 74
End: 2019-12-03

## 2019-12-03 ENCOUNTER — HOSPITAL ENCOUNTER (OUTPATIENT)
Facility: HOSPITAL | Age: 74
Setting detail: HOSPITAL OUTPATIENT SURGERY
Discharge: HOME | End: 2019-12-03
Attending: SURGERY | Admitting: SURGERY

## 2019-12-03 VITALS
HEART RATE: 53 BPM | TEMPERATURE: 97.8 F | OXYGEN SATURATION: 100 % | DIASTOLIC BLOOD PRESSURE: 70 MMHG | SYSTOLIC BLOOD PRESSURE: 115 MMHG | RESPIRATION RATE: 18 BRPM | WEIGHT: 152.2 LBS | HEIGHT: 62 IN | BODY MASS INDEX: 28.01 KG/M2

## 2019-12-03 DIAGNOSIS — R10.13 EPIGASTRIC PAIN: ICD-10-CM

## 2019-12-03 DIAGNOSIS — K80.20 CALCULUS OF GALLBLADDER WITHOUT CHOLECYSTITIS WITHOUT OBSTRUCTION: ICD-10-CM

## 2019-12-03 DIAGNOSIS — R79.89 ELEVATED LFTS: ICD-10-CM

## 2019-12-03 DIAGNOSIS — K21.9 GASTROESOPHAGEAL REFLUX DISEASE WITHOUT ESOPHAGITIS: ICD-10-CM

## 2019-12-03 PROCEDURE — 25010000002 HYDROMORPHONE PER 4 MG: Performed by: ANESTHESIOLOGY

## 2019-12-03 PROCEDURE — 25010000002 DEXAMETHASONE PER 1 MG: Performed by: ANESTHESIOLOGY

## 2019-12-03 PROCEDURE — 0 IOTHALAMATE 60 % SOLUTION: Performed by: SURGERY

## 2019-12-03 PROCEDURE — 88313 SPECIAL STAINS GROUP 2: CPT | Performed by: SURGERY

## 2019-12-03 PROCEDURE — 25010000002 FENTANYL CITRATE (PF) 100 MCG/2ML SOLUTION: Performed by: ANESTHESIOLOGY

## 2019-12-03 PROCEDURE — 74300 X-RAY BILE DUCTS/PANCREAS: CPT

## 2019-12-03 PROCEDURE — 47563 LAPARO CHOLECYSTECTOMY/GRAPH: CPT | Performed by: SURGERY

## 2019-12-03 PROCEDURE — 25010000002 MIDAZOLAM PER 1 MG: Performed by: ANESTHESIOLOGY

## 2019-12-03 PROCEDURE — 88307 TISSUE EXAM BY PATHOLOGIST: CPT | Performed by: SURGERY

## 2019-12-03 PROCEDURE — 88304 TISSUE EXAM BY PATHOLOGIST: CPT | Performed by: SURGERY

## 2019-12-03 PROCEDURE — 25010000002 ONDANSETRON PER 1 MG: Performed by: ANESTHESIOLOGY

## 2019-12-03 PROCEDURE — 25010000002 NEOSTIGMINE PER 0.5 MG: Performed by: ANESTHESIOLOGY

## 2019-12-03 PROCEDURE — 25010000002 KETOROLAC TROMETHAMINE PER 15 MG: Performed by: ANESTHESIOLOGY

## 2019-12-03 PROCEDURE — 47001 NDL BIOPSY LVR TM OTH MAJ PX: CPT | Performed by: SURGERY

## 2019-12-03 PROCEDURE — 25010000002 PROPOFOL 10 MG/ML EMULSION: Performed by: ANESTHESIOLOGY

## 2019-12-03 DEVICE — CLIP LIGAT VASC HORIZON TI MD/LG GRN 6CT: Type: IMPLANTABLE DEVICE | Site: ABDOMEN | Status: FUNCTIONAL

## 2019-12-03 RX ORDER — PROMETHAZINE HYDROCHLORIDE 25 MG/1
25 SUPPOSITORY RECTAL ONCE AS NEEDED
Status: DISCONTINUED | OUTPATIENT
Start: 2019-12-03 | End: 2019-12-03 | Stop reason: HOSPADM

## 2019-12-03 RX ORDER — SODIUM CHLORIDE 0.9 % (FLUSH) 0.9 %
3-10 SYRINGE (ML) INJECTION AS NEEDED
Status: DISCONTINUED | OUTPATIENT
Start: 2019-12-03 | End: 2019-12-03 | Stop reason: HOSPADM

## 2019-12-03 RX ORDER — ONDANSETRON 2 MG/ML
4 INJECTION INTRAMUSCULAR; INTRAVENOUS ONCE AS NEEDED
Status: DISCONTINUED | OUTPATIENT
Start: 2019-12-03 | End: 2019-12-03 | Stop reason: HOSPADM

## 2019-12-03 RX ORDER — BUPIVACAINE HYDROCHLORIDE AND EPINEPHRINE 5; 5 MG/ML; UG/ML
INJECTION, SOLUTION PERINEURAL AS NEEDED
Status: DISCONTINUED | OUTPATIENT
Start: 2019-12-03 | End: 2019-12-03 | Stop reason: HOSPADM

## 2019-12-03 RX ORDER — ROCURONIUM BROMIDE 10 MG/ML
INJECTION, SOLUTION INTRAVENOUS AS NEEDED
Status: DISCONTINUED | OUTPATIENT
Start: 2019-12-03 | End: 2019-12-03 | Stop reason: SURG

## 2019-12-03 RX ORDER — FENTANYL CITRATE 50 UG/ML
50 INJECTION, SOLUTION INTRAMUSCULAR; INTRAVENOUS
Status: DISCONTINUED | OUTPATIENT
Start: 2019-12-03 | End: 2019-12-03 | Stop reason: HOSPADM

## 2019-12-03 RX ORDER — ONDANSETRON 4 MG/1
4 TABLET, FILM COATED ORAL EVERY 6 HOURS PRN
Qty: 10 TABLET | Refills: 1 | Status: SHIPPED | OUTPATIENT
Start: 2019-12-03 | End: 2019-12-10

## 2019-12-03 RX ORDER — EPHEDRINE SULFATE 50 MG/ML
5 INJECTION, SOLUTION INTRAVENOUS ONCE AS NEEDED
Status: DISCONTINUED | OUTPATIENT
Start: 2019-12-03 | End: 2019-12-03 | Stop reason: HOSPADM

## 2019-12-03 RX ORDER — DIPHENHYDRAMINE HYDROCHLORIDE 50 MG/ML
12.5 INJECTION INTRAMUSCULAR; INTRAVENOUS
Status: DISCONTINUED | OUTPATIENT
Start: 2019-12-03 | End: 2019-12-03 | Stop reason: HOSPADM

## 2019-12-03 RX ORDER — SODIUM CHLORIDE 0.9 % (FLUSH) 0.9 %
3 SYRINGE (ML) INJECTION EVERY 12 HOURS SCHEDULED
Status: DISCONTINUED | OUTPATIENT
Start: 2019-12-03 | End: 2019-12-03 | Stop reason: HOSPADM

## 2019-12-03 RX ORDER — SODIUM CHLORIDE 9 MG/ML
INJECTION, SOLUTION INTRAVENOUS AS NEEDED
Status: DISCONTINUED | OUTPATIENT
Start: 2019-12-03 | End: 2019-12-03 | Stop reason: HOSPADM

## 2019-12-03 RX ORDER — SODIUM CHLORIDE, SODIUM LACTATE, POTASSIUM CHLORIDE, CALCIUM CHLORIDE 600; 310; 30; 20 MG/100ML; MG/100ML; MG/100ML; MG/100ML
9 INJECTION, SOLUTION INTRAVENOUS CONTINUOUS
Status: DISCONTINUED | OUTPATIENT
Start: 2019-12-03 | End: 2019-12-03 | Stop reason: HOSPADM

## 2019-12-03 RX ORDER — LIDOCAINE HYDROCHLORIDE 20 MG/ML
INJECTION, SOLUTION INFILTRATION; PERINEURAL AS NEEDED
Status: DISCONTINUED | OUTPATIENT
Start: 2019-12-03 | End: 2019-12-03 | Stop reason: SURG

## 2019-12-03 RX ORDER — NALOXONE HCL 0.4 MG/ML
0.2 VIAL (ML) INJECTION AS NEEDED
Status: DISCONTINUED | OUTPATIENT
Start: 2019-12-03 | End: 2019-12-03 | Stop reason: HOSPADM

## 2019-12-03 RX ORDER — GLYCOPYRROLATE 0.2 MG/ML
INJECTION INTRAMUSCULAR; INTRAVENOUS AS NEEDED
Status: DISCONTINUED | OUTPATIENT
Start: 2019-12-03 | End: 2019-12-03 | Stop reason: SURG

## 2019-12-03 RX ORDER — FENTANYL CITRATE 50 UG/ML
INJECTION, SOLUTION INTRAMUSCULAR; INTRAVENOUS AS NEEDED
Status: DISCONTINUED | OUTPATIENT
Start: 2019-12-03 | End: 2019-12-03 | Stop reason: SURG

## 2019-12-03 RX ORDER — LABETALOL HYDROCHLORIDE 5 MG/ML
5 INJECTION, SOLUTION INTRAVENOUS
Status: DISCONTINUED | OUTPATIENT
Start: 2019-12-03 | End: 2019-12-03 | Stop reason: HOSPADM

## 2019-12-03 RX ORDER — LIDOCAINE HYDROCHLORIDE 10 MG/ML
0.5 INJECTION, SOLUTION EPIDURAL; INFILTRATION; INTRACAUDAL; PERINEURAL ONCE AS NEEDED
Status: DISCONTINUED | OUTPATIENT
Start: 2019-12-03 | End: 2019-12-03 | Stop reason: HOSPADM

## 2019-12-03 RX ORDER — PROMETHAZINE HYDROCHLORIDE 25 MG/ML
12.5 INJECTION, SOLUTION INTRAMUSCULAR; INTRAVENOUS ONCE AS NEEDED
Status: DISCONTINUED | OUTPATIENT
Start: 2019-12-03 | End: 2019-12-03 | Stop reason: HOSPADM

## 2019-12-03 RX ORDER — ONDANSETRON 2 MG/ML
INJECTION INTRAMUSCULAR; INTRAVENOUS AS NEEDED
Status: DISCONTINUED | OUTPATIENT
Start: 2019-12-03 | End: 2019-12-03 | Stop reason: SURG

## 2019-12-03 RX ORDER — PROMETHAZINE HYDROCHLORIDE 25 MG/ML
6.25 INJECTION, SOLUTION INTRAMUSCULAR; INTRAVENOUS
Status: DISCONTINUED | OUTPATIENT
Start: 2019-12-03 | End: 2019-12-03 | Stop reason: HOSPADM

## 2019-12-03 RX ORDER — HYDRALAZINE HYDROCHLORIDE 20 MG/ML
5 INJECTION INTRAMUSCULAR; INTRAVENOUS
Status: DISCONTINUED | OUTPATIENT
Start: 2019-12-03 | End: 2019-12-03 | Stop reason: HOSPADM

## 2019-12-03 RX ORDER — DEXAMETHASONE SODIUM PHOSPHATE 10 MG/ML
INJECTION INTRAMUSCULAR; INTRAVENOUS AS NEEDED
Status: DISCONTINUED | OUTPATIENT
Start: 2019-12-03 | End: 2019-12-03 | Stop reason: SURG

## 2019-12-03 RX ORDER — PROPOFOL 10 MG/ML
VIAL (ML) INTRAVENOUS AS NEEDED
Status: DISCONTINUED | OUTPATIENT
Start: 2019-12-03 | End: 2019-12-03 | Stop reason: SURG

## 2019-12-03 RX ORDER — MIDAZOLAM HYDROCHLORIDE 1 MG/ML
2 INJECTION INTRAMUSCULAR; INTRAVENOUS
Status: DISCONTINUED | OUTPATIENT
Start: 2019-12-03 | End: 2019-12-03 | Stop reason: HOSPADM

## 2019-12-03 RX ORDER — HYDROMORPHONE HYDROCHLORIDE 1 MG/ML
0.5 INJECTION, SOLUTION INTRAMUSCULAR; INTRAVENOUS; SUBCUTANEOUS
Status: DISCONTINUED | OUTPATIENT
Start: 2019-12-03 | End: 2019-12-03 | Stop reason: HOSPADM

## 2019-12-03 RX ORDER — MAGNESIUM HYDROXIDE 1200 MG/15ML
LIQUID ORAL AS NEEDED
Status: DISCONTINUED | OUTPATIENT
Start: 2019-12-03 | End: 2019-12-03 | Stop reason: HOSPADM

## 2019-12-03 RX ORDER — FAMOTIDINE 10 MG/ML
20 INJECTION, SOLUTION INTRAVENOUS ONCE
Status: COMPLETED | OUTPATIENT
Start: 2019-12-03 | End: 2019-12-03

## 2019-12-03 RX ORDER — PROMETHAZINE HYDROCHLORIDE 25 MG/1
25 TABLET ORAL ONCE AS NEEDED
Status: DISCONTINUED | OUTPATIENT
Start: 2019-12-03 | End: 2019-12-03 | Stop reason: HOSPADM

## 2019-12-03 RX ORDER — MIDAZOLAM HYDROCHLORIDE 1 MG/ML
1 INJECTION INTRAMUSCULAR; INTRAVENOUS
Status: DISCONTINUED | OUTPATIENT
Start: 2019-12-03 | End: 2019-12-03 | Stop reason: HOSPADM

## 2019-12-03 RX ORDER — DIPHENHYDRAMINE HCL 25 MG
25 CAPSULE ORAL
Status: DISCONTINUED | OUTPATIENT
Start: 2019-12-03 | End: 2019-12-03 | Stop reason: HOSPADM

## 2019-12-03 RX ORDER — HYDROCODONE BITARTRATE AND ACETAMINOPHEN 7.5; 325 MG/1; MG/1
1 TABLET ORAL ONCE AS NEEDED
Status: DISCONTINUED | OUTPATIENT
Start: 2019-12-03 | End: 2019-12-03

## 2019-12-03 RX ORDER — HYDROCODONE BITARTRATE AND ACETAMINOPHEN 5; 325 MG/1; MG/1
TABLET ORAL
Qty: 24 TABLET | Refills: 0 | Status: SHIPPED | OUTPATIENT
Start: 2019-12-03 | End: 2019-12-10

## 2019-12-03 RX ORDER — SCOLOPAMINE TRANSDERMAL SYSTEM 1 MG/1
1 PATCH, EXTENDED RELEASE TRANSDERMAL
Status: DISCONTINUED | OUTPATIENT
Start: 2019-12-03 | End: 2019-12-03 | Stop reason: HOSPADM

## 2019-12-03 RX ORDER — OXYCODONE AND ACETAMINOPHEN 7.5; 325 MG/1; MG/1
1 TABLET ORAL ONCE AS NEEDED
Status: COMPLETED | OUTPATIENT
Start: 2019-12-03 | End: 2019-12-03

## 2019-12-03 RX ORDER — KETOROLAC TROMETHAMINE 30 MG/ML
INJECTION, SOLUTION INTRAMUSCULAR; INTRAVENOUS AS NEEDED
Status: DISCONTINUED | OUTPATIENT
Start: 2019-12-03 | End: 2019-12-03 | Stop reason: SURG

## 2019-12-03 RX ORDER — ACETAMINOPHEN 325 MG/1
650 TABLET ORAL ONCE AS NEEDED
Status: DISCONTINUED | OUTPATIENT
Start: 2019-12-03 | End: 2019-12-03 | Stop reason: HOSPADM

## 2019-12-03 RX ORDER — FLUMAZENIL 0.1 MG/ML
0.2 INJECTION INTRAVENOUS AS NEEDED
Status: DISCONTINUED | OUTPATIENT
Start: 2019-12-03 | End: 2019-12-03 | Stop reason: HOSPADM

## 2019-12-03 RX ADMIN — SODIUM CHLORIDE, POTASSIUM CHLORIDE, SODIUM LACTATE AND CALCIUM CHLORIDE 9 ML/HR: 600; 310; 30; 20 INJECTION, SOLUTION INTRAVENOUS at 13:31

## 2019-12-03 RX ADMIN — FENTANYL CITRATE 25 MCG: 50 INJECTION INTRAMUSCULAR; INTRAVENOUS at 15:57

## 2019-12-03 RX ADMIN — ROCURONIUM BROMIDE 20 MG: 10 INJECTION INTRAVENOUS at 15:57

## 2019-12-03 RX ADMIN — SCOPALAMINE 1 PATCH: 1 PATCH, EXTENDED RELEASE TRANSDERMAL at 13:30

## 2019-12-03 RX ADMIN — MIDAZOLAM 1 MG: 1 INJECTION INTRAMUSCULAR; INTRAVENOUS at 13:31

## 2019-12-03 RX ADMIN — KETOROLAC TROMETHAMINE 15 MG: 30 INJECTION, SOLUTION INTRAMUSCULAR; INTRAVENOUS at 16:27

## 2019-12-03 RX ADMIN — PROPOFOL 20 MG: 10 INJECTION, EMULSION INTRAVENOUS at 16:02

## 2019-12-03 RX ADMIN — FENTANYL CITRATE 50 MCG: 50 INJECTION, SOLUTION INTRAMUSCULAR; INTRAVENOUS at 17:24

## 2019-12-03 RX ADMIN — GLYCOPYRROLATE 0.6 MG: 0.2 INJECTION INTRAMUSCULAR; INTRAVENOUS at 16:31

## 2019-12-03 RX ADMIN — FENTANYL CITRATE 50 MCG: 50 INJECTION, SOLUTION INTRAMUSCULAR; INTRAVENOUS at 17:55

## 2019-12-03 RX ADMIN — ONDANSETRON HYDROCHLORIDE 4 MG: 2 SOLUTION INTRAMUSCULAR; INTRAVENOUS at 16:27

## 2019-12-03 RX ADMIN — LIDOCAINE HYDROCHLORIDE 60 MG: 20 INJECTION, SOLUTION INFILTRATION; PERINEURAL at 15:58

## 2019-12-03 RX ADMIN — NEOSTIGMINE METHYLSULFATE 3 MG: 1 INJECTION INTRAMUSCULAR; INTRAVENOUS; SUBCUTANEOUS at 16:31

## 2019-12-03 RX ADMIN — DEXAMETHASONE SODIUM PHOSPHATE 6 MG: 10 INJECTION INTRAMUSCULAR; INTRAVENOUS at 16:03

## 2019-12-03 RX ADMIN — OXYCODONE HYDROCHLORIDE AND ACETAMINOPHEN 1 TABLET: 7.5; 325 TABLET ORAL at 04:00

## 2019-12-03 RX ADMIN — HYDROMORPHONE HYDROCHLORIDE 0.5 MG: 1 INJECTION, SOLUTION INTRAMUSCULAR; INTRAVENOUS; SUBCUTANEOUS at 17:47

## 2019-12-03 RX ADMIN — HYDROMORPHONE HYDROCHLORIDE 0.5 MG: 1 INJECTION, SOLUTION INTRAMUSCULAR; INTRAVENOUS; SUBCUTANEOUS at 17:15

## 2019-12-03 RX ADMIN — PROPOFOL 120 MG: 10 INJECTION, EMULSION INTRAVENOUS at 15:59

## 2019-12-03 RX ADMIN — FAMOTIDINE 20 MG: 10 INJECTION INTRAVENOUS at 13:31

## 2019-12-03 RX ADMIN — PROPOFOL 20 MG: 10 INJECTION, EMULSION INTRAVENOUS at 16:07

## 2019-12-03 RX ADMIN — FENTANYL CITRATE 25 MCG: 50 INJECTION INTRAMUSCULAR; INTRAVENOUS at 16:07

## 2019-12-03 RX ADMIN — FENTANYL CITRATE 50 MCG: 50 INJECTION INTRAMUSCULAR; INTRAVENOUS at 16:13

## 2019-12-03 NOTE — ANESTHESIA PROCEDURE NOTES
Airway  Urgency: elective    Date/Time: 12/3/2019 4:02 PM    General Information and Staff    Patient location during procedure: OR  Anesthesiologist: Jefe Lees MD    Indications and Patient Condition  Indications for airway management: airway protection    Preoxygenated: yes  MILS maintained throughout  Mask difficulty assessment: 1 - vent by mask    Final Airway Details  Final airway type: endotracheal airway      Successful airway: ETT  Cuffed: yes   Successful intubation technique: direct laryngoscopy  Facilitating devices/methods: anterior pressure/BURP  Endotracheal tube insertion site: oral  Blade: Santosh  Blade size: 3  ETT size (mm): 7.0  Cormack-Lehane Classification: grade IIa - partial view of glottis  Placement verified by: capnometry   Measured from: teeth  ETT/EBT  to teeth (cm): 20  Number of attempts at approach: 1

## 2019-12-03 NOTE — ANESTHESIA PREPROCEDURE EVALUATION
Anesthesia Evaluation     Patient summary reviewed and Nursing notes reviewed                Airway   Mallampati: II  No difficulty expected  Dental      Pulmonary - negative pulmonary ROS   Cardiovascular - negative cardio ROS    ECG reviewed  Rhythm: regular  Rate: normal        Neuro/Psych  (+) numbness, psychiatric history Anxiety,     GI/Hepatic/Renal/Endo    (+)  GERD,  liver disease history of elevated LFT, renal disease stones,     Musculoskeletal     Abdominal    Substance History - negative use     OB/GYN negative ob/gyn ROS         Other   arthritis,                      Anesthesia Plan    ASA 2     general     intravenous induction     Anesthetic plan, all risks, benefits, and alternatives have been provided, discussed and informed consent has been obtained with: patient.

## 2019-12-03 NOTE — ANESTHESIA POSTPROCEDURE EVALUATION
"Patient: Kourtney Lorenzana    Procedure Summary     Date:  12/03/19 Room / Location:  [facility] OR  / [facility] MAIN OR    Anesthesia Start:  1555 Anesthesia Stop:  1652    Procedure:  CHOLECYSTECTOMY LAPAROSCOPIC INTRAOPERATIVE CHOLANGIOGRAM AND LIVER BIOPSY (N/A Abdomen) Diagnosis:       Gastroesophageal reflux disease without esophagitis      Epigastric pain      Calculus of gallbladder without cholecystitis without obstruction      Elevated LFTs      (Gastroesophageal reflux disease without esophagitis [K21.9])      (Epigastric pain [R10.13])      (Calculus of gallbladder without cholecystitis without obstruction [K80.20])      (Elevated LFTs [R94.5])    Surgeon:  Benny Schneider MD Provider:  Ziggy Akers MD    Anesthesia Type:  general ASA Status:  2          Anesthesia Type: general  Last vitals  BP   115/70 (12/03/19 1830)   Temp   36.6 °C (97.8 °F) (12/03/19 1745)   Pulse   53 (12/03/19 1830)   Resp   18 (12/03/19 1830)     SpO2   100 % (12/03/19 1830)     Post Anesthesia Care and Evaluation    Patient location during evaluation: bedside  Patient participation: complete - patient participated  Level of consciousness: awake  Pain management: adequate  Airway patency: patent  Anesthetic complications: No anesthetic complications    Cardiovascular status: acceptable  Respiratory status: acceptable  Hydration status: acceptable    Comments: */70   Pulse 53   Temp 36.6 °C (97.8 °F) (Oral)   Resp 18   Ht 157.5 cm (62\")   Wt 69 kg (152 lb 3.2 oz)   SpO2 100%   BMI 27.84 kg/m²         "

## 2019-12-03 NOTE — OP NOTE
PREOPERATIVE DIAGNOSIS:  Symptomatic cholelithiasis and elevated alkaline phosphatase    POSTOPERATIVE DIAGNOSIS (FINDINGS):  Same    PROCEDURE:  Laparoscopic cholecystectomy with cholangiogram and liver biopsy    SURGEON:  Benny Schneider MD    ASSISTANT:  Marsha Bonner    ANESTHESIA:  General    EBL:  Minimal    SPECIMEN(S):  -Gallbladder  -Liver biopsy    DESCRIPTION:  Supine position. General anesthesia. Prepped and draped, usual sterile manner.    1/2 % marcaine with epinephrine infiltrated in all incision sites. Small periumbilical incision made, Veress needle inserted with upward traction on abdominal wall. Abdomen insufflated to 15 mm Hg pressure and 5 mm opti-view trocar inserted followed by 10 mm subxiphoid and 5 mm right subcostal trocars.    Gallbladder grasped and elevated. Cystic duct dissected and clipped once distally. Cholangiogram catheter inserted and cholangiogram demonstrated prompt filling of duodenum, no filling defects. Cystic duct clipped twice proximally and divided. Cystic artery clipped twice proximally, once distally and divided. Gallbladder removed from the liver bed with electrocautery and removed through subxiphoid trocar site. Liver bed copiously irrigated and aspirated with good hemostasis noted.  Alvin-Cut liver biopsy was obtained from the right lobe of the liver with no difficulty.  Good specimen was obtained grossly.  Hemostasis was achieved with electrocautery.  CO2 released, fascia of subxiphoid trocar site closed with 0-vicryl, skin edges 5-0 vicryl subcuticular suture.    Tolerated well.  Stable to PACU.    Benny Schneider M.D.

## 2019-12-03 NOTE — DISCHARGE INSTRUCTIONS
You received a pain pill at 5:38pm              Dr. Benny Schneider  [address]  [place] [id number] [phone]    Discharge Instructions for Gall Bladder Surgery    1. Go home, rest and take it easy today; however, you should get up and move about several times today to reduce the risk of developing a clot in your legs.      2. You may experience some dizziness or memory loss from the anesthesia.  This may last for the next 24 hours.  Someone should plan on staying with you for the first 24 hours for your safety.    3. Do not make any important legal decisions or sign any legal papers for the next 24 hours.      4. Eat and drink lightly today.  Start off with liquids, jello, soup, crackers or other bland foods at first. You may advance your diet tomorrow as tolerated as long as you do not experience any nausea or vomiting.     5. If skin glue (Dermabond) was used, your incisions are protected and covered.  The invisible glue will dissolve on its own as your incision heals. If you have dressings, you may remove your outer dressings in 3 days.  The white tapes called steri-strips should stay in place.  They will fall off on their own in 1-2 weeks.  Do not worry if they come off sooner.      6. If you have dressings, you may notice some bleeding/drainage on your outer dressings. A little bloody drainage is normal. If the bleeding/drainage is such that the bandage cannot absorb it, remove the dressing, apply clean gauze and apply firm pressure for a full 15 minutes.  If the bleeding continues, please call me.    7. You may shower tomorrow allowing water to run over the incisions; however, do not scrub the incisions.   No tub baths until your incisions are completely healed.         8. You have received a prescription for a narcotic pain medicine, as you will have some pain following surgery.   You will not be totally pain free, but your pain medicine should make the pain tolerable.  Please take  your pain medicine as prescribed and always take your pills with food to prevent nausea. If you are having severe pain that cannot be controlled by the pain medicine, please contact me.      9. You have also received a prescription for an anti-nausea medicine.  Please take this as prescribed for any nausea or vomiting.  Nausea could be a result of the anesthesia or a result of the narcotic pain medicine.  If you experience severe nausea and vomiting that cannot be controlled by the nausea medicine, please call me.      10. It is not unusual to experience pain/discomfort in your shoulders or under your ribs after surgery.  It is from the gas used during the laparoscopic procedure and usually lasts 1-3 days.  The prescription pain medicine is used to treat the surgical pain and does not typically alleviate this “gassy” pain.     11. No driving for 24 hours and for as long as you are taking your prescription pain medicine.      12. You will need to call the office at [phone] to schedule a follow-up appointment in 6-10 days.     13. Remember to contact me for any of the following:    • Fever > 101 degrees  • Severe pain that cannot be controlled by taking your pain pills  • Severe nausea or vomiting that cannot be controlled by taking your nausea pills  • Significant bleeding of your incisions  • Drainage that has a bad smell or is yellow or green in appearance  • Any other questions or concerns

## 2019-12-05 LAB
CYTO UR: NORMAL
LAB AP CASE REPORT: NORMAL
PATH REPORT.FINAL DX SPEC: NORMAL
PATH REPORT.GROSS SPEC: NORMAL

## 2019-12-06 ENCOUNTER — TELEPHONE (OUTPATIENT)
Dept: GASTROENTEROLOGY | Facility: CLINIC | Age: 74
End: 2019-12-06

## 2019-12-10 ENCOUNTER — OFFICE VISIT (OUTPATIENT)
Dept: FAMILY MEDICINE CLINIC | Facility: CLINIC | Age: 74
End: 2019-12-10

## 2019-12-10 VITALS
TEMPERATURE: 98.6 F | HEIGHT: 62 IN | OXYGEN SATURATION: 97 % | BODY MASS INDEX: 27.6 KG/M2 | DIASTOLIC BLOOD PRESSURE: 78 MMHG | SYSTOLIC BLOOD PRESSURE: 118 MMHG | RESPIRATION RATE: 16 BRPM | HEART RATE: 86 BPM | WEIGHT: 150 LBS

## 2019-12-10 DIAGNOSIS — M54.50 ACUTE BILATERAL LOW BACK PAIN WITHOUT SCIATICA: ICD-10-CM

## 2019-12-10 PROCEDURE — 99213 OFFICE O/P EST LOW 20 MIN: CPT | Performed by: FAMILY MEDICINE

## 2019-12-10 RX ORDER — MELOXICAM 7.5 MG/1
7.5 TABLET ORAL DAILY
Qty: 30 TABLET | Refills: 1 | Status: SHIPPED | OUTPATIENT
Start: 2019-12-10 | End: 2020-01-13

## 2019-12-10 NOTE — PATIENT INSTRUCTIONS
Acute Back Pain, Adult  Acute back pain is sudden and usually short-lived. It is often caused by an injury to the muscles and tissues in the back. The injury may result from:  · A muscle or ligament getting overstretched or torn (strained). Ligaments are tissues that connect bones to each other. Lifting something improperly can cause a back strain.  · Wear and tear (degeneration) of the spinal disks. Spinal disks are circular tissue that provides cushioning between the bones of the spine (vertebrae).  · Twisting motions, such as while playing sports or doing yard work.  · A hit to the back.  · Arthritis.  You may have a physical exam, lab tests, and imaging tests to find the cause of your pain. Acute back pain usually goes away with rest and home care.  Follow these instructions at home:  Managing pain, stiffness, and swelling  · Take over-the-counter and prescription medicines only as told by your health care provider.  · Your health care provider may recommend applying ice during the first 24-48 hours after your pain starts. To do this:  ? Put ice in a plastic bag.  ? Place a towel between your skin and the bag.  ? Leave the ice on for 20 minutes, 2-3 times a day.  · If directed, apply heat to the affected area as often as told by your health care provider. Use the heat source that your health care provider recommends, such as a moist heat pack or a heating pad.  ? Place a towel between your skin and the heat source.  ? Leave the heat on for 20-30 minutes.  ? Remove the heat if your skin turns bright red. This is especially important if you are unable to feel pain, heat, or cold. You have a greater risk of getting burned.  Activity    · Do not stay in bed. Staying in bed for more than 1-2 days can delay your recovery.  · Sit up and stand up straight. Avoid leaning forward when you sit, or hunching over when you stand.  ? If you work at a desk, sit close to it so you do not need to lean over. Keep your chin tucked  "in. Keep your neck drawn back, and keep your elbows bent at a right angle. Your arms should look like the letter \"L.\"  ? Sit high and close to the steering wheel when you drive. Add lower back (lumbar) support to your car seat, if needed.  · Take short walks on even surfaces as soon as you are able. Try to increase the length of time you walk each day.  · Do not sit, drive, or  one place for more than 30 minutes at a time. Sitting or standing for long periods of time can put stress on your back.  · Do not drive or use heavy machinery while taking prescription pain medicine.  · Use proper lifting techniques. When you bend and lift, use positions that put less stress on your back:  ? Bend your knees.  ? Keep the load close to your body.  ? Avoid twisting.  · Exercise regularly as told by your health care provider. Exercising helps your back heal faster and helps prevent back injuries by keeping muscles strong and flexible.  · Work with a physical therapist to make a safe exercise program, as recommended by your health care provider. Do any exercises as told by your physical therapist.  Lifestyle  · Maintain a healthy weight. Extra weight puts stress on your back and makes it difficult to have good posture.  · Avoid activities or situations that make you feel anxious or stressed. Stress and anxiety increase muscle tension and can make back pain worse. Learn ways to manage anxiety and stress, such as through exercise.  General instructions  · Sleep on a firm mattress in a comfortable position. Try lying on your side with your knees slightly bent. If you lie on your back, put a pillow under your knees.  · Follow your treatment plan as told by your health care provider. This may include:  ? Cognitive or behavioral therapy.  ? Acupuncture or massage therapy.  ? Meditation or yoga.  Contact a health care provider if:  · You have pain that is not relieved with rest or medicine.  · You have increasing pain going down " into your legs or buttocks.  · Your pain does not improve after 2 weeks.  · You have pain at night.  · You lose weight without trying.  · You have a fever or chills.  Get help right away if:  · You develop new bowel or bladder control problems.  · You have unusual weakness or numbness in your arms or legs.  · You develop nausea or vomiting.  · You develop abdominal pain.  · You feel faint.  Summary  · Acute back pain is sudden and usually short-lived.  · Use proper lifting techniques. When you bend and lift, use positions that put less stress on your back.  · Take over-the-counter and prescription medicines and apply heat or ice as directed by your health care provider.  This information is not intended to replace advice given to you by your health care provider. Make sure you discuss any questions you have with your health care provider.  Document Released: 12/18/2006 Document Revised: 07/25/2019 Document Reviewed: 08/01/2018  [employer] Patient Education © 2019 [employer]

## 2019-12-10 NOTE — PROGRESS NOTES
Subjective   Kourtney Lorenzana is a 74 y.o. female. Presents today to be evaluated for back pain.     History of Present Illness     Low back pain-patient says that she was doing much better with her back pain but then she was operated on for her gallbladder and since then has gotten worse.  She finished the course of meloxicam and had only been to the physical therapy place once  and was not able to go back before having the operation.  She is interested in going back and would like to get started back on her medication.  No new symptoms like sciatica.    The following portions of the patient's history were reviewed and updated as appropriate: allergies, current medications, past family history, past medical history, past social history, past surgical history and problem list.    Review of Systems   Constitutional: Negative for fatigue and fever.   Respiratory: Negative for shortness of breath and wheezing.    Cardiovascular: Negative for chest pain and palpitations.   Gastrointestinal: Negative for constipation and nausea.   Musculoskeletal: Positive for back pain.   All other systems reviewed and are negative.      Objective   Physical Exam   Constitutional: She appears well-developed and well-nourished. No distress.   Cardiovascular: Normal rate, regular rhythm and normal heart sounds. Exam reveals no gallop and no friction rub.   No murmur heard.  Pulmonary/Chest: Effort normal and breath sounds normal. She has no wheezes. She has no rales.   Musculoskeletal:        Cervical back: Normal.        Thoracic back: Normal.        Lumbar back: She exhibits pain and spasm.   Skin: Skin is warm. Capillary refill takes less than 2 seconds. She is not diaphoretic.   Nursing note and vitals reviewed.      Assessment/Plan   Kourtney was seen today for back pain.    Diagnoses and all orders for this visit:    Acute bilateral low back pain without sciatica  -     meloxicam (MOBIC) 7.5 MG tablet; Take 1 tablet by mouth  Daily.      Plan as above.  We will restart the meloxicam and will have her restart her physical therapy once she is cleared by general surgery this week.

## 2019-12-12 ENCOUNTER — OFFICE VISIT (OUTPATIENT)
Dept: SURGERY | Facility: CLINIC | Age: 74
End: 2019-12-12

## 2019-12-12 DIAGNOSIS — Z09 FOLLOW UP: Primary | ICD-10-CM

## 2019-12-12 PROCEDURE — 99024 POSTOP FOLLOW-UP VISIT: CPT | Performed by: SURGERY

## 2019-12-12 RX ORDER — BACLOFEN 10 MG/1
5 TABLET ORAL DAILY
COMMUNITY
End: 2020-03-04

## 2019-12-15 NOTE — PROGRESS NOTES
Postoperative visit    Laparoscopic cholecystectomy with cholangiogram and liver biopsy 12/3/2019    Pathology:  -Chronic cholecystitis and cholelithiasis.  -Mild steatosis with no iron or fibrosis identified.    Cholangiogram:  -Normal    Office visit: Incisions are healing well and she is doing well, reporting no problems from the surgery.  Pathology reports discussed.

## 2019-12-16 ENCOUNTER — TELEPHONE (OUTPATIENT)
Dept: SURGERY | Facility: CLINIC | Age: 74
End: 2019-12-16

## 2019-12-16 ENCOUNTER — HOSPITAL ENCOUNTER (OUTPATIENT)
Dept: CT IMAGING | Facility: HOSPITAL | Age: 74
Discharge: HOME | End: 2019-12-16
Admitting: SURGERY

## 2019-12-16 DIAGNOSIS — R10.11 RUQ PAIN: ICD-10-CM

## 2019-12-16 DIAGNOSIS — R10.11 RUQ PAIN: Primary | ICD-10-CM

## 2019-12-16 PROCEDURE — 82565 ASSAY OF CREATININE: CPT

## 2019-12-16 PROCEDURE — 25010000002 IOPAMIDOL 61 % SOLUTION: Performed by: SURGERY

## 2019-12-16 PROCEDURE — 74160 CT ABDOMEN W/CONTRAST: CPT

## 2019-12-16 PROCEDURE — 0 DIATRIZOATE MEGLUMINE & SODIUM PER 1 ML: Performed by: SURGERY

## 2019-12-16 RX ADMIN — IOPAMIDOL 85 ML: 612 INJECTION, SOLUTION INTRAVENOUS at 17:12

## 2019-12-16 RX ADMIN — DIATRIZOATE MEGLUMINE AND DIATRIZOATE SODIUM 30 ML: 660; 100 LIQUID ORAL; RECTAL at 16:30

## 2019-12-16 NOTE — TELEPHONE ENCOUNTER
ADDENED    Spoke w/ patient, aware of order for STAT CT, pt aware not to eat anything she states the last time she ate was at 9 am. Patient will await call from Giovanna Aranda, [facility] .    ----- Message from Kourtney Lorenzana sent at 12/16/2019  9:49 AM EST -----  Regarding: RE: Visit Follow-Up Question  Contact: [phone]  Thank you for reply. I've been taking Tylenol ,so I know that doesn't work. Will switch to Advil and try that.  ----- Message -----  From: ISRA BAUTISTA  Sent: 12/16/2019  8:32 AM EST  To: Kourtney Lorenzana  Subject: RE: Visit Follow-Up Question  Ms. Lorenzana,  Thank you for contacting our office with your concerns.   Dr. Schneider is not in the office today but will be back on Thursday to address your concerns and will determine if you need to be seen then.  If your pain becomes worse and is not managed with over the counter Tylenol or Advil, please proceed to the ER for work up.  Thank You  Анна Perez CMA for Dr. Benny Schneider    ----- Message -----  From: Kourtney Lorenzana  Sent: 12/16/2019   3:39 AM EST  To: [facility]  Subject: Visit Follow-Up Question                         Dr Schneider   My surgery was on Dec 3, Tuesday. On Thursday nite I got a sharp continuous pain on right side rib cage. I called office on Saturday, but it was easing up so I didn't go ER. When I came to see you on thru the 12th, I was still ok. Then Friday, 13th, it came back. I'm not able to take deep breaths or lie on that side. Coughing or sneezing are out of the question.  Sincerely, Kourtney Lorenzana  Monday, Dec 16

## 2019-12-17 ENCOUNTER — TELEPHONE (OUTPATIENT)
Dept: SURGERY | Facility: CLINIC | Age: 74
End: 2019-12-17

## 2019-12-17 DIAGNOSIS — R10.11 RIGHT UPPER QUADRANT PAIN: Primary | ICD-10-CM

## 2019-12-17 LAB — CREAT BLDA-MCNC: 0.8 MG/DL (ref 0.6–1.3)

## 2019-12-17 RX ORDER — MELOXICAM 15 MG/1
15 TABLET ORAL DAILY
Qty: 14 TABLET | Refills: 0 | Status: SHIPPED | OUTPATIENT
Start: 2019-12-17 | End: 2019-12-31

## 2019-12-17 NOTE — TELEPHONE ENCOUNTER
I spoke w/ the patient & informed her of her CT results and that we are sending in a RX for Mobic 15 mg po qd x 2 weeks and to not take any Aleve, Aspirin or Ibuprofen while on it. The patient currently takes Mobic 7.5 mg po qd prescribed by her PCP, Dr. Russell Mitchell for low back pain. I instructed her to not take the Mobic prescribed by her PCP for the next 2 weeks but to take Dr. Schneider's as it is double the strength. She did ask about an antibiotic but I informed her that we would not prescribe any antibiotics due to her not having any other symptoms and that without knowing exactly what type of infection she may have an antibiotic could essentially be ineffective. I also instructed her to call back in a week with an update on her symptoms. I also instructed her that if her symptoms became worse or she developed any additional symptoms to call back or proceed to the ER for additional work up. Patient verbalized understanding.

## 2019-12-17 NOTE — TELEPHONE ENCOUNTER
LMOM informing patient that she had no evidence of infection to be treated w/ antibiotics per Dr. Schneider.

## 2019-12-17 NOTE — TELEPHONE ENCOUNTER
----- Message from Benny Schneider MD sent at 12/17/2019  6:43 AM EST -----  Please let her know that her CT was normal. I would recommend mobic 15 mg po daily for two weeks (please call in). Tell her it is a non steroidal drug and she needs to not take ANY ibuprofen or aspirin or aleve while on it. Her pain is probably musculoskeletal in origin and this should help.

## 2019-12-30 ENCOUNTER — APPOINTMENT (OUTPATIENT)
Dept: GENERAL RADIOLOGY | Facility: HOSPITAL | Age: 74
End: 2019-12-30

## 2019-12-30 ENCOUNTER — HOSPITAL ENCOUNTER (EMERGENCY)
Facility: HOSPITAL | Age: 74
Discharge: HOME | End: 2019-12-31
Attending: EMERGENCY MEDICINE | Admitting: EMERGENCY MEDICINE

## 2019-12-30 DIAGNOSIS — R07.89 ATYPICAL CHEST PAIN: Primary | ICD-10-CM

## 2019-12-30 LAB
ALBUMIN SERPL-MCNC: 4 G/DL (ref 3.5–5.2)
ALBUMIN/GLOB SERPL: 1.4 G/DL
ALP SERPL-CCNC: 125 U/L (ref 39–117)
ALT SERPL W P-5'-P-CCNC: 28 U/L (ref 1–33)
ANION GAP SERPL CALCULATED.3IONS-SCNC: 11.5 MMOL/L (ref 5–15)
AST SERPL-CCNC: 15 U/L (ref 1–32)
BASOPHILS # BLD AUTO: 0.03 10*3/MM3 (ref 0–0.2)
BASOPHILS NFR BLD AUTO: 0.4 % (ref 0–1.5)
BILIRUB SERPL-MCNC: 0.3 MG/DL (ref 0.2–1.2)
BUN BLD-MCNC: 20 MG/DL (ref 8–23)
BUN/CREAT SERPL: 23 (ref 7–25)
CALCIUM SPEC-SCNC: 9.2 MG/DL (ref 8.6–10.5)
CHLORIDE SERPL-SCNC: 107 MMOL/L (ref 98–107)
CO2 SERPL-SCNC: 24.5 MMOL/L (ref 22–29)
CREAT BLD-MCNC: 0.87 MG/DL (ref 0.57–1)
DEPRECATED RDW RBC AUTO: 43.1 FL (ref 37–54)
EOSINOPHIL # BLD AUTO: 0.17 10*3/MM3 (ref 0–0.4)
EOSINOPHIL NFR BLD AUTO: 2.3 % (ref 0.3–6.2)
ERYTHROCYTE [DISTWIDTH] IN BLOOD BY AUTOMATED COUNT: 13.2 % (ref 12.3–15.4)
GFR SERPL CREATININE-BSD FRML MDRD: 64 ML/MIN/1.73
GLOBULIN UR ELPH-MCNC: 2.9 GM/DL
GLUCOSE BLD-MCNC: 96 MG/DL (ref 65–99)
HCT VFR BLD AUTO: 36.7 % (ref 34–46.6)
HGB BLD-MCNC: 12.3 G/DL (ref 12–15.9)
HOLD SPECIMEN: NORMAL
HOLD SPECIMEN: NORMAL
IMM GRANULOCYTES # BLD AUTO: 0.03 10*3/MM3 (ref 0–0.05)
IMM GRANULOCYTES NFR BLD AUTO: 0.4 % (ref 0–0.5)
LYMPHOCYTES # BLD AUTO: 2.04 10*3/MM3 (ref 0.7–3.1)
LYMPHOCYTES NFR BLD AUTO: 27.9 % (ref 19.6–45.3)
MCH RBC QN AUTO: 30.1 PG (ref 26.6–33)
MCHC RBC AUTO-ENTMCNC: 33.5 G/DL (ref 31.5–35.7)
MCV RBC AUTO: 90 FL (ref 79–97)
MONOCYTES # BLD AUTO: 0.54 10*3/MM3 (ref 0.1–0.9)
MONOCYTES NFR BLD AUTO: 7.4 % (ref 5–12)
NEUTROPHILS # BLD AUTO: 4.51 10*3/MM3 (ref 1.7–7)
NEUTROPHILS NFR BLD AUTO: 61.6 % (ref 42.7–76)
NRBC BLD AUTO-RTO: 0 /100 WBC (ref 0–0.2)
PLATELET # BLD AUTO: 183 10*3/MM3 (ref 140–450)
PMV BLD AUTO: 9.9 FL (ref 6–12)
POTASSIUM BLD-SCNC: 4 MMOL/L (ref 3.5–5.2)
PROT SERPL-MCNC: 6.9 G/DL (ref 6–8.5)
RBC # BLD AUTO: 4.08 10*6/MM3 (ref 3.77–5.28)
SODIUM BLD-SCNC: 143 MMOL/L (ref 136–145)
TROPONIN T SERPL-MCNC: <0.01 NG/ML (ref 0–0.03)
WBC NRBC COR # BLD: 7.32 10*3/MM3 (ref 3.4–10.8)
WHOLE BLOOD HOLD SPECIMEN: NORMAL
WHOLE BLOOD HOLD SPECIMEN: NORMAL

## 2019-12-30 PROCEDURE — 71046 X-RAY EXAM CHEST 2 VIEWS: CPT

## 2019-12-30 PROCEDURE — 99284 EMERGENCY DEPT VISIT MOD MDM: CPT

## 2019-12-30 PROCEDURE — 93005 ELECTROCARDIOGRAM TRACING: CPT

## 2019-12-30 PROCEDURE — 85025 COMPLETE CBC W/AUTO DIFF WBC: CPT

## 2019-12-30 PROCEDURE — 84484 ASSAY OF TROPONIN QUANT: CPT

## 2019-12-30 PROCEDURE — 80053 COMPREHEN METABOLIC PANEL: CPT

## 2019-12-30 PROCEDURE — 93010 ELECTROCARDIOGRAM REPORT: CPT | Performed by: INTERNAL MEDICINE

## 2019-12-30 PROCEDURE — 93005 ELECTROCARDIOGRAM TRACING: CPT | Performed by: EMERGENCY MEDICINE

## 2019-12-30 RX ORDER — ASPIRIN 325 MG
325 TABLET ORAL ONCE
Status: DISCONTINUED | OUTPATIENT
Start: 2019-12-30 | End: 2019-12-30

## 2019-12-30 RX ORDER — SODIUM CHLORIDE 0.9 % (FLUSH) 0.9 %
10 SYRINGE (ML) INJECTION AS NEEDED
Status: DISCONTINUED | OUTPATIENT
Start: 2019-12-30 | End: 2019-12-31 | Stop reason: HOSPADM

## 2019-12-31 VITALS
TEMPERATURE: 98.4 F | DIASTOLIC BLOOD PRESSURE: 88 MMHG | RESPIRATION RATE: 16 BRPM | SYSTOLIC BLOOD PRESSURE: 133 MMHG | HEART RATE: 64 BPM | HEIGHT: 62 IN | OXYGEN SATURATION: 97 % | BODY MASS INDEX: 27.44 KG/M2

## 2019-12-31 LAB — TROPONIN T SERPL-MCNC: <0.01 NG/ML (ref 0–0.03)

## 2019-12-31 PROCEDURE — 84484 ASSAY OF TROPONIN QUANT: CPT | Performed by: PHYSICIAN ASSISTANT

## 2020-01-07 ENCOUNTER — OFFICE VISIT (OUTPATIENT)
Dept: ORTHOPEDIC SURGERY | Facility: CLINIC | Age: 75
End: 2020-01-07

## 2020-01-07 VITALS — TEMPERATURE: 98.4 F | BODY MASS INDEX: 27.79 KG/M2 | HEIGHT: 62 IN | WEIGHT: 151 LBS

## 2020-01-07 DIAGNOSIS — G89.29 CHRONIC PAIN OF BOTH KNEES: Primary | ICD-10-CM

## 2020-01-07 DIAGNOSIS — M25.562 CHRONIC PAIN OF BOTH KNEES: Primary | ICD-10-CM

## 2020-01-07 DIAGNOSIS — M25.512 LEFT SHOULDER PAIN, UNSPECIFIED CHRONICITY: ICD-10-CM

## 2020-01-07 DIAGNOSIS — M25.561 CHRONIC PAIN OF BOTH KNEES: Primary | ICD-10-CM

## 2020-01-07 PROCEDURE — 73562 X-RAY EXAM OF KNEE 3: CPT | Performed by: ORTHOPAEDIC SURGERY

## 2020-01-07 PROCEDURE — 99213 OFFICE O/P EST LOW 20 MIN: CPT | Performed by: ORTHOPAEDIC SURGERY

## 2020-01-07 NOTE — PROGRESS NOTES
"Patient: Kourtney Lorenzana  YOB: 1945 74 y.o. female  Medical Record Number: [id number]    Chief Complaints:   Chief Complaint   Patient presents with   • Left Knee - Follow-up, Pain   • Right Knee - Follow-up       History of Present Illness:Kourtney Lorenzana is a 74 y.o. female who presents for follow-up of both knees.  She had a right knee injection just over 2 months ago and that is helped quite a bit.  The left knee is achy and in fact more sore than the right knee but not bad enough at this point to consider any further intervention.  She is doing physical therapy and that is helping quite a bit.  Her major issue today is her left shoulder pain and stiffness.  Of the last 2 to 3 months she has had progressive worsening of shoulder pain stiffness and ability to reach overhead and she will feel a pop or catch within the shoulder with certain activities or motions.  She denies any history of trauma or change in activity level that she is aware of leading to this.  It is quite debilitating to her.     Allergies:   Allergies   Allergen Reactions   • Other Nausea And Vomiting     Unknown \"strong\" pain killer   • Hydrocodone-Acetaminophen GI Intolerance   • Tramadol GI Intolerance       Medications:   Current Outpatient Medications   Medication Sig Dispense Refill   • acetaminophen (TYLENOL) 500 MG tablet Take 500-1,000 mg by mouth Every 6 (Six) Hours As Needed for Mild Pain .     • Cholecalciferol (VITAMIN D3) 50 MCG (2000 UT) tablet Take 2,000 Units by mouth Daily.     • diphenhydrAMINE HCl, Sleep, (SLEEP AID) 50 MG capsule Take 50 mg by mouth Every Evening.     • magnesium oxide (MAGOX) 400 (241.3 Mg) MG tablet tablet Take 400 mg by mouth Daily.     • pantoprazole (PROTONIX) 40 MG EC tablet Take 1 tablet by mouth Daily. (Patient taking differently: Take 40 mg by mouth Daily As Needed (S/S ACID REFLUX). NONE RECENTLY) 90 tablet 3   • vitamin B-12 (CYANOCOBALAMIN) 500 MCG tablet Take 1,000 mcg by mouth " "Daily.     • baclofen (LIORESAL) 10 MG tablet Take 5 mg by mouth Daily.     • meloxicam (MOBIC) 7.5 MG tablet Take 1 tablet by mouth Daily. 30 tablet 1     No current facility-administered medications for this visit.          The following portions of the patient's history were reviewed and updated as appropriate: allergies, current medications, past family history, past medical history, past social history, past surgical history and problem list.    Review of Systems:   A 14 point review of systems was performed. All systems negative except pertinent positives/negative listed in HPI above    Physical Exam:   Vitals:    01/07/20 1049   Temp: 98.4 °F (36.9 °C)   Weight: 68.5 kg (151 lb)   Height: 157.5 cm (62\")       General: A and O x 3, ASA, NAD    SCLERA:    Normal    DENTITION:   Normal  Knee:  bilateral    ALIGNMENT:     Neutral  ,   Patella tracks   Midline -  Mild crepitance    GAIT:     Nonantalgic    SKIN:    No abnormality    RANGE OF MOTION:   0  -  135   DEG    STRENGTH:   5 / 5    LIGAMENTS:    No varus / valgus instability.   Negative  Lachman.    MENISCUS:     Negative   Akiko       DISTAL PULSES:    Paplable    DISTAL SENSATION :   Intact    LYMPHATICS:     No   lymphadenopathy    OTHER:          - No  effusion      - No crepitance with ROM      - No Swelling /  tenderness to palpation pes anserine bursa    Left shoulder shows considerable stiffness she is only able to actively abduct to about 45 and same with forward flexion.  Passively I can get her to about 60 degrees of abduction and 60 degrees of forward flexion.  She has a markedly positive Neer and Lozano impingement and considerable supraspinatus infraspinatus weakness.     Radiology:  Xrays 3views both knees (ap,lateral, sunrise) were ordered and reviewed for evaluation of knee pain demonstratingmoderate joint space narrowing medial joint  todays xrays were compared to previous xrays and demonstrate no " change    Assessment/Plan:  Bilateral knee OA.  She is currently in a gray zone but appears to be doing well with conservative measures.  Not bad enough to consider any surgical intervention or injections at this point.  Should her pain worsen she will call back and we could consider injections.  She will continue physical therapy exercises    Left shoulder severe pain and stiffness rotator cuff tendinitis versus tear.  She is developing frozen shoulder.  I am going to get an MRI of her shoulder to evaluate for rotator cuff tear.  She will call back for results and will come up with a plan once we see the MRI.

## 2020-01-13 ENCOUNTER — HOSPITAL ENCOUNTER (OUTPATIENT)
Dept: MRI IMAGING | Facility: HOSPITAL | Age: 75
Discharge: HOME | End: 2020-01-13
Admitting: ORTHOPAEDIC SURGERY

## 2020-01-13 ENCOUNTER — OFFICE VISIT (OUTPATIENT)
Dept: FAMILY MEDICINE CLINIC | Facility: CLINIC | Age: 75
End: 2020-01-13

## 2020-01-13 VITALS
TEMPERATURE: 98.1 F | DIASTOLIC BLOOD PRESSURE: 80 MMHG | SYSTOLIC BLOOD PRESSURE: 122 MMHG | WEIGHT: 147.4 LBS | OXYGEN SATURATION: 99 % | HEIGHT: 62 IN | HEART RATE: 85 BPM | BODY MASS INDEX: 27.12 KG/M2

## 2020-01-13 DIAGNOSIS — M17.0 OSTEOARTHRITIS OF BOTH KNEES, UNSPECIFIED OSTEOARTHRITIS TYPE: ICD-10-CM

## 2020-01-13 DIAGNOSIS — M25.512 LEFT SHOULDER PAIN, UNSPECIFIED CHRONICITY: ICD-10-CM

## 2020-01-13 DIAGNOSIS — E53.8 B12 DEFICIENCY: ICD-10-CM

## 2020-01-13 DIAGNOSIS — M54.50 ACUTE BILATERAL LOW BACK PAIN WITHOUT SCIATICA: ICD-10-CM

## 2020-01-13 DIAGNOSIS — K80.20 CALCULUS OF GALLBLADDER WITHOUT CHOLECYSTITIS WITHOUT OBSTRUCTION: ICD-10-CM

## 2020-01-13 DIAGNOSIS — R20.2 PARESTHESIAS: ICD-10-CM

## 2020-01-13 DIAGNOSIS — E55.9 VITAMIN D DEFICIENCY: ICD-10-CM

## 2020-01-13 DIAGNOSIS — K21.9 GASTROESOPHAGEAL REFLUX DISEASE WITHOUT ESOPHAGITIS: ICD-10-CM

## 2020-01-13 DIAGNOSIS — R74.8 ELEVATED ALKALINE PHOSPHATASE LEVEL: ICD-10-CM

## 2020-01-13 DIAGNOSIS — R53.82 CHRONIC FATIGUE: ICD-10-CM

## 2020-01-13 DIAGNOSIS — M54.12 CERVICAL RADICULOPATHY: ICD-10-CM

## 2020-01-13 DIAGNOSIS — E78.49 OTHER HYPERLIPIDEMIA: Primary | ICD-10-CM

## 2020-01-13 DIAGNOSIS — M54.2 CERVICALGIA: ICD-10-CM

## 2020-01-13 PROCEDURE — 73221 MRI JOINT UPR EXTREM W/O DYE: CPT

## 2020-01-13 PROCEDURE — 99214 OFFICE O/P EST MOD 30 MIN: CPT | Performed by: PHYSICIAN ASSISTANT

## 2020-01-13 NOTE — PROGRESS NOTES
Subjective   Kourtney Lorenzana is a 74 y.o. female who presents today in follow up of hyperlipidemia, elevated alkaline phosphatase, and B12 and vitamin D deficiencies, GERD, chronic fatigue, sleep, spotting, and rash. She also continues with significant back, neck, and joint pain, needs follow up from ER for chest pain, and complains today of congestion and sore throat.    History of Present Illness     Hyperlipidemia- patient has been diligent with diet and exercise with weight watchers.  Continued with weight loss.  Elevated alk phos- was taking Aleve and stopped.  Was taking 2 Tylenol at bedtime.  Cholecystectomy 12/3/2019 with Dr. Schneider.  Went for surgery follow-up with incisions healing well.  B12 deficiency-taking 500 mcg daily. Tolerating without AE  Vitamin D deficiency- again 2000 IU daily. Tolerating without AE  Magnesium-taking magnesium oxide 400 mg.    GERD- has been stable on Protonix 40 mg daily.  No breakthrough symptoms when taking medication regularly.  She has been seen by GI was advised to continue.  Patient was seen 12/10/2019 by Dr. Mitchell and was given Mobic for back pain.then had RUQ pain and Dr Schneider advised to increase to 15 mg for 2 weeks.   Sleep-Waking up 4 x nightly to urinate but can usually go back to sleep.  She takes Benadryl sleep aid 50 mg and has taken Tylenol at bedtime.    Weight watchers-has continued to be compliant and continued with weight loss.    12/30/2019-patient went to ER with sharp chest pain.  Was given Protonix 40 mg daily and advised to see cardiology.  Appointment was made for March 2020. Thinks it was a side effect from Mobic.   Orthopedic surgery- Dr. Catarino De La Cruz- patient was seen 1/7/2020 for OA knee and shoulder pain.  They advised she was starting to develop frozen shoulder and ordered MRI shoulder to make further recommendations pending results.  Advised no surgical intervention at this time for the knee- continue PT. Reports neck stiffness with the  shoulder pain.     Has had hoarseness and sinus congestion. Has had increased runny nose and nasal congestion. Started 1/1/2020 with sore throat. No sick contacts that she is aware. Was in ER 2 days prior however.     The following portions of the patient's history were reviewed and updated as appropriate: allergies, current medications, past family history, past medical history, past social history, past surgical history and problem list.    Review of Systems   Constitutional: Positive for fatigue.   HENT: Positive for congestion, postnasal drip, rhinorrhea and sore throat.    Respiratory: Positive for cough.    Cardiovascular: Positive for chest pain.   Gastrointestinal: Positive for nausea.        GERD symptoms   Musculoskeletal: Positive for arthralgias, back pain, gait problem, myalgias, neck pain and neck stiffness.   Neurological: Positive for numbness.       Objective    Vitals:    01/13/20 1017   BP: 122/80   Pulse: 85   Temp: 98.1 °F (36.7 °C)   SpO2: 99%     Body mass index is 26.95 kg/m².    Physical Exam   Constitutional: She is oriented to person, place, and time. She appears well-developed and well-nourished.   HENT:   Head: Normocephalic and atraumatic.   Right Ear: External ear normal.   Left Ear: External ear normal.   Nose: Nose normal.   Eyes: Conjunctivae and lids are normal.   Neck: Neck supple. Carotid bruit is not present.   Cardiovascular: Normal rate, regular rhythm, normal heart sounds and intact distal pulses. Exam reveals no gallop and no friction rub.   No murmur heard.  Pulmonary/Chest: Effort normal and breath sounds normal. No respiratory distress. She has no wheezes. She has no rhonchi. She has no rales.   Musculoskeletal: She exhibits no edema or deformity.   Neurological: She is alert and oriented to person, place, and time. Gait normal.   Skin: Skin is warm and dry.   Psychiatric: She has a normal mood and affect. Her speech is normal and behavior is normal. Judgment and thought  content normal. Cognition and memory are normal.   Nursing note and vitals reviewed.      Assessment/Plan   Kourtney was seen today for med management.    Diagnoses and all orders for this visit:    Other hyperlipidemia  -     Comprehensive Metabolic Panel  -     CK  -     Lipid Panel With LDL / HDL Ratio  -     TSH    B12 deficiency  -     CBC & Differential  -     Vitamin B12 & Folate  -     TSH    Vitamin D deficiency  -     Comprehensive Metabolic Panel  -     Vitamin D 25 Hydroxy  -     TSH    Gastroesophageal reflux disease without esophagitis    Elevated alkaline phosphatase level  -     Comprehensive Metabolic Panel    Calculus of gallbladder without cholecystitis without obstruction    Chronic fatigue  -     CBC & Differential  -     Comprehensive Metabolic Panel  -     Vitamin B12 & Folate  -     Vitamin D 25 Hydroxy  -     TSH  -     Magnesium    Paresthesias  -     TSH  -     Magnesium    Acute bilateral low back pain without sciatica  -     Ambulatory Referral to Physical Therapy Evaluate and treat    Cervicalgia  -     Ambulatory Referral to Physical Therapy Evaluate and treat    Cervical radiculopathy  -     Ambulatory Referral to Physical Therapy Evaluate and treat    Osteoarthritis of both knees, unspecified osteoarthritis type  -     Ambulatory Referral to Physical Therapy Evaluate and treat    Other orders  -     Manual Differential      Patient Instructions   Assessment and Plan  74 year old female who presents today in follow up of hyperlipidemia, elevated alkaline phosphatase, and B12 and vitamin D deficiencies, GERD, chronic fatigue, sleep, spotting, and rash. She also continues with significant back, neck, and joint pain, needs follow up from ER for chest pain, and complains today of congestion and sore throat. Patient has continued on Weight Watchers and has been diligent with diet and exercise. She was taking Aleve and stopped. She was also taking 2 Tylenol at bedtime. It was thought elevated  alkaline phosphatase could have been related to GB disease. She underwent cholecystectomy 12/3/2019 with Dr. Schneider and went for surgery follow-up with incisions healing well. She is also taking B12 500 mcg daily, vitamin D 2000IU daily, and Magnesium Oxide 400 mg daily. Patient will have fasting labs. Call if no results in 1 week. Stability of conditions, plan, follow up, and further recommendations pending labs. Follow up in no more than 3-4 months if labs are stable and she is feeling ok.     She was seen 6/2019 and had been very fatigued all the time. She could only tolerate being active a few hours then she was tired for the rest of the day, complained of shortness of breath and fatigued with exertion. Patient also noted bilateral toes were blue. She had normal DP and PT pulses bilaterally with normal temperature of the feet. She felt overall cool/ cold all the time. She then joined weight watchers and has improved some. Patient continues waking up 4 x nightly to urinate but states she can usually go back to sleep. She is taking Benadryl sleep aid 50 mg and Tylenol at bedtime as needed. If she has persistent sleep disturbances or fatigue, we will consider sleep study.     GERD has been stable on Protonix 40 mg daily without breakthrough symptoms when taking medication regularly.  She has been seen by GI was advised to continue medication.  Patient was seen 12/10/2019 by Dr. Mitchell and was given Mobic for back pain then she had RUQ pain and Dr Schneider advised to increase to 15 mg for 2 weeks. 12/30/2019, patient went to ER with sharp chest pain, was given Protonix 40 mg daily, and advised to see cardiology.  She has an appointment for March 2020. Patient thinks it was a side effect from Mobic. I advised she keep appt with cardiology and continue Protonix 40 mg as directed. Follow up with GI if persistent symptoms.       Patient also noted pain in her neck and down her left upper arm. She was not sore to touch or  feel but is a deep pain. She also has bilateral hand numbness. We started with labs and advised we would consider PT and additional workup if no improvement or recurrence.  Patient continues follow up with Dr Catarino De La Cruz, orthopedist, who advised PT and knee injections. Her right knee pain improved and her left knee still hurts. She will return as needed for injections with orthopedist. Patient's last appt was 1/7/2020 for OA knee and shoulder pain.  They advised she was starting to develop frozen shoulder and ordered MRI of her shoulder to make further recommendations pending results.  He advised no surgical intervention at this time for the knee and to continue PT. She also has persistent neck stiffness with the shoulder pain. I will refer to PT today. If persistent neck, shoulder, and knee symptoms, or persistent numbness, we will consider EMG and further imaging. She should call or return if no resolution of symptoms.     At her previous visit, she stated she has a scar on her abdomen with pain in the area of her scar from hysterectomy. When she rubbed it, the skin came off. Also, she had itching under bilateral breasts with a rash. On exam, she appeared to have cutaneous candidiasis and was treated with Nystatin cream and powder in both areas and has had improvement.  She also discussed spotting with Urogyn and was advised this was from vaginal dryness. She is rarely has spotting will continue follow up with them as directed.        Patient has had sore throat, hoarseness, sinus congestion, runny nose and nasal congestion that started 1/1/2020. She was in ER 2 days prior. I advised likely viral etiology of symptoms and that she should take claritin or Zyrtec 10 mg once daily, Flonase or Nasacort 2 sprays in each nostril once daily, and Mucinex DM twice daily. Patient to call or return if no improvement, worsening, new or changing symptoms.

## 2020-01-14 LAB
25(OH)D3+25(OH)D2 SERPL-MCNC: 45.7 NG/ML (ref 30–100)
ALBUMIN SERPL-MCNC: 4.7 G/DL (ref 3.5–5.2)
ALBUMIN/GLOB SERPL: 1.7 G/DL
ALP SERPL-CCNC: 142 U/L (ref 39–117)
ALT SERPL-CCNC: 21 U/L (ref 1–33)
AST SERPL-CCNC: 12 U/L (ref 1–32)
BASOPHILS # BLD AUTO: ABNORMAL 10*3/UL
BILIRUB SERPL-MCNC: 0.5 MG/DL (ref 0.2–1.2)
BUN SERPL-MCNC: 18 MG/DL (ref 8–23)
BUN/CREAT SERPL: 21.2 (ref 7–25)
CALCIUM SERPL-MCNC: 10 MG/DL (ref 8.6–10.5)
CHLORIDE SERPL-SCNC: 106 MMOL/L (ref 98–107)
CHOLEST SERPL-MCNC: 231 MG/DL (ref 0–200)
CK SERPL-CCNC: 45 U/L (ref 20–180)
CO2 SERPL-SCNC: 22.5 MMOL/L (ref 22–29)
CREAT SERPL-MCNC: 0.85 MG/DL (ref 0.57–1)
DIFFERENTIAL COMMENT: NORMAL
EOSINOPHIL # BLD AUTO: ABNORMAL 10*3/UL
EOSINOPHIL # BLD MANUAL: 0.07 10*3/MM3 (ref 0–0.4)
EOSINOPHIL NFR BLD AUTO: ABNORMAL %
EOSINOPHIL NFR BLD MANUAL: 1 % (ref 0.3–6.2)
ERYTHROCYTE [DISTWIDTH] IN BLOOD BY AUTOMATED COUNT: 13.3 % (ref 12.3–15.4)
FOLATE SERPL-MCNC: 7.59 NG/ML (ref 4.78–24.2)
GLOBULIN SER CALC-MCNC: 2.7 GM/DL
GLUCOSE SERPL-MCNC: 90 MG/DL (ref 65–99)
HCT VFR BLD AUTO: 36.2 % (ref 34–46.6)
HDLC SERPL-MCNC: 58 MG/DL (ref 40–60)
HGB BLD-MCNC: 13.7 G/DL (ref 12–15.9)
LDLC SERPL CALC-MCNC: 145 MG/DL (ref 0–100)
LDLC/HDLC SERPL: 2.5 {RATIO}
LYMPHOCYTES # BLD AUTO: ABNORMAL 10*3/UL
LYMPHOCYTES # BLD MANUAL: 2.06 10*3/MM3 (ref 0.7–3.1)
LYMPHOCYTES NFR BLD AUTO: ABNORMAL %
LYMPHOCYTES NFR BLD MANUAL: 30.9 % (ref 19.6–45.3)
MAGNESIUM SERPL-MCNC: 2.4 MG/DL (ref 1.6–2.4)
MCH RBC QN AUTO: 33.7 PG (ref 26.6–33)
MCHC RBC AUTO-ENTMCNC: 37.8 G/DL (ref 31.5–35.7)
MCV RBC AUTO: 88.9 FL (ref 79–97)
MONOCYTES # BLD MANUAL: 0.41 10*3/MM3 (ref 0.1–0.9)
MONOCYTES NFR BLD AUTO: ABNORMAL %
MONOCYTES NFR BLD MANUAL: 6.2 % (ref 5–12)
NEUTROPHILS # BLD MANUAL: 4.13 10*3/MM3 (ref 1.7–7)
NEUTROPHILS NFR BLD AUTO: ABNORMAL %
NEUTROPHILS NFR BLD MANUAL: 61.9 % (ref 42.7–76)
PLATELET # BLD AUTO: 220 10*3/MM3 (ref 140–450)
PLATELET BLD QL SMEAR: NORMAL
POTASSIUM SERPL-SCNC: 4.3 MMOL/L (ref 3.5–5.2)
PROT SERPL-MCNC: 7.4 G/DL (ref 6–8.5)
RBC # BLD AUTO: 4.07 10*6/MM3 (ref 3.77–5.28)
RBC MORPH BLD: NORMAL
SODIUM SERPL-SCNC: 142 MMOL/L (ref 136–145)
TRIGL SERPL-MCNC: 141 MG/DL (ref 0–150)
TSH SERPL DL<=0.005 MIU/L-ACNC: 1.19 UIU/ML (ref 0.27–4.2)
VIT B12 SERPL-MCNC: 885 PG/ML (ref 211–946)
VLDLC SERPL CALC-MCNC: 28.2 MG/DL (ref 5–40)
WBC # BLD AUTO: 6.68 10*3/MM3 (ref 3.4–10.8)

## 2020-01-15 ENCOUNTER — TELEPHONE (OUTPATIENT)
Dept: ORTHOPEDIC SURGERY | Facility: CLINIC | Age: 75
End: 2020-01-15

## 2020-01-15 DIAGNOSIS — E78.49 OTHER HYPERLIPIDEMIA: Primary | ICD-10-CM

## 2020-01-15 RX ORDER — ATORVASTATIN CALCIUM 10 MG/1
10 TABLET, FILM COATED ORAL NIGHTLY
Qty: 30 TABLET | Refills: 2 | Status: SHIPPED | OUTPATIENT
Start: 2020-01-15 | End: 2020-05-07

## 2020-01-17 NOTE — TELEPHONE ENCOUNTER
Please let patient know that the MRI of her shoulder shows considerable amount of arthritis and wear pattern.  Would recommend that she see Dr. Garcia for further evaluation and discuss treatment options

## 2020-01-28 ENCOUNTER — TREATMENT (OUTPATIENT)
Dept: PHYSICAL THERAPY | Facility: CLINIC | Age: 75
End: 2020-01-28

## 2020-01-28 DIAGNOSIS — M54.12 RADICULOPATHY, CERVICAL: ICD-10-CM

## 2020-01-28 DIAGNOSIS — M54.2 CERVICALGIA: Primary | ICD-10-CM

## 2020-01-28 PROCEDURE — 97140 MANUAL THERAPY 1/> REGIONS: CPT | Performed by: PHYSICAL THERAPIST

## 2020-01-28 PROCEDURE — G0283 ELEC STIM OTHER THAN WOUND: HCPCS | Performed by: PHYSICAL THERAPIST

## 2020-01-28 PROCEDURE — 97535 SELF CARE MNGMENT TRAINING: CPT | Performed by: PHYSICAL THERAPIST

## 2020-01-28 PROCEDURE — 97110 THERAPEUTIC EXERCISES: CPT | Performed by: PHYSICAL THERAPIST

## 2020-01-28 PROCEDURE — 97162 PT EVAL MOD COMPLEX 30 MIN: CPT | Performed by: PHYSICAL THERAPIST

## 2020-01-28 NOTE — PROGRESS NOTES
Orthopedic / Sports / Industrial Physical Therapy  Physical Therapy Initial Evaluation and Plan of Care    Patient Name: Kourtney Lorenzana          :  1945  Referring Physician: Romina George PA  Diagnosis: Cervicalgia [M54.2]    Date of Evaluation: 2020  ______________________________________________________________________    Subjective Evaluation    History of Present Illness  Onset date: Long history of neck pain, but worse over last couple of weeks since onset of (L) shoulder pain.  Mechanism of injury: Insidious onset -       Patient Occupation: Retired Pain  Current pain ratin  At worst pain ratin  Location: (L) Neck / UT - into lateral arm vs shoulder pain  Quality: Ache; Stronger ache/pain.  Alleviating factors: Rest; Meloxicam; Stretching to right; Hot Shower.  Exacerbated by: Neck rotation > Extension > Flexion;   Progression: worsening    Diagnostic Tests  Bone scan: abnormal    Treatments  Current treatment: medication  Patient Goals  Patient/family treatment goals: Pain alleviation; mobility to allow ADL's, etc.       Bone Scan: 10/09/2019: Result in Epic  There is increased uptake within the left mid cervical spine  and overlying facet joint most likely related to facet arthritis.  ___________________________________________________  Objective       Postural Observations    Additional Postural Observation Details  Fwd head / rounded shoulder posture - Guarded (L) upper quarter -     Tenderness     Additional Tenderness Details  Very tight and tender (L) UT, scalenes, medial scap  Tender (L) >>(R) C-facets / CPS  Resting tension (B) UT's       Active Range of Motion     Additional Active Range of Motion Details  C-Spine: Flexion: WF/NL;  Ext 40-deg w/ sharp pain lower neck/UT (L)             SB to (L) 30-deg w/ pain (L) neck / UT;  SB to (R) WFL w/ pulling (L) UT             ROT (L) 45-deg w/ pain (L) neck / UT;  ROT (R) 65-deg w/o pain    UE: (R) Shoulder WFL; (L) Limited and  painful due to possible RTC/Biceps tear(s) per MRI - Awaiting appointment with Dr. Garcia next month -     Strength/Myotome Testing     Additional Strength Details  (B) UE Myotomes WFL with the exception of (L) Shoulder (see note above for ROM)    Tests     Additional Tests Details  C-Compression; / Distraction: (-)  (+) Quadrant positioning (L) Neck  (+) Elevated First Rib (L)       See Treatment Flow sheet for Exercises, Manual therapy, and modalities.     Self-Care Home Mgt: X 10 min  · Jt protection, ADL modification; Posture and     ___________________________________________________  Assessment & Plan     Assessment  Assessment details: Cervicalgia; Cervical OA/DDD/DJD; Myofascial pain;     PROBLEMS: Pain; Limited mobility; Intolerance to ADL's and hobby activites -   PROGNOSIS: Fair/Good    GOALS:   SHORT TERM GOALS: 2 weeks:  1) HEP Initiated; 2) Pain decreased 50%:   3) AROM  increased:  4) Improved functional ability grossly;     LONG TERM GOALS: 4 weeks (or at time of DISCHARGE): 1) (I) HEP; 2) AROM WFL and pain free; 3) Strength / mobility to be able to perform all ADL's and job-related activities w/o restrictions;       Plan  Planned therapy interventions: body mechanics training, flexibility, home exercise program, joint mobilization, manual therapy, neuromuscular re-education, postural training, soft tissue mobilization, spinal/joint mobilization, strengthening, stretching and therapeutic activities (Modalities prn; Taping prn; )  Frequency: 2x week  Duration in weeks: 4  Treatment plan discussed with: patient      ___________________________________________________  Manual Therapy:    15     mins  54127;   Therapeutic Exercise:    10     mins  23471;     Neuromuscular Jenn:        mins  54211;   Self-Care:     10     mins ; [id number]    Ultrasound:     06     mins  99270;    Electrical Stimulation:   20     mins  72115 ( );  Dry Needling          mins self-pay   Gait Training:           mins  [id number];  EVAL TIME:   25 min    Timed Treatment:   41   mins                Total Treatment:     90   mins    PT SIGNATURE:   Jason Bower, PT  DATE TREATMENT INITIATED: 1/28/2020  ___________________________________________________  Initial Certification  Certification Period: 4/27/2020  I certify that the therapy services are furnished while this patient is under my care.  The services outlined above are required by this patient, and will be reviewed every 90 days.     PHYSICIAN: ________________________________  DATE: ______  Romina George PA        Please sign and return via fax to [phone].. Thank you, [facility].  ______________________________________________________________________  [address] [place] [address]  Phone: [phone] Fax: [phone]    Access Code: [email or web address]   URL: [email or web address]   Date: 01/28/2020   Prepared by: Jason Bower     Exercises  Seated Cervical Retraction - 15 reps - 1 sets - 5s hold - 3x daily - 7x weekly  Standing Upper Trapezius Stretch - 3-5 reps - 1 sets - 30s hold - 3x daily - 7x weekly  Standing Scapular Retraction - 10-15 reps - 1 sets - 10s hold - 2-3x daily - 7x weekly

## 2020-01-29 NOTE — PATIENT INSTRUCTIONS
Assessment and Plan  74 year old female who presents today in follow up of hyperlipidemia, elevated alkaline phosphatase, and B12 and vitamin D deficiencies, GERD, chronic fatigue, sleep, spotting, and rash. She also continues with significant back, neck, and joint pain, needs follow up from ER for chest pain, and complains today of congestion and sore throat. Patient has continued on Weight Watchers and has been diligent with diet and exercise. She was taking Aleve and stopped. She was also taking 2 Tylenol at bedtime. It was thought elevated alkaline phosphatase could have been related to GB disease. She underwent cholecystectomy 12/3/2019 with Dr. Schneider and went for surgery follow-up with incisions healing well. She is also taking B12 500 mcg daily, vitamin D 2000IU daily, and Magnesium Oxide 400 mg daily. Patient will have fasting labs. Call if no results in 1 week. Stability of conditions, plan, follow up, and further recommendations pending labs. Follow up in no more than 3-4 months if labs are stable and she is feeling ok.     She was seen 6/2019 and had been very fatigued all the time. She could only tolerate being active a few hours then she was tired for the rest of the day, complained of shortness of breath and fatigued with exertion. Patient also noted bilateral toes were blue. She had normal DP and PT pulses bilaterally with normal temperature of the feet. She felt overall cool/ cold all the time. She then joined weight watchers and has improved some. Patient continues waking up 4 x nightly to urinate but states she can usually go back to sleep. She is taking Benadryl sleep aid 50 mg and Tylenol at bedtime as needed. If she has persistent sleep disturbances or fatigue, we will consider sleep study.     GERD has been stable on Protonix 40 mg daily without breakthrough symptoms when taking medication regularly.  She has been seen by GI was advised to continue medication.  Patient was seen 12/10/2019 by  Dr. Mitchell and was given Mobic for back pain then she had RUQ pain and Dr Schneider advised to increase to 15 mg for 2 weeks. 12/30/2019, patient went to ER with sharp chest pain, was given Protonix 40 mg daily, and advised to see cardiology.  She has an appointment for March 2020. Patient thinks it was a side effect from Mobic. I advised she keep appt with cardiology and continue Protonix 40 mg as directed. Follow up with GI if persistent symptoms.       Patient also noted pain in her neck and down her left upper arm. She was not sore to touch or feel but is a deep pain. She also has bilateral hand numbness. We started with labs and advised we would consider PT and additional workup if no improvement or recurrence.  Patient continues follow up with Dr Catarino De La Cruz, orthopedist, who advised PT and knee injections. Her right knee pain improved and her left knee still hurts. She will return as needed for injections with orthopedist. Patient's last appt was 1/7/2020 for OA knee and shoulder pain.  They advised she was starting to develop frozen shoulder and ordered MRI of her shoulder to make further recommendations pending results.  He advised no surgical intervention at this time for the knee and to continue PT. She also has persistent neck stiffness with the shoulder pain. I will refer to PT today. If persistent neck, shoulder, and knee symptoms, or persistent numbness, we will consider EMG and further imaging. She should call or return if no resolution of symptoms.     At her previous visit, she stated she has a scar on her abdomen with pain in the area of her scar from hysterectomy. When she rubbed it, the skin came off. Also, she had itching under bilateral breasts with a rash. On exam, she appeared to have cutaneous candidiasis and was treated with Nystatin cream and powder in both areas and has had improvement.  She also discussed spotting with Urogyn and was advised this was from vaginal dryness. She is rarely has  spotting will continue follow up with them as directed.        Patient has had sore throat, hoarseness, sinus congestion, runny nose and nasal congestion that started 1/1/2020. She was in ER 2 days prior. I advised likely viral etiology of symptoms and that she should take claritin or Zyrtec 10 mg once daily, Flonase or Nasacort 2 sprays in each nostril once daily, and Mucinex DM twice daily. Patient to call or return if no improvement, worsening, new or changing symptoms.

## 2020-01-30 ENCOUNTER — TREATMENT (OUTPATIENT)
Dept: PHYSICAL THERAPY | Facility: CLINIC | Age: 75
End: 2020-01-30

## 2020-01-30 DIAGNOSIS — S39.012S STRAIN OF LUMBAR REGION, SEQUELA: ICD-10-CM

## 2020-01-30 DIAGNOSIS — M51.36 DDD (DEGENERATIVE DISC DISEASE), LUMBAR: ICD-10-CM

## 2020-01-30 DIAGNOSIS — M54.12 RADICULOPATHY, CERVICAL: ICD-10-CM

## 2020-01-30 DIAGNOSIS — M54.2 CERVICALGIA: Primary | ICD-10-CM

## 2020-01-30 DIAGNOSIS — M54.50 ACUTE BILATERAL LOW BACK PAIN WITHOUT SCIATICA: ICD-10-CM

## 2020-01-30 PROCEDURE — 97112 NEUROMUSCULAR REEDUCATION: CPT | Performed by: PHYSICAL THERAPIST

## 2020-01-30 PROCEDURE — 97140 MANUAL THERAPY 1/> REGIONS: CPT | Performed by: PHYSICAL THERAPIST

## 2020-01-30 PROCEDURE — 97530 THERAPEUTIC ACTIVITIES: CPT | Performed by: PHYSICAL THERAPIST

## 2020-01-30 PROCEDURE — G0283 ELEC STIM OTHER THAN WOUND: HCPCS | Performed by: PHYSICAL THERAPIST

## 2020-01-30 NOTE — PROGRESS NOTES
Physical Therapy Daily Progress Note    Patient Name: Kourtney Lorenzana         :  1945  Referring Physician: Romina George PA    Subjective   Kourtney Lorenzana reports: feeling good after last session, but notes soreness from the massage in (L) UT and mm soreness from scapular squeezes -   C/O LBP across (B) sides; Getting up from sitting; first thing in AM getting out of bed - can't straighten up - Standing, walking, > sitting;  Bending painful as well -     Objective   Limited and painful C-Rotation to (L) - Very good to (R)  Very tender w/ TP's over (L) UT and very tender over 1st rib (L)  (+) Elevated 1st rib (L)    LB: Severe Hyperlordosis   Limited mobility (B) SI Jt w/ Stork;  (L) [place]/ ASIS / PSIS higher vs (R)  Flexed sacrum   Limited AROM Lumbar spine and painful in lumbar / LSS with flexion, extension, SB - Very poor reversal of lumbar spine with flexion -     See Exercise, Manual, and Modality Logs for complete treatment.     Functional / Therapeutic Activities:  30 min  · TAPING / BRACING: K-Tape to UT and medial scap  · FUNCTIONAL ASSESSMENT Lumbar spine  · Jt protection, ADL modification; Posture and      Assessment/Plan  Cervicalgia; Cervical OA/DDD/DJD; Myofascial pain; Lumbar OA/DDDDJD; LBP  Much improved C-AROM into rotation to (L) after MT -   Decreased pain and improved mobility lumbar spine after MT -   LUMBAR GOALS:   SHORT TERM GOALS: 2 weeks:  1) HEP Initiated; 2) Pain decreased 50%:   3) AROM  increased:  4) Improved functional ability grossly;   LONG TERM GOALS: 4 weeks (or at time of DISCHARGE): 1) (I) HEP; 2) AROM WFL and pain free; 3) Strength / mobility to be able to perform all ADL's w/ w/o restrictions;     Progress strengthening /stabilization /functional activity     _________________________________________________  Manual Therapy:    25     mins  44224;  Therapeutic Exercise:    10     mins  41959;     Neuromuscular Jenn:        mins  00268;    Therapeutic  Activity:     30     mins  07116;     Gait Training:           mins  20682;     Ultrasound:     10     mins  95759;    Electrical Stimulation:    20     mins  20796 ( );  Dry Needling          mins self-pay    Timed Treatment:   75   mins                  Total Treatment:     105   mins    Jason Bower, PT  Physical Therapist

## 2020-02-04 ENCOUNTER — TREATMENT (OUTPATIENT)
Dept: PHYSICAL THERAPY | Facility: CLINIC | Age: 75
End: 2020-02-04

## 2020-02-04 DIAGNOSIS — M54.2 CERVICALGIA: Primary | ICD-10-CM

## 2020-02-04 DIAGNOSIS — M54.12 RADICULOPATHY, CERVICAL: ICD-10-CM

## 2020-02-04 DIAGNOSIS — S39.012S STRAIN OF LUMBAR REGION, SEQUELA: ICD-10-CM

## 2020-02-04 DIAGNOSIS — M51.36 DDD (DEGENERATIVE DISC DISEASE), LUMBAR: ICD-10-CM

## 2020-02-04 DIAGNOSIS — M54.50 ACUTE BILATERAL LOW BACK PAIN WITHOUT SCIATICA: ICD-10-CM

## 2020-02-04 PROCEDURE — 97110 THERAPEUTIC EXERCISES: CPT | Performed by: PHYSICAL THERAPIST

## 2020-02-04 PROCEDURE — 97140 MANUAL THERAPY 1/> REGIONS: CPT | Performed by: PHYSICAL THERAPIST

## 2020-02-04 PROCEDURE — 97035 APP MDLTY 1+ULTRASOUND EA 15: CPT | Performed by: PHYSICAL THERAPIST

## 2020-02-04 PROCEDURE — G0283 ELEC STIM OTHER THAN WOUND: HCPCS | Performed by: PHYSICAL THERAPIST

## 2020-02-05 ENCOUNTER — CONSULT (OUTPATIENT)
Dept: ORTHOPEDIC SURGERY | Facility: CLINIC | Age: 75
End: 2020-02-05

## 2020-02-05 VITALS — TEMPERATURE: 99.2 F | WEIGHT: 147 LBS | BODY MASS INDEX: 27.05 KG/M2 | HEIGHT: 62 IN

## 2020-02-05 DIAGNOSIS — M19.019 ARTHRITIS OF SHOULDER: Primary | ICD-10-CM

## 2020-02-05 PROCEDURE — 99214 OFFICE O/P EST MOD 30 MIN: CPT | Performed by: ORTHOPAEDIC SURGERY

## 2020-02-05 PROCEDURE — 20610 DRAIN/INJ JOINT/BURSA W/O US: CPT | Performed by: ORTHOPAEDIC SURGERY

## 2020-02-05 RX ORDER — METHYLPREDNISOLONE ACETATE 80 MG/ML
80 INJECTION, SUSPENSION INTRA-ARTICULAR; INTRALESIONAL; INTRAMUSCULAR; SOFT TISSUE
Status: COMPLETED | OUTPATIENT
Start: 2020-02-05 | End: 2020-02-05

## 2020-02-05 RX ADMIN — METHYLPREDNISOLONE ACETATE 80 MG: 80 INJECTION, SUSPENSION INTRA-ARTICULAR; INTRALESIONAL; INTRAMUSCULAR; SOFT TISSUE at 14:16

## 2020-02-05 NOTE — PROGRESS NOTES
Physical Therapy Daily Progress Note     Patient Name: Kourtney Lorenzana         :  1945  Referring Physician: Romina George PA     Subjective   Kourtney Lorenzana reports: feeling much better after last session with decreased pain and improved neck ROM into (L) ROT and much less LBP until she helped to assemble a headboard for her granddaughter  and she has had increased Lower mid back and LB/LSS pain and increased soreness in (L) neck / UT since -       Objective   Much improved l C-Rotation to (L), but not as good as last session with discomfort - Very good to (R)  Tender w/ TP's over (L) UT - much less tender over 1st rib (L)  (-) Elevated 1st rib (L)     LB: Severe Hyperlordosis   Painful / guarded gait / transitional movements -   Flexed sacrum   Limited AROM Lumbar spine and painful in lumbar / LSS with flexion, extension, SB - Very poor reversal of lumbar spine with flexion -     Tender lower T-spine/upper L-spine and PS w/ TP's  Tender L4-S1 central and into sacral / glutes/piriformis w/ multiple TP's      See Exercise, Manual, and Modality Logs for complete treatment.     Functional / Therapeutic Activities:   min  · TAPING / BRACING:   · Jt protection, ADL modification; Posture and       Assessment/Plan  Cervicalgia; Cervical OA/DDD/DJD; Myofascial pain; Lumbar OA/DDDDJD; LBP  Doing well after last session, but increased pain after helping assemble headboard -   Much improved C-AROM into rotation to (L) after MT -   Decreased pain and improved mobility lumbar spine after MT -      Progress strengthening /stabilization /functional activity  _________________________________________________  Manual Therapy:            35     mins  82368;  Therapeutic Exercise:    15     mins  30102;     Neuromuscular Jenn:        mins  45236;    Therapeutic Activity:           mins  18457;     Gait Training:                      mins  35760;     Ultrasound:                     15     mins  21453;     Electrical Stimulation:    20     mins  37176 ( );  Dry Needling                       mins self-pay     Timed Treatment:   65   mins                  Total Treatment:     95   mins     Jason Bower, PT  Physical Therapist

## 2020-02-05 NOTE — PROGRESS NOTES
"Patient: Kourtney Lorenzana    YOB: 1945    Medical Record Number: [id number]    Chief Complaints:  Left shoulder pain    History of Present Illness:     74 y.o. female patient who presents with progressively worsening pain and dysfunction affecting the left shoulder.  She reports worsening symptoms for the past 3 to 4 months.  She does not recall any injury.  The pain varies from moderate to severe.  Her typical pain is moderate and aching.  She gets some occasional clicking and popping.  Rest and meloxicam helps somewhat.  Localizes her pain mostly to the back and side of the shoulder.    Allergies:   Allergies   Allergen Reactions   • Hydrocodone-Acetaminophen GI Intolerance   • Other Nausea And Vomiting     Unknown \"strong\" pain killer   • Tramadol GI Intolerance       Home Medications:    Current Outpatient Medications:   •  atorvastatin (LIPITOR) 10 MG tablet, Take 1 tablet by mouth Every Night. (Patient taking differently: Take 10 mg by mouth Every Night. 5 mg every other day.), Disp: 30 tablet, Rfl: 2  •  acetaminophen (TYLENOL) 500 MG tablet, Take 500-1,000 mg by mouth Every 6 (Six) Hours As Needed for Mild Pain ., Disp: , Rfl:   •  baclofen (LIORESAL) 10 MG tablet, Take 5 mg by mouth Daily., Disp: , Rfl:   •  Cholecalciferol (VITAMIN D3) 50 MCG (2000 UT) tablet, Take 2,000 Units by mouth Daily., Disp: , Rfl:   •  diphenhydrAMINE HCl, Sleep, (SLEEP AID) 50 MG capsule, Take 50 mg by mouth Every Evening., Disp: , Rfl:   •  magnesium oxide (MAGOX) 400 (241.3 Mg) MG tablet tablet, Take 400 mg by mouth Daily., Disp: , Rfl:   •  pantoprazole (PROTONIX) 40 MG EC tablet, Take 1 tablet by mouth Daily. (Patient taking differently: Take 40 mg by mouth Daily As Needed (S/S ACID REFLUX). NONE RECENTLY), Disp: 90 tablet, Rfl: 3  •  vitamin B-12 (CYANOCOBALAMIN) 500 MCG tablet, Take 1,000 mcg by mouth Daily., Disp: , Rfl:     Past Medical History:   Diagnosis Date   • Arthritis     OSTEO. KNEES AND BACK SEES " DR MIKE DOUGLAS   • Bloating     WITH SOME NAUSEA   • Bruising tendency (CMS/HCC)    • Cholelithiasis Nov 7, 2019    Discovered by liver ultrasound   • Chronic low back pain    • Diverticular disease    • Elevated liver enzymes    • Gastroesophageal reflux disease without esophagitis 2/11/2016   • GERD (gastroesophageal reflux disease)    • History of anxiety     BEEN A LONG TIME   • History of depression     SITUATIONAL. NOW RESOLVED   • History of panic disorder    • IBS (irritable bowel syndrome)    • Insomnia    • Internal hemorrhoids    • Lumbar canal stenosis    • Lumbosacral neuritis    • Multiple vitamin deficiency    • Nephrolithiasis     July 2014 passage of kidney stone.   • Postmenopausal HRT (hormone replacement therapy)    • Urine incontinence        Past Surgical History:   Procedure Laterality Date   • CATARACT EXTRACTION Bilateral 09/05/2017    Dr. Jefe Barakat OD, MD   • CHOLECYSTECTOMY LAPAROSCOPIC INTRAOPERATIVE CHOLANGIOGRAM WITH LIVER BIOPSY N/A 12/3/2019    Procedure: CHOLECYSTECTOMY LAPAROSCOPIC INTRAOPERATIVE CHOLANGIOGRAM AND LIVER BIOPSY;  Surgeon: Benny Schneider MD;  Location: [facility];  Service: General   • COLONOSCOPY N/A 11/16/2012    Benign cecal polyp-Dr. Mak Rodriguez   • COLONOSCOPY N/A 2015    Dr. Seema Andrade   • ENDOSCOPY N/A 04/27/2018    , no specimen collected-Dr. Seema Andrade   • HYSTERECTOMY Bilateral 1997   • PUBOVAGINAL SLING N/A 06/17/2003    Dr. Ziggy Saba   • TOE SURGERY Right     METATARASAL SURGERY GREAT TOE FOR FRACTUE       Social History     Occupational History   • Not on file   Tobacco Use   • Smoking status: Never Smoker   • Smokeless tobacco: Never Used   Substance and Sexual Activity   • Alcohol use: No   • Drug use: No   • Sexual activity: Not on file      Social History     Social History Narrative   • Not on file       Family History   Problem Relation Age of Onset   • Atrial fibrillation Father    • Atrial fibrillation Sister    • Atrial  "fibrillation Brother    • Other Brother         Coronary arteriosclerosis   • Colon cancer Mother    • Colon polyps Mother    • Liver disease Paternal Aunt    • Malig Hyperthermia Neg Hx        Review of Systems:      Constitutional: Denies fever, shaking or chills   Eyes: Denies change in visual acuity   HEENT: Denies nasal congestion or sore throat   Respiratory: Denies cough or shortness of breath   Cardiovascular: Denies chest pain or edema  Endocrine: Denies tremors, palpitations, intolerance of heat or cold, polyuria, polydipsia.  GI: Denies abdominal pain, nausea, vomiting, bloody stools or diarrhea  : Denies frequency, urgency, incontinence, retention, or nocturia.  Musculoskeletal: Denies numbness, tingling or loss of motor function   Integument: Denies rash, lesion or ulceration   Neurologic: Denies headache or focal weakness, deficits  Heme: Denies spontaneous or excessive bleeding, epistaxis, hematuria, melena, fatigue, enlarged or tender lymph nodes.      All other pertinent positives and negatives as noted above in HPI.    Physical Exam: 74 y.o. female  Vitals:    02/05/20 1332   Temp: 99.2 °F (37.3 °C)   TempSrc: Temporal   Weight: 66.7 kg (147 lb)   Height: 157.5 cm (62\")       General:  Patient is awake and alert.  Appears in no acute distress or discomfort.    Psych:  Affect and demeanor are appropriate.    Eyes:  Conjunctiva and sclera appear grossly normal.  Eyes track well and EOM seem to be intact.    Ears:  No gross abnormalities.  Hearing adequate for the exam.    Cardiovascular:  Regular rate and rhythm.    Lungs:  Good chest expansion.  Breathing unlabored.    Spine:  Neck appears grossly normal.  No palpable masses or adenopathy.  Good motion.  Spurling's maneuver is negative for any shoulder or arm symptoms.    Extremities:  Skin is benign.  No obvious gross abnormalities about left shoulder.  No palpable masses or adenopathy.  No significant tenderness over the rotator interval or " anterior glenohumeral joint line.  Mild lateral tenderness.  No effusion.  Motion is nearly symmetric to the contralateral side, within 5 degrees in all planes.  Positive Lozano maneuver.  No instability.  Rotator cuff strength seems to be well preserved but she does have discomfort with elevation in the scapular plane.  Good strength and no pain with internal and external rotation..  Good motor and sensory function in lower arm and hand.  Palpable radial pulse.  Brisk capillary refill..         Radiology: No x-rays available.  MRI left shoulder is reviewed along with the report.  Findings are listed below.    IMPRESSION:  Degenerative change at the left glenohumeral and  acromioclavicular joints. There is a small glenohumeral joint effusion  with intra-articular debris. There is also rotator cuff tendinitis,  small interstitial tear at the supraspinatus and infraspinatus tendon  insertions, and a split tear of the proximal long head biceps tendon.    Assessment/Plan:  Left shoulder osteoarthritis, rotator cuff tendonopathy    I think most of her pain is actually rotator cuff mediated rather than from the arthritis.  I suggested that we try a subacromial injection.  The risk, benefits and alternatives were discussed.  She consented and the injection was performed as described below.  She is already in therapy.  I recommend that she continue that.  We will see how she responds to this treatment.  If her symptoms persist or recur, I told her I will be happy to see her back and reevaluate.    Jaxon Garcia MD    02/05/2020    CC to Romina George PA    Large Joint Arthrocentesis: L subacromial bursa  Date/Time: 2/5/2020 2:16 PM  Consent given by: patient  Site marked: site marked  Timeout: Immediately prior to procedure a time out was called to verify the correct patient, procedure, equipment, support staff and site/side marked as required   Supporting Documentation  Indications: pain   Procedure  Details  Location: shoulder - L subacromial bursa  Preparation: Patient was prepped and draped in the usual sterile fashion  Needle gauge: 21 G.  Approach: posterior  Medications administered: 2 mL lidocaine (cardiac); 80 mg methylPREDNISolone acetate 80 MG/ML  Patient tolerance: patient tolerated the procedure well with no immediate complications

## 2020-02-06 ENCOUNTER — TREATMENT (OUTPATIENT)
Dept: PHYSICAL THERAPY | Facility: CLINIC | Age: 75
End: 2020-02-06

## 2020-02-06 ENCOUNTER — TELEPHONE (OUTPATIENT)
Dept: ORTHOPEDIC SURGERY | Facility: CLINIC | Age: 75
End: 2020-02-06

## 2020-02-06 DIAGNOSIS — M25.512 LEFT SHOULDER PAIN, UNSPECIFIED CHRONICITY: Primary | ICD-10-CM

## 2020-02-06 PROCEDURE — 97162 PT EVAL MOD COMPLEX 30 MIN: CPT | Performed by: PHYSICAL THERAPIST

## 2020-02-06 PROCEDURE — 97110 THERAPEUTIC EXERCISES: CPT | Performed by: PHYSICAL THERAPIST

## 2020-02-06 PROCEDURE — 97112 NEUROMUSCULAR REEDUCATION: CPT | Performed by: PHYSICAL THERAPIST

## 2020-02-06 PROCEDURE — 97535 SELF CARE MNGMENT TRAINING: CPT | Performed by: PHYSICAL THERAPIST

## 2020-02-06 NOTE — PROGRESS NOTES
Orthopedic / Sports / Industrial Physical Therapy  Physical Therapy Initial Evaluation and Plan of Care    Patient Name: Kourtney Lorenzana          :  1945  Referring Physician: Jaxon Garcia MD  Diagnosis: Left shoulder pain, unspecified chronicity [M25.512]    Date of Evaluation: 2020  ______________________________________________________________________    Subjective Evaluation    History of Present Illness  Onset date: Pain since 2019.  Mechanism of injury: Copied from Dr. Garcia's ON from 2020: 74 y.o. female  who presents with progressively worsening pain in the left shoulder.    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 -  Pain  Current pain ratin  At worst pain rating: 10  Location: (L) Shoulder diffusely sore, especially laterally - Pain into (L) UT as well -   Quality: Ache with sharp pain w/ movement -   Alleviating factors: rest, meloxicam, injection yesterday   Exacerbated by: AROM / Reaching, lifting items of wt - (L) Side-lying -   Progression: improved (following injection yesteday - )    Diagnostic Tests  X-ray: abnormal (Per MD note 2020)  MRI studies: abnormal (Per Report in Epic)    Treatments  Current treatment: injection treatment and medication  Patient Goals  Patient/family treatment goals: Decreased pain ; Mobility, strength to be able to perform ADL's and hobby activites w/ wo modifications          MRI IMPRESSION: Date 2020 (copied from report in Epic)  Degenerative change at the left glenohumeral and  acromioclavicular joints. There is a small glenohumeral joint effusion  with intra-articular debris. There is also rotator cuff tendinitis,  small interstitial tear at the supraspinatus and infraspinatus tendon  insertions, and  a split tear of the proximal long head biceps tendon.  ___________________________________________________  Objective       Postural Observations    Additional Postural Observation Details  Fwd head / rounded shoulder posture w/ hypertrophy (L) UT, supraclavicular region; No noticeable atrophy or deformity noted -     Active Range of Motion     Additional Active Range of Motion Details  (L) Shoulder: FE 90-deg; ERB to UT (L); IRB to (L) buttock    Passive Range of Motion     Additional Passive Range of Motion Details  PROM: (L) Shoulder: FE; 105-deg; ERS/ER; 50-deg w/ posterior impingement; ERA 30-deg w/ posterior impingement; LANIE 20-deg w/ posterior shoulder pain    Strength/Myotome Testing     Additional Strength Details  (L) Shoulder:  Painful / weak ABDuction; Strong Empty Can; ERS 4/5 painfree as with IR;     Tests     Additional Tests Details  (+) Speeds; (++) [place]'s; (-) Empty Can; (+) Posterior Impingement        See Treatment Flow sheet for Exercises, Manual therapy, and modalities.   NMR: X 10 min  · TAPING / BRACING: K-tape to 1) Inhibit over work of deltoid (L) Shoulder; 2) Facilitate ER/Shoulder retraction and facilitate postural positioning  · Verbal / tactile cuing for proper Posture and       SELF-CARE HOME MGT:  10 min  ·  Joint protection; ADL Modification; Precautions; Positioning instructions when sleeping to decrease pain;   ________________________________________________________________________    Assessment & Plan     Assessment  Assessment details: Chronic shoulder pain; OA; RTC tear, etc (L)    PROBLEMS: Pain; Limited ROM/Mobility; Weakness; Intolerance to ADL's and hobby activities requiring use of LUE -   PROGNOSIS: Good    GOALS:   SHORT TERM GOALS: 2 weeks:  1) HEP Initiated; 2) Pain decreased 50%:   3) P/AAROM  increased:  4) Improved functional ability grossly;     LONG TERM GOALS: 4 weeks (or at time of DISCHARGE): 1) (I) HEP; 2) AROM WFL and pain free; 3) Strength /  mobility to be able to perform all ADL's and hobby-related activities w/o restrictions;       Plan  Planned therapy interventions: home exercise program, flexibility, joint mobilization, manual therapy, neuromuscular re-education, postural training, soft tissue mobilization, strengthening, stretching and therapeutic activities (Modalities prn; Taping prn; )  Frequency: 2x week  Duration in weeks: 4  Treatment plan discussed with: patient      ___________________________________________________  Manual Therapy:    10     mins  73846;   Therapeutic Exercise:    10     mins  28881;     Neuromuscular Jenn:    10    mins  56669;   Self Care:     10     mins  87433;     Ultrasound:     06     mins  22560;    Electrical Stimulation:        mins  40938 ( );  Dry Needling          mins self-pay   Gait Training:          mins  40676;  EVAL TIME:   25 min    Timed Treatment:   46   mins                Total Treatment:     80   mins    PT SIGNATURE:   Jason Bower PT  DATE TREATMENT INITIATED: 2/6/2020  ___________________________________________________  Initial Certification  Certification Period: 5/6/2020  I certify that the therapy services are furnished while this patient is under my care.  The services outlined above are required by this patient, and will be reviewed every 90 days.     PHYSICIAN: ________________________________  DATE: ______  Jaxon Garcia MD        Please sign and return via fax to [phone].. Thank you, [facility].  ______________________________________________________________________  [address] [place] [address]  Phone: [phone] Fax: [phone]

## 2020-02-11 ENCOUNTER — TREATMENT (OUTPATIENT)
Dept: PHYSICAL THERAPY | Facility: CLINIC | Age: 75
End: 2020-02-11

## 2020-02-11 DIAGNOSIS — M25.512 LEFT SHOULDER PAIN, UNSPECIFIED CHRONICITY: ICD-10-CM

## 2020-02-11 DIAGNOSIS — M25.562 BILATERAL CHRONIC KNEE PAIN: Primary | ICD-10-CM

## 2020-02-11 DIAGNOSIS — G89.29 BILATERAL CHRONIC KNEE PAIN: Primary | ICD-10-CM

## 2020-02-11 DIAGNOSIS — M25.561 BILATERAL CHRONIC KNEE PAIN: Primary | ICD-10-CM

## 2020-02-11 DIAGNOSIS — M17.0 PRIMARY OSTEOARTHRITIS OF BOTH KNEES: ICD-10-CM

## 2020-02-11 PROCEDURE — 97110 THERAPEUTIC EXERCISES: CPT | Performed by: PHYSICAL THERAPIST

## 2020-02-11 PROCEDURE — 97530 THERAPEUTIC ACTIVITIES: CPT | Performed by: PHYSICAL THERAPIST

## 2020-02-11 NOTE — PROGRESS NOTES
Physical Therapy Daily Progress Note     Patient Name: Kourtney Lorenzana         :  1945  Referring Physician: Romina George PA     Subjective   Kourtney Lorenzana reports: feeling much better after last session with decreased pain in neck, LB and (L) shoulder and improved mobility and function - Notes (B) knee pain ( (L) Anterior, (R) Ant and post.lat) - with cramping into lat lower leg (R) during the night last night -      Objective   Much improved AROM neck, LB and (L) Shoulder -   Swelling ant/medial (B) knees;  Palpable crepitus (B) PF Jt w/ AROM  Tender inferior/medial PF Jt, Very tender medial joint line and pes region(B) knees -   Tender prox Tib-fib joint (R) - w/ apparent hypermobility same -   AROM WFL (B) knees (+) Step-up, squat tests (B) PF Jt; (+) Thessalys (B) medial knee       See Exercise, Manual, and Modality Logs for complete treatment.     Functional / Therapeutic Activities: 30  min  · TAPING / BRACING: K-tape to 1) Unload PF jt (B) knees; 2) Unload medial knee x 2 strips (B) knees' 3) Stabilize prox fibula (R) w/ leukotape over; 4) K-Tape to Unload (L) Shoulder and supraspinatus  · Functional assessment (B) knees  · Jt protection, ADL modification; Posture and       Assessment/Plan  Cervicalgia; Cervical OA/DDD/DJD; Myofascial pain; Lumbar OA/DDDDJD; LBP; Chronic shoulder pain; OA; RTC tear, etc (L)  Much improved  With decreased pain, improved C-AROM into rotation to (L) and decreased pain and improved mobility of lumbar spine and (L) shoulder -   (B) knee pain / OA, etc. -  Much less pain and improved function after taping -      Progress strengthening /stabilization /functional activity  _________________________________________________  Manual Therapy:                 mins  44972;  Therapeutic Exercise:    23    mins  31627;     Neuromuscular Jenn:        mins  57660;    Therapeutic Activity:      30     mins  67165;     Gait Training:                      mins  44573;      Ultrasound:                          mins  87568;    Electrical Stimulation:         mins  76021 ( );  Dry Needling                       mins self-pay     Timed Treatment:   53   mins                  Total Treatment:     65   mins     Jason Bower, PT  Physical Therapist

## 2020-02-13 ENCOUNTER — TREATMENT (OUTPATIENT)
Dept: PHYSICAL THERAPY | Facility: CLINIC | Age: 75
End: 2020-02-13

## 2020-02-13 DIAGNOSIS — M17.0 PRIMARY OSTEOARTHRITIS OF BOTH KNEES: ICD-10-CM

## 2020-02-13 DIAGNOSIS — M25.562 BILATERAL CHRONIC KNEE PAIN: Primary | ICD-10-CM

## 2020-02-13 DIAGNOSIS — M25.512 LEFT SHOULDER PAIN, UNSPECIFIED CHRONICITY: ICD-10-CM

## 2020-02-13 DIAGNOSIS — M54.2 CERVICALGIA: ICD-10-CM

## 2020-02-13 DIAGNOSIS — G89.29 BILATERAL CHRONIC KNEE PAIN: Primary | ICD-10-CM

## 2020-02-13 DIAGNOSIS — M25.561 BILATERAL CHRONIC KNEE PAIN: Primary | ICD-10-CM

## 2020-02-13 PROCEDURE — 97140 MANUAL THERAPY 1/> REGIONS: CPT | Performed by: PHYSICAL THERAPIST

## 2020-02-13 PROCEDURE — 97110 THERAPEUTIC EXERCISES: CPT | Performed by: PHYSICAL THERAPIST

## 2020-02-13 PROCEDURE — 97530 THERAPEUTIC ACTIVITIES: CPT | Performed by: PHYSICAL THERAPIST

## 2020-02-14 NOTE — PROGRESS NOTES
Physical Therapy Daily Progress Note     Patient Name: Kourtney Lorenzana         :  1945  Referring Physician: Romina George PA     Subjective   Kourtney Lorenzana reports: feeling much better with decreased pain and improved mobility and function -   Notes increased pain (L) UT, neck, lateral (L) shoulder - Tape greatly reduced her pain and allowed improved function -      Objective   Very tender (L) 1st rib, UT w/ TPs;    (+) Elevated 1st rib (L)  Pt demonstrates improved AROM (L) Shoulder, but limited A/PROM -   Pt demonstrates much improved mobility of C-spine especially into rotation -         See Exercise, Manual, and Modality Logs for complete treatment.     Functional / Therapeutic Activities: 15  min  · TAPING / BRACING: K-tape to 1) Unload PF jt (B) knees; 2) Unload medial knee x 2 strips (B) knees' 3) Stabilize prox fibula (R) w/ leukotape over;   · Jt protection, ADL modification; Posture and       Assessment/Plan  Cervicalgia; Cervical OA/DDD/DJD; Myofascial pain; Lumbar OA/DDDDJD; LBP; Chronic shoulder pain; OA; RTC tear, etc (L)  Much improved  With decreased pain, improved C-AROM into rotation to (L) and decreased pain and improved mobility of lumbar spine and (L) shoulder -   (B) knee pain / OA, etc. -  Much less pain and improved function after taping -      Progress strengthening /stabilization /functional activity  _________________________________________________  Manual Therapy:           15      mins  84191;  Therapeutic Exercise:    30    mins  57892;     Neuromuscular Jenn:        mins  63448;    Therapeutic Activity:      15     mins  80333;     Gait Training:                      mins  50910;     Ultrasound:                    06      mins  27490;    Electrical Stimulation:         mins  55004 ( );  Dry Needling                       mins self-pay     Timed Treatment:   66  mins                  Total Treatment:     80   mins     Jason Bower PT  Physical Therapist

## 2020-02-20 ENCOUNTER — TREATMENT (OUTPATIENT)
Dept: PHYSICAL THERAPY | Facility: CLINIC | Age: 75
End: 2020-02-20

## 2020-02-20 DIAGNOSIS — M54.50 ACUTE BILATERAL LOW BACK PAIN WITHOUT SCIATICA: ICD-10-CM

## 2020-02-20 DIAGNOSIS — M54.2 CERVICALGIA: ICD-10-CM

## 2020-02-20 DIAGNOSIS — M25.561 BILATERAL CHRONIC KNEE PAIN: Primary | ICD-10-CM

## 2020-02-20 DIAGNOSIS — M25.512 LEFT SHOULDER PAIN, UNSPECIFIED CHRONICITY: ICD-10-CM

## 2020-02-20 DIAGNOSIS — M17.0 PRIMARY OSTEOARTHRITIS OF BOTH KNEES: ICD-10-CM

## 2020-02-20 DIAGNOSIS — G89.29 BILATERAL CHRONIC KNEE PAIN: Primary | ICD-10-CM

## 2020-02-20 DIAGNOSIS — M25.562 BILATERAL CHRONIC KNEE PAIN: Primary | ICD-10-CM

## 2020-02-20 DIAGNOSIS — M51.36 DDD (DEGENERATIVE DISC DISEASE), LUMBAR: ICD-10-CM

## 2020-02-20 DIAGNOSIS — S39.012S STRAIN OF LUMBAR REGION, SEQUELA: ICD-10-CM

## 2020-02-20 PROCEDURE — 97110 THERAPEUTIC EXERCISES: CPT | Performed by: PHYSICAL THERAPIST

## 2020-02-20 PROCEDURE — 97530 THERAPEUTIC ACTIVITIES: CPT | Performed by: PHYSICAL THERAPIST

## 2020-02-20 PROCEDURE — 97140 MANUAL THERAPY 1/> REGIONS: CPT | Performed by: PHYSICAL THERAPIST

## 2020-02-21 NOTE — PROGRESS NOTES
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     Functional / Therapeutic Activities: 15  min  · TAPING / BRACING: K-tape to 1) Unload PF jt (B) knees; 2) Unload medial knee x 2 strips (B) knees; 3) K-Tape to unload (L) Shoulder / dletoid;   · Jt protection, ADL modification; Posture and       Assessment/Plan  Cervicalgia; Cervical OA/DDD/DJD; Myofascial pain; Lumbar OA/DDDDJD; LBP; Chronic shoulder pain; OA; RTC tear, etc (L)  Much improved  With decreased pain, improved C-AROM into rotation to (L) and decreased pain and improved mobility of lumbar spine and (L) shoulder -   (B) knee pain / OA, etc. -  Much less pain and improved function after taping -      Progress strengthening /stabilization /functional activity  _________________________________________________  Manual Therapy:           25      mins  74067;  Therapeutic Exercise:    07    mins  96212;     Neuromuscular Jenn:        mins  15989;    Therapeutic Activity:      15     mins  49145;     Gait Training:                      mins  31835;     Ultrasound:                    06      mins  17013;    Electrical Stimulation:    20     mins  86198 ( );  Dry Needling                       mins self-pay     Timed  Treatment:   53  mins                  Total Treatment:     80   mins     Jason Bower, PT  Physical Therapist

## 2020-02-25 ENCOUNTER — TREATMENT (OUTPATIENT)
Dept: PHYSICAL THERAPY | Facility: CLINIC | Age: 75
End: 2020-02-25

## 2020-02-25 DIAGNOSIS — M54.2 CERVICALGIA: ICD-10-CM

## 2020-02-25 DIAGNOSIS — M25.561 BILATERAL CHRONIC KNEE PAIN: Primary | ICD-10-CM

## 2020-02-25 DIAGNOSIS — G89.29 BILATERAL CHRONIC KNEE PAIN: Primary | ICD-10-CM

## 2020-02-25 DIAGNOSIS — M25.562 BILATERAL CHRONIC KNEE PAIN: Primary | ICD-10-CM

## 2020-02-25 DIAGNOSIS — M17.0 PRIMARY OSTEOARTHRITIS OF BOTH KNEES: ICD-10-CM

## 2020-02-25 DIAGNOSIS — M25.512 LEFT SHOULDER PAIN, UNSPECIFIED CHRONICITY: ICD-10-CM

## 2020-02-25 PROCEDURE — 97530 THERAPEUTIC ACTIVITIES: CPT | Performed by: PHYSICAL THERAPIST

## 2020-02-25 PROCEDURE — 97110 THERAPEUTIC EXERCISES: CPT | Performed by: PHYSICAL THERAPIST

## 2020-02-25 PROCEDURE — G0283 ELEC STIM OTHER THAN WOUND: HCPCS | Performed by: PHYSICAL THERAPIST

## 2020-02-25 PROCEDURE — 97140 MANUAL THERAPY 1/> REGIONS: CPT | Performed by: PHYSICAL THERAPIST

## 2020-02-26 NOTE — PROGRESS NOTES
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     Functional / Therapeutic Activities: 10  min  · TAPING / BRACING: K-tape to 1) Unload PF jt (B) knees; 2) Unload medial knee x 2 strips (B) knees; HELD KNEE TAPE   K-Tape to unload (L) Shoulder /Deltoid, UT (L); Stabilize 1st rib (L);   · Jt protection, ADL modification; Posture and       Assessment/Plan  Cervicalgia; Cervical OA/DDD/DJD; Myofascial pain; Lumbar OA/DDDDJD; LBP; Chronic shoulder pain; OA; RTC tear, etc (L)  Much improved  With decreased pain, improved C-AROM into rotation to (L) and decreased pain and improved mobility of lumbar spine and (L) shoulder -   (B) knee pain / OA, etc. -  Much less pain and improved function after taping -      Progress strengthening /stabilization /functional activity  _________________________________________________  Manual Therapy:           20      mins  52896;  Therapeutic Exercise:   22    mins  06345;     Neuromuscular Jenn:        mins  17449;    Therapeutic Activity:      10     mins  83812;     Gait Training:                      mins  00425;     Ultrasound:                    06      mins  90989;    Electrical Stimulation:    20     mins  12627 (  );  Dry Needling                       mins self-pay     Timed Treatment:   58  mins                  Total Treatment:     90   mins     Jason Bower, PT  Physical Therapist

## 2020-02-27 ENCOUNTER — TREATMENT (OUTPATIENT)
Dept: PHYSICAL THERAPY | Facility: CLINIC | Age: 75
End: 2020-02-27

## 2020-02-27 DIAGNOSIS — G89.29 BILATERAL CHRONIC KNEE PAIN: Primary | ICD-10-CM

## 2020-02-27 DIAGNOSIS — M54.2 CERVICALGIA: ICD-10-CM

## 2020-02-27 DIAGNOSIS — M25.562 BILATERAL CHRONIC KNEE PAIN: Primary | ICD-10-CM

## 2020-02-27 DIAGNOSIS — M25.561 BILATERAL CHRONIC KNEE PAIN: Primary | ICD-10-CM

## 2020-02-27 DIAGNOSIS — S39.012S STRAIN OF LUMBAR REGION, SEQUELA: ICD-10-CM

## 2020-02-27 DIAGNOSIS — M54.12 RADICULOPATHY, CERVICAL: ICD-10-CM

## 2020-02-27 DIAGNOSIS — M51.36 DDD (DEGENERATIVE DISC DISEASE), LUMBAR: ICD-10-CM

## 2020-02-27 DIAGNOSIS — M17.0 PRIMARY OSTEOARTHRITIS OF BOTH KNEES: ICD-10-CM

## 2020-02-27 DIAGNOSIS — M25.512 LEFT SHOULDER PAIN, UNSPECIFIED CHRONICITY: ICD-10-CM

## 2020-02-27 DIAGNOSIS — M54.50 ACUTE BILATERAL LOW BACK PAIN WITHOUT SCIATICA: ICD-10-CM

## 2020-02-27 PROCEDURE — 97530 THERAPEUTIC ACTIVITIES: CPT | Performed by: PHYSICAL THERAPIST

## 2020-02-27 PROCEDURE — 97110 THERAPEUTIC EXERCISES: CPT | Performed by: PHYSICAL THERAPIST

## 2020-02-27 PROCEDURE — G0283 ELEC STIM OTHER THAN WOUND: HCPCS | Performed by: PHYSICAL THERAPIST

## 2020-02-27 PROCEDURE — 97140 MANUAL THERAPY 1/> REGIONS: CPT | Performed by: PHYSICAL THERAPIST

## 2020-03-02 NOTE — PROGRESS NOTES
Physical Therapy Daily Progress Note  RE-ASSESSMENT -     Patient Name: Kourtney Lorenzana         :  1945  Referring Physician: Romina George PA     Subjective   Kourtney Lorenzana reports: noted increased (L) lateral Shoulder / Deltoid pain  - Pain in (R) knee - Less pain in UT and neck - Notes much improved neck AROM especially into rotation with improved functional ability -especially driving, etc.   Minimal c/o LBP today -   Taping very helpful in alleviating knee pain and allowing improved functional ability -     Objective   Less tender (L)  UT w/ fewer TPs and less tender over 1st rib,;    (-) Elevated 1st rib (L)  Very Tender lateral shoulder/deltoid w/ multiple TP's (L) - Feels like a mass around deltoid (L) - Limited active FE, ERB, and IRB (L) with pain   Weak / painful (L) ABDuction, Empty Can -   Much improved AROM C-Spine especially rotation - L/R w/less pain and much more functional -   Tender infrapatellar and medial joint line pain (R) knee and PF Jt tenderness (R) >(L) knee    Pt demonstrates much improved mobility of C-spine especially into rotation especially after MT -      See Exercise, Manual, and Modality Logs for complete treatment.     Functional / Therapeutic Activities: 20 min  · TAPING / BRACING: K-tape to 1) Unload PF jt (B) knees; 2) Unload medial knee x 2 strips (B) knees; K-Tape to unload (L) Shoulder /Deltoid, UT (L); Stabilize 1st rib (L); 3) Unload Deltoid (R)  · FUNCTIONAL ASSESSMENT -   · Jt protection, ADL modification; Posture and       Assessment/Plan  Cervicalgia; Cervical OA/DDD/DJD; Myofascial pain; Lumbar OA/DDDDJD; LBP;   Chronic shoulder pain; OA; RTC tear, etc (L) with increased pain lateral shoulder / deltoid with apparent swelling / nodule -    With decreased pain, improved C-AROM into rotation to (L) and decreased pain and improved mobility of lumbar spine and (L) shoulder -   (B) knee pain / OA, etc. -  Much less pain and improved function after  taping -   STG's: Mostly Met;   LTG's: Neck / knee ROM goals met; Pain, mobility, strength, function goals not met (improved for neck and knees, LB)     Progress strengthening /stabilization /functional activity - Continue to progress towards LTG's - (L) Shoulder may require further assessment / intervention -   _________________________________________________  Manual Therapy:           05      mins  49957;  Therapeutic Exercise:   23    mins  60912;     Neuromuscular Jenn:        mins  72574;    Therapeutic Activity:      20     mins  21183;     Gait Training:                      mins  86600;     Ultrasound:                    05      mins  13130;    Electrical Stimulation:    15     mins  60551 ( );  Dry Needling                       mins self-pay     Timed Treatment:   53  mins                  Total Treatment:     75   mins     Jason Bower, PT  Physical Therapist

## 2020-03-03 ENCOUNTER — TREATMENT (OUTPATIENT)
Dept: PHYSICAL THERAPY | Facility: CLINIC | Age: 75
End: 2020-03-03

## 2020-03-03 DIAGNOSIS — M25.562 BILATERAL CHRONIC KNEE PAIN: Primary | ICD-10-CM

## 2020-03-03 DIAGNOSIS — G89.29 BILATERAL CHRONIC KNEE PAIN: Primary | ICD-10-CM

## 2020-03-03 DIAGNOSIS — M25.561 BILATERAL CHRONIC KNEE PAIN: Primary | ICD-10-CM

## 2020-03-03 DIAGNOSIS — S39.012S STRAIN OF LUMBAR REGION, SEQUELA: ICD-10-CM

## 2020-03-03 DIAGNOSIS — M51.36 DDD (DEGENERATIVE DISC DISEASE), LUMBAR: ICD-10-CM

## 2020-03-03 DIAGNOSIS — M17.0 PRIMARY OSTEOARTHRITIS OF BOTH KNEES: ICD-10-CM

## 2020-03-03 DIAGNOSIS — M54.50 ACUTE BILATERAL LOW BACK PAIN WITHOUT SCIATICA: ICD-10-CM

## 2020-03-03 DIAGNOSIS — M54.2 CERVICALGIA: ICD-10-CM

## 2020-03-03 DIAGNOSIS — M25.512 LEFT SHOULDER PAIN, UNSPECIFIED CHRONICITY: ICD-10-CM

## 2020-03-03 PROCEDURE — 97110 THERAPEUTIC EXERCISES: CPT | Performed by: PHYSICAL THERAPIST

## 2020-03-03 PROCEDURE — 97035 APP MDLTY 1+ULTRASOUND EA 15: CPT | Performed by: PHYSICAL THERAPIST

## 2020-03-03 PROCEDURE — G0283 ELEC STIM OTHER THAN WOUND: HCPCS | Performed by: PHYSICAL THERAPIST

## 2020-03-03 PROCEDURE — 97140 MANUAL THERAPY 1/> REGIONS: CPT | Performed by: PHYSICAL THERAPIST

## 2020-03-03 PROCEDURE — 97530 THERAPEUTIC ACTIVITIES: CPT | Performed by: PHYSICAL THERAPIST

## 2020-03-03 NOTE — PROGRESS NOTES
Physical Therapy Daily Progress Note     Patient Name: Kourtney Lorenzana         :  1945  Referring Physician: Romina George PA     Subjective   Kourtney Lorenzana reports:  (L) Shoulder felt great after last session with tape, but pain returned after tape removed and increased (L) Shoulder pain following using her arm at home -  Pain goes into (L) UT/neck as well 0   C/O (B) knee pain; c/O Increased central LB/LSS pain over last day or so and worse after sitting in 1.5hr Bible Study this morning  In a hard chair -   Today notes pain (L) UT/Neck and lateral (L) Shoulder - Notes improved neck ROM since starting PT, but limited rotation to (L) today -  Thinks her pain is from change in weather as well -     Objective   Very tender (L)  UT w/ TPs and very tender over 1st rib,;    (+) Elevated 1st rib (L)  Tender lateral shoulder/deltoid w/ palpable mass? Lateral deltoid (L)  Tender LSS central and surrounding PS  Tender infrapatellar region and medial knee jt knees R/L -        See Exercise, Manual, and Modality Logs for complete treatment.     Functional / Therapeutic Activities: 15  min  TAPING / BRACING: K-tape to 1) Unload PF jt (B) knees; 2) Unload medial knee x 2 strips (B) knees; 3)  K-Tape to unload (L) Shoulder /Deltoid,;   · Jt protection, ADL modification; Posture and       Assessment/Plan  Cervicalgia; Cervical OA/DDD/DJD; Myofascial pain; Lumbar OA/DDDDJD; LBP; Chronic shoulder pain; OA; RTC tear, etc (L)  Was Much improved  With decreased pain, improved C-AROM into rotation to (L) and decreased pain and improved mobility of lumbar spine and (L) shoulder -  Until this week with increased pain shoulder/ut/neck, knees, LB -   (B) knee pain / OA, etc. -  Much less pain and improved function after taping -   (L) Shoulder would benefit from further assessment / intervention and to assess apparent mass lat delt vs swelling     Progress strengthening /stabilization /functional  activity  _________________________________________________  Manual Therapy:           25      mins  85195;  Therapeutic Exercise:   22    mins  46163;     Neuromuscular Jenn:        mins  70785;    Therapeutic Activity:      15     mins  95358;     Gait Training:                      mins  84275;     Ultrasound:                    06      mins  16367;    Electrical Stimulation:    20     mins  83485 ( );  Dry Needling                       mins self-pay     Timed Treatment:   68  mins                  Total Treatment:     100   mins     Jason Bower PT  Physical Therapist

## 2020-03-04 ENCOUNTER — OFFICE VISIT (OUTPATIENT)
Dept: GASTROENTEROLOGY | Facility: CLINIC | Age: 75
End: 2020-03-04

## 2020-03-04 VITALS
HEIGHT: 62 IN | BODY MASS INDEX: 27.46 KG/M2 | TEMPERATURE: 98.2 F | DIASTOLIC BLOOD PRESSURE: 74 MMHG | SYSTOLIC BLOOD PRESSURE: 110 MMHG | WEIGHT: 149.2 LBS

## 2020-03-04 DIAGNOSIS — K21.9 GASTROESOPHAGEAL REFLUX DISEASE WITHOUT ESOPHAGITIS: Primary | ICD-10-CM

## 2020-03-04 DIAGNOSIS — R74.8 ELEVATED SERUM GGT LEVEL: ICD-10-CM

## 2020-03-04 DIAGNOSIS — K90.89 BILE SALT-INDUCED DIARRHEA: ICD-10-CM

## 2020-03-04 DIAGNOSIS — K63.5 POLYP OF COLON, UNSPECIFIED PART OF COLON, UNSPECIFIED TYPE: ICD-10-CM

## 2020-03-04 PROCEDURE — 99213 OFFICE O/P EST LOW 20 MIN: CPT | Performed by: INTERNAL MEDICINE

## 2020-03-04 NOTE — PROGRESS NOTES
Subjective   Chief Complaint   Patient presents with   • Cholelithiasis       Kourtney Lorenzana is a  74 y.o. female here for a follow up visit for abdominal pain, elevated ggt.     Since her last visit with me she has undergone:  Laparoscopic cholecystectomy with cholangiogram and liver biopsy 12/3/2019.     Pathology:  -Chronic cholecystitis and cholelithiasis.  -Mild steatosis with no iron or fibrosis identified.    Since her cholecystectomy her constipation has resolved.  She is now having looser bowel movements.  She has some urgency.  This does not really bother her.  She does not want to take any medicine.  It is not interfering with her quality of life at present.  She has no blood in her stool.    She has peristant elevated alk phos - previously normal GGT.      She lost 30 lbs with weight watchers and she is only rarely taking pantoprazole as needed.  HPI  Past Medical History:   Diagnosis Date   • Arthritis     OSTEO. KNEES AND BACK SEES DR MIKE DOUGLAS   • Bloating     WITH SOME NAUSEA   • Bruising tendency (CMS/HCC)    • Cholelithiasis Nov 7, 2019    Discovered by liver ultrasound   • Chronic low back pain    • Diverticular disease    • Elevated liver enzymes    • Gastroesophageal reflux disease without esophagitis 2/11/2016   • GERD (gastroesophageal reflux disease)    • History of anxiety     BEEN A LONG TIME   • History of depression     SITUATIONAL. NOW RESOLVED   • History of panic disorder    • IBS (irritable bowel syndrome)    • Insomnia    • Internal hemorrhoids    • Lumbar canal stenosis    • Lumbosacral neuritis    • Multiple vitamin deficiency    • Nephrolithiasis     July 2014 passage of kidney stone.   • Postmenopausal HRT (hormone replacement therapy)    • Urine incontinence      Past Surgical History:   Procedure Laterality Date   • CATARACT EXTRACTION Bilateral 09/05/2017    Dr. Jefe Barakat OD, MD   • CHOLECYSTECTOMY LAPAROSCOPIC INTRAOPERATIVE CHOLANGIOGRAM WITH LIVER BIOPSY N/A 12/3/2019  "   Procedure: CHOLECYSTECTOMY LAPAROSCOPIC INTRAOPERATIVE CHOLANGIOGRAM AND LIVER BIOPSY;  Surgeon: Benny Schneider MD;  Location: [facility] OR;  Service: General   • COLONOSCOPY N/A 11/16/2012    Benign cecal polyp-Dr. Mak Rodriguez   • COLONOSCOPY N/A 2015    Dr. Seema Andrade   • ENDOSCOPY N/A 04/27/2018    HH, no specimen collected-Dr. Seema Andrade   • HYSTERECTOMY Bilateral 1997   • PUBOVAGINAL SLING N/A 06/17/2003    Dr. Ziggy Saba   • TOE SURGERY Right     METATARASAL SURGERY GREAT TOE FOR FRACTUE       Current Outpatient Medications:   •  acetaminophen (TYLENOL) 500 MG tablet, Take 500-1,000 mg by mouth Every 6 (Six) Hours As Needed for Mild Pain ., Disp: , Rfl:   •  Cholecalciferol (VITAMIN D3) 50 MCG (2000 UT) tablet, Take 2,000 Units by mouth Daily., Disp: , Rfl:   •  diphenhydrAMINE HCl, Sleep, (SLEEP AID) 50 MG capsule, Take 50 mg by mouth Every Evening., Disp: , Rfl:   •  magnesium oxide (MAGOX) 400 (241.3 Mg) MG tablet tablet, Take 400 mg by mouth Daily., Disp: , Rfl:   •  TURMERIC PO, Take  by mouth., Disp: , Rfl:   •  atorvastatin (LIPITOR) 10 MG tablet, Take 1 tablet by mouth Every Night. (Patient taking differently: Take 10 mg by mouth Every Night. 5 mg every other day.), Disp: 30 tablet, Rfl: 2  PRN Meds:.  Allergies   Allergen Reactions   • Hydrocodone-Acetaminophen GI Intolerance   • Other Nausea And Vomiting     Unknown \"strong\" pain killer   • Tramadol GI Intolerance     Social History     Socioeconomic History   • Marital status:      Spouse name: Not on file   • Number of children: Not on file   • Years of education: Not on file   • Highest education level: Not on file   Tobacco Use   • Smoking status: Never Smoker   • Smokeless tobacco: Never Used   Substance and Sexual Activity   • Alcohol use: No   • Drug use: No     Family History   Problem Relation Age of Onset   • Atrial fibrillation Father    • Atrial fibrillation Sister    • Atrial fibrillation Brother    • Other Brother  "        Coronary arteriosclerosis   • Colon cancer Mother    • Colon polyps Mother    • Liver disease Paternal Aunt    • Malig Hyperthermia Neg Hx      Review of Systems   Constitutional: Negative for appetite change and unexpected weight change.   Gastrointestinal: Negative for abdominal pain, blood in stool and constipation.   All other systems reviewed and are negative.    Vitals:    03/04/20 1406   BP: 110/74   Temp: 98.2 °F (36.8 °C)         03/04/20  1406   Weight: 67.7 kg (149 lb 3.2 oz)       Objective   Physical Exam   Constitutional: She appears well-developed and well-nourished.   HENT:   Head: Normocephalic and atraumatic.   Eyes: No scleral icterus.   Pulmonary/Chest: Effort normal.   Abdominal: Soft. She exhibits no distension. There is no tenderness.   Neurological: She is alert.   Skin: Skin is warm and dry.   Psychiatric: She has a normal mood and affect.     No radiology results for the last 7 days    Assessment/Plan   Diagnoses and all orders for this visit:    Gastroesophageal reflux disease without esophagitis    Bile salt-induced diarrhea    Elevated serum GGT level    Polyp of colon, unspecified part of colon, unspecified type    Other orders  -     TURMERIC PO; Take  by mouth.      Plan:  · Due for c/s later year due to history of polyps  · Continue diet modification to maintain weight loss-this is helped significantly with her acid reflux.  She is only minimal steatosis on her liver biopsy.  She has no elevation of her transaminases.  The GGT is unrelated to her fatty liver and is likely related to a bone source given normal GGT.  No further work-up is necessary.  · Have counseled the patient that if her bowel changes worsen or she desires to treat this with medication to contact me.

## 2020-03-10 ENCOUNTER — TREATMENT (OUTPATIENT)
Dept: PHYSICAL THERAPY | Facility: CLINIC | Age: 75
End: 2020-03-10

## 2020-03-10 DIAGNOSIS — M25.512 LEFT SHOULDER PAIN, UNSPECIFIED CHRONICITY: Primary | ICD-10-CM

## 2020-03-10 DIAGNOSIS — M54.2 CERVICALGIA: ICD-10-CM

## 2020-03-10 PROCEDURE — 97110 THERAPEUTIC EXERCISES: CPT | Performed by: PHYSICAL THERAPIST

## 2020-03-10 PROCEDURE — 97032 APPL MODALITY 1+ESTIM EA 15: CPT | Performed by: PHYSICAL THERAPIST

## 2020-03-10 PROCEDURE — 97140 MANUAL THERAPY 1/> REGIONS: CPT | Performed by: PHYSICAL THERAPIST

## 2020-03-10 PROCEDURE — 97530 THERAPEUTIC ACTIVITIES: CPT | Performed by: PHYSICAL THERAPIST

## 2020-03-10 PROCEDURE — 97035 APP MDLTY 1+ULTRASOUND EA 15: CPT | Performed by: PHYSICAL THERAPIST

## 2020-03-10 NOTE — PROGRESS NOTES
------------------------------------------------------------------------------------------------------   MD PROGRESS NOTE    Patient: Kourtney Lorenzana        : 1945  Diagnosis/ICD-10 Code:  Left shoulder pain, unspecified chronicity [M25.512]  Referring practitioner: Jaxon Garcia MD  Date of Initial Visit: 2020                  Today's Date: 3/10/2020  _________________________________________________________________    Thank you for the referral of Ms. Lorenzana to [facility].  Ms. Lorenzana has attended 7 PT sessions and their treatment has consisted of: modalities prn, manual therapy, therapeutic exercise, kinesio taping, patient education, and HEP.     Subjective   Kourtney Lorenzana reports: overall improvement with increased mobility and function, but persistent pain mostly into lateral shoulder with swelling in lateral shoulder that is very tender.  Pain with lifting items of wt, reaching out into ABDuction and IRB, but improved reaching OH / FE -   ___________________________________________________________________  Objective              OBSERVATION:               PALPATION: Tender along anterior shoulder / LH Biceps, but especially tender at lateral deltoid with an apparent palpable mass?at same area (L) shoulder -         AROM: -deg;  ERB to T1-2; IRB to Sacrum;    STRENGTH: Painful / weak ABDuction, Empty Can and IR with pain mostly lateral shoulder   SPECIAL TESTS: Pain w/ Empty Can, Speeds, [place]'s (L) Shoulder   ACTIVITY TOLERANCE: Improved tolerance to light ADL's requiring reaching into FE, but limited with reaching up behind her back and out to the side and lifting items of wt with LUE -      See Exercise, Manual, and Modality Logs for complete treatment.      Functional / Therapeutic Activities:  X 20 min  · TAPING / BRACING: K-Tape to 1) STAR Unload Lateral Deltoid; 2) Unload shoulder (L)   · FUNCTIONAL ASSESSMENT -   · Jt protection, ADL modification; Posture  and    ___________________________________________________________________   Assessment/Plan  Shoulder pain - Overall improved with increased mobility and function -   Severe pain / tenderness lateral deltoid w/ possible palpable mass?? - Pt may benefit from further assessment -   Ms. Lorenzana would benefit from continued Physical Therapy and further assessment of possible palpable mass lateral deltoid -      P: Recommend continued Physical Therapy and further assessment of possible palpable mass lateral deltoid -     2 times/wk x 4weeks.    Please advise after your exam.    Thank you again for this referral of Ms. Lorenzana to [facility].    PT Signature: ______________________________   Jason Bower, PT  ______________________________________________________  Based upon review of the patient's progress and continued therapy plan, it is my medical opinion that Kourtney Lorenzana should continue physical therapy treatment per the recommendation above.     Signature: ____________________________   Date: _____    Jaxon Garcia MD  ____________________________________________________________________  Manual Therapy:    25     mins  84387;    Therapeutic Exercise:   05     mins  01770;     Neuromuscular Jenn:        mins  83623;     Therapeutic Activity:    20     mins  44105;     Ultrasound:     06     mins  16603;     Electrical Stimulation:  20     mins  32894 ( );  Dry Needling          mins self-pay     Gait Training:          mins  34444;    Timed Treatment:   56   mins             Total Treatment:     80   mins    ______________________________________________________________________  [address] [facility]  [place] [address]  Phone: [phone] Fax: [phone]

## 2020-03-10 NOTE — PROGRESS NOTES
Physical Therapy Daily Progress Note     Patient Name: Kourtney Lorenzana         :  1945  Referring Physician: Romina George PA     Subjective   Kourtney Lorenzana reports:  decreased (B) knee and LBP and decreased lateral shoulder pain and less swollen and tender lateral shoulder - Notes mostly pain in (L) UT and neck today -  To see Dr. Garcia on Monday for her shoulder -      Objective   Very tender (L)  UT w/ TPs and very tender over 1st rib,;    (+) Elevated 1st rib (L)  Tender lateral shoulder/deltoid w/  a palpable mass? Lateral deltoid (L), but smaller overall -         See Exercise, Manual, and Modality Logs for complete treatment.     Functional / Therapeutic Activities: 15  min  TAPING / BRACING: K-tape to 1) Unload PF jt (B) knees; 2) Unload medial knee x 2 strips (B) knees; 3)  K-Tape to unload (L) Shoulder /Deltoid,;   · Jt protection, ADL modification; Posture and       Assessment/Plan  Cervicalgia; Cervical OA/DDD/DJD; Myofascial pain; Lumbar OA/DDDDJD; LBP; Chronic shoulder pain; OA; RTC tear, etc (L)  Was Much improved  With decreased pain, improved C-AROM into rotation to (L) and decreased pain and improved mobility of lumbar spine and (L) shoulder -  Until this week with increased pain shoulder/ut/neck, knees, LB -   (B) knee pain / OA, etc. -  Much less pain and improved function after taping -   (L) Shoulder would benefit from further assessment / intervention and to assess apparent mass lat delt vs swelling     Progress strengthening /stabilization /functional activity  _________________________________________________  Manual Therapy:           25      mins  48019;  Therapeutic Exercise:   22    mins  75858;     Neuromuscular Jenn:        mins  55887;    Therapeutic Activity:      15     mins  14571;     Gait Training:                      mins  18566;     Ultrasound:                    06      mins  56431;    Electrical Stimulation:    20     mins  17357 ( );  Dry  Raghav                       mins self-pay     Timed Treatment:   68  mins                  Total Treatment:     100   mins     Jason Bower, PT  Physical Therapist

## 2020-03-16 ENCOUNTER — OFFICE VISIT (OUTPATIENT)
Dept: ORTHOPEDIC SURGERY | Facility: CLINIC | Age: 75
End: 2020-03-16

## 2020-03-16 VITALS — BODY MASS INDEX: 27.42 KG/M2 | TEMPERATURE: 98 F | HEIGHT: 62 IN | WEIGHT: 149 LBS

## 2020-03-16 DIAGNOSIS — M54.2 NECK PAIN: Primary | ICD-10-CM

## 2020-03-16 DIAGNOSIS — M25.512 LEFT SHOULDER PAIN, UNSPECIFIED CHRONICITY: ICD-10-CM

## 2020-03-16 PROCEDURE — 72040 X-RAY EXAM NECK SPINE 2-3 VW: CPT | Performed by: ORTHOPAEDIC SURGERY

## 2020-03-16 PROCEDURE — 99213 OFFICE O/P EST LOW 20 MIN: CPT | Performed by: ORTHOPAEDIC SURGERY

## 2020-03-16 PROCEDURE — 73030 X-RAY EXAM OF SHOULDER: CPT | Performed by: ORTHOPAEDIC SURGERY

## 2020-03-16 NOTE — PROGRESS NOTES
"Patient:Kourtney Lorenzana    YOB: 1945    Medical Record Number:[id number]    Chief Complaints: Left shoulder pain    History of Present Illness:     74 y.o. female patient who presents for follow-up of her shoulder.  She reports moderate to severe constant aching pain.  The last injection really did not seem to help all that much.  The majority of her pain is anterior shoulder and lateral upper arm.  She is also having pain that occasionally shoots up into her neck.  Denies any numbness, weakness or other associated symptoms.    Allergies:  Allergies   Allergen Reactions   • Hydrocodone-Acetaminophen GI Intolerance   • Other Nausea And Vomiting     Unknown \"strong\" pain killer   • Tramadol GI Intolerance       Home Medications:    Current Outpatient Medications:   •  acetaminophen (TYLENOL) 500 MG tablet, Take 500-1,000 mg by mouth Every 6 (Six) Hours As Needed for Mild Pain ., Disp: , Rfl:   •  atorvastatin (LIPITOR) 10 MG tablet, Take 1 tablet by mouth Every Night. (Patient taking differently: Take 10 mg by mouth Every Night. 5 mg every other day.), Disp: 30 tablet, Rfl: 2  •  Cholecalciferol (VITAMIN D3) 50 MCG (2000 UT) tablet, Take 2,000 Units by mouth Daily., Disp: , Rfl:   •  diphenhydrAMINE HCl, Sleep, (SLEEP AID) 50 MG capsule, Take 50 mg by mouth Every Evening., Disp: , Rfl:   •  magnesium oxide (MAGOX) 400 (241.3 Mg) MG tablet tablet, Take 400 mg by mouth Daily., Disp: , Rfl:   •  TURMERIC PO, Take  by mouth., Disp: , Rfl:     Past Medical History:   Diagnosis Date   • Arthritis     OSTEO. KNEES AND BACK SEES DR MIKE DOUGLAS   • Bloating     WITH SOME NAUSEA   • Bruising tendency (CMS/HCC)    • Cholelithiasis Nov 7, 2019    Discovered by liver ultrasound   • Chronic low back pain    • Diverticular disease    • Elevated liver enzymes    • Gastroesophageal reflux disease without esophagitis 2/11/2016   • GERD (gastroesophageal reflux disease)    • History of anxiety     BEEN A LONG TIME   • " History of depression     SITUATIONAL. NOW RESOLVED   • History of panic disorder    • IBS (irritable bowel syndrome)    • Insomnia    • Internal hemorrhoids    • Lumbar canal stenosis    • Lumbosacral neuritis    • Multiple vitamin deficiency    • Nephrolithiasis     July 2014 passage of kidney stone.   • Postmenopausal HRT (hormone replacement therapy)    • Urine incontinence        Past Surgical History:   Procedure Laterality Date   • CATARACT EXTRACTION Bilateral 09/05/2017    Dr. Jefe Barakat OD, MD   • CHOLECYSTECTOMY LAPAROSCOPIC INTRAOPERATIVE CHOLANGIOGRAM WITH LIVER BIOPSY N/A 12/3/2019    Procedure: CHOLECYSTECTOMY LAPAROSCOPIC INTRAOPERATIVE CHOLANGIOGRAM AND LIVER BIOPSY;  Surgeon: Benny Schneider MD;  Location: [facility] OR;  Service: General   • COLONOSCOPY N/A 11/16/2012    Benign cecal polyp-Dr. Mak Rodriguez   • COLONOSCOPY N/A 2015    Dr. Seema Andrade   • ENDOSCOPY N/A 04/27/2018    HH, no specimen collected-Dr. Seema Andrade   • HYSTERECTOMY Bilateral 1997   • PUBOVAGINAL SLING N/A 06/17/2003    Dr. Ziggy Saba   • TOE SURGERY Right     METATARASAL SURGERY GREAT TOE FOR FRACTUE       Social History     Occupational History   • Not on file   Tobacco Use   • Smoking status: Never Smoker   • Smokeless tobacco: Never Used   Substance and Sexual Activity   • Alcohol use: No   • Drug use: No   • Sexual activity: Defer      Social History     Social History Narrative   • Not on file       Family History   Problem Relation Age of Onset   • Atrial fibrillation Father    • Atrial fibrillation Sister    • Atrial fibrillation Brother    • Other Brother         Coronary arteriosclerosis   • Colon cancer Mother    • Colon polyps Mother    • Liver disease Paternal Aunt    • Malig Hyperthermia Neg Hx        Review of Systems:      Constitutional: Denies fever, shaking or chills   Eyes: Denies change in visual acuity   HEENT: Denies nasal congestion or sore throat   Respiratory: Denies cough or shortness of  "breath   Cardiovascular: Denies chest pain or edema  Endocrine: Denies tremors, palpitations, intolerance of heat or cold, polyuria, polydipsia.  GI: Denies abdominal pain, nausea, vomiting, bloody stools or diarrhea  : Denies frequency, urgency, incontinence, retention, or nocturia.  Musculoskeletal: Denies numbness, tingling or loss of motor function except as above  Integument: Denies rash, lesion or ulceration   Neurologic: Denies headache or focal weakness, deficits  Heme: Denies spontaneous or excessive bleeding, epistaxis, hematuria, melena, fatigue, enlarged or tender lymph nodes.      All other pertinent positives and negatives as noted above in HPI.    Physical Exam:74 y.o. female  Vitals:    03/16/20 1141   Temp: 98 °F (36.7 °C)   Weight: 67.6 kg (149 lb)   Height: 157.5 cm (62.01\")       General:  Patient is awake and alert.  Appears in no acute distress or discomfort.    Psych:  Affect and demeanor are appropriate.    Neck: Good motion.  Negative Spurling's.    Extremities: Left shoulder.  Skin is benign.  Good motion.  Moderate anterior tenderness.  Mild to moderate discomfort with resisted elevation in the scapular plane but she has good overall strength.    Imaging: AP, scapular Y and axillary views of the left shoulder are ordered and reviewed.  No comparison films are available but I do have an MRI for comparison.  AP and lateral views of the cervical spine are also ordered and reviewed.  No comparison films are available for these x-rays.  The x-rays of the shoulder confirm moderate glenohumeral osteoarthritis with joint space narrowing, osteophyte formation and a loose body in the axillary pouch.  The x-rays of the shoulder show advanced cervical degenerative disc disease, particularly at C4-5 and C6-7.    Assessment/Plan: 1.  Left shoulder osteoarthritis 2.  Cervical degenerative disc disease    We discussed options.  I told her I recommend that we try an intra-articular injection for the " shoulder.  I also recommend that we go ahead and get an MRI of her neck with an eye towards possible cervical epidurals.  For now, in light of recent events, she wants to hold off on any intervention.  I certainly think that is reasonable.  I will plan to see her back in a month and we can reevaluate.    Jaxon Garcia MD    03/16/2020

## 2020-03-17 ENCOUNTER — TELEPHONE (OUTPATIENT)
Dept: CARDIOLOGY | Facility: CLINIC | Age: 75
End: 2020-03-17

## 2020-03-23 ENCOUNTER — TELEPHONE (OUTPATIENT)
Dept: PHYSICAL THERAPY | Facility: CLINIC | Age: 75
End: 2020-03-23

## 2020-03-23 DIAGNOSIS — M51.36 DDD (DEGENERATIVE DISC DISEASE), LUMBAR: ICD-10-CM

## 2020-03-23 DIAGNOSIS — M25.561 BILATERAL CHRONIC KNEE PAIN: ICD-10-CM

## 2020-03-23 DIAGNOSIS — M25.562 BILATERAL CHRONIC KNEE PAIN: ICD-10-CM

## 2020-03-23 DIAGNOSIS — M17.0 PRIMARY OSTEOARTHRITIS OF BOTH KNEES: ICD-10-CM

## 2020-03-23 DIAGNOSIS — M54.2 CERVICALGIA: ICD-10-CM

## 2020-03-23 DIAGNOSIS — G89.29 BILATERAL CHRONIC KNEE PAIN: ICD-10-CM

## 2020-03-23 DIAGNOSIS — M25.512 LEFT SHOULDER PAIN, UNSPECIFIED CHRONICITY: Primary | ICD-10-CM

## 2020-03-23 NOTE — TELEPHONE ENCOUNTER
Called and spoke with Kourtney Lorenzana telling them that we were unable to treat them due to the restrictions imposed by the Governor due to the Corona Virus.     Asked Kourtney  if they had any questions concerning their HEP and how they were doing.     She noted overall she is doing better, but still having (L) shoulder and (L) UT/Neck pain. She saw Dr. Garcia recently who took xrays of her neck and shoulder and is going to refer her to a Neck specialist and is going to have her return to his office in about 1 month for another injection for her shoulder - He wants to avoid surgery.      Told Kourtney that they would be contacted to schedule Physical Therapy once we were cleared to begin safely treating patients.     Kourtney was encouraged to call us if they had any questions / concerns.     Kourtney verbalized understanding and appreciated us calling.       Jason Bower, PT

## 2020-04-30 ENCOUNTER — APPOINTMENT (OUTPATIENT)
Dept: GENERAL RADIOLOGY | Facility: HOSPITAL | Age: 75
End: 2020-04-30

## 2020-04-30 ENCOUNTER — HOSPITAL ENCOUNTER (EMERGENCY)
Facility: HOSPITAL | Age: 75
Discharge: HOME | End: 2020-04-30
Attending: EMERGENCY MEDICINE | Admitting: EMERGENCY MEDICINE

## 2020-04-30 VITALS
SYSTOLIC BLOOD PRESSURE: 112 MMHG | TEMPERATURE: 98.2 F | BODY MASS INDEX: 27.05 KG/M2 | DIASTOLIC BLOOD PRESSURE: 76 MMHG | RESPIRATION RATE: 18 BRPM | HEIGHT: 62 IN | OXYGEN SATURATION: 96 % | HEART RATE: 70 BPM | WEIGHT: 147 LBS

## 2020-04-30 DIAGNOSIS — R00.2 HEART PALPITATIONS: Primary | ICD-10-CM

## 2020-04-30 LAB
ALBUMIN SERPL-MCNC: 4.3 G/DL (ref 3.5–5.2)
ALBUMIN/GLOB SERPL: 1.4 G/DL
ALP SERPL-CCNC: 127 U/L (ref 39–117)
ALT SERPL W P-5'-P-CCNC: 16 U/L (ref 1–33)
ANION GAP SERPL CALCULATED.3IONS-SCNC: 14.3 MMOL/L (ref 5–15)
AST SERPL-CCNC: 11 U/L (ref 1–32)
BASOPHILS # BLD AUTO: 0.03 10*3/MM3 (ref 0–0.2)
BASOPHILS NFR BLD AUTO: 0.5 % (ref 0–1.5)
BILIRUB SERPL-MCNC: 0.4 MG/DL (ref 0.2–1.2)
BUN BLD-MCNC: 14 MG/DL (ref 8–23)
BUN/CREAT SERPL: 17.7 (ref 7–25)
CALCIUM SPEC-SCNC: 9.3 MG/DL (ref 8.6–10.5)
CHLORIDE SERPL-SCNC: 107 MMOL/L (ref 98–107)
CO2 SERPL-SCNC: 20.7 MMOL/L (ref 22–29)
CREAT BLD-MCNC: 0.79 MG/DL (ref 0.57–1)
DEPRECATED RDW RBC AUTO: 43.1 FL (ref 37–54)
EOSINOPHIL # BLD AUTO: 0.05 10*3/MM3 (ref 0–0.4)
EOSINOPHIL NFR BLD AUTO: 0.9 % (ref 0.3–6.2)
ERYTHROCYTE [DISTWIDTH] IN BLOOD BY AUTOMATED COUNT: 14.5 % (ref 12.3–15.4)
GFR SERPL CREATININE-BSD FRML MDRD: 71 ML/MIN/1.73
GLOBULIN UR ELPH-MCNC: 3 GM/DL
GLUCOSE BLD-MCNC: 99 MG/DL (ref 65–99)
HCT VFR BLD AUTO: 36.9 % (ref 34–46.6)
HGB BLD-MCNC: 13.5 G/DL (ref 12–15.9)
IMM GRANULOCYTES # BLD AUTO: 0.02 10*3/MM3 (ref 0–0.05)
IMM GRANULOCYTES NFR BLD AUTO: 0.3 % (ref 0–0.5)
LIPASE SERPL-CCNC: 58 U/L (ref 13–60)
LYMPHOCYTES # BLD AUTO: 1.6 10*3/MM3 (ref 0.7–3.1)
LYMPHOCYTES NFR BLD AUTO: 27.8 % (ref 19.6–45.3)
MAGNESIUM SERPL-MCNC: 2.1 MG/DL (ref 1.6–2.4)
MCH RBC QN AUTO: 34.2 PG (ref 26.6–33)
MCHC RBC AUTO-ENTMCNC: 36.6 G/DL (ref 31.5–35.7)
MCV RBC AUTO: 93.4 FL (ref 79–97)
MONOCYTES # BLD AUTO: 0.4 10*3/MM3 (ref 0.1–0.9)
MONOCYTES NFR BLD AUTO: 7 % (ref 5–12)
NEUTROPHILS # BLD AUTO: 3.65 10*3/MM3 (ref 1.7–7)
NEUTROPHILS NFR BLD AUTO: 63.5 % (ref 42.7–76)
NRBC BLD AUTO-RTO: 0 /100 WBC (ref 0–0.2)
PLATELET # BLD AUTO: 200 10*3/MM3 (ref 140–450)
PMV BLD AUTO: 10.2 FL (ref 6–12)
POTASSIUM BLD-SCNC: 4.1 MMOL/L (ref 3.5–5.2)
PROT SERPL-MCNC: 7.3 G/DL (ref 6–8.5)
RBC # BLD AUTO: 3.95 10*6/MM3 (ref 3.77–5.28)
SODIUM BLD-SCNC: 142 MMOL/L (ref 136–145)
TROPONIN T SERPL-MCNC: <0.01 NG/ML (ref 0–0.03)
WBC NRBC COR # BLD: 5.75 10*3/MM3 (ref 3.4–10.8)

## 2020-04-30 PROCEDURE — 93005 ELECTROCARDIOGRAM TRACING: CPT

## 2020-04-30 PROCEDURE — 93010 ELECTROCARDIOGRAM REPORT: CPT | Performed by: INTERNAL MEDICINE

## 2020-04-30 PROCEDURE — 71045 X-RAY EXAM CHEST 1 VIEW: CPT

## 2020-04-30 PROCEDURE — 99283 EMERGENCY DEPT VISIT LOW MDM: CPT

## 2020-04-30 PROCEDURE — 84484 ASSAY OF TROPONIN QUANT: CPT | Performed by: PHYSICIAN ASSISTANT

## 2020-04-30 PROCEDURE — 83735 ASSAY OF MAGNESIUM: CPT | Performed by: PHYSICIAN ASSISTANT

## 2020-04-30 PROCEDURE — 80053 COMPREHEN METABOLIC PANEL: CPT | Performed by: PHYSICIAN ASSISTANT

## 2020-04-30 PROCEDURE — 83690 ASSAY OF LIPASE: CPT | Performed by: PHYSICIAN ASSISTANT

## 2020-04-30 PROCEDURE — 93005 ELECTROCARDIOGRAM TRACING: CPT | Performed by: EMERGENCY MEDICINE

## 2020-04-30 PROCEDURE — 85025 COMPLETE CBC W/AUTO DIFF WBC: CPT | Performed by: PHYSICIAN ASSISTANT

## 2020-05-07 ENCOUNTER — OFFICE VISIT (OUTPATIENT)
Dept: CARDIOLOGY | Facility: CLINIC | Age: 75
End: 2020-05-07

## 2020-05-07 ENCOUNTER — HOSPITAL ENCOUNTER (OUTPATIENT)
Dept: CARDIOLOGY | Facility: HOSPITAL | Age: 75
Discharge: HOME | End: 2020-05-07
Admitting: INTERNAL MEDICINE

## 2020-05-07 VITALS
DIASTOLIC BLOOD PRESSURE: 80 MMHG | WEIGHT: 120.8 LBS | SYSTOLIC BLOOD PRESSURE: 110 MMHG | HEART RATE: 61 BPM | HEIGHT: 62 IN | BODY MASS INDEX: 22.23 KG/M2

## 2020-05-07 DIAGNOSIS — R06.09 DYSPNEA ON EXERTION: ICD-10-CM

## 2020-05-07 DIAGNOSIS — R00.0 TACHYCARDIA: Primary | ICD-10-CM

## 2020-05-07 DIAGNOSIS — R00.0 TACHYCARDIA: ICD-10-CM

## 2020-05-07 LAB — TSH SERPL DL<=0.05 MIU/L-ACNC: 1.78 UIU/ML (ref 0.27–4.2)

## 2020-05-07 PROCEDURE — 84443 ASSAY THYROID STIM HORMONE: CPT | Performed by: INTERNAL MEDICINE

## 2020-05-07 PROCEDURE — 99204 OFFICE O/P NEW MOD 45 MIN: CPT | Performed by: INTERNAL MEDICINE

## 2020-05-07 PROCEDURE — 36415 COLL VENOUS BLD VENIPUNCTURE: CPT

## 2020-05-07 PROCEDURE — 93000 ELECTROCARDIOGRAM COMPLETE: CPT | Performed by: INTERNAL MEDICINE

## 2020-05-07 RX ORDER — PANTOPRAZOLE SODIUM 40 MG/1
1 TABLET, DELAYED RELEASE ORAL 2 TIMES DAILY
COMMUNITY
Start: 2020-04-17 | End: 2021-01-19

## 2020-05-07 NOTE — PROGRESS NOTES
Date of Office Visit: 2020  Encounter Provider: Anum Hicks MD  Place of Service: [facility]  Patient Name: Kourtney Lorenzana  :1945      Patient ID:  Kourtney Lorenzana is a 74 y.o. female is here for intermittent tachycardia      History of Present Illness    She has a history of GERD, irritable bowel syndrome, history of anemia and elevated liver enzymes.    She is , has 2 children is retired.  She is Dagoberto Lorenzana's grandmother.  She uses no cigarettes, alcohol or drugs and has 1 cup of coffee per day.    Her father hypertension, diabetes and heart disease.  Her sister, father and brother all have atrial fibrillation.  Her brother has also had a myocardial infarction. She has a sister with strokes and her father also had strokes.    She was in the emergency department on 2020 with palpitations and chest pain.  The episodes were intermittent and have been going on since 2019.  Most of the time they are associate with activity.  In the emergency department, blood pressure was 142/85, heart rate 98.  Her CMP was normal, magnesium normal, CBC normal, troponin negative, normal lipase.      She had a normal stress echocardiogram in .  She had an echocardiogram done 3/2016 showed ejection fraction 65% with grade 1 diastolic dysfunction which was no change from prior.    He has been having episodes of her heart racing.  It started about 3 weeks ago.  When it comes on, she feels her heart beating very rapidly and hard.  She feels up into her throat.  She gets weak and feels as if she is in a pass out.  This is also associated with nausea and short windedness.  These episodes last about an hour.  There is nothing that she knows that brings them on.  There is nothing that makes them better.  They have woken her out of sleep.  This past Wednesday at 12:30 AM, she woke with her heart racing.  She has had some intermittent chest pain but this is  usually better with pantoprazole.  She is never been diagnosed with a heart rhythm disturbance before.  She is not using any stimulants.  She is never been noted to have any rate issues.    Past Medical History:   Diagnosis Date   • Arthritis     OSTEO. KNEES AND BACK SEES DR MIKE DOUGLAS   • Bloating     WITH SOME NAUSEA   • Bruising tendency (CMS/HCC)    • Cholelithiasis Nov 7, 2019    Discovered by liver ultrasound   • Chronic low back pain    • Diverticular disease    • Elevated liver enzymes    • Gastroesophageal reflux disease without esophagitis 2/11/2016   • GERD (gastroesophageal reflux disease)    • Heart palpitations    • History of anxiety     BEEN A LONG TIME   • History of depression     SITUATIONAL. NOW RESOLVED   • History of panic disorder    • IBS (irritable bowel syndrome)    • Insomnia    • Internal hemorrhoids    • Lumbar canal stenosis    • Lumbosacral neuritis    • Multiple vitamin deficiency    • Nephrolithiasis     July 2014 passage of kidney stone.   • Postmenopausal HRT (hormone replacement therapy)    • Urine incontinence          Past Surgical History:   Procedure Laterality Date   • CATARACT EXTRACTION Bilateral 09/05/2017    Dr. Jefe Barakat OD, MD   • CHOLECYSTECTOMY LAPAROSCOPIC INTRAOPERATIVE CHOLANGIOGRAM WITH LIVER BIOPSY N/A 12/3/2019    Procedure: CHOLECYSTECTOMY LAPAROSCOPIC INTRAOPERATIVE CHOLANGIOGRAM AND LIVER BIOPSY;  Surgeon: Benny Schneider MD;  Location: [facility];  Service: General   • COLONOSCOPY N/A 11/16/2012    Benign cecal polyp-Dr. Mak Rodriguez   • COLONOSCOPY N/A 2015    Dr. Seema Andrade   • ENDOSCOPY N/A 04/27/2018    , no specimen collected-Dr. Seema Andrade   • HYSTERECTOMY Bilateral 1997   • PUBOVAGINAL SLING N/A 06/17/2003    Dr. Ziggy Saba   • TOE SURGERY Right     METATARASAL SURGERY GREAT TOE FOR FRACTUE       Current Outpatient Medications on File Prior to Visit   Medication Sig Dispense Refill   • acetaminophen (TYLENOL) 500 MG tablet Take  500-1,000 mg by mouth Every 6 (Six) Hours As Needed for Mild Pain .     • Cholecalciferol (VITAMIN D3) 50 MCG (2000 UT) tablet Take 2,000 Units by mouth Daily.     • diphenhydrAMINE HCl, Sleep, (SLEEP AID) 50 MG capsule Take 50 mg by mouth Every Evening.     • magnesium oxide (MAGOX) 400 (241.3 Mg) MG tablet tablet Take 400 mg by mouth Daily.     • pantoprazole (PROTONIX) 40 MG EC tablet Take 1 tablet by mouth Daily.     • TURMERIC PO Take  by mouth.     • [DISCONTINUED] atorvastatin (LIPITOR) 10 MG tablet Take 1 tablet by mouth Every Night. (Patient taking differently: Take 10 mg by mouth Every Night. 5 mg every other day.) 30 tablet 2     No current facility-administered medications on file prior to visit.        Social History     Socioeconomic History   • Marital status:      Spouse name: Not on file   • Number of children: Not on file   • Years of education: Not on file   • Highest education level: Not on file   Tobacco Use   • Smoking status: Never Smoker   • Smokeless tobacco: Never Used   • Tobacco comment: caffeine use   Substance and Sexual Activity   • Alcohol use: No   • Drug use: No   • Sexual activity: Defer           Review of Systems   Constitution: Negative.   HENT: Negative for congestion.    Eyes: Negative for vision loss in left eye and vision loss in right eye.   Respiratory: Negative.  Negative for cough, hemoptysis, shortness of breath, sleep disturbances due to breathing, snoring, sputum production and wheezing.    Endocrine: Negative.    Hematologic/Lymphatic: Negative.    Skin: Negative for poor wound healing and rash.   Musculoskeletal: Negative for falls, gout, muscle cramps and myalgias.   Gastrointestinal: Negative for abdominal pain, diarrhea, dysphagia, hematemesis, melena, nausea and vomiting.   Neurological: Negative for excessive daytime sleepiness, dizziness, headaches, light-headedness, loss of balance, seizures and vertigo.   Psychiatric/Behavioral: Negative for  "depression and substance abuse. The patient is not nervous/anxious.        Procedures    ECG 12 Lead  Date/Time: 5/7/2020 11:01 AM  Performed by: Anum Hicks MD  Authorized by: Anum Hicks MD   Comparison: compared with previous ECG   Similar to previous ECG  Rhythm: sinus rhythm  Other findings: poor R wave progression    Clinical impression: non-specific ECG                Objective:      Vitals:    05/07/20 1039 05/07/20 1040   BP: 120/82 110/80   BP Location: Right arm Left arm   Patient Position: Sitting Sitting   Pulse: 61    Weight: 54.8 kg (120 lb 12.8 oz)    Height: 157.5 cm (62\")      Body mass index is 22.09 kg/m².    Physical Exam   Constitutional: She is oriented to person, place, and time. She appears well-developed and well-nourished. No distress.   HENT:   Head: Normocephalic and atraumatic.   Eyes: Conjunctivae are normal. No scleral icterus.   Neck: Neck supple. No JVD present. Carotid bruit is not present. No thyromegaly present.   Cardiovascular: Normal rate, regular rhythm, S1 normal, S2 normal and intact distal pulses.  No extrasystoles are present. PMI is not displaced. Exam reveals no gallop.   No murmur heard.  Pulses:       Carotid pulses are 2+ on the right side, and 2+ on the left side.       Radial pulses are 2+ on the right side, and 2+ on the left side.        Dorsalis pedis pulses are 2+ on the right side, and 2+ on the left side.        Posterior tibial pulses are 2+ on the right side, and 2+ on the left side.   Pulmonary/Chest: Effort normal and breath sounds normal. No respiratory distress. She has no wheezes. She has no rhonchi. She has no rales. She exhibits no tenderness.   Abdominal: Soft. Bowel sounds are normal. She exhibits no distension, no abdominal bruit and no mass. There is no tenderness.   Musculoskeletal: She exhibits no edema or deformity.   Lymphadenopathy:     She has no cervical adenopathy.   Neurological: She is alert and oriented to person, " place, and time. No cranial nerve deficit.   Skin: Skin is warm and dry. No rash noted. She is not diaphoretic. No cyanosis. No pallor. Nails show no clubbing.   Psychiatric: She has a normal mood and affect. Judgment normal.   Vitals reviewed.      Lab Review:       Assessment:      Diagnosis Plan   1. Tachycardia  Holter Monitor - 72 Hour Up To 21 Days    TSH    Adult Transthoracic Echo Complete W/ Cont if Necessary Per Protocol   2. Dyspnea on exertion  Holter Monitor - 72 Hour Up To 21 Days    TSH    Adult Transthoracic Echo Complete W/ Cont if Necessary Per Protocol     1. Paroxysmal tachycardia which may be atrial fibrillation.  She has a family history of this.  Put a 2-week monitor on her.  We did talk with the fact that she may need a blood thinner if this turns out to be atrial fibrillation.  In the meantime we will check a TSH.  Also will set up for an echocardiogram.  2. Dyspnea on exertion.  This mostly occurs with the tachycardia.  3. Family history of atrial fibrillation.     Plan:       See back in 4 weeks.  No med changes, set up testing.

## 2020-05-21 ENCOUNTER — TELEPHONE (OUTPATIENT)
Dept: CARDIOLOGY | Facility: CLINIC | Age: 75
End: 2020-05-21

## 2020-05-21 ENCOUNTER — HOSPITAL ENCOUNTER (OUTPATIENT)
Dept: CARDIOLOGY | Facility: HOSPITAL | Age: 75
Discharge: HOME | End: 2020-05-21
Admitting: INTERNAL MEDICINE

## 2020-05-21 VITALS
SYSTOLIC BLOOD PRESSURE: 128 MMHG | WEIGHT: 120 LBS | DIASTOLIC BLOOD PRESSURE: 86 MMHG | HEART RATE: 70 BPM | BODY MASS INDEX: 22.08 KG/M2 | HEIGHT: 62 IN

## 2020-05-21 DIAGNOSIS — R00.0 TACHYCARDIA: ICD-10-CM

## 2020-05-21 DIAGNOSIS — R06.09 DYSPNEA ON EXERTION: ICD-10-CM

## 2020-05-21 LAB
AORTIC DIMENSIONLESS INDEX: 0.7 (DI)
ASCENDING AORTA: 2.7 CM
BH CV ECHO MEAS - ACS: 2 CM
BH CV ECHO MEAS - AO MAX PG (FULL): 3.2 MMHG
BH CV ECHO MEAS - AO MAX PG: 6.5 MMHG
BH CV ECHO MEAS - AO MEAN PG (FULL): 2 MMHG
BH CV ECHO MEAS - AO MEAN PG: 4 MMHG
BH CV ECHO MEAS - AO ROOT AREA (BSA CORRECTED): 1.7
BH CV ECHO MEAS - AO ROOT AREA: 5.1 CM^2
BH CV ECHO MEAS - AO ROOT DIAM: 2.6 CM
BH CV ECHO MEAS - AO V2 MAX: 127 CM/SEC
BH CV ECHO MEAS - AO V2 MEAN: 90.7 CM/SEC
BH CV ECHO MEAS - AO V2 VTI: 29.7 CM
BH CV ECHO MEAS - AVA(I,A): 2 CM^2
BH CV ECHO MEAS - AVA(I,D): 2 CM^2
BH CV ECHO MEAS - AVA(V,A): 2 CM^2
BH CV ECHO MEAS - AVA(V,D): 2 CM^2
BH CV ECHO MEAS - BSA(HAYCOCK): 1.5 M^2
BH CV ECHO MEAS - BSA: 1.5 M^2
BH CV ECHO MEAS - BZI_BMI: 21.9 KILOGRAMS/M^2
BH CV ECHO MEAS - BZI_METRIC_HEIGHT: 157.5 CM
BH CV ECHO MEAS - BZI_METRIC_WEIGHT: 54.4 KG
BH CV ECHO MEAS - EDV(MOD-SP4): 43 ML
BH CV ECHO MEAS - EDV(TEICH): 94.4 ML
BH CV ECHO MEAS - EF(CUBED): 60.5 %
BH CV ECHO MEAS - EF(MOD-BP): 65 %
BH CV ECHO MEAS - EF(MOD-SP4): 65.1 %
BH CV ECHO MEAS - EF(TEICH): 52.2 %
BH CV ECHO MEAS - ESV(MOD-SP4): 15 ML
BH CV ECHO MEAS - ESV(TEICH): 45.1 ML
BH CV ECHO MEAS - FS: 26.7 %
BH CV ECHO MEAS - IVS/LVPW: 0.94
BH CV ECHO MEAS - IVSD: 0.64 CM
BH CV ECHO MEAS - LAT PEAK E' VEL: 9 CM/SEC
BH CV ECHO MEAS - LV DIASTOLIC VOL/BSA (35-75): 27.9 ML/M^2
BH CV ECHO MEAS - LV MASS(C)D: 90.5 GRAMS
BH CV ECHO MEAS - LV MASS(C)DI: 58.8 GRAMS/M^2
BH CV ECHO MEAS - LV MAX PG: 3.2 MMHG
BH CV ECHO MEAS - LV MEAN PG: 2 MMHG
BH CV ECHO MEAS - LV SYSTOLIC VOL/BSA (12-30): 9.7 ML/M^2
BH CV ECHO MEAS - LV V1 MAX: 89.8 CM/SEC
BH CV ECHO MEAS - LV V1 MEAN: 60.9 CM/SEC
BH CV ECHO MEAS - LV V1 VTI: 21.4 CM
BH CV ECHO MEAS - LVIDD: 4.5 CM
BH CV ECHO MEAS - LVIDS: 3.3 CM
BH CV ECHO MEAS - LVLD AP4: 5.8 CM
BH CV ECHO MEAS - LVLS AP4: 4.8 CM
BH CV ECHO MEAS - LVOT AREA (M): 2.8 CM^2
BH CV ECHO MEAS - LVOT AREA: 2.8 CM^2
BH CV ECHO MEAS - LVOT DIAM: 1.9 CM
BH CV ECHO MEAS - LVPWD: 0.68 CM
BH CV ECHO MEAS - MED PEAK E' VEL: 8 CM/SEC
BH CV ECHO MEAS - MR MAX PG: 21 MMHG
BH CV ECHO MEAS - MR MAX VEL: 229 CM/SEC
BH CV ECHO MEAS - MV A DUR: 0.13 SEC
BH CV ECHO MEAS - MV A MAX VEL: 78.5 CM/SEC
BH CV ECHO MEAS - MV DEC SLOPE: 240 CM/SEC^2
BH CV ECHO MEAS - MV DEC TIME: 0.24 SEC
BH CV ECHO MEAS - MV E MAX VEL: 55.8 CM/SEC
BH CV ECHO MEAS - MV E/A: 0.71
BH CV ECHO MEAS - MV MAX PG: 3 MMHG
BH CV ECHO MEAS - MV MEAN PG: 1 MMHG
BH CV ECHO MEAS - MV P1/2T MAX VEL: 58.7 CM/SEC
BH CV ECHO MEAS - MV P1/2T: 71.6 MSEC
BH CV ECHO MEAS - MV V2 MAX: 86.3 CM/SEC
BH CV ECHO MEAS - MV V2 MEAN: 47 CM/SEC
BH CV ECHO MEAS - MV V2 VTI: 25.1 CM
BH CV ECHO MEAS - MVA P1/2T LCG: 3.7 CM^2
BH CV ECHO MEAS - MVA(P1/2T): 3.1 CM^2
BH CV ECHO MEAS - MVA(VTI): 2.4 CM^2
BH CV ECHO MEAS - PA MAX PG (FULL): 0.31 MMHG
BH CV ECHO MEAS - PA MAX PG: 2.3 MMHG
BH CV ECHO MEAS - PA V2 MAX: 76 CM/SEC
BH CV ECHO MEAS - PULM A REVS DUR: 0.1 SEC
BH CV ECHO MEAS - PULM A REVS VEL: 28.1 CM/SEC
BH CV ECHO MEAS - PULM DIAS VEL: 37.5 CM/SEC
BH CV ECHO MEAS - PULM S/D: 1.5
BH CV ECHO MEAS - PULM SYS VEL: 56.8 CM/SEC
BH CV ECHO MEAS - PVA(V,A): 3.2 CM^2
BH CV ECHO MEAS - PVA(V,D): 3.2 CM^2
BH CV ECHO MEAS - QP/QS: 0.8
BH CV ECHO MEAS - RAP SYSTOLE: 3 MMHG
BH CV ECHO MEAS - RV MAX PG: 2 MMHG
BH CV ECHO MEAS - RV MEAN PG: 1 MMHG
BH CV ECHO MEAS - RV V1 MAX: 70.7 CM/SEC
BH CV ECHO MEAS - RV V1 MEAN: 43.8 CM/SEC
BH CV ECHO MEAS - RV V1 VTI: 14 CM
BH CV ECHO MEAS - RVOT AREA: 3.5 CM^2
BH CV ECHO MEAS - RVOT DIAM: 2.1 CM
BH CV ECHO MEAS - RVSP: 22 MMHG
BH CV ECHO MEAS - SI(AO): 98.6 ML/M^2
BH CV ECHO MEAS - SI(CUBED): 36.8 ML/M^2
BH CV ECHO MEAS - SI(LVOT): 39.4 ML/M^2
BH CV ECHO MEAS - SI(MOD-SP4): 18.2 ML/M^2
BH CV ECHO MEAS - SI(TEICH): 32 ML/M^2
BH CV ECHO MEAS - SUP REN AO DIAM: 1.6 CM
BH CV ECHO MEAS - SV(AO): 151.7 ML
BH CV ECHO MEAS - SV(CUBED): 56.7 ML
BH CV ECHO MEAS - SV(LVOT): 60.7 ML
BH CV ECHO MEAS - SV(MOD-SP4): 28 ML
BH CV ECHO MEAS - SV(RVOT): 48.5 ML
BH CV ECHO MEAS - SV(TEICH): 49.3 ML
BH CV ECHO MEAS - TAPSE (>1.6): 1.6 CM2
BH CV ECHO MEAS - TR MAX VEL: 221 CM/SEC
BH CV ECHO MEASUREMENTS AVERAGE E/E' RATIO: 6.56
BH CV XLRA - RV BASE: 3.1 CM
BH CV XLRA - RV LENGTH: 5.7 CM
BH CV XLRA - RV MID: 2.5 CM
BH CV XLRA - TDI S': 8 CM/SEC
LEFT ATRIUM VOLUME INDEX: 25 ML/M2
MAXIMAL PREDICTED HEART RATE: 147 BPM
SINUS: 2.5 CM
STJ: 2.9 CM
STRESS TARGET HR: 125 BPM

## 2020-05-21 PROCEDURE — 93306 TTE W/DOPPLER COMPLETE: CPT

## 2020-05-21 PROCEDURE — 93306 TTE W/DOPPLER COMPLETE: CPT | Performed by: INTERNAL MEDICINE

## 2020-05-27 ENCOUNTER — TELEPHONE (OUTPATIENT)
Dept: ORTHOPEDIC SURGERY | Facility: CLINIC | Age: 75
End: 2020-05-27

## 2020-05-27 ENCOUNTER — OFFICE VISIT (OUTPATIENT)
Dept: ORTHOPEDIC SURGERY | Facility: CLINIC | Age: 75
End: 2020-05-27

## 2020-05-27 VITALS — HEIGHT: 62 IN | BODY MASS INDEX: 27.97 KG/M2 | TEMPERATURE: 98 F | WEIGHT: 152 LBS

## 2020-05-27 DIAGNOSIS — M25.512 LEFT SHOULDER PAIN, UNSPECIFIED CHRONICITY: Primary | ICD-10-CM

## 2020-05-27 DIAGNOSIS — M54.2 CHRONIC NECK PAIN: ICD-10-CM

## 2020-05-27 DIAGNOSIS — G89.29 CHRONIC NECK PAIN: ICD-10-CM

## 2020-05-27 PROCEDURE — 20610 DRAIN/INJ JOINT/BURSA W/O US: CPT | Performed by: ORTHOPAEDIC SURGERY

## 2020-05-27 PROCEDURE — 99213 OFFICE O/P EST LOW 20 MIN: CPT | Performed by: ORTHOPAEDIC SURGERY

## 2020-05-27 RX ORDER — METHYLPREDNISOLONE ACETATE 80 MG/ML
80 INJECTION, SUSPENSION INTRA-ARTICULAR; INTRALESIONAL; INTRAMUSCULAR; SOFT TISSUE
Status: COMPLETED | OUTPATIENT
Start: 2020-05-27 | End: 2020-05-27

## 2020-05-27 RX ADMIN — METHYLPREDNISOLONE ACETATE 80 MG: 80 INJECTION, SUSPENSION INTRA-ARTICULAR; INTRALESIONAL; INTRAMUSCULAR; SOFT TISSUE at 10:44

## 2020-05-27 NOTE — TELEPHONE ENCOUNTER
----- Message from Kourtney Lorenzana sent at 5/27/2020  1:10 PM EDT -----  Regarding: Prescription Question  Contact: [phone]  MY CHART MESSAGE:    Hi! I have an MRI scheduled for June 8th on my neck and since I am claustrophobic they said I could get a mild sedative from my primary care sweet Ms. Hunter. Could you call that over to the [facility] in [place]?  Thank you. Kourtney Lorenzana. 1945

## 2020-05-27 NOTE — PROGRESS NOTES
"Patient:Kourtney Lorenzana    YOB: 1945    Medical Record Number:[id number]    Chief Complaints: Follow-up regarding left shoulder arthritis, worsening neck pain    History of Present Illness:     74 y.o. female patient who presents for follow-up of her left shoulder.  She would like to get the injection repeated today.    She also follows up for her neck.  She reports worsening pain.  She would like a referral to a specialist for her neck.    Allergies:  Allergies   Allergen Reactions   • Hydrocodone-Acetaminophen GI Intolerance   • Other Nausea And Vomiting     Unknown \"strong\" pain killer   • Tramadol GI Intolerance       Home Medications:    Current Outpatient Medications:   •  acetaminophen (TYLENOL) 500 MG tablet, Take 500-1,000 mg by mouth Every 6 (Six) Hours As Needed for Mild Pain ., Disp: , Rfl:   •  Cholecalciferol (VITAMIN D3) 50 MCG (2000 UT) tablet, Take 2,000 Units by mouth Daily., Disp: , Rfl:   •  diphenhydrAMINE HCl, Sleep, (SLEEP AID) 50 MG capsule, Take 50 mg by mouth Every Evening., Disp: , Rfl:   •  magnesium oxide (MAGOX) 400 (241.3 Mg) MG tablet tablet, Take 400 mg by mouth Daily., Disp: , Rfl:   •  pantoprazole (PROTONIX) 40 MG EC tablet, Take 1 tablet by mouth Daily., Disp: , Rfl:   •  TURMERIC PO, Take  by mouth., Disp: , Rfl:     Past Medical History:   Diagnosis Date   • Arthritis     OSTEO. KNEES AND BACK SEES DR MIKE DOUGLAS   • Bloating     WITH SOME NAUSEA   • Bruising tendency (CMS/HCC)    • Cholelithiasis Nov 7, 2019    Discovered by liver ultrasound   • Chronic low back pain    • Diverticular disease    • Elevated liver enzymes    • Gastroesophageal reflux disease without esophagitis 2/11/2016   • GERD (gastroesophageal reflux disease)    • Heart palpitations    • History of anxiety     BEEN A LONG TIME   • History of depression     SITUATIONAL. NOW RESOLVED   • History of panic disorder    • IBS (irritable bowel syndrome)    • Insomnia    • Internal hemorrhoids    • " Lumbar canal stenosis    • Lumbosacral neuritis    • Multiple vitamin deficiency    • Nephrolithiasis     July 2014 passage of kidney stone.   • Postmenopausal HRT (hormone replacement therapy)    • Urine incontinence        Past Surgical History:   Procedure Laterality Date   • CATARACT EXTRACTION Bilateral 09/05/2017    Dr. Jefe Barakat OD, MD   • CHOLECYSTECTOMY LAPAROSCOPIC INTRAOPERATIVE CHOLANGIOGRAM WITH LIVER BIOPSY N/A 12/3/2019    Procedure: CHOLECYSTECTOMY LAPAROSCOPIC INTRAOPERATIVE CHOLANGIOGRAM AND LIVER BIOPSY;  Surgeon: Benny Schneider MD;  Location: [facility] OR;  Service: General   • COLONOSCOPY N/A 11/16/2012    Benign cecal polyp-Dr. Mak Rodriguez   • COLONOSCOPY N/A 2015    Dr. Seema Andrade   • ENDOSCOPY N/A 04/27/2018    [facility], no specimen collected-Dr. Seema Andrade   • HYSTERECTOMY Bilateral 1997   • PUBOVAGINAL SLING N/A 06/17/2003    Dr. Ziggy Saba   • TOE SURGERY Right     METATARASAL SURGERY GREAT TOE FOR FRACTUE       Social History     Occupational History   • Not on file   Tobacco Use   • Smoking status: Never Smoker   • Smokeless tobacco: Never Used   • Tobacco comment: caffeine use   Substance and Sexual Activity   • Alcohol use: No   • Drug use: No   • Sexual activity: Defer      Social History     Social History Narrative   • Not on file       Family History   Problem Relation Age of Onset   • Atrial fibrillation Father    • Atrial fibrillation Sister    • Atrial fibrillation Brother    • Other Brother         Coronary arteriosclerosis   • Colon cancer Mother    • Colon polyps Mother    • Liver disease Paternal Aunt    • Aneurysm Paternal Grandfather    • Malig Hyperthermia Neg Hx        Review of Systems:      Constitutional: Denies fever, shaking or chills   Eyes: Denies change in visual acuity   HEENT: Denies nasal congestion or sore throat   Respiratory: Denies cough or shortness of breath   Cardiovascular: Denies chest pain or edema  Endocrine: Denies tremors, palpitations,  "intolerance of heat or cold, polyuria, polydipsia.  GI: Denies abdominal pain, nausea, vomiting, bloody stools or diarrhea  : Denies frequency, urgency, incontinence, retention, or nocturia.  Musculoskeletal: Denies numbness, tingling or loss of motor function except as above  Integument: Denies rash, lesion or ulceration   Neurologic: Denies headache or focal weakness, deficits  Heme: Denies spontaneous or excessive bleeding, epistaxis, hematuria, melena, fatigue, enlarged or tender lymph nodes.      All other pertinent positives and negatives as noted above in HPI.    Physical Exam:74 y.o. female  Vitals:    05/27/20 1042   Temp: 98 °F (36.7 °C)   Weight: 68.9 kg (152 lb)   Height: 157.5 cm (62\")       General:  Patient is awake and alert.  Appears in no acute distress or discomfort.    Psych:  Affect and demeanor are appropriate.    Extremities: Left shoulder is examined.  Skin is benign.  Moderate tenderness anteriorly.  Motion is functional and fairly well-tolerated.  She has discomfort at extremes of internal rotation and forward elevation.  Good strength in her rotator cuff and deltoid.  Intact motor and sensory function in her lower arm and hand.  Brisk capillary refill.    Imaging: Her previous x-rays of the left shoulder are reviewed.  She has moderate glenohumeral osteoarthritis with a loose body in the axillary pouch.  Her previous x-rays of the cervical spine are also reviewed.  She has advanced degenerative disc disease at C4-5, C5-6 and C6-7.    Assessment/Plan: 1.  Left shoulder osteoarthritis 2.  Cervical degenerative disc disease    I agreed to repeat her left shoulder injection today.  The risk, benefits and alternatives were discussed.  She consented and the injection was performed as described below.    I am going to refer her for an MRI of the cervical spine.  I will also set her up to see Dr. Napier for a consultation regarding options for her neck.    Jaxon Garcia, " MD    05/27/2020    Large Joint Arthrocentesis: L glenohumeral  Date/Time: 5/27/2020 10:44 AM  Consent given by: patient  Site marked: site marked  Timeout: Immediately prior to procedure a time out was called to verify the correct patient, procedure, equipment, support staff and site/side marked as required   Supporting Documentation  Indications: pain and joint swelling   Procedure Details  Location: shoulder - L glenohumeral  Preparation: Patient was prepped and draped in the usual sterile fashion  Needle gauge: 21G.  Approach: anterolateral  Medications administered: 80 mg methylPREDNISolone acetate 80 MG/ML; 2 mL lidocaine (cardiac)  Patient tolerance: patient tolerated the procedure well with no immediate complications

## 2020-05-28 ENCOUNTER — TELEPHONE (OUTPATIENT)
Dept: CARDIOLOGY | Facility: CLINIC | Age: 75
End: 2020-05-28

## 2020-05-28 NOTE — TELEPHONE ENCOUNTER
Advised starting metoprolol tartrate 25 mg nightly for nonsustained supraventricular tachycardia which she feels at night when she lies down.  See Elizabeth Alanis in June.  Ointment as already scheduled.

## 2020-05-29 NOTE — TELEPHONE ENCOUNTER
I spoke to Ms. Lorenzana.  I told her I am happy to prescribe a one time dose of valium to take prior to her MRI, but she will need someone to drive her to and from the test.  She said she will try to make arrangements for a  and call back for the prescription.

## 2020-06-04 RX ORDER — DIAZEPAM 5 MG/1
5 TABLET ORAL 2 TIMES DAILY PRN
Qty: 1 TABLET | Refills: 0 | Status: SHIPPED | OUTPATIENT
Start: 2020-06-04 | End: 2020-06-19

## 2020-06-04 NOTE — TELEPHONE ENCOUNTER
----- Message from Kourtney Lorenzana sent at 6/4/2020  1:14 PM EDT -----  Regarding: Prescription Question  Contact: [phone]  To Ami  I would like to go ahead and get the valium for my MRI on monday. My granddaughter will be available to take me, so I'll have a ride.   Just to confirm, I get my prescriptions at [facility] in [place].   Thank you. Kourtney Lorenzana [birth year]

## 2020-06-04 NOTE — TELEPHONE ENCOUNTER
----- Message from Kourtney Lorenzana sent at 6/4/2020  1:14 PM EDT -----  Regarding: Prescription Question  Contact: [phone]  To Ami  I would like to go ahead and get the valium for my MRI on monday. My granddaughter will be available to take me, so I'll have a ride.   Just to confirm, I get my prescriptions at [facility] in [place].   Thank you. Kourtney Lorenzana [birth year]

## 2020-06-08 ENCOUNTER — HOSPITAL ENCOUNTER (OUTPATIENT)
Dept: MRI IMAGING | Facility: HOSPITAL | Age: 75
Discharge: HOME | End: 2020-06-08
Admitting: ORTHOPAEDIC SURGERY

## 2020-06-08 DIAGNOSIS — G89.29 CHRONIC NECK PAIN: ICD-10-CM

## 2020-06-08 DIAGNOSIS — M54.2 CHRONIC NECK PAIN: ICD-10-CM

## 2020-06-08 PROCEDURE — 72141 MRI NECK SPINE W/O DYE: CPT

## 2020-06-09 ENCOUNTER — CLINICAL SUPPORT (OUTPATIENT)
Dept: ORTHOPEDIC SURGERY | Facility: CLINIC | Age: 75
End: 2020-06-09

## 2020-06-09 VITALS — TEMPERATURE: 96.8 F | WEIGHT: 150 LBS | HEIGHT: 62 IN | BODY MASS INDEX: 27.6 KG/M2

## 2020-06-09 DIAGNOSIS — M17.0 PRIMARY OSTEOARTHRITIS OF BOTH KNEES: Primary | ICD-10-CM

## 2020-06-09 PROCEDURE — 99213 OFFICE O/P EST LOW 20 MIN: CPT | Performed by: NURSE PRACTITIONER

## 2020-06-09 PROCEDURE — 20610 DRAIN/INJ JOINT/BURSA W/O US: CPT | Performed by: NURSE PRACTITIONER

## 2020-06-09 RX ORDER — METHYLPREDNISOLONE ACETATE 80 MG/ML
80 INJECTION, SUSPENSION INTRA-ARTICULAR; INTRALESIONAL; INTRAMUSCULAR; SOFT TISSUE
Status: COMPLETED | OUTPATIENT
Start: 2020-06-09 | End: 2020-06-09

## 2020-06-09 RX ADMIN — METHYLPREDNISOLONE ACETATE 80 MG: 80 INJECTION, SUSPENSION INTRA-ARTICULAR; INTRALESIONAL; INTRAMUSCULAR; SOFT TISSUE at 08:14

## 2020-06-09 NOTE — PROGRESS NOTES
"Patient: Kourtney Lorenzana  YOB: 1945 74 y.o. female  Medical Record Number: [id number]    Chief Complaints:   Chief Complaint   Patient presents with   • Left Knee - Follow-up, Pain   • Right Knee - Follow-up, Pain       History of Present Illness:Kourtney Lorenzana is a 74 y.o. female who presents with complaints of bilateral knee pain.  She reports she has had increased pain over the last several weeks.  Denies any recent injury.  Describes the knee pain as a moderate sometimes severe constant ache worse with walking better with ice    Allergies:   Allergies   Allergen Reactions   • Hydrocodone-Acetaminophen GI Intolerance   • Other Nausea And Vomiting     Unknown \"strong\" pain killer   • Tramadol GI Intolerance       Medications:   Current Outpatient Medications   Medication Sig Dispense Refill   • acetaminophen (TYLENOL) 500 MG tablet Take 500-1,000 mg by mouth Every 6 (Six) Hours As Needed for Mild Pain .     • Cholecalciferol (VITAMIN D3) 50 MCG (2000 UT) tablet Take 2,000 Units by mouth Daily.     • diphenhydrAMINE HCl, Sleep, (SLEEP AID) 50 MG capsule Take 50 mg by mouth Every Evening.     • metoprolol tartrate (LOPRESSOR) 25 MG tablet Take 1 tablet by mouth Every Night. 90 tablet 3   • pantoprazole (PROTONIX) 40 MG EC tablet Take 1 tablet by mouth Daily.     • TURMERIC PO Take  by mouth.     • diazePAM (Valium) 5 MG tablet Take 1 tablet by mouth 2 (Two) Times a Day As Needed for Anxiety. 1 tablet 0   • magnesium oxide (MAGOX) 400 (241.3 Mg) MG tablet tablet Take 400 mg by mouth Daily.       No current facility-administered medications for this visit.          The following portions of the patient's history were reviewed and updated as appropriate: allergies, current medications, past family history, past medical history, past social history, past surgical history and problem list.    Review of Systems:   A 14 point review of systems was performed. All systems negative except pertinent " "positives/negative listed in HPI above    Physical Exam:   Vitals:    06/09/20 1440   Temp: 96.8 °F (36 °C)   TempSrc: Temporal   Weight: 68 kg (150 lb)   Height: 157.5 cm (62\")   PainSc: Comment: 9/10 RIGHT KNEE 7/10 LEFT KNEE       General: A and O x 3, ASA, NAD    SCLERA:    Normal    DENTITION:   Normal  Skin clear no unusual lesions noted  Bilateral knees patient has no appreciable effusion limited range of motion secondary to pain calf soft and nontender    Radiology:  Xrays 3views (ap,lateral, sunrise) previous x-rays were reviewed show significant arthritic changes    Assessment/Plan:  Osteoarthritis bilateral knees    Patient discussed treatment options.  She would like to proceed with bilateral knee cortisone injections.  Prior to injections risks were discussed including pain infection.  Patient verbalized understanding would like to proceed with injections she will continue with regular exercise program and I will see her back as needed    Large Joint Arthrocentesis: R knee  Date/Time: 6/9/2020 8:14 AM  Consent given by: patient  Site marked: site marked  Timeout: Immediately prior to procedure a time out was called to verify the correct patient, procedure, equipment, support staff and site/side marked as required   Supporting Documentation  Indications: pain   Procedure Details  Location: knee - R knee  Preparation: Patient was prepped and draped in the usual sterile fashion  Needle gauge: 21g.  Approach: lateral  Medications administered: 2 mL lidocaine (cardiac); 80 mg methylPREDNISolone acetate 80 MG/ML  Patient tolerance: patient tolerated the procedure well with no immediate complications    Large Joint Arthrocentesis: L knee  Date/Time: 6/9/2020 8:14 AM  Consent given by: patient  Site marked: site marked  Timeout: Immediately prior to procedure a time out was called to verify the correct patient, procedure, equipment, support staff and site/side marked as required   Supporting " Documentation  Indications: pain   Procedure Details  Location: knee - L knee  Preparation: Patient was prepped and draped in the usual sterile fashion  Needle gauge: 21g.  Approach: lateral  Medications administered: 2 mL lidocaine (cardiac); 80 mg methylPREDNISolone acetate 80 MG/ML  Patient tolerance: patient tolerated the procedure well with no immediate complications

## 2020-06-19 ENCOUNTER — OFFICE VISIT (OUTPATIENT)
Dept: CARDIOLOGY | Facility: CLINIC | Age: 75
End: 2020-06-19

## 2020-06-19 VITALS
SYSTOLIC BLOOD PRESSURE: 126 MMHG | HEIGHT: 62 IN | BODY MASS INDEX: 27.82 KG/M2 | HEART RATE: 69 BPM | WEIGHT: 151.2 LBS | DIASTOLIC BLOOD PRESSURE: 70 MMHG

## 2020-06-19 DIAGNOSIS — R06.09 DYSPNEA ON EXERTION: ICD-10-CM

## 2020-06-19 DIAGNOSIS — R00.2 PALPITATIONS: ICD-10-CM

## 2020-06-19 DIAGNOSIS — I47.1 PSVT (PAROXYSMAL SUPRAVENTRICULAR TACHYCARDIA) (HCC): Primary | ICD-10-CM

## 2020-06-19 DIAGNOSIS — H93.8X1 POUNDING NOISE IN RIGHT EAR: ICD-10-CM

## 2020-06-19 PROCEDURE — 93000 ELECTROCARDIOGRAM COMPLETE: CPT | Performed by: NURSE PRACTITIONER

## 2020-06-19 PROCEDURE — 99214 OFFICE O/P EST MOD 30 MIN: CPT | Performed by: NURSE PRACTITIONER

## 2020-06-19 NOTE — PROGRESS NOTES
Date of Office Visit: 2020  Encounter Provider: SLY Barker  Place of Service: [facility] CARDIOLOGY  Patient Name: Kourtney Lorenzana  :1945  Primary Cardiologist: Dr. Anum Hicks     Chief Complaint   Patient presents with   • Rapid Heart Rate   • Follow-up   :     Dear Romina LÓPEZ,     HPI: Kourtney Lorenzana is a pleasant 74 y.o. female who presents today for cardiac follow up and reassessment of palpitations. She is a new patient to me and her previous records have been reviewed.  In , she had a normal stress echocardiogram.  In 2016, she had an echocardiogram which revealed an EF of 65% and grade 1 diastolic dysfunction.    On 2020, she saw Dr. Hicks for palpitations.  There was concerned about atrial fibrillation.  A TSH was normal.  Echocardiogram completed 2020 showed EF of 65%, grade 1 diastolic dysfunction, trace aortic valve regurgitation, trace tricuspid regurgitation, and trace pulmonic valve regurgitation.  14-day Holter monitor worn 2020 showed 40 episodes of nonsustained SVT the longest 5 beats in duration at a rate of 195 bpm.    On 2020, Dr. Hicks started her on metoprolol tartrate 25 mg nightly for nonsustained supraventricular tachycardia.    She presents today for follow-up visit.  She said she started taking the full tablet of metoprolol and initially felt shaky and had headaches.  She then cut the tablet in half and felt better.  She started having more palpitations so she decided to start taking the full tablet once again and has had no further issues.  She occasionally feels her heart racing around 8 or 9 PM and when she does so she has shortness of breath.  She hears a thump in her right ear.  She denies chest pain, edema, dizziness, syncope, or bleeding.  Heart rate are both normal today.  I asked her if she uses any stimulants and she denied.  She does use NyQuil at nighttime.    Past Medical  History:   Diagnosis Date   • Arthritis     OSTEO. KNEES AND BACK SEES DR MIKE DOUGLAS   • Bloating     WITH SOME NAUSEA   • Bruising tendency (CMS/HCC)    • Cholelithiasis Nov 7, 2019    Discovered by liver ultrasound   • Chronic low back pain    • Diverticular disease    • Elevated liver enzymes    • Gastroesophageal reflux disease without esophagitis 2/11/2016   • GERD (gastroesophageal reflux disease)    • Heart palpitations    • History of anxiety     BEEN A LONG TIME   • History of depression     SITUATIONAL. NOW RESOLVED   • History of panic disorder    • IBS (irritable bowel syndrome)    • Insomnia    • Internal hemorrhoids    • Lumbar canal stenosis    • Lumbosacral neuritis    • Multiple vitamin deficiency    • Nephrolithiasis     July 2014 passage of kidney stone.   • Postmenopausal HRT (hormone replacement therapy)    • Urine incontinence        Past Surgical History:   Procedure Laterality Date   • CATARACT EXTRACTION Bilateral 09/05/2017    Dr. Jefe Barakat OD, MD   • CHOLECYSTECTOMY LAPAROSCOPIC INTRAOPERATIVE CHOLANGIOGRAM WITH LIVER BIOPSY N/A 12/3/2019    Procedure: CHOLECYSTECTOMY LAPAROSCOPIC INTRAOPERATIVE CHOLANGIOGRAM AND LIVER BIOPSY;  Surgeon: Benny Schneider MD;  Location: [facility];  Service: General   • COLONOSCOPY N/A 11/16/2012    Benign cecal polyp-Dr. Mak Rodriguez   • COLONOSCOPY N/A 2015    Dr. Seema Andrade   • ENDOSCOPY N/A 04/27/2018    , no specimen collected-Dr. Seema Andrade   • HYSTERECTOMY Bilateral 1997   • PUBOVAGINAL SLING N/A 06/17/2003    Dr. Zigyg Saba   • TOE SURGERY Right     METATARASAL SURGERY GREAT TOE FOR FRACTUE       Social History     Socioeconomic History   • Marital status:      Spouse name: Not on file   • Number of children: Not on file   • Years of education: Not on file   • Highest education level: Not on file   Tobacco Use   • Smoking status: Never Smoker   • Smokeless tobacco: Never Used   Substance and Sexual Activity   • Alcohol use: No      Comment: caffeine use: 1.5 cups daily   • Drug use: No   • Sexual activity: Defer       Family History   Problem Relation Age of Onset   • Atrial fibrillation Father    • Atrial fibrillation Sister    • Atrial fibrillation Brother    • Other Brother         Coronary arteriosclerosis   • Colon cancer Mother    • Colon polyps Mother    • Liver disease Paternal Aunt    • Aneurysm Paternal Grandfather    • Malig Hyperthermia Neg Hx        The following portion of the patient's history were reviewed and updated as appropriate: past medical history, past surgical history, past social history, past family history, allergies, current medications, and problem list.    Review of Systems   Constitution: Negative for chills, diaphoresis, fever, malaise/fatigue, night sweats, weight gain and weight loss.   HENT: Negative for hearing loss, nosebleeds, sore throat and tinnitus.    Eyes: Negative for blurred vision, double vision, pain and visual disturbance.   Cardiovascular: Positive for dyspnea on exertion and palpitations. Negative for chest pain, claudication, cyanosis, irregular heartbeat, leg swelling, near-syncope, orthopnea, paroxysmal nocturnal dyspnea and syncope.   Respiratory: Negative for cough, hemoptysis, shortness of breath, snoring and wheezing.    Endocrine: Negative for cold intolerance, heat intolerance and polyuria.   Hematologic/Lymphatic: Negative for bleeding problem. Does not bruise/bleed easily.   Skin: Negative for color change, dry skin, flushing and itching.   Musculoskeletal: Negative for falls, joint pain, joint swelling, muscle cramps, muscle weakness and myalgias.   Gastrointestinal: Negative for abdominal pain, constipation, heartburn, melena, nausea and vomiting.   Genitourinary: Negative for dysuria and hematuria.   Neurological: Negative for excessive daytime sleepiness, dizziness, light-headedness, loss of balance, numbness, paresthesias, seizures and vertigo.   Psychiatric/Behavioral:  "Negative for altered mental status, depression, memory loss and substance abuse. The patient does not have insomnia and is not nervous/anxious.    Allergic/Immunologic: Negative for environmental allergies.       Allergies   Allergen Reactions   • Hydrocodone-Acetaminophen GI Intolerance   • Other Nausea And Vomiting     Unknown \"strong\" pain killer   • Tramadol GI Intolerance         Current Outpatient Medications:   •  acetaminophen (TYLENOL) 500 MG tablet, Take 500-1,000 mg by mouth Every 6 (Six) Hours As Needed for Mild Pain ., Disp: , Rfl:   •  Cholecalciferol (VITAMIN D3) 50 MCG (2000 UT) tablet, Take 2,000 Units by mouth Daily., Disp: , Rfl:   •  metoprolol tartrate (LOPRESSOR) 25 MG tablet, Take 1 tablet by mouth Every Night., Disp: 90 tablet, Rfl: 3  •  pantoprazole (PROTONIX) 40 MG EC tablet, Take 1 tablet by mouth Daily., Disp: , Rfl:   •  TURMERIC PO, Take  by mouth., Disp: , Rfl:         Objective:     Vitals:    06/19/20 1311 06/19/20 1312   BP: 130/70 126/70   BP Location: Left arm Right arm   Pulse: 69    Weight: 68.6 kg (151 lb 3.2 oz)    Height: 157.5 cm (62\")      Body mass index is 27.65 kg/m².    PHYSICAL EXAM:    Vitals Reviewed.   General Appearance: No acute distress, well developed and well nourished..   Eyes: Conjunctiva and lids: No erythema, swelling, or discharge. Sclera non-icteric.   HENT: Atraumatic, normocephalic. External eyes, ears, and nose normal. No hearing loss noted. Mucous membranes normal. Lips not cyanotic. Neck supple with no tenderness.  Respiratory: No signs of respiratory distress. Respiration rhythm and depth normal.   Clear to auscultation. No rales, crackles, rhonchi, or wheezing auscultated.   Cardiovascular:  Jugular Venous Pressure: Normal  Heart Rate and Rhythm: Normal, Heart Sounds: Normal S1 and S2. No S3 or S4 noted.  Murmurs: No murmurs noted. No rubs, thrills, or gallops.   Arterial Pulses: Carotid pulses normal. No carotid bruit noted. Lower Extremities: No " edema noted.  Gastrointestinal:  Abdomen soft, non-distended, non-tender. Normal bowel sounds. No hepatomegaly.   Musculoskeletal: Normal movement of extremities  Skin and Nails: General appearance normal. No pallor, cyanosis, diaphoresis. Skin temperature normal. No clubbing of fingernails.   Psychiatric: Patient alert and oriented to person, place, and time. Speech and behavior appropriate. Normal mood and affect.       ECG 12 Lead  Date/Time: 6/19/2020 1:15 PM  Performed by: Elizabeth Grullon APRN  Authorized by: Elizabeth Grullon APRN   Comparison: compared with previous ECG from 5/7/2020  Similar to previous ECG  Rhythm: sinus rhythm  Rate: normal  BPM: 69  ST Segments: ST segments normal  T Waves: T waves normal  QRS axis: left  Other findings: poor R wave progression    Clinical impression: non-specific ECG              Assessment:       Diagnosis Plan   1. PSVT (paroxysmal supraventricular tachycardia) (CMS/HCC)  ECG 12 Lead   2. Palpitations  ECG 12 Lead   3. Pounding noise in right ear  ECG 12 Lead   4. Dyspnea on exertion            Plan:       1/2.  Paroxysmal SupraVentricular Tachycardia Palpitations: She has been experiencing palpitations.  Recent Holter monitor showed 40 episodes of nonsustained PSVT.  We did not see any atrial fibrillation.  Echocardiogram and TSH were normal.  She will continue with her metoprolol tartrate and I recommended that she take 1 first thing in the morning and another dose at 7 PM since she typically has the palpitations around 8 PM.  We will reassess in 1 month.  I have also recommended that she stop taking the NyQuil and replace with magnesium 400 mg 1 tablet to help her palpitations and sleep habits.    3.  Right Ear Pounding Noise: She is aware of her heartbeat in her right ear.  I recommended good hydration.    4.  Dyspnea with exertion: Occurs when her heart is racing.  She does not notice the shortness of breath with her daily activities.    5.  I have recommended a  telehealth visit in 1 month for reassessment of her palpitations with taking her beta-blocker at 7 PM.      As always, it has been a pleasure to participate in your patient's care. Thank you.       Sincerely,         SLY Holloway        Dictated utilizing Dragon dictation

## 2020-06-23 RX ORDER — CALCIUM CARBONATE 300MG(750)
TABLET,CHEWABLE ORAL
COMMUNITY
Start: 2020-06-19 | End: 2020-09-29

## 2020-06-24 ENCOUNTER — CONSULT (OUTPATIENT)
Dept: ORTHOPEDIC SURGERY | Facility: CLINIC | Age: 75
End: 2020-06-24

## 2020-06-24 VITALS — WEIGHT: 150 LBS | HEIGHT: 62 IN | BODY MASS INDEX: 27.6 KG/M2 | TEMPERATURE: 98.3 F

## 2020-06-24 DIAGNOSIS — M47.22 CERVICAL SPONDYLOSIS WITH RADICULOPATHY: Primary | ICD-10-CM

## 2020-06-24 PROCEDURE — 99214 OFFICE O/P EST MOD 30 MIN: CPT | Performed by: ORTHOPAEDIC SURGERY

## 2020-06-24 NOTE — PROGRESS NOTES
New patient or new problem visit    Chief Complaint   Patient presents with   • Cervical Spine - Pain, Establish Care       HPI: She complains of neck pain which radiates to the left shoulder and arm also gets some facial numbness and her ears feel plugged at times.  Physical therapy did not help.  The pain is sometimes severe no balance difficulty so    PFSH: See chart- reviewed    Review of Systems   Constitutional: Negative for chills, fever and unexpected weight change.   HENT: Negative for trouble swallowing and voice change.    Eyes: Negative for visual disturbance.   Respiratory: Negative for cough and shortness of breath.    Cardiovascular: Negative for chest pain and leg swelling.   Gastrointestinal: Negative for abdominal pain, nausea and vomiting.   Endocrine: Negative for cold intolerance and heat intolerance.   Genitourinary: Negative for difficulty urinating, frequency and urgency.   Musculoskeletal: Positive for arthralgias and neck pain.   Skin: Negative for rash and wound.   Allergic/Immunologic: Negative for immunocompromised state.   Neurological: Negative for weakness and numbness.   Hematological: Does not bruise/bleed easily.   Psychiatric/Behavioral: Negative for dysphoric mood. The patient is not nervous/anxious.        PE: Constitutional: Vital signs above-noted.  Awake, alert and oriented    Psychiatric: Affect and insight do not appear grossly disturbed.    Pulmonary: Breathing is unlabored, color is good.    Skin: Warm, dry and normal turgor    Cardiac: Ileal pulses intact.  No edema.    Eyesight and hearing appear adequate for examination purposes      Musculoskeletal:  Posture is unremarkable to coronal and sagittal inspection.  Motion appears undisturbed.  The skin about the area is intact.  There is no palpable or visible deformity.  There is no local spasm. There is some mild tenderness to percussion and palpation of the spine.     Neurologic:  In the bilateral upper extremities  there is no evidence of atrophy.  Motor function is undisturbed in shoulder abduction, elbow flexion, wrist extension, finger extension, triceps extension, or finger abduction.  Sensation appears symmetrically intact to light touch.  Reflexes are 2+ and symmetrical in the biceps, brachioradialis, and triceps. Tomlinson test is negative.  Gait appears undisturbed.  Spurling test is negative.      MEDICAL DECISION MAKING    XRAY: Plain film x-rays of the cervical spine show multilevel spondylosis in the mid cervical region.  Slight anterolisthesis at L3-4.  MRI scan shows some C5-6 moderate spinal stenosis without cord signal change.  I reviewed the radiologist report with which I agree.    Other: n/a    Impression: Cervical spondylosis with radiculopathy.  There may be some cranial nerve symptoms involved.  If she fails to improve with cervical epidural injections we may need to do further more proximal work-up.    Plan: Cervical epidural steroid injections    Answers for HPI/ROS submitted by the patient on 6/22/2020   What is the primary reason for your visit?: Other  Please describe your symptoms.: Pain in my neck  Have you had these symptoms before?: No  How long have you been having these symptoms?: Greater than 2 weeks  Please list any medications you are currently taking for this condition.: Tylenol  Please describe any probable cause for these symptoms. : Arthritis, old age

## 2020-07-14 ENCOUNTER — APPOINTMENT (OUTPATIENT)
Dept: PAIN MEDICINE | Facility: HOSPITAL | Age: 75
End: 2020-07-14

## 2020-07-21 ENCOUNTER — OFFICE VISIT (OUTPATIENT)
Dept: CARDIOLOGY | Facility: CLINIC | Age: 75
End: 2020-07-21

## 2020-07-21 VITALS
SYSTOLIC BLOOD PRESSURE: 128 MMHG | DIASTOLIC BLOOD PRESSURE: 70 MMHG | HEIGHT: 62 IN | BODY MASS INDEX: 28.45 KG/M2 | WEIGHT: 154.6 LBS | HEART RATE: 67 BPM

## 2020-07-21 DIAGNOSIS — I47.1 PSVT (PAROXYSMAL SUPRAVENTRICULAR TACHYCARDIA) (HCC): Primary | ICD-10-CM

## 2020-07-21 DIAGNOSIS — L29.9 ITCHING: ICD-10-CM

## 2020-07-21 DIAGNOSIS — R00.2 PALPITATIONS: ICD-10-CM

## 2020-07-21 PROBLEM — I47.10 PSVT (PAROXYSMAL SUPRAVENTRICULAR TACHYCARDIA): Status: ACTIVE | Noted: 2020-07-21

## 2020-07-21 PROCEDURE — 99213 OFFICE O/P EST LOW 20 MIN: CPT | Performed by: NURSE PRACTITIONER

## 2020-07-21 PROCEDURE — 93000 ELECTROCARDIOGRAM COMPLETE: CPT | Performed by: NURSE PRACTITIONER

## 2020-07-21 NOTE — PROGRESS NOTES
Date of Office Visit: 2020  Encounter Provider: SLY Barker  Place of Service: [facility] CARDIOLOGY  Patient Name: Kourtney Lorenzana  :1945  Primary Cardiologist: Dr. Anum Hicks     Chief Complaint   Patient presents with   • Follow-up   :     Dear Romina LÓPEZ,     HPI: Kourtney Lorenzana is a pleasant 74 y.o. female who presents today for cardiac follow up and reassessment of palpitations.     In , she had a normal stress echocardiogram.  In 2016, she had an echocardiogram which revealed an EF of 65% and grade 1 diastolic dysfunction.    On 2020, she saw Dr. Hicks for palpitations.  There was concerned about atrial fibrillation.  A TSH was normal.  Echocardiogram completed 2020 showed EF of 65%, grade 1 diastolic dysfunction, trace aortic valve regurgitation, trace tricuspid regurgitation, and trace pulmonic valve regurgitation.  14-day Holter monitor worn 2020 showed 40 episodes of nonsustained SVT the longest 5 beats in duration at a rate of 195 bpm. On 2020, Dr. Hicks started her on metoprolol tartrate 25 mg nightly for nonsustained supraventricular tachycardia.    In  she followed up with me in the office.  She was unable to tolerate the full dose of metoprolol tartrate and was taking 1/2 tablet daily.  She felt her heart racing around 8 or 9 PM so I suggested that she take the metoprolol at 7 PM.  I also recommended a magnesium supplement.    She presents today for follow-up visit.  Since her last visit she has had 4-5 episodes of palpitations.  On her last visit I taught her to bear down when she had the palpitations and this has helped tremendously.  She is taking the metoprolol 25 mg half tablet at 7 PM which is really helped to decrease the frequency of palpitations.  She has noticed that she is itching all over and was wondering if it was from the magnesium or metoprolol.  She stopped the magnesium for  a few days and the itching continued.  Occasionally she feels the need to take a deep breath, but denies shortness of breath.  She further denies chest pain, dizziness, or syncope.    Past Medical History:   Diagnosis Date   • Arthritis     OSTEO. KNEES AND BACK SEES DR MIKE DOUGLAS   • Bloating     WITH SOME NAUSEA   • Bruising tendency (CMS/HCC)    • Cholelithiasis Nov 7, 2019    Discovered by liver ultrasound   • Chronic low back pain    • Diverticular disease    • Elevated liver enzymes    • Gastroesophageal reflux disease without esophagitis 2/11/2016   • GERD (gastroesophageal reflux disease)    • Heart palpitations    • History of anxiety     BEEN A LONG TIME   • History of depression     SITUATIONAL. NOW RESOLVED   • History of panic disorder    • IBS (irritable bowel syndrome)    • Insomnia    • Internal hemorrhoids    • Lumbar canal stenosis    • Lumbosacral neuritis    • Multiple vitamin deficiency    • Nephrolithiasis     July 2014 passage of kidney stone.   • Postmenopausal HRT (hormone replacement therapy)    • Urine incontinence        Past Surgical History:   Procedure Laterality Date   • CATARACT EXTRACTION Bilateral 09/05/2017    Dr. Jefe Barakat OD, MD   • CHOLECYSTECTOMY LAPAROSCOPIC INTRAOPERATIVE CHOLANGIOGRAM WITH LIVER BIOPSY N/A 12/3/2019    Procedure: CHOLECYSTECTOMY LAPAROSCOPIC INTRAOPERATIVE CHOLANGIOGRAM AND LIVER BIOPSY;  Surgeon: Benny Schneider MD;  Location: [facility];  Service: General   • COLONOSCOPY N/A 11/16/2012    Benign cecal polyp-Dr. Mak Rodriguez   • COLONOSCOPY N/A 2015    Dr. Seema Andrade   • ENDOSCOPY N/A 04/27/2018    , no specimen collected-Dr. Seema Andrade   • HYSTERECTOMY Bilateral 1997   • PUBOVAGINAL SLING N/A 06/17/2003    Dr. Ziggy Saba   • TOE SURGERY Right     METATARASAL SURGERY GREAT TOE FOR FRACTUE       Social History     Socioeconomic History   • Marital status:      Spouse name: Not on file   • Number of children: Not on file   • Years of  education: Not on file   • Highest education level: Not on file   Tobacco Use   • Smoking status: Never Smoker   • Smokeless tobacco: Never Used   Substance and Sexual Activity   • Alcohol use: No     Comment: caffeine use: 1.5 cups daily   • Drug use: No   • Sexual activity: Defer       Family History   Problem Relation Age of Onset   • Atrial fibrillation Father    • Atrial fibrillation Sister    • Atrial fibrillation Brother    • Other Brother         Coronary arteriosclerosis   • Colon cancer Mother    • Colon polyps Mother    • Liver disease Paternal Aunt    • Aneurysm Paternal Grandfather    • Malig Hyperthermia Neg Hx        The following portion of the patient's history were reviewed and updated as appropriate: past medical history, past surgical history, past social history, past family history, allergies, current medications, and problem list.    Review of Systems   Constitution: Negative for chills, diaphoresis, fever, malaise/fatigue, night sweats, weight gain and weight loss.   HENT: Negative for hearing loss, nosebleeds, sore throat and tinnitus.    Eyes: Negative for blurred vision, double vision, pain and visual disturbance.   Cardiovascular: Positive for palpitations. Negative for chest pain, claudication, cyanosis, dyspnea on exertion, irregular heartbeat, leg swelling, near-syncope, orthopnea, paroxysmal nocturnal dyspnea and syncope.   Respiratory: Negative for cough, hemoptysis, shortness of breath, snoring and wheezing.    Endocrine: Negative for cold intolerance, heat intolerance and polyuria.   Hematologic/Lymphatic: Negative for bleeding problem. Does not bruise/bleed easily.   Skin: Negative for color change, dry skin, flushing and itching.   Musculoskeletal: Negative for falls, joint pain, joint swelling, muscle cramps, muscle weakness and myalgias.   Gastrointestinal: Negative for abdominal pain, constipation, heartburn, melena, nausea and vomiting.   Genitourinary: Negative for dysuria  "and hematuria.   Neurological: Negative for excessive daytime sleepiness, dizziness, light-headedness, loss of balance, numbness, paresthesias, seizures and vertigo.   Psychiatric/Behavioral: Negative for altered mental status, depression, memory loss and substance abuse. The patient does not have insomnia and is not nervous/anxious.    Allergic/Immunologic: Negative for environmental allergies.       Allergies   Allergen Reactions   • Hydrocodone-Acetaminophen GI Intolerance   • Other Nausea And Vomiting     Unknown \"strong\" pain killer   • Tramadol GI Intolerance         Current Outpatient Medications:   •  acetaminophen (TYLENOL) 500 MG tablet, Take 500-1,000 mg by mouth Every 6 (Six) Hours As Needed for Mild Pain ., Disp: , Rfl:   •  Cholecalciferol (VITAMIN D3) 50 MCG (2000 UT) tablet, Take 2,000 Units by mouth Daily., Disp: , Rfl:   •  metoprolol tartrate (LOPRESSOR) 25 MG tablet, Take 1 tablet by mouth Every Night., Disp: 90 tablet, Rfl: 3  •  pantoprazole (PROTONIX) 40 MG EC tablet, Take 1 tablet by mouth Daily., Disp: , Rfl:   •  TURMERIC PO, Take  by mouth., Disp: , Rfl:         Objective:     Vitals:    07/21/20 1031 07/21/20 1032   BP: 130/70 128/70   BP Location: Left arm Right arm   Pulse: 67    Weight: 70.1 kg (154 lb 9.6 oz)    Height: 157.5 cm (62\")      Body mass index is 28.28 kg/m².    PHYSICAL EXAM:    Vitals Reviewed.   General Appearance: No acute distress, well developed and well nourished..   Eyes: Conjunctiva and lids: No erythema, swelling, or discharge. Sclera non-icteric.   HENT: Atraumatic, normocephalic. External eyes, ears, and nose normal. No hearing loss noted. Mucous membranes normal. Lips not cyanotic. Neck supple with no tenderness.  Respiratory: No signs of respiratory distress. Respiration rhythm and depth normal.   Clear to auscultation. No rales, crackles, rhonchi, or wheezing auscultated.   Cardiovascular:  Jugular Venous Pressure: Normal  Heart Rate and Rhythm: Normal, " Heart Sounds: Normal S1 and S2. No S3 or S4 noted.  Murmurs: No murmurs noted. No rubs, thrills, or gallops.   Lower Extremities: Trace lower extremity edema noted.  Gastrointestinal:  Abdomen soft, non-distended, non-tender. Normal bowel sounds. No hepatomegaly.   Musculoskeletal: Normal movement of extremities  Skin and Nails: General appearance normal. No pallor, cyanosis, diaphoresis. Skin temperature normal. No clubbing of fingernails.   Psychiatric: Patient alert and oriented to person, place, and time. Speech and behavior appropriate. Normal mood and affect.       ECG 12 Lead  Date/Time: 7/21/2020 10:38 AM  Performed by: Elizabeth Grullon APRN  Authorized by: Elizabeth Grullon APRN   Comparison: compared with previous ECG from 6/19/2020  Similar to previous ECG  Rhythm: sinus rhythm  Rate: normal  BPM: 67  Conduction: conduction normal  ST Segments: ST segments normal  T Waves: T waves normal  QRS axis: normal  Other findings: poor R wave progression    Clinical impression: non-specific ECG              Assessment:       Diagnosis Plan   1. PSVT (paroxysmal supraventricular tachycardia) (CMS/HCC)     2. Palpitations     3. Itching            Plan:       1/2/3.  Paroxysmal SupraVentricular Tachycardia and Palpitations: Her recent Holter monitor showed 40 episodes of nonsustained PSVT.  She is taking metoprolol 25 mg half tablet at 7 PM and magnesium which is really helped to decrease her palpitations.  She has had a few brief episodes which has resolved with vagal maneuver of bearing down.  She complains of generalized itching since starting the medications.  She thought it was potentially due to the magnesium and stopped it for a few days but the itching continued.  I recommended holding the metoprolol for a few days to see if the itching subsides.  If so we will switch her to carvedilol or atenolol.  She will call me with an update next week.    4.  Right Ear Pounding Noise: She is aware of her heartbeat in  her right ear.  I recommended good hydration.    5.  She is scheduled to follow-up with Dr. Hicks in September and wishes to keep that appointment.    As always, it has been a pleasure to participate in your patient's care. Thank you.       Sincerely,         SLY Holloway        Dictated utilizing Dragon dictation

## 2020-07-24 ENCOUNTER — ANESTHESIA (OUTPATIENT)
Dept: PAIN MEDICINE | Facility: HOSPITAL | Age: 75
End: 2020-07-24

## 2020-07-24 ENCOUNTER — HOSPITAL ENCOUNTER (OUTPATIENT)
Dept: GENERAL RADIOLOGY | Facility: HOSPITAL | Age: 75
Discharge: HOME | End: 2020-07-24

## 2020-07-24 ENCOUNTER — ANESTHESIA EVENT (OUTPATIENT)
Dept: PAIN MEDICINE | Facility: HOSPITAL | Age: 75
End: 2020-07-24

## 2020-07-24 ENCOUNTER — HOSPITAL ENCOUNTER (OUTPATIENT)
Dept: PAIN MEDICINE | Facility: HOSPITAL | Age: 75
Discharge: HOME | End: 2020-07-24
Admitting: ANESTHESIOLOGY

## 2020-07-24 VITALS
SYSTOLIC BLOOD PRESSURE: 118 MMHG | TEMPERATURE: 97.8 F | HEART RATE: 61 BPM | OXYGEN SATURATION: 99 % | HEIGHT: 62 IN | DIASTOLIC BLOOD PRESSURE: 73 MMHG | WEIGHT: 150 LBS | BODY MASS INDEX: 27.6 KG/M2 | RESPIRATION RATE: 16 BRPM

## 2020-07-24 DIAGNOSIS — M48.02 CERVICAL SPINAL STENOSIS: Primary | ICD-10-CM

## 2020-07-24 DIAGNOSIS — R52 PAIN: ICD-10-CM

## 2020-07-24 PROCEDURE — C1755 CATHETER, INTRASPINAL: HCPCS

## 2020-07-24 PROCEDURE — 77003 FLUOROGUIDE FOR SPINE INJECT: CPT

## 2020-07-24 PROCEDURE — 25010000002 METHYLPREDNISOLONE PER 80 MG: Performed by: ANESTHESIOLOGY

## 2020-07-24 PROCEDURE — 25010000002 MIDAZOLAM PER 1 MG: Performed by: ANESTHESIOLOGY

## 2020-07-24 PROCEDURE — 0 IOPAMIDOL 41 % SOLUTION: Performed by: ANESTHESIOLOGY

## 2020-07-24 RX ORDER — SODIUM CHLORIDE 0.9 % (FLUSH) 0.9 %
1-10 SYRINGE (ML) INJECTION AS NEEDED
Status: DISCONTINUED | OUTPATIENT
Start: 2020-07-24 | End: 2020-07-25 | Stop reason: HOSPADM

## 2020-07-24 RX ORDER — SODIUM CHLORIDE 0.9 % (FLUSH) 0.9 %
3-10 SYRINGE (ML) INJECTION AS NEEDED
Status: DISCONTINUED | OUTPATIENT
Start: 2020-07-24 | End: 2020-07-25 | Stop reason: HOSPADM

## 2020-07-24 RX ORDER — MIDAZOLAM HYDROCHLORIDE 1 MG/ML
1 INJECTION INTRAMUSCULAR; INTRAVENOUS AS NEEDED
Status: DISCONTINUED | OUTPATIENT
Start: 2020-07-24 | End: 2020-07-25 | Stop reason: HOSPADM

## 2020-07-24 RX ORDER — LANOLIN ALCOHOL/MO/W.PET/CERES
3 CREAM (GRAM) TOPICAL NIGHTLY
COMMUNITY
End: 2020-09-29

## 2020-07-24 RX ORDER — FENTANYL CITRATE 50 UG/ML
50 INJECTION, SOLUTION INTRAMUSCULAR; INTRAVENOUS AS NEEDED
Status: DISCONTINUED | OUTPATIENT
Start: 2020-07-24 | End: 2020-07-25 | Stop reason: HOSPADM

## 2020-07-24 RX ORDER — METHYLPREDNISOLONE ACETATE 80 MG/ML
80 INJECTION, SUSPENSION INTRA-ARTICULAR; INTRALESIONAL; INTRAMUSCULAR; SOFT TISSUE ONCE
Status: COMPLETED | OUTPATIENT
Start: 2020-07-24 | End: 2020-07-24

## 2020-07-24 RX ORDER — MELOXICAM 7.5 MG/1
7.5 TABLET ORAL DAILY PRN
COMMUNITY
End: 2020-09-01

## 2020-07-24 RX ORDER — LIDOCAINE HYDROCHLORIDE 10 MG/ML
0.5 INJECTION, SOLUTION INFILTRATION; PERINEURAL ONCE AS NEEDED
Status: DISCONTINUED | OUTPATIENT
Start: 2020-07-24 | End: 2020-07-25 | Stop reason: HOSPADM

## 2020-07-24 RX ORDER — SODIUM CHLORIDE 0.9 % (FLUSH) 0.9 %
3 SYRINGE (ML) INJECTION EVERY 12 HOURS SCHEDULED
Status: DISCONTINUED | OUTPATIENT
Start: 2020-07-24 | End: 2020-07-25 | Stop reason: HOSPADM

## 2020-07-24 RX ORDER — LIDOCAINE HYDROCHLORIDE 10 MG/ML
1 INJECTION, SOLUTION INFILTRATION; PERINEURAL ONCE AS NEEDED
Status: DISCONTINUED | OUTPATIENT
Start: 2020-07-24 | End: 2020-07-25 | Stop reason: HOSPADM

## 2020-07-24 RX ADMIN — IOPAMIDOL 10 ML: 408 INJECTION, SOLUTION INTRATHECAL at 12:14

## 2020-07-24 RX ADMIN — MIDAZOLAM 1 MG: 1 INJECTION INTRAMUSCULAR; INTRAVENOUS at 12:08

## 2020-07-24 RX ADMIN — METHYLPREDNISOLONE ACETATE 80 MG: 80 INJECTION, SUSPENSION INTRA-ARTICULAR; INTRALESIONAL; INTRAMUSCULAR; SOFT TISSUE at 12:14

## 2020-07-24 NOTE — ANESTHESIA PROCEDURE NOTES
PAIN Epidural block    Pre-sedation assessment completed: 7/24/2020 12:04 PM    Patient reassessed immediately prior to procedure    Patient location during procedure: pain clinic  Start Time: 7/24/2020 12:05 PM  Stop Time: 7/24/2020 12:15 PM  Indication:procedure for pain  Performed By  Anesthesiologist: Jefe Lees MD  Preanesthetic Checklist  Completed: patient identified, site marked, surgical consent, pre-op evaluation, timeout performed, IV checked, risks and benefits discussed and monitors and equipment checked  Additional Notes  Performed under fluoroscopy  Post-Op Diagnosis Codes:     * Foraminal stenosis of cervical region (M48.02)     * Cervical spondylosis with radiculopathy (M47.22)     * Cervical spinal stenosis (M48.02)  Prep:  Pt Position:prone  Sterile Tech:cap, gloves, mask and sterile barrier  Prep:chlorhexidine gluconate and isopropyl alcohol  Monitoring:blood pressure monitoring, continuous pulse oximetry and EKG  Procedure:Sedation: yes     Approach:midline  Guidance: fluoroscopy  Location:cervical (Intralaminar)  Level:6-7  Needle Type:Tuohy  Needle Gauge:20 G  Aspiration:negative  Test Dose:negative  Medications:  Depomedrol:80 mg  Preservative Free Saline:2mL  Isovue:2mL  Comments:Needle placement confirmed with epidural spread of contrast injection.  Post Assessment:  Post-procedure: Bandaid.  Pt Tolerance:patient tolerated the procedure well with no apparent complications  Complications:no

## 2020-07-24 NOTE — H&P
[facility]    History and Physical    Patient Name: Kourtney Lorenzana  :  1945  MRN:  [id number]  Date of Admission: 2020    Subjective     Patient is a 74 y.o. female presents with chief complaint of moderate, severe neck and shoulder: left pain with occasional hand involvement.  Onset of symptoms was abrupt starting several months ago.  Symptoms are associated/aggravated by twisting or moving her head.. Symptoms improve with nothing .  Her cervical MRI was read as;  Degenerative disc and facet change in the cervical spine  with grade 1 retrolisthesis of C4. There is reactive bone marrow edema  around the left C3-4 facet joint where there is advanced degenerative  change. Foraminal narrowing is demonstrated at several levels as  delineated level by level above. No disc herniation or high-grade canal  stenosis is present.  The following portions of the patients history were reviewed and updated as appropriate: current medications, allergies, past medical history, past surgical history, past family history, past social history and problem list                Objective     Past Medical History:   Past Medical History:   Diagnosis Date   • Arthritis     OSTEO. KNEES AND BACK SEES DR MIKE DOUGLAS   • Bloating     WITH SOME NAUSEA   • Bruising tendency (CMS/HCC)    • Cholelithiasis 2019    Discovered by liver ultrasound   • Chronic low back pain    • Diverticular disease    • Elevated liver enzymes    • Gastroesophageal reflux disease without esophagitis 2016   • GERD (gastroesophageal reflux disease)    • Heart palpitations    • History of anxiety     BEEN A LONG TIME   • History of depression     SITUATIONAL. NOW RESOLVED   • History of panic disorder    • IBS (irritable bowel syndrome)    • Insomnia    • Internal hemorrhoids    • Lumbar canal stenosis    • Lumbosacral neuritis    • Multiple vitamin deficiency    • Nephrolithiasis     2014 passage of kidney stone.   • Postmenopausal HRT  (hormone replacement therapy)    • Urine incontinence      Past Surgical History:   Past Surgical History:   Procedure Laterality Date   • CATARACT EXTRACTION Bilateral 09/05/2017    Dr. Jefe Barakat OD, MD   • CHOLECYSTECTOMY LAPAROSCOPIC INTRAOPERATIVE CHOLANGIOGRAM WITH LIVER BIOPSY N/A 12/3/2019    Procedure: CHOLECYSTECTOMY LAPAROSCOPIC INTRAOPERATIVE CHOLANGIOGRAM AND LIVER BIOPSY;  Surgeon: Benny Schneider MD;  Location: [facility];  Service: General   • COLONOSCOPY N/A 11/16/2012    Benign cecal polyp-Dr. Mak Rodriguez   • COLONOSCOPY N/A 2015    Dr. Seema Andrade   • ENDOSCOPY N/A 04/27/2018    , no specimen collected-Dr. Seema Andrade   • HYSTERECTOMY Bilateral 1997   • PUBOVAGINAL SLING N/A 06/17/2003    Dr. Ziggy Saba   • TOE SURGERY Right     METATARASAL SURGERY GREAT TOE FOR FRACTUE     Family History:   Family History   Problem Relation Age of Onset   • Atrial fibrillation Father    • Atrial fibrillation Sister    • Atrial fibrillation Brother    • Other Brother         Coronary arteriosclerosis   • Colon cancer Mother    • Colon polyps Mother    • Liver disease Paternal Aunt    • Aneurysm Paternal Grandfather    • Malig Hyperthermia Neg Hx      Social History:   Social History     Tobacco Use   • Smoking status: Never Smoker   • Smokeless tobacco: Never Used   Substance Use Topics   • Alcohol use: No     Comment: caffeine use: 1.5 cups daily   • Drug use: No       Vital Signs Range for the last 24 hours  Temperature:     Temp Source:     BP:     Pulse:     Respirations:     SPO2:     O2 Amount (l/min):     O2 Devices     Weight:           --------------------------------------------------------------------------------    Current Outpatient Medications   Medication Sig Dispense Refill   • acetaminophen (TYLENOL) 500 MG tablet Take 500-1,000 mg by mouth Every 6 (Six) Hours As Needed for Mild Pain .     • Cholecalciferol (VITAMIN D3) 50 MCG (2000 UT) tablet Take 2,000 Units by mouth Daily.     •  Magnesium 400 MG tablet      • metoprolol tartrate (LOPRESSOR) 25 MG tablet Take 0.5 tablets by mouth Every Night. 45 tablet 1   • pantoprazole (PROTONIX) 40 MG EC tablet Take 1 tablet by mouth Daily.       Current Facility-Administered Medications   Medication Dose Route Frequency Provider Last Rate Last Dose   • fentaNYL citrate (PF) (SUBLIMAZE) injection 50 mcg  50 mcg Intravenous PRN Jefe Lees MD       • iopamidol (ISOVUE-M 200) injection 41%  12 mL Epidural Once in imaging Jefe Lees MD       • lidocaine (XYLOCAINE) 1 % injection 1 mL  1 mL Intradermal Once PRN Jefe Lees MD       • methylPREDNISolone acetate (DEPO-medrol) injection 80 mg  80 mg Intra-articular Once Jefe Lees MD       • midazolam (VERSED) injection 1 mg  1 mg Intravenous PRN Jefe Lees MD       • sodium chloride 0.9 % flush 1-10 mL  1-10 mL Intravenous PRN Jefe Lees MD           --------------------------------------------------------------------------------  Assessment/Plan      Anesthesia Evaluation           Pain impairs ability to perform ADLs: Driving, Housekeeping, Exercise/Activity and Sleeping  Modalities previously tried to control pain with limited effectiveness within the last 4-6 weeks: Prescription medications, Heat and OTC medications     Airway   Mallampati: I  Dental - normal exam     Pulmonary - normal exam   Cardiovascular - normal exam        Neuro/Psych- neuro exam normal  normal reflexes and symmetric  (-) strength not normal in all 4 extremities  GI/Hepatic/Renal/Endo      Musculoskeletal     Abdominal    Substance History      OB/GYN          Other                   Diagnosis and Plan    Treatment Plan  ASA 3      Procedures: Cervical Epidural Steroid Injection(PRABHAKAR), With fluoroscopy,       Anesthetic plan and risks discussed with patient.          Diagnosis     * Foraminal stenosis of cervical region [M48.02]     * Cervical spondylosis with radiculopathy [M47.22]     * Cervical spinal stenosis  [M48.02]

## 2020-07-24 NOTE — DISCHARGE INSTRUCTIONS
"Guide To Relieving And Avoiding Neck Pain    Exercise is important to help prevent and treat neck pain.  Good posture, exercise and avoiding injury will help to keep your neck healthy.    When the neck is strained or over worked symptoms may include headache, upper back pain, shoulder pain or arm pain.  Numbness or tingling in the fingers, dizziness or nausea may also occur.    Posture:    Avoid slumping over a desk.  Raise your work (including computer) to eye level to avoid bending at the neck.      Change Position Often:  Changing position prevents overuse of particular muscles.    Sleep On One Pillow:  Using to many pillows or to large of a pillow causes a \"kink\" in you neck.      Move and Exercise:  Living an active lifestyle is an important part of staying healthy.  Be sure to include the exercise to follow specifically for your neck.                    Range of Motion Exercises:  Do these exercises three times a day.  If you experience increased pain stop and contact your physician.  All exercises can be performed sitting or standing.        1.    2.   3.    1.  Place both hands behind you neck.  Gently tilt your neck backward so that you are looking at the ceiling.  Hold for a count of 10.    2.  Look straight facing forward.  Slowly tip your ear toward your right shoulder.  Do not force the motion.  Hold for a count of 10.  Bring head back to starting position and repeat to left side.    3.  Look straight facing forward.  Gently turn your head to the right.  Do not force the motion.  Hold for a count of 10.  Bring head back to starting position and repeat to the left side.    Exercises To Strengthen Muscles:  1.  Look straight facing forward.  Relax your shoulders.  Raise both shoulders toward your ears.  Hold for 3 seconds.       1.       2.   3.      1.  Look straight facing forward.  Relax your shoulders.  Raise both shoulders toward     2.  Raise your ars to your side and bend your elbows.  Squeeze " shoulder blades together as you rotate your arms outward.  Hold for 5 seconds.    3.  Look straight facing forward.  Pull your head straight back.  Do not tip or move your jaw.  Hold for 5 seconds.Cervical epidural steroid injection instructions  Plan includes:  1.  Cervical epidural steroid injections, up to 3, spaced 4 weeks apart.  If pain control is acceptable after 1 or 2 injections, it was discussed with the patient that they may return for the subsequent injections if and when their pain returns.  The risks were discussed with the patient including failure of relief, worsening pain, Headache (post dural puncture headache), bleeding (epidural hematoma) and infection (epidural abscess or skin infection).  2.  Physical therapy exercises at home as prescribed by physical therapy or from the pain clinic handout (given to the patient).  Continuation of these exercises every day, or multiple times per week, even when the patient has good pain relief, was stressed to the patient as a preventative measure to decrease the frequency and severity of future pain episodes.  3.  Continue pain medicines as already prescribed.  If patient not currently taking any, it is recommended to begin Acetaminophen 1000 mg po q 8 hours.  If other medicines containing Acetaminophen are currently prescribed, maintain daily dose at 3000 mg.    4.  If they can tolerate NSAIDS, it is recommended to take Ibuprofen 600 mg po q 6 hours for 7 days during pain exacerbations.  Alternatively, they may substitute an NSAID of their choice (e.g. Aleve).  This may be taken at the same time as Acetaminophen.  5.  Heat and ice to the affected area as tolerated for pain control.  It was discussed that heating pads can cause burns.  6.  Low impact exercise such as walking or water exercise was recommended to maintain overall health and aid in weight control.   7.  Follow up as needed for subsequent injections.  8.  Patient was counseled to abstain from  tobacco products.

## 2020-07-27 ENCOUNTER — TELEPHONE (OUTPATIENT)
Dept: ORTHOPEDIC SURGERY | Facility: CLINIC | Age: 75
End: 2020-07-27

## 2020-07-27 NOTE — TELEPHONE ENCOUNTER
----- Message from Kourtney Lorenzana sent at 7/27/2020  4:18 PM EDT -----  Regarding: Compliment  Contact: [phone]  MY CHART MESSAGE;    Junito this is Kourtney Lorenzana. I've only seen Dr Napier one time and he referred me to pain management at [facility].  I finally got there for my epidural block in my neck. Dr Jefe Lees and staff were all very nice. Just want to let you know I'm feeling so much better. My neck still creaks and crackles, but I have more movement and much less pain. My face doesn't feel numb nor am I having headaches.  Thank you  Kourtney Lorenzana, [birth year]

## 2020-07-28 ENCOUNTER — PATIENT MESSAGE (OUTPATIENT)
Dept: CARDIOLOGY | Facility: CLINIC | Age: 75
End: 2020-07-28

## 2020-07-29 NOTE — TELEPHONE ENCOUNTER
From: Kourtney Lorenzana  To: Anum Hicks MD  Sent: 7/28/2020 7:39 PM EDT  Subject: Visit Follow-Up Question    For Elizabeth Menezes, it's Sylv.  Letting you know about the itching. It has let up quite a bit, but I still have it. Do you want me to start taking the metoprolol like I was to see if I start itching worse. ? Maybe it was something else???

## 2020-08-07 ENCOUNTER — PATIENT MESSAGE (OUTPATIENT)
Dept: CARDIOLOGY | Facility: CLINIC | Age: 75
End: 2020-08-07

## 2020-08-07 RX ORDER — ATENOLOL 25 MG/1
25 TABLET ORAL
Qty: 30 TABLET | Refills: 3 | Status: SHIPPED | OUTPATIENT
Start: 2020-08-07 | End: 2020-08-21

## 2020-08-07 NOTE — TELEPHONE ENCOUNTER
From: Kourtney Lorenzana  To: Elizabeth Grullon APRN  Sent: 8/7/2020 7:48 AM EDT  Subject: Visit Follow-Up Question    Clif Johnson  I guess my itching was from the metoprolol. I've been taking all my other pills without any problems.  Are you going to try another brand? [facility] pharmacy notified me to  prescription, but it was the same metoprolol that I had.!!?? I didn't accept it until I hear from you.  Kourtney [birth year]

## 2020-08-21 ENCOUNTER — ANESTHESIA (OUTPATIENT)
Dept: PAIN MEDICINE | Facility: HOSPITAL | Age: 75
End: 2020-08-21

## 2020-08-21 ENCOUNTER — ANESTHESIA EVENT (OUTPATIENT)
Dept: PAIN MEDICINE | Facility: HOSPITAL | Age: 75
End: 2020-08-21

## 2020-08-21 ENCOUNTER — HOSPITAL ENCOUNTER (OUTPATIENT)
Dept: PAIN MEDICINE | Facility: HOSPITAL | Age: 75
Discharge: HOME | End: 2020-08-21
Admitting: ANESTHESIOLOGY

## 2020-08-21 ENCOUNTER — HOSPITAL ENCOUNTER (OUTPATIENT)
Dept: GENERAL RADIOLOGY | Facility: HOSPITAL | Age: 75
Discharge: HOME | End: 2020-08-21

## 2020-08-21 VITALS
DIASTOLIC BLOOD PRESSURE: 82 MMHG | SYSTOLIC BLOOD PRESSURE: 119 MMHG | TEMPERATURE: 97.7 F | RESPIRATION RATE: 16 BRPM | OXYGEN SATURATION: 99 % | HEART RATE: 62 BPM

## 2020-08-21 DIAGNOSIS — M48.02 CERVICAL SPINAL STENOSIS: ICD-10-CM

## 2020-08-21 DIAGNOSIS — R52 PAIN: ICD-10-CM

## 2020-08-21 PROCEDURE — 25010000002 DEXAMETHASONE SODIUM PHOSPHATE 10 MG/ML SOLUTION: Performed by: ANESTHESIOLOGY

## 2020-08-21 PROCEDURE — 0 IOPAMIDOL 41 % SOLUTION: Performed by: ANESTHESIOLOGY

## 2020-08-21 PROCEDURE — C1755 CATHETER, INTRASPINAL: HCPCS

## 2020-08-21 PROCEDURE — 77003 FLUOROGUIDE FOR SPINE INJECT: CPT

## 2020-08-21 PROCEDURE — 25010000002 MIDAZOLAM PER 1 MG: Performed by: ANESTHESIOLOGY

## 2020-08-21 RX ORDER — DEXAMETHASONE SODIUM PHOSPHATE 10 MG/ML
10 INJECTION, SOLUTION INTRAMUSCULAR; INTRAVENOUS ONCE
Status: COMPLETED | OUTPATIENT
Start: 2020-08-21 | End: 2020-08-21

## 2020-08-21 RX ORDER — MULTIVIT WITH MINERALS/LUTEIN
500 TABLET ORAL DAILY
COMMUNITY
End: 2021-05-15 | Stop reason: HOSPADM

## 2020-08-21 RX ORDER — SODIUM CHLORIDE 0.9 % (FLUSH) 0.9 %
3-10 SYRINGE (ML) INJECTION AS NEEDED
Status: DISCONTINUED | OUTPATIENT
Start: 2020-08-21 | End: 2020-08-22 | Stop reason: HOSPADM

## 2020-08-21 RX ORDER — LIDOCAINE HYDROCHLORIDE 10 MG/ML
1 INJECTION, SOLUTION INFILTRATION; PERINEURAL ONCE
Status: DISCONTINUED | OUTPATIENT
Start: 2020-08-21 | End: 2020-08-22 | Stop reason: HOSPADM

## 2020-08-21 RX ORDER — FENTANYL CITRATE 50 UG/ML
50 INJECTION, SOLUTION INTRAMUSCULAR; INTRAVENOUS AS NEEDED
Status: DISCONTINUED | OUTPATIENT
Start: 2020-08-21 | End: 2020-08-22 | Stop reason: HOSPADM

## 2020-08-21 RX ORDER — MIDAZOLAM HYDROCHLORIDE 1 MG/ML
1 INJECTION INTRAMUSCULAR; INTRAVENOUS AS NEEDED
Status: DISCONTINUED | OUTPATIENT
Start: 2020-08-21 | End: 2020-08-22 | Stop reason: HOSPADM

## 2020-08-21 RX ORDER — SODIUM CHLORIDE 0.9 % (FLUSH) 0.9 %
3 SYRINGE (ML) INJECTION EVERY 12 HOURS SCHEDULED
Status: DISCONTINUED | OUTPATIENT
Start: 2020-08-21 | End: 2020-08-22 | Stop reason: HOSPADM

## 2020-08-21 RX ADMIN — MIDAZOLAM 2 MG: 1 INJECTION INTRAMUSCULAR; INTRAVENOUS at 11:22

## 2020-08-21 RX ADMIN — DEXAMETHASONE SODIUM PHOSPHATE 10 MG: 10 INJECTION, SOLUTION INTRAMUSCULAR; INTRAVENOUS at 11:26

## 2020-08-21 RX ADMIN — IOPAMIDOL 10 ML: 408 INJECTION, SOLUTION INTRATHECAL at 11:26

## 2020-08-21 NOTE — H&P
CHIEF COMPLAINT:  Neck pain        HISTORY OF PRESENT ILLNESS:  This patient is complaining of neck pain which radiates to the left shoulder and at times down the left arm.  The pain is significantly decreasing the patient's Activities of Daily Living.  Diagnostic imaging specifically an MRI of her cervical spine from 6/8/2020 shows a posterior disc osteophyte complex and stenosis at the C5-C6 level and foraminal narrowing at several levels.  The full dictation by the radiologist is available in the chart.    This patient was referred to our clinic by Dr. Jose Napier for cervical epidural steroid injections.    Prior injections afforded greater than 50 % relief of pain and significantly increased activities of daily living.  Unfortunately, her relief has faded somewhat, and she is requesting another epidural steroid injection in hopes of further relief.     PAST MEDICAL HISTORY:  Current Outpatient Medications on File Prior to Encounter   Medication Sig Dispense Refill   • acetaminophen (TYLENOL) 500 MG tablet Take 500-1,000 mg by mouth Every 6 (Six) Hours As Needed for Mild Pain .     • Cholecalciferol (VITAMIN D3) 50 MCG (2000 UT) tablet Take 2,000 Units by mouth Daily.     • Magnesium 400 MG tablet      • melatonin 3 MG tablet Take 3 mg by mouth Every Night.     • meloxicam (MOBIC) 7.5 MG tablet Take 7.5 mg by mouth Daily As Needed.     • pantoprazole (PROTONIX) 40 MG EC tablet Take 1 tablet by mouth Daily.     • vitamin C (ASCORBIC ACID) 250 MG tablet Take 500 mg by mouth Daily.     • Zinc 40 MG tablet Take 40 mg by mouth Daily.     • [DISCONTINUED] atenolol (TENORMIN) 25 MG tablet Take 1 tablet by mouth every night at bedtime. 30 tablet 3     No current facility-administered medications on file prior to encounter.        Past Medical History:   Diagnosis Date   • Arthritis     OSTEO. KNEES AND BACK SEES DR MIKE DOUGLAS   • Bloating     WITH SOME NAUSEA   • Bruising tendency (CMS/HCC)    • Cholelithiasis Nov 7,  2019    Discovered by liver ultrasound   • Chronic low back pain    • Diverticular disease    • Elevated liver enzymes    • Gastroesophageal reflux disease without esophagitis 2/11/2016   • GERD (gastroesophageal reflux disease)    • Heart palpitations    • History of anxiety     BEEN A LONG TIME   • History of depression     SITUATIONAL. NOW RESOLVED   • History of panic disorder    • IBS (irritable bowel syndrome)    • Insomnia    • Internal hemorrhoids    • Lumbar canal stenosis    • Lumbosacral neuritis    • Multiple vitamin deficiency    • Neck pain    • Nephrolithiasis     July 2014 passage of kidney stone.   • Postmenopausal HRT (hormone replacement therapy)    • Urine incontinence        SOCIAL HISTORY:  Counseled to avoid tobacco     REVIEW OF SYSTEMS:  No pertinent hematologic, infectious, or constitutional symptoms.  Other review of systems non-contributory     PHYSICAL EXAM:  /89 (BP Location: Left arm, Patient Position: Sitting)   Pulse 66   Temp 36.5 °C (97.7 °F) (Infrared)   Resp 16   SpO2 97%   Constitutional: Well-developed well-nourished no acute distress  General: Alert and oriented  Ophtho: EOMI  Airway: Mallampati 2  Cardiac:  Regular rate  Lungs:  Clear to auscultation bilaterally   GI: soft, nontender  Lumbar Spine: Noncontributory  Sacroiliac Joints: Noncontributory  Musc: Tenderness palpation over the cervical spine  Neuro: Decreased range of motion of her neck and pain with forward flexion and extension.  Spurling's test is positive on the left.        DIAGNOSIS:  Post-Op Diagnosis Codes:  Post-Op Diagnosis Codes:     * Cervical spondylosis with radiculopathy [M47.22]     * Stenosis of cervical spine [M48.02]     PLAN:  -  Cervical epidural steroid injection #2 in the series with a planned entry level of C6-C7 and attempted medication spread to C5-C6 on the left side. These will likely be spaced approximately 2-4 weeks apart.  If pain control is acceptable after this injection, it  was discussed with the patient that they may return for the subsequent injections if and when their pain returns.     - Risks were discussed with the patient, including but not limited to: failure of relief, worsening pain, tissue, nerve, blood vessel or spinal cord damage, post-dural-puncture headache, bleeding, epidural hematoma, infection, and epidural abscess. Benefits and alternatives were also discussed. No promises were made. Risks/benefits/alternatives of procedural medications were also discussed, including but not limited to hyperglycemia, fluid retention, decreased immune system function, osteoporosis, and anaphylactoid/allergic reactions. The patient voiced understanding and agreed to proceed.  -  Physical therapy exercises at home should be considered, as tolerated, as prescribed by physical therapy or from the pain clinic handout (given to the patient).  Continuation of these exercises every day, or multiple times per week, even when the patient has good pain relief, was stressed to the patient as a preventative measure to decrease the frequency and severity of future pain episodes.  -  It is recommended that the patient use the least amount of opioid medication possible.     -  Heat and ice to the affected area as tolerated for pain control.  It was discussed that heating pads can cause burns.  -  Daily low impact exercise such as walking or water exercise was recommended to maintain overall health and aid in weight control.   -  Patient was counseled to abstain from tobacco products.  -  Follow up as needed for subsequent injections.      Kameron Lomax M.D.  [facility], [facility], [facility]  Board Certified in Pain Medicine by the American Board of Anesthesiology  Board Certified in Anesthesiology by the American Board of Anesthesiology     Much of this encounter note is an electronic transcription/translation of spoken language to printed text. The electronic  translation of spoken language may permit erroneous, or at times, nonsensical words or phrases to be inadvertently transcribed. Although I have reviewed the note for such errors, some may still exist.

## 2020-08-21 NOTE — ANESTHESIA PROCEDURE NOTES
Cervical epidural steroid injection    Pre-sedation assessment completed: 8/21/2020 11:19 AM    Patient reassessed immediately prior to procedure    Patient location during procedure: pain clinic  Start Time: 8/21/2020 11:20 AM  Stop Time: 8/21/2020 11:27 AM  Indication:procedure for pain  Performed By  Anesthesiologist: Kameron Lomax MD  Preanesthetic Checklist  Completed: patient identified, site marked, surgical consent, pre-op evaluation, timeout performed, IV checked, risks and benefits discussed and monitors and equipment checked  Additional Notes  Post-Op Diagnosis Codes:     * Cervical spondylosis with radiculopathy (M47.22)     * Stenosis of cervical spine (M48.02)    The patient was observed in recovery with no evidence of neurological deficits or other problems. All questions were answered. The patient was discharged with appropriate instructions.  Prep:  Pt Position:prone  Sterile Tech:cap, gloves, mask and sterile barrier  Prep:chlorhexidine gluconate and isopropyl alcohol  Monitoring:blood pressure monitoring, continuous pulse oximetry and EKG  Procedure:Sedation: yes     Approach:left paramedian  Guidance: fluoroscopy  Location:cervical  Interspace: Entry site at C6-C7 with spread to C5-C6.  Needle Type:Tuohy  Needle Gauge:20 G  Aspiration:negative  Medications:  Analgesia: Preservative Free Dexamethasone 10mg.  Preservative Free Saline:2mL  Isovue:1mL  Comments:Appropriate epidural spread of contrast.   Post Assessment:  Post-procedure: Dressing per nursing.  Pt Tolerance:patient tolerated the procedure well with no apparent complications  Complications:no

## 2020-09-01 ENCOUNTER — OFFICE VISIT (OUTPATIENT)
Dept: FAMILY MEDICINE CLINIC | Facility: CLINIC | Age: 75
End: 2020-09-01

## 2020-09-01 VITALS
BODY MASS INDEX: 29.08 KG/M2 | SYSTOLIC BLOOD PRESSURE: 124 MMHG | OXYGEN SATURATION: 98 % | WEIGHT: 158 LBS | HEIGHT: 62 IN | HEART RATE: 76 BPM | DIASTOLIC BLOOD PRESSURE: 70 MMHG | TEMPERATURE: 98.2 F

## 2020-09-01 DIAGNOSIS — R10.31 RLQ ABDOMINAL PAIN: Primary | ICD-10-CM

## 2020-09-01 DIAGNOSIS — R10.32 LEFT INGUINAL PAIN: ICD-10-CM

## 2020-09-01 DIAGNOSIS — R10.813 RIGHT LOWER QUADRANT ABDOMINAL TENDERNESS WITHOUT REBOUND TENDERNESS: ICD-10-CM

## 2020-09-01 DIAGNOSIS — G47.00 INSOMNIA, UNSPECIFIED TYPE: ICD-10-CM

## 2020-09-01 DIAGNOSIS — R10.814 LEFT LOWER QUADRANT ABDOMINAL TENDERNESS WITHOUT REBOUND TENDERNESS: ICD-10-CM

## 2020-09-01 PROCEDURE — 99213 OFFICE O/P EST LOW 20 MIN: CPT | Performed by: PHYSICIAN ASSISTANT

## 2020-09-01 RX ORDER — AMITRIPTYLINE HYDROCHLORIDE 10 MG/1
TABLET, FILM COATED ORAL
Qty: 45 TABLET | Refills: 0 | Status: SHIPPED | OUTPATIENT
Start: 2020-09-01 | End: 2021-01-13

## 2020-09-01 NOTE — PROGRESS NOTES
"Subjective   Kourtney Lorenzana is a 74 y.o. female who presents today with RLQ pain intermittent for 4 years.     History of Present Illness     Started about 4 years ago with RLQ pain that is intermittent. States she thought it was from constipation and would take something for a BM. She states she would try Tylenol. Usually would last 15-20 minutes then resolve then could go a couple months without symptoms.     The past 3 months, the pain is more constant- lasting up to 30 minutes and occurring every 3 weeks. Sharp pain- stabbing pain at similar intensity to having a baby without anesthetic. If she has the pain and tries to have BM makes the pain worse. The \"after effect\" of the pain is soreness for about 2 days after she has the pain. Feels like aching and muscle soreness, tender to the touch about 2/10. Nausea associated with the pain. No vomiting.     BM every day- takes fiberwise daily to ensure daily BM. No blood in stool, pain before or after BM.     Last colonoscopy- 11/2015- recheck in 5 years. Dr Andrade.     Hysterectomy- at age 51- had fibroids with heavy bleeding. Decided to take her ovaries as well \"because of my age\". No cancer. No Hx abnormal PAP.     Trouble falling asleep and trouble staying asleep. Tried melatonin. Nataliia gave Rx for Ambien- did not like the side effects. Tried Tylenol PM, Benadryl. With medication, she may have 3 hours of sleep then toss and turn, up and down to bathroom as many as 4 x per night. If takes too much, she gets cramps in legs or feet. Has never tried trazodone or amitriptyline. She worries about AE of memory loss. No daytime napping. Caffeine- 1 - 1 1/2 cups only in the morning. Water all day  Has some urinary incontinence. She does not want to be too tired because she may wet the bed.     The following portions of the patient's history were reviewed and updated as appropriate: allergies, current medications, past family history, past medical history, past social " history, past surgical history and problem list.    Review of Systems    Objective   Vitals:    09/01/20 1522   BP: 124/70   Pulse: 76   Temp: 98.2 °F (36.8 °C)   SpO2: 98%     Body mass index is 29.08 kg/m².    Physical Exam   Constitutional: She is oriented to person, place, and time. She appears well-developed and well-nourished.   HENT:   Head: Normocephalic and atraumatic.   Right Ear: External ear normal.   Left Ear: External ear normal.   Nose: Nose normal.   Eyes: Conjunctivae are normal.   Cardiovascular: Normal rate, regular rhythm, normal heart sounds and intact distal pulses. Exam reveals no gallop and no friction rub.   No murmur heard.  Pulmonary/Chest: Effort normal and breath sounds normal. No respiratory distress. She has no wheezes. She has no rhonchi. She has no rales.   Abdominal: Soft. Normal appearance and bowel sounds are normal. She exhibits no shifting dullness, no distension, no abdominal bruit and no mass. There is no hepatosplenomegaly. There is tenderness in the right lower quadrant and left lower quadrant. There is rebound and guarding (with palpation LLQ). There is no rigidity and no CVA tenderness. No hernia.   Neurological: She is alert and oriented to person, place, and time.   Skin: Skin is warm and dry. She is not diaphoretic.   Psychiatric: She has a normal mood and affect. Her behavior is normal. Judgment and thought content normal.   Nursing note and vitals reviewed.      Assessment/Plan   Kourtney was seen today for pelvic pain.    Diagnoses and all orders for this visit:    RLQ abdominal pain  -     US Pelvis Complete  -     Urinalysis With Microscopic - Urine, Clean Catch  -     Urine Culture - Urine, Urine, Clean Catch    Right lower quadrant abdominal tenderness without rebound tenderness  -     US Pelvis Complete  -     Urinalysis With Microscopic - Urine, Clean Catch  -     Urine Culture - Urine, Urine, Clean Catch    Left lower quadrant abdominal tenderness without  rebound tenderness  -     US Pelvis Complete  -     Urinalysis With Microscopic - Urine, Clean Catch  -     Urine Culture - Urine, Urine, Clean Catch    Left inguinal pain  -     US Pelvis Complete  -     Urinalysis With Microscopic - Urine, Clean Catch  -     Urine Culture - Urine, Urine, Clean Catch    Insomnia, unspecified type  -     amitriptyline (ELAVIL) 10 MG tablet; Take 1-2 tablet at bedtime as needed for sleep    Other orders  -     Microscopic Examination -    Assessment and Plan  74 y.o. female who presents today with RLQ pain intermittent for 4 years. She started about 4 years ago with RLQ pain that has been intermittent, lasting about 15-20 minutes then resolve and may go a couple months without symptoms. She thought it was from constipation and would take something to induce BM or try Tylenol.  The past 3 months, the pain has become more constant, lasting up to 30 minutes and occurring every 3 weeks. This pain has become sharp, stabbing pain at a similar intensity to having a baby, worse when she is having a BM with some associated nausea. After the episode of pain, she has soreness for about 2 days that feels like aching and muscle soreness with tenderness to the touch at an intensity of 2/10. She has a bowel movement every day and takes fiberwise daily. She denies blood in stool, pain before and after BM, or vomiting. Her last colonoscopy was 11/2015 with Dr Andrade- recheck in 5 years. she had a hysterectomy with BSO at age 51 for heavy bleeding from fibroids. Benign pathology and no history of abnormal PAP. She underwent CT abd/pelvis prior to cholecystectomy 12/2019 without pathology. Pain is RLQ with RLQ tenderness, however, she has more severe LLQ tenderness to palpation and tenderness left inguinal area. I discussed possible etiology of symptoms. She is due for colonoscopy this year- she should contact Dr Andrade to follow up abdominal pain and recheck colonoscopy. I will refer for pelvic US. If  no findings, I advised this could be related to adhesions or hernia. To ER ASAP if worsening, new or changing symptoms or intractable symptoms. We could consider general surgery follow up if persistent pain and no etiology.     She also has chronic insomnia with trouble falling asleep and staying asleep. She has no daytime napping and has limited caffeine with 1 - 1 1/2 cups of coffee only in the morning followed by water the rest of the day. SLY Andrew gave a prescription for Ambien, however, she did not like the side effects. She has tried Tylenol PM, Benadryl, and Melatonin. With medication, she may get 3 hours of sleep then toss and turn, getting up and down to bathroom as many as 4 x per night. She does have some urinary incontinence. She worries if she sleeps too hard, she may have enuresis. If she takes too many OTC medications, she gets cramps in legs or feet. She worries about AE of memory loss with medication. She has never tried trazodone or amitriptyline. I will try Elavil 10 mg at bedtime to see if this helps her sleep and may help with pain while we are awaiting workup. If she has worsening constipation, I will change medication.

## 2020-09-03 LAB
APPEARANCE UR: CLEAR
BACTERIA #/AREA URNS HPF: NORMAL /[HPF]
BACTERIA UR CULT: NO GROWTH
BACTERIA UR CULT: NORMAL
BILIRUB UR QL STRIP: NEGATIVE
COLOR UR: YELLOW
EPI CELLS #/AREA URNS HPF: NORMAL /HPF (ref 0–10)
GLUCOSE UR QL: NEGATIVE
HGB UR QL STRIP: NEGATIVE
KETONES UR QL STRIP: NEGATIVE
LEUKOCYTE ESTERASE UR QL STRIP: NEGATIVE
MICRO URNS: NORMAL
MICRO URNS: NORMAL
NITRITE UR QL STRIP: NEGATIVE
PH UR STRIP: 6 [PH] (ref 5–7.5)
PROT UR QL STRIP: NEGATIVE
RBC #/AREA URNS HPF: NORMAL /HPF (ref 0–2)
SP GR UR: 1.01 (ref 1–1.03)
UROBILINOGEN UR STRIP-MCNC: 0.2 MG/DL (ref 0.2–1)
WBC #/AREA URNS HPF: NORMAL /HPF (ref 0–5)

## 2020-09-09 DIAGNOSIS — R10.813 RIGHT LOWER QUADRANT ABDOMINAL TENDERNESS WITHOUT REBOUND TENDERNESS: ICD-10-CM

## 2020-09-09 DIAGNOSIS — R10.32 LEFT INGUINAL PAIN: ICD-10-CM

## 2020-09-09 DIAGNOSIS — R10.31 RLQ ABDOMINAL PAIN: Primary | ICD-10-CM

## 2020-09-09 DIAGNOSIS — R10.814 LEFT LOWER QUADRANT ABDOMINAL TENDERNESS WITHOUT REBOUND TENDERNESS: ICD-10-CM

## 2020-09-15 ENCOUNTER — HOSPITAL ENCOUNTER (OUTPATIENT)
Dept: ULTRASOUND IMAGING | Facility: HOSPITAL | Age: 75
Discharge: HOME | End: 2020-09-15
Admitting: PHYSICIAN ASSISTANT

## 2020-09-15 PROCEDURE — 76857 US EXAM PELVIC LIMITED: CPT

## 2020-09-17 ENCOUNTER — OFFICE VISIT (OUTPATIENT)
Dept: CARDIOLOGY | Facility: CLINIC | Age: 75
End: 2020-09-17

## 2020-09-17 VITALS
HEART RATE: 81 BPM | HEIGHT: 62 IN | SYSTOLIC BLOOD PRESSURE: 154 MMHG | WEIGHT: 161 LBS | DIASTOLIC BLOOD PRESSURE: 96 MMHG | BODY MASS INDEX: 29.63 KG/M2

## 2020-09-17 DIAGNOSIS — I47.1 PSVT (PAROXYSMAL SUPRAVENTRICULAR TACHYCARDIA) (HCC): Primary | ICD-10-CM

## 2020-09-17 PROCEDURE — 93000 ELECTROCARDIOGRAM COMPLETE: CPT | Performed by: INTERNAL MEDICINE

## 2020-09-17 PROCEDURE — 99214 OFFICE O/P EST MOD 30 MIN: CPT | Performed by: INTERNAL MEDICINE

## 2020-09-17 NOTE — PROGRESS NOTES
Date of Office Visit: 2020  Encounter Provider: Anum Hicks MD  Place of Service: [facility] CARDIOLOGY  Patient Name: Kourtney Lorenzana  :1945      Patient ID:  Kourtney Lorenzana is a 74 y.o. female is here for  followup for pSVT.         History of Present Illness    She has a history of GERD, irritable bowel syndrome, history of anemia and elevated liver enzymes.     She is , has 2 children is retired.  She is Dagoberto Lorenzana's grandmother.  She uses no cigarettes, alcohol or drugs and has 1 cup of coffee per day.     Her father hypertension, diabetes and heart disease.  Her sister, father and brother all have atrial fibrillation.  Her brother has also had a myocardial infarction. She has a sister with strokes and her father also had strokes.     She was in the emergency department on 2020 with palpitations and chest pain.  The episodes were intermittent and have been going on since 2019.  Most of the time they are associate with activity.  In the emergency department, blood pressure was 142/85, heart rate 98.  Her CMP was normal, magnesium normal, CBC normal, troponin negative, normal lipase.       She had a normal stress echocardiogram in .  She had an echocardiogram done 3/2016 showed ejection fraction 65% with grade 1 diastolic dysfunction which was no change from prior.    On 2020, she was evaluated for palpitations.  There was concerned about atrial fibrillation.  TSH was normal.  Echocardiogram completed 2020 showed EF of 65%, grade 1 diastolic dysfunction, trace aortic valve regurgitation, trace tricuspid regurgitation, and trace pulmonic valve regurgitation.  14-day Holter monitor worn 2020 showed 40 episodes of nonsustained SVT the longest 5 beats in duration at a rate of 195 bpm. On 2020, she was started her on metoprolol tartrate 25 mg nightly for nonsustained supraventricular tachycardia.     In  she followed  up with me in the office.  She was unable to tolerate the full dose of metoprolol tartrate and was taking 1/2 tablet daily.  She felt her heart racing around 8 or 9 PM so I suggested that she take the metoprolol at 7 PM.    Tried magnesium supplements but this did not help the palpitations.  She thought the metoprolol was causing her itching but when she stopped it, the itching stopped for 2 weeks and then restarted so then she thought maybe it was metoprolol.  Elizabeth started on atenolol 25 mg half nightly.  She thought that was causing itching but when she stopped it again the itching went away but then came back.  She is not sure either of these medications cause that.  I rechecked her blood pressure today and it was 132/82.  Usually she does a Valsalva maneuver, the tachycardia gets better.  She had an episode of right facial numbness but has cervical spine disease and has chronic left facial numbness.  I reassured her that did not sound like a stroke.  It could be due to her cervical spine disease or even Bell's palsy but she does not have a facial droop.  She has not been sleeping well so Elavil was started.  That is helping her somewhat.  She is on several supplements.  She has no exertional chest tightness or pressure.  She has no exertional dyspnea.  She is taking a Benadryl at night as well for the itching.    Past Medical History:   Diagnosis Date   • Arthritis     OSTEO. KNEES AND BACK SEES DR MIKE DOUGLAS   • Bloating     WITH SOME NAUSEA   • Bruising tendency (CMS/HCC)    • Cholelithiasis Nov 7, 2019    Discovered by liver ultrasound   • Chronic low back pain    • Diverticular disease    • Elevated liver enzymes    • Gastroesophageal reflux disease without esophagitis 2/11/2016   • GERD (gastroesophageal reflux disease)    • Heart palpitations    • History of anxiety     BEEN A LONG TIME   • History of depression     SITUATIONAL. NOW RESOLVED   • History of panic disorder    • IBS (irritable bowel syndrome)     • Insomnia    • Internal hemorrhoids    • Lumbar canal stenosis    • Lumbosacral neuritis    • Multiple vitamin deficiency    • Neck pain    • Nephrolithiasis     July 2014 passage of kidney stone.   • Postmenopausal HRT (hormone replacement therapy)    • PSVT (paroxysmal supraventricular tachycardia) (CMS/HCC)    • Urine incontinence          Past Surgical History:   Procedure Laterality Date   • CATARACT EXTRACTION Bilateral 09/05/2017    Dr. Jefe Barakat OD, MD   • CHOLECYSTECTOMY     • CHOLECYSTECTOMY LAPAROSCOPIC INTRAOPERATIVE CHOLANGIOGRAM WITH LIVER BIOPSY N/A 12/3/2019    Procedure: CHOLECYSTECTOMY LAPAROSCOPIC INTRAOPERATIVE CHOLANGIOGRAM AND LIVER BIOPSY;  Surgeon: Benny Schneider MD;  Location: [facility];  Service: General   • COLONOSCOPY N/A 11/16/2012    Benign cecal polyp-Dr. Mak Rodriguez   • COLONOSCOPY N/A 2015    Dr. Seema Andrade   • ENDOSCOPY N/A 04/27/2018    , no specimen collected-Dr. Seema Andrade   • HYSTERECTOMY Bilateral 1997   • PUBOVAGINAL SLING N/A 06/17/2003    Dr. Ziggy Saba   • TOE SURGERY Right     METATARASAL SURGERY GREAT TOE FOR FRACTUE       Current Outpatient Medications on File Prior to Visit   Medication Sig Dispense Refill   • acetaminophen (TYLENOL) 500 MG tablet Take 500-1,000 mg by mouth Every 6 (Six) Hours As Needed for Mild Pain .     • amitriptyline (ELAVIL) 10 MG tablet Take 1-2 tablet at bedtime as needed for sleep 45 tablet 0   • Cholecalciferol (VITAMIN D3) 50 MCG (2000 UT) tablet Take 2,000 Units by mouth Daily.     • Magnesium 400 MG tablet      • melatonin 3 MG tablet Take 3 mg by mouth Every Night.     • pantoprazole (PROTONIX) 40 MG EC tablet Take 1 tablet by mouth Daily.     • vitamin C (ASCORBIC ACID) 250 MG tablet Take 500 mg by mouth Daily.     • Zinc 40 MG tablet Take 40 mg by mouth Daily.       No current facility-administered medications on file prior to visit.        Social History     Socioeconomic History   • Marital status:       "Spouse name: Not on file   • Number of children: Not on file   • Years of education: Not on file   • Highest education level: Not on file   Tobacco Use   • Smoking status: Never Smoker   • Smokeless tobacco: Never Used   Substance and Sexual Activity   • Alcohol use: No     Comment: caffeine use: 1.5 cups daily   • Drug use: No   • Sexual activity: Defer           Review of Systems   Constitution: Negative.   HENT: Negative for congestion.    Eyes: Negative for vision loss in left eye and vision loss in right eye.   Respiratory: Negative.  Negative for cough, hemoptysis, shortness of breath, sleep disturbances due to breathing, snoring, sputum production and wheezing.    Endocrine: Negative.    Hematologic/Lymphatic: Negative.    Skin: Negative for poor wound healing and rash.   Musculoskeletal: Negative for falls, gout, muscle cramps and myalgias.   Gastrointestinal: Negative for abdominal pain, diarrhea, dysphagia, hematemesis, melena, nausea and vomiting.   Neurological: Negative for excessive daytime sleepiness, dizziness, headaches, light-headedness, loss of balance, seizures and vertigo.   Psychiatric/Behavioral: Negative for depression and substance abuse. The patient is not nervous/anxious.        Procedures    ECG 12 Lead    Date/Time: 9/17/2020 2:38 PM  Performed by: Anum Hicks MD  Authorized by: Anum Hicks MD   Comparison: compared with previous ECG   Similar to previous ECG  Rhythm: sinus rhythm  Other findings: poor R wave progression    Clinical impression: non-specific ECG                Objective:      Vitals:    09/17/20 1429   BP: 154/96   BP Location: Right arm   Patient Position: Sitting   Cuff Size: Adult   Pulse: 81   Weight: 73 kg (161 lb)   Height: 157.5 cm (62\")     Body mass index is 29.45 kg/m².    Vitals signs reviewed.   Constitutional:       General: Not in acute distress.     Appearance: Well-developed. Not diaphoretic.   Eyes:      General: No scleral icterus.    "  Conjunctiva/sclera: Conjunctivae normal.   HENT:      Head: Normocephalic and atraumatic.   Neck:      Musculoskeletal: Neck supple.      Thyroid: No thyromegaly.      Vascular: No carotid bruit or JVD.      Lymphadenopathy: No cervical adenopathy.   Pulmonary:      Effort: Pulmonary effort is normal. No respiratory distress.      Breath sounds: Normal breath sounds. No wheezing. No rhonchi. No rales.   Chest:      Chest wall: Not tender to palpatation.   Cardiovascular:      Normal rate. Regular rhythm.      No gallop.   Edema:     Peripheral edema absent.   Abdominal:      General: Bowel sounds are normal. There is no distension or abdominal bruit.      Palpations: Abdomen is soft. There is no abdominal mass.      Tenderness: There is no abdominal tenderness.   Musculoskeletal:         General: No deformity.      Extremities: No clubbing present.  Skin:     General: Skin is warm and dry. There is no cyanosis.      Coloration: Skin is not pale.      Findings: No rash.   Neurological:      Mental Status: Alert and oriented to person, place, and time.      Cranial Nerves: No cranial nerve deficit.   Psychiatric:         Judgment: Judgment normal.         Lab Review:       Assessment:      Diagnosis Plan   1. PSVT (paroxysmal supraventricular tachycardia) (CMS/HCC)        Adult Transthoracic Echo Complete W/ Cont if Necessary Per Protocol      1. Paroxysmal supraventricular tachycardia.    On magnesium to treat this.  off metoprolol as thought it caused itching but may not have.  Atrial fibrillation has not been detected.  2. Dyspnea on exertion.  This mostly occurs with the tachycardia.  3. Family history of atrial fibrillation.       Plan:       See koffi in 6 months, restart atenolol 25mg 1/2 tab nightly.  Stop magnesium as it is not helping and hold supplements for now due to itching.  Ok to take elavil for sleep.

## 2020-09-18 ENCOUNTER — TELEPHONE (OUTPATIENT)
Dept: FAMILY MEDICINE CLINIC | Facility: CLINIC | Age: 75
End: 2020-09-18

## 2020-09-18 NOTE — TELEPHONE ENCOUNTER
Patient called in and wanted to know her pelvic ultrasound results . Patient has not heard anything yet .         Please call her at [phone]

## 2020-09-29 ENCOUNTER — OFFICE VISIT (OUTPATIENT)
Dept: GASTROENTEROLOGY | Facility: CLINIC | Age: 75
End: 2020-09-29

## 2020-09-29 VITALS — WEIGHT: 162.4 LBS | HEIGHT: 62 IN | BODY MASS INDEX: 29.88 KG/M2 | TEMPERATURE: 97.8 F

## 2020-09-29 DIAGNOSIS — Z80.0 FH: COLON CANCER: Primary | ICD-10-CM

## 2020-09-29 DIAGNOSIS — R10.30 LOWER ABDOMINAL PAIN: ICD-10-CM

## 2020-09-29 PROCEDURE — 99214 OFFICE O/P EST MOD 30 MIN: CPT | Performed by: INTERNAL MEDICINE

## 2020-09-29 RX ORDER — ATENOLOL 25 MG/1
25 TABLET ORAL DAILY
COMMUNITY
End: 2021-01-13

## 2020-09-29 NOTE — PROGRESS NOTES
Subjective   Chief Complaint   Patient presents with   • Heartburn   • Abdominal Pain       Kourtney Lorenzana is a  74 y.o. female here for a follow up visit for abdominal pain and heartburn.  She was last seen in the office in March 2020.    Laparoscopic cholecystectomy with cholangiogram and liver biopsy 12/3/2019.     Pathology:  -Chronic cholecystitis and cholelithiasis.  -Mild steatosis with no iron or fibrosis identified.    She reports that her lower abdominal and pelvic pain has recurred.  Worse if bladder is full.  She has gotten more constipated since starting elavil.  Worse if she is more gassy.  Normal pelvic u/s - she has had a complete hysterectomy.    She has stopped weight watchers and has gained back 10 lbs.  She felt better when she was on the program.    She has peristant elevated alk phos - previously normal GGT.    HPI  Past Medical History:   Diagnosis Date   • Arthritis     OSTEO. KNEES AND BACK SEES DR MIKE DOUGLAS   • Bloating     WITH SOME NAUSEA   • Bruising tendency (CMS/HCC)    • Cholelithiasis Nov 7, 2019    Discovered by liver ultrasound   • Chronic low back pain    • Diverticular disease    • Elevated liver enzymes    • Gastroesophageal reflux disease without esophagitis 2/11/2016   • GERD (gastroesophageal reflux disease)    • Heart palpitations    • History of anxiety     BEEN A LONG TIME   • History of depression     SITUATIONAL. NOW RESOLVED   • History of panic disorder    • IBS (irritable bowel syndrome)    • Insomnia    • Internal hemorrhoids    • Lumbar canal stenosis    • Lumbosacral neuritis    • Multiple vitamin deficiency    • Neck pain    • Nephrolithiasis     July 2014 passage of kidney stone.   • Postmenopausal HRT (hormone replacement therapy)    • PSVT (paroxysmal supraventricular tachycardia) (CMS/HCC)    • Urine incontinence      Past Surgical History:   Procedure Laterality Date   • CATARACT EXTRACTION Bilateral 09/05/2017    Dr. Jefe Barakat OD, MD   • CHOLECYSTECTOMY  "    • CHOLECYSTECTOMY LAPAROSCOPIC INTRAOPERATIVE CHOLANGIOGRAM WITH LIVER BIOPSY N/A 12/3/2019    Procedure: CHOLECYSTECTOMY LAPAROSCOPIC INTRAOPERATIVE CHOLANGIOGRAM AND LIVER BIOPSY;  Surgeon: Benny Schneider MD;  Location: [facility];  Service: General   • COLONOSCOPY N/A 11/16/2012    Benign cecal polyp-Dr. Mak Rodriguez   • COLONOSCOPY N/A 2015    Dr. Seema Andrade   • ENDOSCOPY N/A 04/27/2018    HH, no specimen collected-Dr. Seema Andrade   • HYSTERECTOMY Bilateral 1997   • PUBOVAGINAL SLING N/A 06/17/2003    Dr. Ziggy Saba   • TOE SURGERY Right     METATARASAL SURGERY GREAT TOE FOR FRACTUE       Current Outpatient Medications:   •  acetaminophen (TYLENOL) 500 MG tablet, Take 500-1,000 mg by mouth Every 6 (Six) Hours As Needed for Mild Pain ., Disp: , Rfl:   •  amitriptyline (ELAVIL) 10 MG tablet, Take 1-2 tablet at bedtime as needed for sleep, Disp: 45 tablet, Rfl: 0  •  atenolol (TENORMIN) 25 MG tablet, Take 25 mg by mouth Daily., Disp: , Rfl:   •  Cholecalciferol (VITAMIN D3) 50 MCG (2000 UT) tablet, Take 2,000 Units by mouth Daily., Disp: , Rfl:   •  pantoprazole (PROTONIX) 40 MG EC tablet, Take 1 tablet by mouth 2 (two) times a day., Disp: , Rfl:   •  vitamin C (ASCORBIC ACID) 250 MG tablet, Take 500 mg by mouth Daily., Disp: , Rfl:   •  Zinc 40 MG tablet, Take 40 mg by mouth Daily., Disp: , Rfl:   PRN Meds:.  Allergies   Allergen Reactions   • Metoprolol Itching   • Hydrocodone-Acetaminophen GI Intolerance   • Other Nausea And Vomiting     Unknown \"strong\" pain killer   • Tramadol GI Intolerance     Social History     Socioeconomic History   • Marital status:      Spouse name: Not on file   • Number of children: Not on file   • Years of education: Not on file   • Highest education level: Not on file   Tobacco Use   • Smoking status: Never Smoker   • Smokeless tobacco: Never Used   Substance and Sexual Activity   • Alcohol use: No     Comment: caffeine use: 1.5 cups daily   • Drug use: No   • " Sexual activity: Defer     Family History   Problem Relation Age of Onset   • Atrial fibrillation Father    • Atrial fibrillation Sister    • Atrial fibrillation Brother    • Other Brother         Coronary arteriosclerosis   • Colon cancer Mother    • Colon polyps Mother    • Liver disease Paternal Aunt    • Aneurysm Paternal Grandfather    • Malig Hyperthermia Neg Hx      Review of Systems   Constitutional: Negative for appetite change and unexpected weight change.   Gastrointestinal: Positive for abdominal distention, abdominal pain and constipation. Negative for blood in stool.   All other systems reviewed and are negative.    Vitals:    09/29/20 1427   Temp: 97.8 °F (36.6 °C)         09/29/20  1427   Weight: 73.7 kg (162 lb 6.4 oz)       Objective   Physical Exam  Constitutional:       Appearance: Normal appearance. She is well-developed.   HENT:      Head: Normocephalic and atraumatic.   Eyes:      General: No scleral icterus.     Conjunctiva/sclera: Conjunctivae normal.   Neck:      Musculoskeletal: Normal range of motion and neck supple.   Pulmonary:      Effort: Pulmonary effort is normal.      Breath sounds: Normal breath sounds.   Abdominal:      General: There is no distension.      Palpations: Abdomen is soft.      Tenderness: There is abdominal tenderness.       Skin:     General: Skin is warm and dry.   Neurological:      Mental Status: She is alert.   Psychiatric:         Mood and Affect: Mood normal.         Behavior: Behavior normal.       No radiology results for the last 7 days    Assessment/Plan   Diagnoses and all orders for this visit:    FH: colon cancer  -     Case Request; Standing  -     Case Request    Lower abdominal pain    Other orders  -     atenolol (TENORMIN) 25 MG tablet; Take 25 mg by mouth Daily.  -     Follow Anesthesia Guidelines / Standing Orders; Future  -     Obtain Informed Consent; Future  -     Implement Anesthesia orders day of procedure.; Standing  -     Obtain informed  consent; Standing  -     Verify bowel prep was successful; Standing  -     Give tap water enema if bowel prep was insufficient; Standing      Plan:  · Resume stool softener, continue fiber  supplement  · She is due for colonoscopy - will schedule at her convenience  · Question whether her discomfort has something to do with scar tissue as she seems to be worsening times her bladder is full or she is gassy.  She is due for colonoscopy anyway due to her family history we will make sure that there is nothing else going on

## 2020-10-06 ENCOUNTER — CLINICAL SUPPORT (OUTPATIENT)
Dept: ORTHOPEDIC SURGERY | Facility: CLINIC | Age: 75
End: 2020-10-06

## 2020-10-06 VITALS — HEIGHT: 62 IN | TEMPERATURE: 98.1 F | BODY MASS INDEX: 29.81 KG/M2 | WEIGHT: 162 LBS

## 2020-10-06 DIAGNOSIS — M25.561 CHRONIC PAIN OF BOTH KNEES: Primary | ICD-10-CM

## 2020-10-06 DIAGNOSIS — M25.562 CHRONIC PAIN OF BOTH KNEES: Primary | ICD-10-CM

## 2020-10-06 DIAGNOSIS — G89.29 CHRONIC PAIN OF BOTH KNEES: Primary | ICD-10-CM

## 2020-10-06 PROCEDURE — 20610 DRAIN/INJ JOINT/BURSA W/O US: CPT | Performed by: NURSE PRACTITIONER

## 2020-10-06 PROCEDURE — 99213 OFFICE O/P EST LOW 20 MIN: CPT | Performed by: NURSE PRACTITIONER

## 2020-10-06 RX ORDER — METHYLPREDNISOLONE ACETATE 80 MG/ML
80 INJECTION, SUSPENSION INTRA-ARTICULAR; INTRALESIONAL; INTRAMUSCULAR; SOFT TISSUE
Status: COMPLETED | OUTPATIENT
Start: 2020-10-06 | End: 2020-10-06

## 2020-10-06 RX ADMIN — METHYLPREDNISOLONE ACETATE 80 MG: 80 INJECTION, SUSPENSION INTRA-ARTICULAR; INTRALESIONAL; INTRAMUSCULAR; SOFT TISSUE at 08:57

## 2020-10-06 RX ADMIN — METHYLPREDNISOLONE ACETATE 80 MG: 80 INJECTION, SUSPENSION INTRA-ARTICULAR; INTRALESIONAL; INTRAMUSCULAR; SOFT TISSUE at 08:55

## 2020-10-06 NOTE — PROGRESS NOTES
"Patient: Kourtney Lorenzana  YOB: 1945 74 y.o. female  Medical Record Number: [id number]    Chief Complaints: Bilateral knee pain    History of Present Illness:Kourtney Lorenzana is a 74 y.o. female who presents with complaints of increased bilateral knee pain.  She describes the knee pain as a moderate constant ache worse with standing walking, better with rest    Allergies:   Allergies   Allergen Reactions   • Metoprolol Itching   • Hydrocodone-Acetaminophen GI Intolerance   • Other Nausea And Vomiting     Unknown \"strong\" pain killer   • Tramadol GI Intolerance       Medications:   Current Outpatient Medications   Medication Sig Dispense Refill   • acetaminophen (TYLENOL) 500 MG tablet Take 500-1,000 mg by mouth Every 6 (Six) Hours As Needed for Mild Pain .     • amitriptyline (ELAVIL) 10 MG tablet Take 1-2 tablet at bedtime as needed for sleep 45 tablet 0   • atenolol (TENORMIN) 25 MG tablet Take 25 mg by mouth Daily.     • Cholecalciferol (VITAMIN D3) 50 MCG (2000 UT) tablet Take 2,000 Units by mouth Daily.     • pantoprazole (PROTONIX) 40 MG EC tablet Take 1 tablet by mouth 2 (two) times a day.     • vitamin C (ASCORBIC ACID) 250 MG tablet Take 500 mg by mouth Daily.     • Zinc 40 MG tablet Take 40 mg by mouth Daily.       No current facility-administered medications for this visit.          The following portions of the patient's history were reviewed and updated as appropriate: allergies, current medications, past family history, past medical history, past social history, past surgical history and problem list.    Review of Systems:   A 14 point review of systems was performed. All systems negative except pertinent positives/negative listed in HPI above    Physical Exam:   There were no vitals filed for this visit.    General: A and O x 3, ASA, NAD    SCLERA:    Normal    DENTITION:   Normal  Skin clear no unusual lesions noted  Bilateral knees patient has no appreciable effusion limited range of " motion secondary to pain calf soft nontender    Radiology:  Xrays previous x-rays bilateral knees were reviewed and show significant arthritic changes    Assessment/Plan:  Osteoarthritis bilateral knees    Patient discussed treatment options, she would like to proceed with bilateral knee cortisone injections she will continue with physical therapy exercises we will see her back as needed    Large Joint Arthrocentesis: R knee  Date/Time: 10/6/2020 8:55 AM  Consent given by: patient  Site marked: site marked  Timeout: Immediately prior to procedure a time out was called to verify the correct patient, procedure, equipment, support staff and site/side marked as required   Supporting Documentation  Indications: pain   Procedure Details  Location: knee - R knee  Preparation: Patient was prepped and draped in the usual sterile fashion  Needle gauge: 21g.  Approach: lateral  Medications administered: 2 mL lidocaine (cardiac); 80 mg methylPREDNISolone acetate 80 MG/ML  Patient tolerance: patient tolerated the procedure well with no immediate complications    Large Joint Arthrocentesis: L knee  Date/Time: 10/6/2020 8:57 AM  Consent given by: patient  Site marked: site marked  Timeout: Immediately prior to procedure a time out was called to verify the correct patient, procedure, equipment, support staff and site/side marked as required   Supporting Documentation  Indications: pain   Procedure Details  Location: knee - L knee  Preparation: Patient was prepped and draped in the usual sterile fashion  Needle gauge: 21g.  Approach: lateral  Medications administered: 2 mL lidocaine (cardiac); 80 mg methylPREDNISolone acetate 80 MG/ML  Patient tolerance: patient tolerated the procedure well with no immediate complications

## 2020-10-07 ENCOUNTER — OFFICE VISIT (OUTPATIENT)
Dept: ORTHOPEDIC SURGERY | Facility: CLINIC | Age: 75
End: 2020-10-07

## 2020-10-07 VITALS — TEMPERATURE: 97.6 F | BODY MASS INDEX: 29.81 KG/M2 | HEIGHT: 62 IN | WEIGHT: 162 LBS

## 2020-10-07 DIAGNOSIS — M47.22 CERVICAL SPONDYLOSIS WITH RADICULOPATHY: ICD-10-CM

## 2020-10-07 DIAGNOSIS — M48.061 SPINAL STENOSIS OF LUMBAR REGION, UNSPECIFIED WHETHER NEUROGENIC CLAUDICATION PRESENT: Primary | ICD-10-CM

## 2020-10-07 PROCEDURE — 99213 OFFICE O/P EST LOW 20 MIN: CPT | Performed by: ORTHOPAEDIC SURGERY

## 2020-10-07 NOTE — PROGRESS NOTES
She has failed to improve with conservative care including epidurals.  No bowel or bladder complaints.  Good strength in the upper extremity on examination.  She has C4-5, C5-6 stenosis for which surgery would be a good option but she did not do physical therapy.  Certainly no reason why we cannot exhaust this and I am going to order that.  She will call if she fails to improve and we can talk about surgery.

## 2020-10-09 ENCOUNTER — APPOINTMENT (OUTPATIENT)
Dept: PAIN MEDICINE | Facility: HOSPITAL | Age: 75
End: 2020-10-09

## 2020-10-28 ENCOUNTER — TREATMENT (OUTPATIENT)
Dept: PHYSICAL THERAPY | Facility: CLINIC | Age: 75
End: 2020-10-28

## 2020-10-28 DIAGNOSIS — M47.22 CERVICAL SPONDYLOSIS WITH RADICULOPATHY: Primary | ICD-10-CM

## 2020-10-28 PROCEDURE — 97140 MANUAL THERAPY 1/> REGIONS: CPT | Performed by: PHYSICAL THERAPIST

## 2020-10-28 PROCEDURE — 97162 PT EVAL MOD COMPLEX 30 MIN: CPT | Performed by: PHYSICAL THERAPIST

## 2020-10-28 PROCEDURE — 97110 THERAPEUTIC EXERCISES: CPT | Performed by: PHYSICAL THERAPIST

## 2020-10-28 PROCEDURE — 97530 THERAPEUTIC ACTIVITIES: CPT | Performed by: PHYSICAL THERAPIST

## 2020-10-28 NOTE — PROGRESS NOTES
Orthopedic / Sports / Industrial Physical Therapy  Physical Therapy Initial Evaluation and Plan of Care    Patient Name: Kourtney Lorenzana          :  1945  Referring Physician: Jose Napier MD  Diagnosis:   Cervical spondylosis with radiculopathy M47.22;  721.0  Date of Evaluation: 10/28/2020  ______________________________________________________________________    Subjective Evaluation    History of Present Illness  Onset date: Over 10 months - worse recently.  Mechanism of injury: Insidious onset - OA / spurs (per Pt) -     Had been to pain mgt and received 2 epidurals with last one in .         Patient Occupation: Retired Pain  Current pain ratin  At worst pain rating: 10  Location: (L) C-spine, UT;  Denies UE radicular symptoms - Very limited rotation to (L) -   Quality: Ache; Worse at times - Painful Crepitus/popping   Exacerbated by: Mopping, vacuuming, Lifting wet clothes, (L) SIdelying; Rotation to (R);    Progression: worsening    Hand dominance: right    Diagnostic Tests  MRI studies: abnormal    Treatments  Previous treatment: physical therapy  Current treatment: injection treatment  Patient Goals  Patient/family treatment goals: Decreased pain; Improved mobility / movement;  Improved ADL's -         ___________________________________________________  Objective          Postural Observations    Additional Postural Observation Details  Fwd head posture w/ loss of C-lordosis  Hypertrophy (L) UT, Supraclavicular region (L)      Tenderness     Additional Tenderness Details  Severely tender (L) UT, Levator, CPS, Scalenes, 1st rib (L) -     Active Range of Motion     Additional Active Range of Motion Details  C-Spine: Flex/Ext WNL;  SB >30-deg, but w/ pain (L) lower neck/UT w/ SB to (L); ROT (L) 50-deg; (R) 70-deg   UE AROM: WFL grossly    Strength/Myotome Testing     Additional Strength Details  UE Myotomes grossly intact    Tests     Additional Tests Details  (-) Cervical  Compression / Distraction;  (-) Quadrant positioning;   (+) Elevated 1st rib (L)            See Treatment Flow sheet for Exercises, Manual therapy, and modalities.   FUNCTIONAL ACTIVITIES: X 13min  · TAPING / BRACING: NA  · Reviewed MRI report w/ Pt.   · Jt protection, ADL modification; Posture and     ___________________________________________________  Assessment & Plan     Assessment  Assessment details: 75 yo female w/ (L) sided neck/UT pain; Apparent significant degenerative changes, etc.   Pt responded well to manual therapy with decreased and improved mobility into rotation to (L) (increased >20-deg) -     PROBLEMS: Pain; Limited mobility; Limited tolerance to ADL's including carrying objects, lifting, sleeping, turning her head / driving, etc.   PROGNOSIS: Good    GOALS:   SHORT TERM GOALS: 2 weeks:  1) HEP Initiated; 2) Pain decreased 50%:   3) AROM C-rotation to (L) increased > 10-de) Improved functional ability grossly;   LONG TERM GOALS: 4 weeks (or at time of DISCHARGE): 1) (I) HEP; 2) AROM WFL and pain free; 3) Strength / mobility to be able to perform all ADL's w/ w/o modifications;       Plan  Planned therapy interventions: body mechanics training, home exercise program, manual therapy, neuromuscular re-education, soft tissue mobilization, strengthening, stretching and therapeutic activities (Modalities prn; Taping prn; )  Frequency: 2x week  Duration in weeks: 4  Treatment plan discussed with: patient      ___________________________________________________  Manual Therapy:    20     mins  94503;   Therapeutic Exercise:    10     mins  35124;     Neuromuscular Jenn:        mins  20552;   Therapeutic Activity:    13      mins  83187;     Ultrasound:     06     mins  03982;    Electrical Stimulation:   15     mins  96209 ( );  Dry Needling          mins self-pay   Gait Training:          mins  45727;  EVAL TIME:   20 min    Timed Treatment:   49   mins                Total  Treatment:     90   mins    PT SIGNATURE:   Jasno Bower, PT  DATE TREATMENT INITIATED: 10/28/2020  ___________________________________________________  Initial Certification  Certification Period: 1/26/2021  I certify that the therapy services are furnished while this patient is under my care.  The services outlined above are required by this patient, and will be reviewed every 90 days.     PHYSICIAN: ________________________________  DATE: ______  Jose Napier MD        Please sign and return via fax to [phone].. Thank you, [facility].  ______________________________________________________________________  [address] [place] [address]  Phone: [phone] Fax: [phone]  Access Code: [email or web address]   URL: [email or web address]   Date: 10/28/2020   Prepared by: Jason Bower     Exercises  Seated Cervical Retraction - 15 reps - 1 sets - 5s hold - 3x daily - 7x weekly  Standing Upper Trapezius Stretch - 3-5 reps - 1 sets - 30s hold - 3x daily - 7x weekly  Seated Levator Scapulae Stretch - 3-5 reps - 1 sets - 30s hold - 3x daily - 7x weekly  Standing Scapular Retraction - 10-15 reps - 1 sets - 10s hold - 2-3x daily - 7x weekly

## 2020-11-04 ENCOUNTER — TREATMENT (OUTPATIENT)
Dept: PHYSICAL THERAPY | Facility: CLINIC | Age: 75
End: 2020-11-04

## 2020-11-04 DIAGNOSIS — M54.2 CERVICALGIA: ICD-10-CM

## 2020-11-04 DIAGNOSIS — M47.22 CERVICAL SPONDYLOSIS WITH RADICULOPATHY: Primary | ICD-10-CM

## 2020-11-04 PROCEDURE — 97140 MANUAL THERAPY 1/> REGIONS: CPT | Performed by: PHYSICAL THERAPIST

## 2020-11-04 PROCEDURE — 97530 THERAPEUTIC ACTIVITIES: CPT | Performed by: PHYSICAL THERAPIST

## 2020-11-04 PROCEDURE — 97110 THERAPEUTIC EXERCISES: CPT | Performed by: PHYSICAL THERAPIST

## 2020-11-04 PROCEDURE — G0283 ELEC STIM OTHER THAN WOUND: HCPCS | Performed by: PHYSICAL THERAPIST

## 2020-11-20 ENCOUNTER — TREATMENT (OUTPATIENT)
Dept: PHYSICAL THERAPY | Facility: CLINIC | Age: 75
End: 2020-11-20

## 2020-11-20 DIAGNOSIS — M54.2 CERVICALGIA: ICD-10-CM

## 2020-11-20 DIAGNOSIS — M47.22 CERVICAL SPONDYLOSIS WITH RADICULOPATHY: Primary | ICD-10-CM

## 2020-11-20 PROCEDURE — 97530 THERAPEUTIC ACTIVITIES: CPT | Performed by: PHYSICAL THERAPIST

## 2020-11-20 PROCEDURE — 97110 THERAPEUTIC EXERCISES: CPT | Performed by: PHYSICAL THERAPIST

## 2020-11-20 PROCEDURE — 97140 MANUAL THERAPY 1/> REGIONS: CPT | Performed by: PHYSICAL THERAPIST

## 2020-11-20 NOTE — PROGRESS NOTES
Physical Therapy Daily Progress Note    Patient Name: Kourtney Lorenzana         :  1945  Referring Physician: Jose Napier MD      Subjective   Kourtney Lorenzana reports: c/O pain in (L) neck/ut/shoulder - Moved 2020 -  Doing a lot of activity - Pain increased late that afternoon after mopping/vacuuming other home, loading ite3m sinto car, 4 car loads - Whole body hurt - Tylenol not working - took advil and tylenol later -   Had to do a lot yesterday, but not as painful - able to get by w/tylenol     Objective   Guarding (L) UT/Upper quarter  Limited cervical rotation to (L) w/ pain (L) lower Cspine and UT region; Flexion WFL; Ext WFL w/ pain (L) UT region at end range - SB limited and painful to (L) w/ pain (L) UT and lower neck region -  UE AROM WFL w/ pain in (L) UT region w/ FE, ERB/IRB -  UE Myotomes WNL -   Very tender (L) UT, scalenes, 1st rib w/ TPs -   (+) Elevated 1st rib (L)    See Exercise, Manual, and Modality Logs for complete treatment.     Functional / Therapeutic Activities:  15 min  · TAPING / BRACING: NA  · Cervical spine/upper quarter assessment -   · Reviewed Jt protection, ADL modification; Posture and      Assessment/Plan  75 yo female w/ (L) sided neck/UT pain; Myofascial pain (L) -   Improved with decreased pain and improved mobility and function until moving this week with resultant increase in pain and loss of mobility and function -   Much less pain and improved mobility after manual therapy        Progress strengthening /stabilization /functional activity       _________________________________________________  Manual Therapy:    23     mins  44598;  Therapeutic Exercise:    10     mins  38022;     Neuromuscular Jenn:        mins  70791;    Therapeutic Activity:     15     mins  95588;     Gait Training:           mins  20455;     Ultrasound:     08     mins  56242;    Electrical Stimulation:    20     mins  35801 ( );  Dry Needling          mins  self-pay    Timed Treatment:   56   mins                  Total Treatment:     80   mins    Jason Bower, PT  Physical Therapist

## 2020-11-25 ENCOUNTER — TREATMENT (OUTPATIENT)
Dept: PHYSICAL THERAPY | Facility: CLINIC | Age: 75
End: 2020-11-25

## 2020-11-25 DIAGNOSIS — M54.2 CERVICALGIA: ICD-10-CM

## 2020-11-25 DIAGNOSIS — M47.22 CERVICAL SPONDYLOSIS WITH RADICULOPATHY: Primary | ICD-10-CM

## 2020-11-25 PROCEDURE — 97530 THERAPEUTIC ACTIVITIES: CPT | Performed by: PHYSICAL THERAPIST

## 2020-11-25 PROCEDURE — 97110 THERAPEUTIC EXERCISES: CPT | Performed by: PHYSICAL THERAPIST

## 2020-11-25 PROCEDURE — 97140 MANUAL THERAPY 1/> REGIONS: CPT | Performed by: PHYSICAL THERAPIST

## 2020-11-25 NOTE — PROGRESS NOTES
Physical Therapy Daily Progress Note    Patient Name: Kourtney Lorenzana         :  1945  Referring Physician: Jose Napier MD      Subjective   Kourtney Lorenzana reports: feeling much better after last session - Some increased pain (L) UT/neck today - limited ability to turn head to L>R    Objective   Fwd head posturing   Very tender (L) UT, scalenes, 1st rib and levator  Limited C-rotation L>R w/ pain (L) UT region  (+) Elevated 1st rib (L)    See Exercise, Manual, and Modality Logs for complete treatment.     Functional / Therapeutic Activities:  10 min  · TAPING / BRACING: NA  · Discussion about the importance of head posturing / positioning and its affect on stress to neck, upper back and LB (BOWLING BALL TALK)  · Assessment of neck / upper quarter    Assessment/Plan  73 yo female w/ (L) sided neck/UT pain; Myofascial pain (L) -   Flare up from moving much improved, but remaining pain and limited mobility   Much better after MT w/ pain alleviated and ROM much improved     Progress strengthening /stabilization /functional activity       _________________________________________________  Manual Therapy:    23    mins  34715;  Therapeutic Exercise:    15     mins  92575;     Neuromuscular Jenn:        mins  95520;    Therapeutic Activity:     10     mins  06622;     Gait Training:           mins  99390;     Ultrasound:     06     mins  21936;    Electrical Stimulation:    20     mins  70142 ( );  Dry Needling          mins self-pay    Timed Treatment:   54   mins                  Total Treatment:     80   mins    Jason Bower PT  Physical Therapist

## 2020-12-03 ENCOUNTER — TREATMENT (OUTPATIENT)
Dept: PHYSICAL THERAPY | Facility: CLINIC | Age: 75
End: 2020-12-03

## 2020-12-03 DIAGNOSIS — M54.2 CERVICALGIA: ICD-10-CM

## 2020-12-03 DIAGNOSIS — M47.22 CERVICAL SPONDYLOSIS WITH RADICULOPATHY: Primary | ICD-10-CM

## 2020-12-03 PROCEDURE — 97530 THERAPEUTIC ACTIVITIES: CPT | Performed by: PHYSICAL THERAPIST

## 2020-12-03 PROCEDURE — 97140 MANUAL THERAPY 1/> REGIONS: CPT | Performed by: PHYSICAL THERAPIST

## 2020-12-03 PROCEDURE — 97110 THERAPEUTIC EXERCISES: CPT | Performed by: PHYSICAL THERAPIST

## 2020-12-03 NOTE — PROGRESS NOTES
------------------------------------------------------------------------------------------------------   MD PROGRESS NOTE    Patient: Kourtney Lorenzana        : 1945  Diagnosis/ICD-10 Code:  Cervical spondylosis with radiculopathy [M47.22]  Referring practitioner: Jose Napier MD  Date of Initial Visit: 10/28/2020                  Today's Date: 12/3/2020  _________________________________________________________________    Thank you for the referral of Ms. Lorenzana to [facility].  Ms. Lorenzana has attended 5 PT sessions and their treatment has consisted of: modalities prn, manual therapy, therapeutic exercise, patient education, and HEP.     Subjective   Kourtney Lorenzana reports: feeling much better with decreased pain and improved mobility and functional ability - notes intermittent soreness, but much improved overall - Stretching helpful -  ___________________________________________________________________  Objective              OBSERVATION: Improved posturing c-spine / Upper quarter - Decreased resting tension (L) UT              PALPATION: Tender (L) UT, Scalnes, CPS and 1st rib w/ TPs, but less intense /severe        AROM: Full C-AROM all planes except rotation to (L) w/ pain in (L) lower neck, UT region   STRENGTH: UE Myotomes WNL   SENSATION: Grossly intact to LT UE's   SPECIAL TESTS: (+) Elevated 1st rib (L), but less severe -    ACTIVITY TOLERANCE: Improving tolerance to ADL's -     See Exercise, Manual, and Modality Logs for complete treatment.      Functional / Therapeutic Activities:  X 15 min  · TAPING / BRACING: NA  · FUNCTIONAL ASSESSMENT -    · Jt protection, ADL modification; Posture and    ___________________________________________________________________   Assessment/Plan  75 yo female w/ (L) sided neck/UT pain; Myofascial pain (L) -   Flare up from moving much improved, but remaining pain and limited mobility   Much better after MT w/ pain alleviated and  ROM much improved   Pain alleviated and c-rotation to (L) restored w/o pain   Ms. Lorenzana would benefit from continued Physical Therapy.     P: Recommend continued Physical Therapy to allow a full and safe return to ADL's and normal job duties 1-2 times/wk x 4weeks.    Please advise after your exam.    Thank you again for this referral of Ms. Lorenzana to [facility].    PT Signature: ______________________________   Jason Bower, PT  ______________________________________________________  Based upon review of the patient's progress and continued therapy plan, it is my medical opinion that Kourtney Lorenzana should continue physical therapy treatment per the recommendation above.     Signature: ____________________________   Date:  _______   Jose Napier MD  ____________________________________________________________________  Manual Therapy:    23     mins  66385;    Therapeutic Exercise:   15    mins  59635;     Neuromuscular Jenn:        mins  56853;     Therapeutic Activity:    15     mins  07074;     Ultrasound:     06     mins  24985;     Electrical Stimulation:  15     mins  99226 ( );  Dry Needling          mins self-pay     Gait Training:          mins  71710;    Timed Treatment:  59   mins             Total Treatment:     80   mins    ______________________________________________________________________  [address] [place] [address]  Phone: [phone] Fax: [phone]

## 2020-12-11 ENCOUNTER — TREATMENT (OUTPATIENT)
Dept: PHYSICAL THERAPY | Facility: CLINIC | Age: 75
End: 2020-12-11

## 2020-12-11 DIAGNOSIS — M47.22 CERVICAL SPONDYLOSIS WITH RADICULOPATHY: Primary | ICD-10-CM

## 2020-12-11 DIAGNOSIS — M54.2 CERVICALGIA: ICD-10-CM

## 2020-12-11 PROCEDURE — 97140 MANUAL THERAPY 1/> REGIONS: CPT | Performed by: PHYSICAL THERAPIST

## 2020-12-11 PROCEDURE — 97530 THERAPEUTIC ACTIVITIES: CPT | Performed by: PHYSICAL THERAPIST

## 2020-12-11 PROCEDURE — 97110 THERAPEUTIC EXERCISES: CPT | Performed by: PHYSICAL THERAPIST

## 2020-12-11 NOTE — PROGRESS NOTES
Physical Therapy Daily Progress Note    Patient Name: Kourtney Lorenzana         :  1945  Referring Physician: Jose Napier MD      Subjective   Kourtney Lorenzana reports: feeling a lot better and used the vacum yesterday doing 3 bedrooms and an area rug and had to push very hard - felt good while doing it, but noted increased soreness last night and today and stiff on (L) side -     Objective   Pt very tender over (L) CPS, UT, Scalenes, 1st rib  Limited C-rotation to (L) and extension w/ pain (L) UT/lower neck  (-) C-distraction / Compression  (+) Elevated 1st rib (L)    See Exercise, Manual, and Modality Logs for complete treatment.     Functional / Therapeutic Activities:  10  min  · TAPING / BRACING: NA  · Neck/upper quarter assessment  · Jt protection, ADL modification; Posture and      Assessment/Plan  75 yo female w/ (L) sided neck/UT pain; Myofascial pain (L) -   Flare up from moving much improved, but remaining pain and limited mobility   Much better after MT w/ pain alleviated and ROM much improved   Pain alleviated and c-rotation to (L) restored w/o pain     Progress strengthening /stabilization /functional activity conservatively        _________________________________________________  Manual Therapy:    23     mins  79062;  Therapeutic Exercise:    15     mins  53855;     Neuromuscular Jenn:        mins  78025;    Therapeutic Activity:     10     mins  48330;     Gait Training:           mins  51338;     Ultrasound:     08     mins  88875;    Electrical Stimulation:    20     mins  21637 ( );  Dry Needling          mins self-pay    Timed Treatment:   56   mins                  Total Treatment:     80   mins    Jason Bower PT  Physical Therapist

## 2020-12-18 ENCOUNTER — TREATMENT (OUTPATIENT)
Dept: PHYSICAL THERAPY | Facility: CLINIC | Age: 75
End: 2020-12-18

## 2020-12-18 DIAGNOSIS — M47.22 CERVICAL SPONDYLOSIS WITH RADICULOPATHY: Primary | ICD-10-CM

## 2020-12-18 DIAGNOSIS — M54.2 CERVICALGIA: ICD-10-CM

## 2020-12-18 PROCEDURE — 97140 MANUAL THERAPY 1/> REGIONS: CPT | Performed by: PHYSICAL THERAPIST

## 2020-12-18 PROCEDURE — 97530 THERAPEUTIC ACTIVITIES: CPT | Performed by: PHYSICAL THERAPIST

## 2020-12-18 PROCEDURE — 97110 THERAPEUTIC EXERCISES: CPT | Performed by: PHYSICAL THERAPIST

## 2020-12-18 NOTE — PROGRESS NOTES
Physical Therapy Daily Progress Note    Patient Name: Kourtney Lorenzana         :  1945  Referring Physician: Jose Napier MD      Subjective   Kourtney Lorenzana reports: doing much better, but notes intermittent sharp pain in (L) neck/UT that wraps around the right side of her throat -     Objective   Tender (L) UT Scalenes, 1st rib and very tight vs (R)  Limited and painful C-rotation to (L) and Extension w/ pain in (L) UT/Lower neck  Painful SB to (L)   (+) Elevated 1st rib (L)  (-) Cervical Compression / Distraction -     See Exercise, Manual, and Modality Logs for complete treatment.     Functional / Therapeutic Activities:  10 min  · TAPING / BRACING: NA  · Assessment of Neck / upper quarter  · Jt protection, ADL modification; Posture and      Assessment/Plan  76 yo female w/ (L) sided neck/UT pain; Myofascial pain (L) - Cervical spondylosis / Cervical DDD/DJD -   Improved with decreased pain and improved mobility, but intermittent sharp pain in (L) UT/Neck  Much improved after manual therapy with decreased pain and improved mobility     Progress strengthening /stabilization /functional activity       _________________________________________________  Manual Therapy:    23     mins  38503;  Therapeutic Exercise:    15     mins  66350;     Neuromuscular Jenn:        mins  21831;    Therapeutic Activity:     10     mins  50336;     Gait Training:           mins  10588;     Ultrasound:     08     mins  81679;    Electrical Stimulation:    20     mins  66077 ( );  Dry Needling          mins self-pay    Timed Treatment:   56   mins                  Total Treatment:     80   mins    Jason Bower PT  Physical Therapist

## 2020-12-30 ENCOUNTER — TREATMENT (OUTPATIENT)
Dept: PHYSICAL THERAPY | Facility: CLINIC | Age: 75
End: 2020-12-30

## 2020-12-30 DIAGNOSIS — M54.2 CERVICALGIA: ICD-10-CM

## 2020-12-30 DIAGNOSIS — M47.22 CERVICAL SPONDYLOSIS WITH RADICULOPATHY: Primary | ICD-10-CM

## 2020-12-30 PROCEDURE — 97530 THERAPEUTIC ACTIVITIES: CPT | Performed by: PHYSICAL THERAPIST

## 2020-12-30 PROCEDURE — 97140 MANUAL THERAPY 1/> REGIONS: CPT | Performed by: PHYSICAL THERAPIST

## 2020-12-30 PROCEDURE — 97110 THERAPEUTIC EXERCISES: CPT | Performed by: PHYSICAL THERAPIST

## 2020-12-30 NOTE — PROGRESS NOTES
Physical Therapy Daily Progress Note    Patient Name: Kourtney Lorenzana         :  1945  Referring Physician: Jose Napier MD      Subjective   Kourtney Lorenzana reports: feeling much better since last treatment with decreased pain and improved mobility and function - More able to perform ADL's, etc    Objective   Much less tender (L) UT, 1st rib, scalenes, Cspine -   Pt demonstrating improved AROM all planes w/o pain Cspine     See Exercise, Manual, and Modality Logs for complete treatment.     Functional / Therapeutic Activities:  08 min  · TAPING / BRACING: NA  · Assessment of C-spine / upper quarter  · Jt protection, ADL modification; Posture and      Assessment/Plan  76 yo female w/ (L) sided neck/UT pain; Myofascial pain (L) - Cervical spondylosis / Cervical DDD/DJD -   Improved with decreased pain and improved mobility, but intermittent sharp pain in (L) UT/Neck  Much improved after manual therapy with decreased pain and improved mobility since last sessioin    Progress strengthening /stabilization /functional activity       _________________________________________________  Manual Therapy:    10     mins  08710;  Therapeutic Exercise:    25     mins  08199;     Neuromuscular Jenn:        mins  87354;    Therapeutic Activity:     10     mins  04109;     Gait Training:           mins  58724;     Ultrasound:     08     mins  42665;    Electrical Stimulation:    20     mins  30697 ( );  Dry Needling          mins self-pay    Timed Treatment:   53   mins                  Total Treatment:     80   mins    Jason Bower PT  Physical Therapist

## 2021-01-04 NOTE — PROGRESS NOTES
"Patient: Kourtney Lorenzana  YOB: 1945 75 y.o. female  Medical Record Number: [id number]    Chief Complaints: Bilateral knee pain    History of Present Illness:Kourtney Lorenzana is a 75 y.o. female who presents with complaints of increased bilateral knee pain.  She denies any recent injury.  She received cortisone injections 3 months ago and had\" a reaction\" significant pain following the injections.  Patient describes a bilateral knee pain as a moderate constant ache worse with standing walking, only slightly better with rest right knee is worse than the left    Allergies:   Allergies   Allergen Reactions   • Metoprolol Itching   • Hydrocodone-Acetaminophen GI Intolerance   • Other Nausea And Vomiting     Unknown \"strong\" pain killer   • Tramadol GI Intolerance       Medications:   Current Outpatient Medications   Medication Sig Dispense Refill   • acetaminophen (TYLENOL) 500 MG tablet Take 500-1,000 mg by mouth Every 6 (Six) Hours As Needed for Mild Pain .     • amitriptyline (ELAVIL) 10 MG tablet Take 1-2 tablet at bedtime as needed for sleep 45 tablet 0   • atenolol (TENORMIN) 25 MG tablet Take 25 mg by mouth Daily.     • Cholecalciferol (VITAMIN D3) 50 MCG (2000 UT) tablet Take 2,000 Units by mouth Daily.     • pantoprazole (PROTONIX) 40 MG EC tablet Take 1 tablet by mouth 2 (two) times a day.     • vitamin C (ASCORBIC ACID) 250 MG tablet Take 500 mg by mouth Daily.     • Zinc 40 MG tablet Take 40 mg by mouth Daily.       No current facility-administered medications for this visit.          The following portions of the patient's history were reviewed and updated as appropriate: allergies, current medications, past family history, past medical history, past social history, past surgical history and problem list.    Review of Systems:   A 14 point review of systems was performed. All systems negative except pertinent positives/negative listed in HPI above    Physical Exam:   Vitals:    01/05/21 1419 " "  Temp: 96.8 °F (36 °C)   Weight: 73.5 kg (162 lb)   Height: 157.5 cm (62\")   PainSc:   8       General: A and O x 3, ASA, NAD    SCLERA:    Normal    DENTITION:   Normal  Skin clear no unusual lesions noted  Bilateral knees no appreciable effusion 110 degrees flexion neutral extension with a positive Akiko negative Lockman calf soft and nontender    Radiology:  Xrays 3views (ap,lateral, sunrise) previous x-rays bilateral knees were reviewed and shows significant arthritic changes    Assessment/Plan:  Osteoarthritis bilateral knees    Patient I discussed options, she was given information regarding Monovisc injections that something she may want to try, she was for to outpatient physical therapy, she will continue with anti-inflammatories.    Elsa Chiang, SLY      "

## 2021-01-05 ENCOUNTER — CLINICAL SUPPORT (OUTPATIENT)
Dept: ORTHOPEDIC SURGERY | Facility: CLINIC | Age: 76
End: 2021-01-05

## 2021-01-05 VITALS — HEIGHT: 62 IN | BODY MASS INDEX: 29.81 KG/M2 | WEIGHT: 162 LBS | TEMPERATURE: 96.8 F

## 2021-01-05 DIAGNOSIS — M17.0 PRIMARY OSTEOARTHRITIS OF KNEES, BILATERAL: Primary | ICD-10-CM

## 2021-01-05 PROCEDURE — 99213 OFFICE O/P EST LOW 20 MIN: CPT | Performed by: NURSE PRACTITIONER

## 2021-01-08 ENCOUNTER — TREATMENT (OUTPATIENT)
Dept: PHYSICAL THERAPY | Facility: CLINIC | Age: 76
End: 2021-01-08

## 2021-01-08 DIAGNOSIS — M54.2 CERVICALGIA: ICD-10-CM

## 2021-01-08 DIAGNOSIS — M47.22 CERVICAL SPONDYLOSIS WITH RADICULOPATHY: Primary | ICD-10-CM

## 2021-01-08 PROCEDURE — 97140 MANUAL THERAPY 1/> REGIONS: CPT | Performed by: PHYSICAL THERAPIST

## 2021-01-08 PROCEDURE — 97530 THERAPEUTIC ACTIVITIES: CPT | Performed by: PHYSICAL THERAPIST

## 2021-01-08 PROCEDURE — 97110 THERAPEUTIC EXERCISES: CPT | Performed by: PHYSICAL THERAPIST

## 2021-01-08 NOTE — PROGRESS NOTES
------------------------------------------------------------------------------------------------------   MD PROGRESS NOTE     Patient: Kourtney Lorenzana        : 1945  Diagnosis/ICD-10 Code:  Cervical spondylosis with radiculopathy [M47.22]  Referring practitioner: Jose Napier MD  Date of Initial Visit: 10/28/2020                  Treatment Date: 2021  _________________________________________________________________     Thank you for the referral of Ms. Lorenzana to [facility] Therapy.  Ms. Lorenzana has attended 9 PT sessions and their treatment has consisted of: modalities prn, manual therapy, therapeutic exercise, patient education, and HEP.      Subjective   Kourtney Lorenzana reports: feeling much better with decreased pain and improved mobility and functional ability - notes intermittent soreness, but much improved overall - Stretching helpful -  ___________________________________________________________________  Objective              OBSERVATION: Improved posturing c-spine / Upper quarter - Decreased resting tension (L) UT              PALPATION: Tender (L) UT, Scalnes, CPS and 1st rib w/ TPs, but less intense /severe              AROM: Full C-AROM all planes except rotation to (L) w/ pain in (L) lower neck, UT region              STRENGTH: UE Myotomes WNL              SENSATION: Grossly intact to LT UE's              SPECIAL TESTS: (+) Elevated 1st rib (L), but less severe -  (-) Cervical Compression / Distraction; (+) Quadrant position (L) C-spine              ACTIVITY TOLERANCE: Improving tolerance to ADL's -          See Exercise, Manual, and Modality Logs for complete treatment.      Functional / Therapeutic Activities:  X 15 min  · TAPING / BRACING: NA  · FUNCTIONAL ASSESSMENT -    · Jt protection, ADL modification; Posture and    ___________________________________________________________________   Assessment/Plan  75 yo female w/ (L) sided neck/UT pain; Myofascial  pain (L) -   (Per MRI: Degenerative disc and facet change in the cervical spine  with grade 1 retrolisthesis of C4; C-DJD; Foraminal Stenosis)  Much improved with decreased pain and improved mobility and function - Intermittent flare-ups of pain, but less often and less severe - modifying activities helpful, but over use such as vacuuming, etc causes flare-ups at time -   Ms. Lorenzana would benefit from continued Physical Therapy prn due to severity of her condition and to avoid further more aggressive /invasive procedures and allow improved function, etc.   GOAL STATUS: STGs; Met                             LTGs; Progressing towards Function / mobility goals-      P: Recommend continued Physical Therapy prn to allow a full and safe return to ADL's prn and continue to progress towards long term goals.      Thank you again for this referral of Ms. Lorenzana to [facility] Physical Therapy.     PT Signature: ______________________________   Jason Bower, PT  ______________________________________________________  Based upon review of the patient's progress and continued therapy plan, it is my medical opinion that Kourtney Lorenzana should continue physical therapy treatment per the recommendation above.      Signature: ____________________________   Date:  _______   Jose Napier MD  ____________________________________________________________________  Manual Therapy:            10     mins  13983;    Therapeutic Exercise:   23    mins  31525;     Neuromuscular Jenn:        mins  20873;     Therapeutic Activity:    15     mins  76963;     Ultrasound:                     06     mins  72923;     Electrical Stimulation:  20     mins  14989 ( );  Dry Needling                       mins self-pay     Gait Training:            mins  38403;     Timed Treatment:  54   mins             Total Treatment:     80   mins     ______________________________________________________________________  [address] [place] [address]  Phone: [phone]                 Fax: [phone]

## 2021-01-12 ENCOUNTER — TREATMENT (OUTPATIENT)
Dept: PHYSICAL THERAPY | Facility: CLINIC | Age: 76
End: 2021-01-12

## 2021-01-12 DIAGNOSIS — M17.0 PRIMARY OSTEOARTHRITIS OF KNEES, BILATERAL: Primary | ICD-10-CM

## 2021-01-12 PROCEDURE — 97162 PT EVAL MOD COMPLEX 30 MIN: CPT | Performed by: PHYSICAL THERAPIST

## 2021-01-12 PROCEDURE — 97112 NEUROMUSCULAR REEDUCATION: CPT | Performed by: PHYSICAL THERAPIST

## 2021-01-12 PROCEDURE — 97535 SELF CARE MNGMENT TRAINING: CPT | Performed by: PHYSICAL THERAPIST

## 2021-01-12 NOTE — PROGRESS NOTES
Physical Therapy Initial Evaluation and Plan of Care    Patient Name: Kourtney Lorenzana          :  1945  Referring Physician: Elsa Chiang APRN  Diagnosis: Primary osteoarthritis of knees, bilateral [M17.0]    Date of Evaluation: 2021  ______________________________________________________________________    Subjective Evaluation    History of Present Illness  Onset date: 5-10 yrs - Progressively worsening over last couple of months.  Mechanism of injury: New house has stairs and hasn't had to do stairs much prior to this -         Patient Occupation: retired -  Pain  Current pain ratin  At worst pain rating: 10  Location: Anterior and lateral knee (B);   Exacerbated by: Walking, stairs, squatting, sleeping on her side (knees touching) ; Knee flexion; Kneeling.  Progression: worsening    Social Support  Patient lives at: In a house with her kids and lives in basement and has to use steps to go upstairs -     Diagnostic Tests  X-ray: abnormal (No knee Xrays since 2020)    Treatments  Previous treatment: injection treatment and physical therapy  Current treatment comments: To have Gel injections 2021.     Patient Goals  Patient/family treatment goals: Decreased pain; Improved ability to walk, do stairs / ADL's, etc w/wo modifications -          ___________________________________________________  Objective          Observations     Additional Knee Observation Details  Pt presents with antalgic gait - Diffuse swelling R>L knee anterior, posterior - WS to (L) -       Tenderness     Additional Tenderness Details  Severely tender infrapatellar region, patellar tendon, medial PF jt, medial knee jt line, pes region, lateral joint line and proximal tib-fib joint R>>L -   Palpable crepitus R>L PF Jt w/ AROM knees -     Active Range of Motion     Additional Active Range of Motion Details  WNL (B) Knees, but increased anterior knee pain with Knee extension OP/HE R>L -     Tests     Additional  Tests Details  (+) Squat Test and Step-Up Tests R>>L  Very poor ability to step-up with (R) LE due to severe pain anterior knee  (+) Thessalys and McMurrays (medial) R>L       See Treatment Flow sheet for Exercises, Manual therapy, and modalities.   NMR: K-tape to 1) Unload PF Jt / Patellar lift; 2) Unload Infrapatellar region; 3) Unload medial knee jt; 4) Fabricate patellar tendon strap (B) knees to inhibit pain and facilitate improved firing of quads/LE musculature and facilitate improved functional ability -  Self Care: X 15 min  · TAPING / BRACING: Discussed, described and directed Pt to patellar tendon straps since taping to simulate patellar tendon straps very very effective  · Jt protection, ADL modifications, etc    ___________________________________________________  Assessment & Plan     Assessment  Assessment details: (B) knee pain / OA (R>L); PF Jt Dysfunction; Patellar tendonitis  Taping greatly alleviated her pain and allowed much improved ambulation and ability to ambulate up/down stairs reciprocally w/ minimal pain -     PROBLEMS: Pain; Abnormal gait; Weakness; Intolerance to ADL's requiring use of LE's including stairs, squatting, walking, etc.   PROGNOSIS: Good -    GOALS:   SHORT TERM GOALS: 2 weeks:  1) HEP Initiated; 2) Pain decreased 50%:   3)  Improved gait / functional ability with taping;     LONG TERM GOALS: 4 weeks (or at time of DISCHARGE): 1) (I) HEP; 2) AROM WFL and pain free; 3) Strength / mobility to be able to perform all ADL's and job-related activities w/w/o restrictions/modifications / bracing, etc;       Plan  Planned therapy interventions: flexibility, home exercise program, manual therapy, neuromuscular re-education, soft tissue mobilization, strengthening, stretching and therapeutic activities (Modalities prn; taping/ bracing prn; )  Frequency: 2x week  Duration in weeks: 4  Treatment plan discussed with: patient      ___________________________________________________  Manual  Therapy:         mins  59497;   Therapeutic Exercise:    10     mins  09067;     Neuromuscular Jenn:    30    mins  87171;   Therapeutic Activity:     15     mins  33993;     Ultrasound:     06     mins  27534;    Electrical Stimulation:        mins  91945 ( );  Dry Needling          mins self-pay   Gait Training:          mins  64416;  EVAL TIME:   25 min    Timed Treatment:   61   mins                Total Treatment:     90   mins    PT SIGNATURE:   Jason Bower, PT  DATE TREATMENT INITIATED: 1/12/2021  ___________________________________________________  Initial Certification  Certification Period: 4/12/2021  I certify that the therapy services are furnished while this patient is under my care.  The services outlined above are required by this patient, and will be reviewed every 90 days.     PHYSICIAN: ________________________________  DATE: ______  Elsa Chiang APRN        Please sign and return via fax to [phone].. Thank you, [facility].  ______________________________________________________________________  [address] [place] [address]  Phone: [phone] Fax: [phone]

## 2021-01-13 ENCOUNTER — OFFICE VISIT (OUTPATIENT)
Dept: FAMILY MEDICINE CLINIC | Facility: CLINIC | Age: 76
End: 2021-01-13

## 2021-01-13 VITALS
OXYGEN SATURATION: 99 % | SYSTOLIC BLOOD PRESSURE: 120 MMHG | RESPIRATION RATE: 16 BRPM | BODY MASS INDEX: 30.73 KG/M2 | HEART RATE: 76 BPM | HEIGHT: 62 IN | WEIGHT: 167 LBS | DIASTOLIC BLOOD PRESSURE: 82 MMHG | TEMPERATURE: 97.8 F

## 2021-01-13 DIAGNOSIS — R10.814 LEFT LOWER QUADRANT ABDOMINAL TENDERNESS WITHOUT REBOUND TENDERNESS: ICD-10-CM

## 2021-01-13 DIAGNOSIS — R10.32 LEFT INGUINAL PAIN: ICD-10-CM

## 2021-01-13 DIAGNOSIS — R10.813 RIGHT LOWER QUADRANT ABDOMINAL TENDERNESS WITHOUT REBOUND TENDERNESS: ICD-10-CM

## 2021-01-13 DIAGNOSIS — R10.31 RLQ ABDOMINAL PAIN: ICD-10-CM

## 2021-01-13 DIAGNOSIS — L02.511 ABSCESS OF FINGER OF RIGHT HAND: Primary | ICD-10-CM

## 2021-01-13 PROCEDURE — 99213 OFFICE O/P EST LOW 20 MIN: CPT | Performed by: PHYSICIAN ASSISTANT

## 2021-01-13 RX ORDER — DOXYCYCLINE HYCLATE 100 MG/1
100 CAPSULE ORAL 2 TIMES DAILY
Qty: 20 CAPSULE | Refills: 0 | Status: SHIPPED | OUTPATIENT
Start: 2021-01-13 | End: 2021-02-19 | Stop reason: SDUPTHER

## 2021-01-13 RX ORDER — DOXYCYCLINE HYCLATE 100 MG/1
100 CAPSULE ORAL 2 TIMES DAILY
Qty: 20 CAPSULE | Refills: 0 | Status: SHIPPED | OUTPATIENT
Start: 2021-01-13 | End: 2021-01-13 | Stop reason: SDUPTHER

## 2021-01-13 NOTE — PROGRESS NOTES
Subjective   Kourtney Lorenzana is a 75 y.o. female who presents today with finger infection right middle finger.     History of Present Illness     Has a knot on the right middle finger x 2 weeks. Was swollen, red, and painful. Yesterday, she hit it when making the bed and it bled and drained. Redness, swelling, and pain has improved some. Not sure what happened to cause it. States no injury or exposures to infectious material.     The following portions of the patient's history were reviewed and updated as appropriate: allergies, current medications, past family history, past medical history, past social history, past surgical history and problem list.    Review of Systems   Constitutional: Negative.    HENT: Negative.    Respiratory: Negative.    Cardiovascular: Negative.    Skin: Positive for wound.   Neurological: Negative.        Objective   Vitals:    01/13/21 1428   BP: 120/82   Pulse: 76   Resp: 16   Temp: 97.8 °F (36.6 °C)   SpO2: 99%     Body mass index is 30.54 kg/m².    Physical Exam  Vitals signs and nursing note reviewed.   Constitutional:       General: She is not in acute distress.     Appearance: Normal appearance. She is well-developed.   HENT:      Head: Normocephalic and atraumatic.   Eyes:      General:         Right eye: No discharge.         Left eye: No discharge.   Cardiovascular:      Rate and Rhythm: Normal rate.      Pulses: Normal pulses.   Pulmonary:      Effort: Pulmonary effort is normal. No respiratory distress.   Musculoskeletal:        Hands:    Skin:     General: Skin is dry.   Neurological:      Mental Status: She is alert.   Psychiatric:         Attention and Perception: She is attentive.         Mood and Affect: Mood normal.         Speech: Speech normal.         Behavior: Behavior normal.         Thought Content: Thought content normal.         Judgment: Judgment normal.         Assessment/Plan   Diagnoses and all orders for this visit:    1. Abscess of finger of right hand  (Primary)  -     Discontinue: doxycycline (VIBRAMYCIN) 100 MG capsule; Take 1 capsule by mouth 2 (Two) Times a Day.  Dispense: 20 capsule; Refill: 0  -     Discontinue: mupirocin (Bactroban) 2 % ointment; Apply  topically to the appropriate area as directed 3 (Three) Times a Day.  Dispense: 15 g; Refill: 0  -     mupirocin (Bactroban) 2 % ointment; Apply  topically to the appropriate area as directed 3 (Three) Times a Day.  Dispense: 15 g; Refill: 0  -     doxycycline (VIBRAMYCIN) 100 MG capsule; Take 1 capsule by mouth 2 (Two) Times a Day.  Dispense: 20 capsule; Refill: 0        Assessment and Plan  Patient with a lesion right DIP joint that appears consistent with abscess that ruptured spontaneously. This could be related to other traumatic changes or skin lesion, however, she had rupture of lesion last night. I will treat with doxycycline 100 mg twice daily for 10 days and use mupiricin 2-3 x daily and keep covered. To be seen if worsening, new, or changing symptoms or if no complete resolution of symptoms.     I spent 20 minutes caring for Kourtney Lorenzana on this date of service. This time includes time spent by me in the following activities: preparing for the visit, reviewing tests, specialists records, and previous visits, obtaining and/or reviewing a separately obtained history, performing a medically appropriate examination and/or evaluation, counseling and educating the patient/family/caregiver, referring and/or communicating with other health care professionals as necessary, documenting information in the medical record, independently interpreting results and communicating that information with the patient/family/caregiver, and developing a medically appropriate treatment plan with consideration of other conditions, medications, and treatments.

## 2021-01-19 RX ORDER — PANTOPRAZOLE SODIUM 40 MG/1
TABLET, DELAYED RELEASE ORAL
Qty: 90 TABLET | Refills: 2 | Status: SHIPPED | OUTPATIENT
Start: 2021-01-19 | End: 2021-03-26 | Stop reason: SDUPTHER

## 2021-01-20 ENCOUNTER — TREATMENT (OUTPATIENT)
Dept: PHYSICAL THERAPY | Facility: CLINIC | Age: 76
End: 2021-01-20

## 2021-01-20 DIAGNOSIS — M17.0 PRIMARY OSTEOARTHRITIS OF KNEES, BILATERAL: Primary | ICD-10-CM

## 2021-01-20 PROCEDURE — 97110 THERAPEUTIC EXERCISES: CPT | Performed by: PHYSICAL THERAPIST

## 2021-01-20 PROCEDURE — 97112 NEUROMUSCULAR REEDUCATION: CPT | Performed by: PHYSICAL THERAPIST

## 2021-01-20 PROCEDURE — 97530 THERAPEUTIC ACTIVITIES: CPT | Performed by: PHYSICAL THERAPIST

## 2021-01-20 NOTE — PROGRESS NOTES
Physical Therapy Daily Progress Note    Patient Name: Kourtney Lorenzana         :  1945  Referring Physician: Elsa Chiang APRN      Subjective   Kourtney Lorenzana reports: doing well with knees taped for about 2 days, but pain progressively worse as tape loosened -  Shower reallly destroyed the tape  Pain severe (B) anterior and medial knees -     Objective   Severely tender medial joint line and pes region (B) knees;  Tender medial > lat PF jt (B) knee; Less tender lateral knee joint line (B) knees -   Diffusely swollen anterior knee / infrapatellar regions (B) knees -  Guarded / antalgic gait     See Exercise, Manual, and Modality Logs for complete treatment.   NMR: K-tape to 1) Unload PF Jt / Patellar lift; 2) Unload Infrapatellar region; 3) Unload medial knee jt; 4) Fabricate patellar tendon strap (B) knees to inhibit pain and facilitate improved firing of quads/LE musculature and facilitate improved functional ability   Functional / Therapeutic Activities:  10  min  ·  Discussed about wrapping tape with seran wrap to protect tape in shower   · Jt protection, ADL modifications; discussion of pain control and importance of anti-inflammatories for pain control, etc.      Assessment/Plan  (B) knee pain / OA (R>L); PF Jt Dysfunction; Patellar tendonitis  Taping greatly alleviated her pain and allowed much improved ambulation and ability to ambulate up/down stairs reciprocally w/ minimal pain -  Increased pain w/o tape -     Progress strengthening /stabilization /functional activity       _________________________________________________  Manual Therapy:         mins  93930;  Therapeutic Exercise:    20     mins  81399;     Neuromuscular Jenn:    33    mins  12145;    Therapeutic Activity:     10     mins  15486;     Gait Training:           mins  19752;     Ultrasound:          mins  28497;    Electrical Stimulation:         mins  99461 ( );  Dry Needling          mins self-pay    Timed Treatment:   63    mins                  Total Treatment:     70   mins    Jason Bower, PT  Physical Therapist

## 2021-01-22 ENCOUNTER — TREATMENT (OUTPATIENT)
Dept: PHYSICAL THERAPY | Facility: CLINIC | Age: 76
End: 2021-01-22

## 2021-01-22 DIAGNOSIS — M47.22 CERVICAL SPONDYLOSIS WITH RADICULOPATHY: Primary | ICD-10-CM

## 2021-01-22 DIAGNOSIS — M54.2 CERVICALGIA: ICD-10-CM

## 2021-01-22 PROCEDURE — 97035 APP MDLTY 1+ULTRASOUND EA 15: CPT | Performed by: PHYSICAL THERAPIST

## 2021-01-22 PROCEDURE — 97110 THERAPEUTIC EXERCISES: CPT | Performed by: PHYSICAL THERAPIST

## 2021-01-22 PROCEDURE — 97140 MANUAL THERAPY 1/> REGIONS: CPT | Performed by: PHYSICAL THERAPIST

## 2021-01-22 PROCEDURE — G0283 ELEC STIM OTHER THAN WOUND: HCPCS | Performed by: PHYSICAL THERAPIST

## 2021-01-22 NOTE — PROGRESS NOTES
Physical Therapy Daily Progress Note     Patient Name: Kourtney Lorenzana         :  1945  Referring Physician: Jose Napier MD (Neck)  /  Elsa Chiang APRN (Knees)      Subjective   Kourtney Lorenzana reports: Knees felt good after taping that day, but pain progressively got worse the next day (B) knees  Notes increased pain in (L) UT/Neck today - limited ability to turn head to (L)    Objective   Very tender (L) UT, CPS, Scalenes -   Limited C-rotation to (L) vs (R)   (+) Elevated 1st rib (L)    See Exercise, Manual, and Modality Logs for complete treatment.     Functional / Therapeutic Activities:   min  · TAPING / BRACING: NA  · Jt protection, ADL modification; Posture and      Assessment/Plan  (L) sided neck/UT pain; Myofascial pain (L) -   (Per MRI: Degenerative disc and facet change in the cervical spine  with grade 1 retrolisthesis of C4; C-DJD; Foraminal Stenosis)  Much improved with decreased pain and improved mobility and function - Intermittent flare-ups of pain, but less often and less severe -   Pain much less and mobility improved after MT today -     Progress strengthening /stabilization /functional activity       _________________________________________________  Manual Therapy:    20     mins  35925;  Therapeutic Exercise:    15     mins  54594;     Neuromuscular Jenn:        mins  54944;    Therapeutic Activity:          mins  46129;     Gait Training:           mins  60197;     Ultrasound:     08     mins  11154;    Electrical Stimulation:    20     mins  17864 ( );  Dry Needling          mins self-pay    Timed Treatment:   43   mins                  Total Treatment:     70   mins    Jason Bower PT  Physical Therapist

## 2021-01-25 NOTE — PROGRESS NOTES
"Patient: Kourtney Lorenzana  YOB: 1945 75 y.o. female  Medical Record Number: [id number]    Chief Complaints: Bilateral knee pain    History of Present Illness:Kourtney Lorenzana is a 75 y.o. female who presents with complaints of increased bilateral knee pain.  She denies any injury, describes the knee pain as a moderate sometimes severe constant ache worse with walking better with rest    Allergies:   Allergies   Allergen Reactions   • Metoprolol Itching   • Hydrocodone-Acetaminophen GI Intolerance   • Other Nausea And Vomiting     Unknown \"strong\" pain killer   • Tramadol GI Intolerance       Medications:   Current Outpatient Medications   Medication Sig Dispense Refill   • acetaminophen (TYLENOL) 500 MG tablet Take 500-1,000 mg by mouth Every 6 (Six) Hours As Needed for Mild Pain .     • Cholecalciferol (VITAMIN D3) 50 MCG (2000 UT) tablet Take 2,000 Units by mouth Daily.     • doxycycline (VIBRAMYCIN) 100 MG capsule Take 1 capsule by mouth 2 (Two) Times a Day. 20 capsule 0   • mupirocin (Bactroban) 2 % ointment Apply  topically to the appropriate area as directed 3 (Three) Times a Day. 15 g 0   • pantoprazole (PROTONIX) 40 MG EC tablet TAKE ONE TABLET BY MOUTH DAILY 90 tablet 2   • vitamin C (ASCORBIC ACID) 250 MG tablet Take 500 mg by mouth Daily.     • Zinc 40 MG tablet Take 40 mg by mouth Daily.       No current facility-administered medications for this visit.          The following portions of the patient's history were reviewed and updated as appropriate: allergies, current medications, past family history, past medical history, past social history, past surgical history and problem list.    Review of Systems:   A 14 point review of systems was performed. All systems negative except pertinent positives/negative listed in HPI above    Physical Exam:   Vitals:    01/26/21 1417   Temp: 97.9 °F (36.6 °C)   Weight: 74.8 kg (165 lb)   Height: 157.5 cm (62\")   PainSc:   8       General: A and O x 3, ASA, " NAD    SCLERA:    Normal    DENTITION:   Normal  Skin clear no unusual lesions noted  Bilateral knees patient has no appreciable effusion 110 degrees flexion neutral extension with a positive Akiko negative Lockman calf soft and nontender    Radiology:  Xrays 3views (ap,lateral, sunrise) previous x-rays bilateral knees were reviewed show significant arthritic changes    Assessment/Plan:  Osteoarthritis bilateral knees    Patient discussed options, she would like to proceed with bilateral knee Monovisc injections, she will continue with physical therapy exercises and I will see her back as needed    Large Joint Arthrocentesis: R knee  Date/Time: 1/26/2021 4:56 PM  Consent given by: patient  Site marked: site marked  Timeout: Immediately prior to procedure a time out was called to verify the correct patient, procedure, equipment, support staff and site/side marked as required   Supporting Documentation  Indications: pain   Procedure Details  Location: knee - R knee  Preparation: Patient was prepped and draped in the usual sterile fashion  Needle gauge: 21g.  Approach: lateral  Medications administered: 88 mg Hyaluronan 88 MG/4ML; 2 mL lidocaine (cardiac)  Patient tolerance: patient tolerated the procedure well with no immediate complications    Large Joint Arthrocentesis: L knee  Date/Time: 1/26/2021 4:56 PM  Consent given by: patient  Site marked: site marked  Timeout: Immediately prior to procedure a time out was called to verify the correct patient, procedure, equipment, support staff and site/side marked as required   Supporting Documentation  Indications: pain   Procedure Details  Location: knee - L knee  Preparation: Patient was prepped and draped in the usual sterile fashion  Needle gauge: 21g.  Approach: lateral  Medications administered: 88 mg Hyaluronan 88 MG/4ML; 2 mL lidocaine (cardiac)  Patient tolerance: patient tolerated the procedure well with no immediate complications        Elsa Chiang  APRN

## 2021-01-26 ENCOUNTER — CLINICAL SUPPORT (OUTPATIENT)
Dept: ORTHOPEDIC SURGERY | Facility: CLINIC | Age: 76
End: 2021-01-26

## 2021-01-26 ENCOUNTER — TELEPHONE (OUTPATIENT)
Dept: GASTROENTEROLOGY | Facility: CLINIC | Age: 76
End: 2021-01-26

## 2021-01-26 VITALS — HEIGHT: 62 IN | BODY MASS INDEX: 30.36 KG/M2 | TEMPERATURE: 97.9 F | WEIGHT: 165 LBS

## 2021-01-26 DIAGNOSIS — M17.0 PRIMARY OSTEOARTHRITIS OF KNEES, BILATERAL: ICD-10-CM

## 2021-01-26 PROCEDURE — 20610 DRAIN/INJ JOINT/BURSA W/O US: CPT | Performed by: NURSE PRACTITIONER

## 2021-01-26 NOTE — TELEPHONE ENCOUNTER
----- Message from Leatha Mason Rep sent at 1/26/2021 12:39 PM EST -----  Regarding: schedule colonoscopy  Contact: [phone]  Pt needs to schedule

## 2021-01-29 ENCOUNTER — HOSPITAL ENCOUNTER (OUTPATIENT)
Dept: CT IMAGING | Facility: HOSPITAL | Age: 76
Discharge: HOME | End: 2021-01-29
Admitting: PHYSICIAN ASSISTANT

## 2021-01-29 LAB — CREAT BLDA-MCNC: 0.9 MG/DL (ref 0.6–1.3)

## 2021-01-29 PROCEDURE — 25010000002 IOPAMIDOL 61 % SOLUTION: Performed by: PHYSICIAN ASSISTANT

## 2021-01-29 PROCEDURE — 0 DIATRIZOATE MEGLUMINE & SODIUM PER 1 ML: Performed by: PHYSICIAN ASSISTANT

## 2021-01-29 PROCEDURE — 74177 CT ABD & PELVIS W/CONTRAST: CPT

## 2021-01-29 PROCEDURE — 82565 ASSAY OF CREATININE: CPT

## 2021-01-29 RX ADMIN — DIATRIZOATE MEGLUMINE AND DIATRIZOATE SODIUM 30 ML: 660; 100 LIQUID ORAL; RECTAL at 14:30

## 2021-01-29 RX ADMIN — IOPAMIDOL 85 ML: 612 INJECTION, SOLUTION INTRAVENOUS at 15:42

## 2021-02-01 ENCOUNTER — LAB REQUISITION (OUTPATIENT)
Dept: LAB | Facility: HOSPITAL | Age: 76
End: 2021-02-01

## 2021-02-01 DIAGNOSIS — Z00.00 ENCOUNTER FOR GENERAL ADULT MEDICAL EXAMINATION WITHOUT ABNORMAL FINDINGS: ICD-10-CM

## 2021-02-01 LAB — SARS-COV-2 ORF1AB RESP QL NAA+PROBE: NOT DETECTED

## 2021-02-01 PROCEDURE — U0004 COV-19 TEST NON-CDC HGH THRU: HCPCS | Performed by: INTERNAL MEDICINE

## 2021-02-03 ENCOUNTER — TREATMENT (OUTPATIENT)
Dept: PHYSICAL THERAPY | Facility: CLINIC | Age: 76
End: 2021-02-03

## 2021-02-03 DIAGNOSIS — M79.18 MYOFASCIAL PAIN: ICD-10-CM

## 2021-02-03 DIAGNOSIS — M47.22 CERVICAL SPONDYLOSIS WITH RADICULOPATHY: Primary | ICD-10-CM

## 2021-02-03 DIAGNOSIS — M54.2 CERVICALGIA: ICD-10-CM

## 2021-02-03 PROCEDURE — 97140 MANUAL THERAPY 1/> REGIONS: CPT | Performed by: PHYSICAL THERAPIST

## 2021-02-03 PROCEDURE — G0283 ELEC STIM OTHER THAN WOUND: HCPCS | Performed by: PHYSICAL THERAPIST

## 2021-02-03 PROCEDURE — 97110 THERAPEUTIC EXERCISES: CPT | Performed by: PHYSICAL THERAPIST

## 2021-02-03 NOTE — PROGRESS NOTES
Physical Therapy Daily Progress Note    Patient Name: Kourtney Lorenzana         :  1945  Referring Physician: Jose Napier MD      Subjective   Kourtney Lorenzana reports: doing well for over 2 weeks with decreased neck / UT (L) pain and improved mobility and function - Started to note increased pain in (L) UT w/ loss of mobility of neck rotation to (L) over last few days -     Objective   Pt demonstrating limited C-rotation to (L) - Very tight, tender w/ TPs (L) UT, Levator, scalenes and very tender over 1st Rib   (+) Elevated 1st Rib (L)     See Exercise, Manual, and Modality Logs for complete treatment.     Functional / Therapeutic Activities:   min  · TAPING / BRACING: NA  · Jt protection, ADL modification; Posture and      Assessment/Plan  (L) sided neck/UT pain; Myofascial pain (L) -   (Per MRI: Degenerative disc and facet change in the cervical spine  with grade 1 retrolisthesis of C4; C-DJD; Foraminal Stenosis)  Much improved with decreased pain and improved mobility and function - Intermittent flare-ups of pain, but less often and less severe -   Pain much less and mobility improved after MT today -     Progress strengthening /stabilization /functional activity       _________________________________________________  Manual Therapy:    23     mins  14743;  Therapeutic Exercise:    20     mins  07506;     Neuromuscular Jenn:        mins  49075;    Therapeutic Activity:          mins  13249;     Gait Training:           mins  91110;     Ultrasound:     08     mins  70239;    Electrical Stimulation:    20     mins  33695 ( );  Dry Needling          mins self-pay    Timed Treatment:   51   mins                  Total Treatment:     75   mins    Jason Bower PT  Physical Therapist

## 2021-02-05 ENCOUNTER — OUTSIDE FACILITY SERVICE (OUTPATIENT)
Dept: GASTROENTEROLOGY | Facility: CLINIC | Age: 76
End: 2021-02-05

## 2021-02-05 DIAGNOSIS — Z80.0 FH: COLON CANCER: Primary | ICD-10-CM

## 2021-02-05 DIAGNOSIS — R10.30 LOWER ABDOMINAL PAIN: ICD-10-CM

## 2021-02-05 PROCEDURE — G0105 COLORECTAL SCRN; HI RISK IND: HCPCS | Performed by: INTERNAL MEDICINE

## 2021-02-09 ENCOUNTER — TELEPHONE (OUTPATIENT)
Dept: ORTHOPEDIC SURGERY | Facility: CLINIC | Age: 76
End: 2021-02-09

## 2021-02-09 ENCOUNTER — PATIENT MESSAGE (OUTPATIENT)
Dept: ORTHOPEDIC SURGERY | Facility: CLINIC | Age: 76
End: 2021-02-09

## 2021-02-09 NOTE — TELEPHONE ENCOUNTER
Have patient come in to see me either Thursday morning, okay to use one of the new patient new problem slots or next Tuesday to get some new x-rays and also discuss any additional options she might have.

## 2021-02-09 NOTE — TELEPHONE ENCOUNTER
----- Message from Kourtney Lorenzana sent at 2/9/2021 11:14 AM EST -----  Regarding: Visit Follow-Up Question  Contact: [phone]  Junito Vanegas  This is Kourtney Lorenzana. You wanted me to let you know about the gel shots you gave me on my last visit, if they helped.  Sadly, the shots did not help and in fact my knees hurt more than they ever did. ????

## 2021-02-18 ENCOUNTER — TELEPHONE (OUTPATIENT)
Dept: GASTROENTEROLOGY | Facility: CLINIC | Age: 76
End: 2021-02-18

## 2021-02-18 ENCOUNTER — TELEPHONE (OUTPATIENT)
Dept: FAMILY MEDICINE CLINIC | Facility: CLINIC | Age: 76
End: 2021-02-18

## 2021-02-18 NOTE — TELEPHONE ENCOUNTER
Called pt and advised of Dr Lopez' note.  Also gave [facility] scheduling number at [phone].Verb understanding.

## 2021-02-18 NOTE — TELEPHONE ENCOUNTER
----- Message from Kourtney Lorenzana sent at 2/17/2021  6:39 PM EST -----  Regarding: Visit Follow-Up Question  Contact: [phone]  Hi Dr Andrade  This is Kourtney Lorenzana. You did a colonoscopy on me Feb 5th. I was a little sore for the next week. But this past Monday I was up at 4AM with some pain and burning in what seemed to be a UTI. Took some Azo. That helped, but my lower abdomen hurt more. Had some doxycycline hyclate  leftover and been taking that. Was better yesterday, but today I have pain and nausea. I'm still waiting to be scheduled for that barium enema you ordered. (Not that I'm anxious to get that done!)  Just thought you should know since this is all happening after the colonoscopy.

## 2021-02-18 NOTE — TELEPHONE ENCOUNTER
I sent patient a message to ensure this time would work but have not heard a response. Please call patient to ensure that time will work for her.

## 2021-02-18 NOTE — TELEPHONE ENCOUNTER
Called pt and pt denies any further pain and burning when she urinates. She is having pain on her right lower abd side.  She states it is is a knawing pain.  Pt does not know if she has had a fever .  Pt denies chills. Pt does report diarrhea for 2 days, but yesterday did not have any diarrhea. She states the doxycycline did help.  Pt has taken 5 doses of the antibx.  Advised pt will send message to Dr Andarde but in the meantime she should also contact her pcp due to possible uti.  Verb understanding.

## 2021-02-18 NOTE — TELEPHONE ENCOUNTER
----- Message from Janice Cade sent at 2/18/2021 12:41 PM EST -----  Regarding: RE: patient needs appt  She is scheduled  ----- Message -----  From: Romina George PA  Sent: 2/18/2021  12:16 PM EST  To: Herman Wynne MA  Subject: patient needs appt                               Can you put this patient on at 2 pm tomorrow with me?

## 2021-02-18 NOTE — TELEPHONE ENCOUNTER
Agree with contacting her PCP to have a urine sample checked for UTI.  Please give her the schedule one number so that she can schedule her barium enema-the order has been in the chart since the fifth

## 2021-02-19 ENCOUNTER — OFFICE VISIT (OUTPATIENT)
Dept: FAMILY MEDICINE CLINIC | Facility: CLINIC | Age: 76
End: 2021-02-19

## 2021-02-19 VITALS
RESPIRATION RATE: 16 BRPM | DIASTOLIC BLOOD PRESSURE: 70 MMHG | HEART RATE: 83 BPM | OXYGEN SATURATION: 99 % | HEIGHT: 62 IN | WEIGHT: 162.4 LBS | BODY MASS INDEX: 29.88 KG/M2 | SYSTOLIC BLOOD PRESSURE: 120 MMHG | TEMPERATURE: 98.2 F

## 2021-02-19 DIAGNOSIS — R10.32 LLQ ABDOMINAL PAIN: ICD-10-CM

## 2021-02-19 DIAGNOSIS — R19.7 DIARRHEA, UNSPECIFIED TYPE: ICD-10-CM

## 2021-02-19 DIAGNOSIS — N30.00 ACUTE CYSTITIS WITHOUT HEMATURIA: ICD-10-CM

## 2021-02-19 DIAGNOSIS — R10.31 RLQ ABDOMINAL PAIN: ICD-10-CM

## 2021-02-19 DIAGNOSIS — R30.0 DYSURIA: ICD-10-CM

## 2021-02-19 DIAGNOSIS — M54.50 LOW BACK PAIN WITHOUT SCIATICA, UNSPECIFIED BACK PAIN LATERALITY, UNSPECIFIED CHRONICITY: Primary | ICD-10-CM

## 2021-02-19 LAB
BILIRUB BLD-MCNC: NEGATIVE MG/DL
CLARITY, POC: CLEAR
COLOR UR: NORMAL
GLUCOSE UR STRIP-MCNC: NEGATIVE MG/DL
KETONES UR QL: NEGATIVE
LEUKOCYTE EST, POC: NEGATIVE
NITRITE UR-MCNC: NEGATIVE MG/ML
PH UR: 6 [PH] (ref 5–8)
PROT UR STRIP-MCNC: NEGATIVE MG/DL
RBC # UR STRIP: NEGATIVE /UL
SP GR UR: 1.01 (ref 1–1.03)
UROBILINOGEN UR QL: NORMAL

## 2021-02-19 PROCEDURE — 99213 OFFICE O/P EST LOW 20 MIN: CPT | Performed by: PHYSICIAN ASSISTANT

## 2021-02-19 PROCEDURE — 81003 URINALYSIS AUTO W/O SCOPE: CPT | Performed by: PHYSICIAN ASSISTANT

## 2021-02-19 RX ORDER — DOXYCYCLINE HYCLATE 100 MG/1
100 CAPSULE ORAL 2 TIMES DAILY
Qty: 10 CAPSULE | Refills: 0 | Status: SHIPPED | OUTPATIENT
Start: 2021-02-19 | End: 2021-03-24

## 2021-02-21 LAB
BACTERIA UR CULT: NO GROWTH
BACTERIA UR CULT: NORMAL

## 2021-02-23 ENCOUNTER — TREATMENT (OUTPATIENT)
Dept: PHYSICAL THERAPY | Facility: CLINIC | Age: 76
End: 2021-02-23

## 2021-02-23 DIAGNOSIS — M47.22 CERVICAL SPONDYLOSIS WITH RADICULOPATHY: Primary | ICD-10-CM

## 2021-02-23 DIAGNOSIS — M79.18 MYOFASCIAL PAIN: ICD-10-CM

## 2021-02-23 DIAGNOSIS — M54.2 CERVICALGIA: ICD-10-CM

## 2021-02-23 PROCEDURE — 97535 SELF CARE MNGMENT TRAINING: CPT | Performed by: PHYSICAL THERAPIST

## 2021-02-23 PROCEDURE — 97140 MANUAL THERAPY 1/> REGIONS: CPT | Performed by: PHYSICAL THERAPIST

## 2021-02-23 PROCEDURE — 97110 THERAPEUTIC EXERCISES: CPT | Performed by: PHYSICAL THERAPIST

## 2021-02-23 NOTE — PROGRESS NOTES
------------------------------------------------------------------------------------------------------   MD PROGRESS NOTE    Patient: Kourtney Lorenzana        : 1945  Diagnosis/ICD-10 Code:  Cervical spondylosis with radiculopathy [M47.22]  Referring practitioner: Jose Napier MD  Date of Initial Visit: 10/28/2021                  Today's Date: 2021  _________________________________________________________________    Thank you for the referral of Ms. Lorenzana to [facility].  Ms. Lorenzana has attended 13 PT sessions and their treatment has consisted of: modalities prn, manual therapy, therapeutic exercise, patient education, and HEP.     Subjective   Kourtney Lorenzana reports: doing better with decreasing pain and improving mobility and function - Able to go about 1-2 weeks between PT sessions before pain returns -  Pain (L) UT and neck and limited rotation to (L) - no radicular symptoms -  Pain increased last Wednesday (2021) - no change in activity to cause -   ___________________________________________________________________  Objective              OBSERVATION: significant hypertrophy (L) UT, scalenes, supraclavicular region -               PALPATION: Severely tender (L) Scalenes, UT, 1st rib, lower C-facets and CPS        AROM: C-spine: Flexion 40-deg;  Extension 50-deg w/ pain (L) Lower neck at end range;  ROT (L) 55-deg; (R) 70-deg both w/ pain (L) UT/lower neck   STRENGTH: UE Myotomes WNL grossly    DTR's/SENSATION: WNL grossly    SPECIAL TESTS: (-) C-Compression / Distraction - (+) Elevated First Rib (L)   ACTIVITY TOLERANCE: Improved tolerance to ADLs for about 2 weeks following PT     See Exercise, Manual, and Modality Logs for complete treatment.      SELF CARE:  X 15 min  · TAPING / BRACING: NA  · FUNCTIONAL ASSESSMENT   · Jt protection, ADL modification; Posture and    ___________________________________________________________________    Assessment/Plan  (L) sided neck/UT pain; Myofascial pain (L) -   (Per MRI: Degenerative disc and facet change in the cervical spine  with grade 1 retrolisthesis of C4; C-DJD; Foraminal Stenosis)  Much improved with decreased pain and improved mobility and function - Intermittent flare-ups of pain, but less often and less severe -   Pain much less and mobility improved after MT today -   Ms. Lorenzana would benefit from continued Physical Therapy.     P: Recommend continued Physical Therapy to allow a full and safe return to ADL's and normal job duties 1-2 times/wk x 8 weeks or prn.    Please advise after your exam.    Thank you again for this referral of Ms. Lorenzana to [facility].    PT Signature: ______________________________   Jason Bower, PT  ______________________________________________________  Based upon review of the patient's progress and continued therapy plan, it is my medical opinion that Kourtney Lorenzana should continue physical therapy treatment per the recommendation above.     Signature: ____________________________   Date: ____   Jose Napier MD  ____________________________________________________________________  Manual Therapy:    20     mins  86199;  Therapeutic Exercise:    23     mins  06832;     Neuromuscular Jenn:        mins  11534;    Therapeutic Activity:          mins  95330;   Self Care:                      15     mins  76176  Ultrasound:     08     mins  31205;  Iontophoresis:          mins  37003;    Electrical Stimulation:    20     mins  80428 ( );  Mechanical Traction:          mins  32045  Dry Needling          mins self-pay    Timed Treatment:   66   mins                  Total Treatment:     90   mins    ______________________________________________________________________  [id number] [facility] [place] [address]  Phone: [phone] Fax: [phone]

## 2021-03-02 DIAGNOSIS — Z23 IMMUNIZATION DUE: ICD-10-CM

## 2021-03-03 ENCOUNTER — TREATMENT (OUTPATIENT)
Dept: PHYSICAL THERAPY | Facility: CLINIC | Age: 76
End: 2021-03-03

## 2021-03-03 DIAGNOSIS — M47.22 CERVICAL SPONDYLOSIS WITH RADICULOPATHY: Primary | ICD-10-CM

## 2021-03-03 DIAGNOSIS — M79.18 MYOFASCIAL PAIN: ICD-10-CM

## 2021-03-03 DIAGNOSIS — M54.2 CERVICALGIA: ICD-10-CM

## 2021-03-03 PROCEDURE — 97140 MANUAL THERAPY 1/> REGIONS: CPT | Performed by: PHYSICAL THERAPIST

## 2021-03-03 PROCEDURE — G0283 ELEC STIM OTHER THAN WOUND: HCPCS | Performed by: PHYSICAL THERAPIST

## 2021-03-03 PROCEDURE — 97110 THERAPEUTIC EXERCISES: CPT | Performed by: PHYSICAL THERAPIST

## 2021-03-03 NOTE — PROGRESS NOTES
Physical Therapy Daily Progress Note    Patient Name: Kourtney Lorenzana         :  1945  Referring Physician: Elsa Chiang, SLY      Subjective   Kourtney Lorenzana reports: being more sore since last session with soreness in (L) UT/neck and painful and limited cervical rotation to (L) and SB to (L);  Hypertrophy supraclavicular region (L)      Objective   Very Tender (L) UT/neck, scalenes, 1st rib (L) with multiple trigger points (L) UT, levator,  - Limited c-rotation and SB to (L)     See Exercise, Manual, and Modality Logs for complete treatment. -      Functional / Therapeutic Activities:   min  · TAPING / BRACING: NA  · Jt protection, ADL modification; Posture and      Assessment/Plan  (L) sided neck/UT pain; Myofascial pain (L) -   (Per MRI: Degenerative disc and facet change in the cervical spine  with grade 1 retrolisthesis of C4; C-DJD; Foraminal Stenosis)  Much improved with decreased pain and improved mobility and function - Intermittent flare-ups of pain, but less often and less severe -   Pain much less and mobility improved after MT today    Progress per Plan of Care       _________________________________________________  Manual Therapy:    23     mins  51679;  Therapeutic Exercise:    15     mins  03804;     Neuromuscular Jenn:        mins  50789;    Therapeutic Activity:          mins  74874;     Ultrasound:     08     mins  53466;  Iontophoresis:          mins  15773;    Electrical Stimulation:    20     mins  25700 ( );  Mechanical Traction:          mins  00641  Dry Needling          mins self-pay    Timed Treatment:   46   mins                  Total Treatment:     75   mins    Jason Bower PT  Physical Therapist

## 2021-03-09 ENCOUNTER — OFFICE VISIT (OUTPATIENT)
Dept: ORTHOPEDIC SURGERY | Facility: CLINIC | Age: 76
End: 2021-03-09

## 2021-03-09 VITALS — HEIGHT: 62 IN | TEMPERATURE: 97.2 F | WEIGHT: 162 LBS | BODY MASS INDEX: 29.81 KG/M2

## 2021-03-09 DIAGNOSIS — R52 PAIN: Primary | ICD-10-CM

## 2021-03-09 DIAGNOSIS — M17.11 PRIMARY OSTEOARTHRITIS OF RIGHT KNEE: ICD-10-CM

## 2021-03-09 PROCEDURE — 99214 OFFICE O/P EST MOD 30 MIN: CPT | Performed by: NURSE PRACTITIONER

## 2021-03-09 PROCEDURE — 73562 X-RAY EXAM OF KNEE 3: CPT | Performed by: NURSE PRACTITIONER

## 2021-03-09 RX ORDER — VANCOMYCIN HYDROCHLORIDE 1 G/200ML
15 INJECTION, SOLUTION INTRAVENOUS ONCE
Status: CANCELLED | OUTPATIENT
Start: 2021-05-14 | End: 2021-03-09

## 2021-03-09 RX ORDER — MELOXICAM 15 MG/1
15 TABLET ORAL ONCE
Status: CANCELLED | OUTPATIENT
Start: 2021-05-14 | End: 2021-03-09

## 2021-03-09 RX ORDER — CEFAZOLIN SODIUM 2 G/100ML
2 INJECTION, SOLUTION INTRAVENOUS ONCE
Status: CANCELLED | OUTPATIENT
Start: 2021-05-14 | End: 2021-03-09

## 2021-03-09 RX ORDER — PREGABALIN 75 MG/1
150 CAPSULE ORAL ONCE
Status: CANCELLED | OUTPATIENT
Start: 2021-05-14 | End: 2021-03-09

## 2021-03-09 NOTE — PROGRESS NOTES
"Patient: Kourtney Lorenzana  YOB: 1945 75 y.o. female  Medical Record Number: [id number]    Chief Complaints:   Chief Complaint   Patient presents with   • Left Knee - Follow-up, Pain   • Right Knee - Follow-up, Pain       History of Present Illness:Kourtney Lorenzana is a 75 y.o. female who presents with complaints of severe bilateral knee pain right greater than left.  She has known significant osteoarthritis, her pain is progressively gotten worse.  Describes it as a severe constant dull type pain worse with any standing walking, only slightly better with rest.  She no longer can have cortisone injections because of reactions but it did not help, she tried Monovisc injections with no improvement, she tried physical therapy with minimal improvement, is unable to take anti-inflammatories    Allergies:   Allergies   Allergen Reactions   • Metoprolol Itching   • Hydrocodone-Acetaminophen GI Intolerance   • Other Nausea And Vomiting     Unknown \"strong\" pain killer   • Tramadol GI Intolerance       Medications:   Current Outpatient Medications   Medication Sig Dispense Refill   • acetaminophen (TYLENOL) 500 MG tablet Take 500-1,000 mg by mouth Every 6 (Six) Hours As Needed for Mild Pain .     • Cholecalciferol (VITAMIN D3) 50 MCG (2000 UT) tablet Take 2,000 Units by mouth Daily.     • doxycycline (VIBRAMYCIN) 100 MG capsule Take 1 capsule by mouth 2 (Two) Times a Day. 10 capsule 0   • pantoprazole (PROTONIX) 40 MG EC tablet TAKE ONE TABLET BY MOUTH DAILY 90 tablet 2   • vitamin C (ASCORBIC ACID) 250 MG tablet Take 500 mg by mouth Daily.     • Zinc 40 MG tablet Take 40 mg by mouth Daily.     • mupirocin (Bactroban) 2 % ointment Apply  topically to the appropriate area as directed 3 (Three) Times a Day. 15 g 0     No current facility-administered medications for this visit.         The following portions of the patient's history were reviewed and updated as appropriate: allergies, current medications, past " "family history, past medical history, past social history, past surgical history and problem list.    Review of Systems:   A 14 point review of systems was performed. All systems negative except pertinent positives/negative listed in HPI above    Physical Exam:   Vitals:    03/09/21 1522   Temp: 97.2 °F (36.2 °C)   Weight: 73.5 kg (162 lb)   Height: 157.5 cm (62\")   PainSc:   6       General: A and O x 3, ASA, NAD    SCLERA:    Normal    DENTITION:   Normal  Skin clear no unusual lesions noted  Bilateral knees patient has trace amount effusion noted bilaterally with 110 degrees flexion neutral extension with a positive Akiko negative Lockman calf soft and nontender       Radiology:  Xrays 3views (ap,lateral, sunrise) 3 views bilateral knees were ordered and reviewed today secondary to pain show bone-on-bone end-stage osteoarthritis right greater than left with cyst and spur formation.  Compared to views show definite progression in arthritic changes    Assessment/Plan: End-stage osteoarthritis bilateral knees right greater than left    Patient I discussed options, she would like to proceed with right total knee replacement.  She has tried and failed all conservative measures.  We will proceed with getting her scheduled also asked that Dr. De La Cruz give her a call back next week to discuss the surgery    Continuation of conservative management vs. TKA discussed.  The patient wishes to proceed with total knee replacement.  At this point the patient has failed the full compliment of conservative treatment and stating complete understanding of the risks/benefits/ anternatives wishes to proceed with surgical treatment.    Risk and benefits of surgery were reviewed.  Including, but not limited to, blood clots or pulmonary embolism, anesthesia risk, infection, fracture, skin/leg numbness, persistent pain/crepitance/popping/catching, failure of the implant, need for future surgeries, hematoma, possible nerve or blood vessel " injury, need for transfusion, and potential risk of stroke,heart attack or death, among others.  The patient understands and wishes to proceed.     It was explained that if tissue has been repaired or reconstructed, there is also an increased chance of failure which may require further management.  Following the completion of the discussion, the patient expressed understanding of this planned course of care, all their questions were answered and consent will be obtained preoperatively.    Operative Plan: [facility] and Nephcullen Oxinium Total Knee Replacement an overnight staywith home health rehab        Elsa Chiang, APRN  3/9/2021

## 2021-03-10 ENCOUNTER — TELEPHONE (OUTPATIENT)
Dept: CARDIOLOGY | Facility: CLINIC | Age: 76
End: 2021-03-10

## 2021-03-10 PROBLEM — M17.11 PRIMARY OSTEOARTHRITIS OF RIGHT KNEE: Status: ACTIVE | Noted: 2021-03-10

## 2021-03-10 NOTE — TELEPHONE ENCOUNTER
Received a fax from Dr. Catarino De La Cruz's office stating that pt is needing cardiac clearance to have right total knee arthroplasty done on 4/30/2021    Pt was last seen by Dr. Hicks on 9/17/2020

## 2021-03-11 ENCOUNTER — TREATMENT (OUTPATIENT)
Dept: PHYSICAL THERAPY | Facility: CLINIC | Age: 76
End: 2021-03-11

## 2021-03-11 DIAGNOSIS — M79.18 MYOFASCIAL PAIN: ICD-10-CM

## 2021-03-11 DIAGNOSIS — M47.22 CERVICAL SPONDYLOSIS WITH RADICULOPATHY: Primary | ICD-10-CM

## 2021-03-11 DIAGNOSIS — M54.2 CERVICALGIA: ICD-10-CM

## 2021-03-11 PROCEDURE — G0283 ELEC STIM OTHER THAN WOUND: HCPCS | Performed by: PHYSICAL THERAPIST

## 2021-03-11 PROCEDURE — 97035 APP MDLTY 1+ULTRASOUND EA 15: CPT | Performed by: PHYSICAL THERAPIST

## 2021-03-11 PROCEDURE — 97110 THERAPEUTIC EXERCISES: CPT | Performed by: PHYSICAL THERAPIST

## 2021-03-11 PROCEDURE — 97140 MANUAL THERAPY 1/> REGIONS: CPT | Performed by: PHYSICAL THERAPIST

## 2021-03-11 NOTE — PROGRESS NOTES
Physical Therapy Daily Progress Note    Patient Name: Kourtney Lorenzana         :  1945  Referring Physician: Elsa Chiang APRN      Subjective   Kourtney Lorenzana reports: doing well after last session and still doing better - new shoulder blade squeezes are helpful and helps her to breath easier -   To TKA (B) 2021 -     Objective   Pt demonstrating improved AROM C-spine w/ much less pain (L) UT/neck -   Decreased tenderness (L) Scalenes, 1st rib, CPS -     See Exercise, Manual, and Modality Logs for complete treatment.     Functional / Therapeutic Activities:   min  · TAPING / BRACING: NA  · Jt protection, ADL modification; Posture and      Assessment/Plan  (L) sided neck/UT pain; Myofascial pain (L) -   (Per MRI: Degenerative disc and facet change in the cervical spine  with grade 1 retrolisthesis of C4; C-DJD; Foraminal Stenosis)  Much improved with decreased pain and improved mobility and function - Intermittent flare-ups of pain, but less often and less severe -     Progress strengthening /stabilization /functional activity       _________________________________________________  Manual Therapy:    15     mins  52661;  Therapeutic Exercise:    20     mins  02593;     Neuromuscular Jenn:        mins  06634;    Therapeutic Activity:          mins  47103;     Ultrasound:     08     mins  10663;  Iontophoresis:          mins  91190;    Electrical Stimulation:    20     mins  55020 ( );  Mechanical Traction:          mins  04171  Dry Needling          mins self-pay    Timed Treatment:   43   mins                  Total Treatment:     70   mins    Jason Bower PT  Physical Therapist

## 2021-03-12 ENCOUNTER — HOSPITAL ENCOUNTER (OUTPATIENT)
Dept: GENERAL RADIOLOGY | Facility: HOSPITAL | Age: 76
Discharge: HOME | End: 2021-03-12
Admitting: INTERNAL MEDICINE

## 2021-03-12 DIAGNOSIS — Z80.0 FH: COLON CANCER: ICD-10-CM

## 2021-03-12 DIAGNOSIS — R10.30 LOWER ABDOMINAL PAIN: ICD-10-CM

## 2021-03-12 PROCEDURE — A9270 NON-COVERED ITEM OR SERVICE: HCPCS | Performed by: INTERNAL MEDICINE

## 2021-03-12 PROCEDURE — 63710000001 BARIUM SULFATE 105 % SUSPENSION: Performed by: INTERNAL MEDICINE

## 2021-03-12 PROCEDURE — 74280 X-RAY XM COLON 2CNTRST STD: CPT

## 2021-03-12 RX ADMIN — BARIUM SULFATE 1500 ML: 1.05 SUSPENSION ORAL; RECTAL at 09:15

## 2021-03-17 ENCOUNTER — TELEPHONE (OUTPATIENT)
Dept: GASTROENTEROLOGY | Facility: CLINIC | Age: 76
End: 2021-03-17

## 2021-03-17 NOTE — TELEPHONE ENCOUNTER
Please let her know that the barium enema was normal.  Diverticulosis is noted.  No polyps or masses noted.  Please schedule follow-up in the office with me regarding her abdominal pain and constipation

## 2021-03-18 ENCOUNTER — TREATMENT (OUTPATIENT)
Dept: PHYSICAL THERAPY | Facility: CLINIC | Age: 76
End: 2021-03-18

## 2021-03-18 DIAGNOSIS — M54.2 CERVICALGIA: ICD-10-CM

## 2021-03-18 DIAGNOSIS — M47.22 CERVICAL SPONDYLOSIS WITH RADICULOPATHY: Primary | ICD-10-CM

## 2021-03-18 DIAGNOSIS — M79.18 MYOFASCIAL PAIN: ICD-10-CM

## 2021-03-18 PROCEDURE — 97110 THERAPEUTIC EXERCISES: CPT | Performed by: PHYSICAL THERAPIST

## 2021-03-18 PROCEDURE — G0283 ELEC STIM OTHER THAN WOUND: HCPCS | Performed by: PHYSICAL THERAPIST

## 2021-03-18 PROCEDURE — 97140 MANUAL THERAPY 1/> REGIONS: CPT | Performed by: PHYSICAL THERAPIST

## 2021-03-18 NOTE — PROGRESS NOTES
Physical Therapy Daily Progress Note    Patient Name: Kourtney Lorenzana         :  1945  Referring Physician: Elsa Chiang APRN      Subjective   Kourtney Lorenzana reports: doing well since last session with minimal increased pain and improved mobility -     Objective   Tender (L) UT, scalenes and 1st rib, but much less severe -   Elevated 1st rib (L), but not as severe -     See Exercise, Manual, and Modality Logs for complete treatment.     Functional / Therapeutic Activities:   min  · TAPING / BRACING: NA  · Jt protection, ADL modification; Posture and      Assessment/Plan  (L) sided neck/UT pain; Myofascial pain (L) -   (Per MRI: Degenerative disc and facet change in the cervical spine  with grade 1 retrolisthesis of C4; C-DJD; Foraminal Stenosis)  Much improved with decreased pain and improved mobility and function - Intermittent flare-ups of pain, but less often and less severe - longer periods between flares -     Progress strengthening /stabilization /functional activity - to have (R) TKA 2021       _________________________________________________  Manual Therapy:    15     mins  50130;  Therapeutic Exercise:    23     mins  86869;     Neuromuscular Jenn:        mins  65332;    Therapeutic Activity:          mins  00730;     Ultrasound:     08     mins  23954;  Iontophoresis:          mins  98656;    Electrical Stimulation:    20     mins  41390 ( );  Mechanical Traction:          mins  45112  Dry Needling          mins self-pay    Timed Treatment:   46   mins                  Total Treatment:     75   mins    Jason Bower PT  Physical Therapist

## 2021-03-24 ENCOUNTER — HOSPITAL ENCOUNTER (OUTPATIENT)
Dept: CARDIOLOGY | Facility: HOSPITAL | Age: 76
Discharge: HOME | End: 2021-03-24

## 2021-03-24 ENCOUNTER — HOSPITAL ENCOUNTER (OUTPATIENT)
Dept: CT IMAGING | Facility: HOSPITAL | Age: 76
Discharge: HOME | End: 2021-03-24

## 2021-03-24 ENCOUNTER — OFFICE VISIT (OUTPATIENT)
Dept: CARDIOLOGY | Facility: CLINIC | Age: 76
End: 2021-03-24

## 2021-03-24 ENCOUNTER — LAB (OUTPATIENT)
Dept: LAB | Facility: HOSPITAL | Age: 76
End: 2021-03-24

## 2021-03-24 ENCOUNTER — TELEPHONE (OUTPATIENT)
Dept: CARDIOLOGY | Facility: CLINIC | Age: 76
End: 2021-03-24

## 2021-03-24 VITALS
BODY MASS INDEX: 30.11 KG/M2 | WEIGHT: 163.6 LBS | HEIGHT: 62 IN | DIASTOLIC BLOOD PRESSURE: 86 MMHG | HEART RATE: 76 BPM | SYSTOLIC BLOOD PRESSURE: 134 MMHG

## 2021-03-24 DIAGNOSIS — R60.0 LOWER EXTREMITY EDEMA: ICD-10-CM

## 2021-03-24 DIAGNOSIS — I47.1 PSVT (PAROXYSMAL SUPRAVENTRICULAR TACHYCARDIA) (HCC): ICD-10-CM

## 2021-03-24 DIAGNOSIS — R79.89 ELEVATED D-DIMER: ICD-10-CM

## 2021-03-24 DIAGNOSIS — R91.8 PULMONARY NODULES: ICD-10-CM

## 2021-03-24 DIAGNOSIS — R60.0 LOWER EXTREMITY EDEMA: Primary | ICD-10-CM

## 2021-03-24 DIAGNOSIS — R06.09 DYSPNEA ON EXERTION: ICD-10-CM

## 2021-03-24 DIAGNOSIS — Z01.810 ENCOUNTER FOR PRE-OPERATIVE CARDIOVASCULAR CLEARANCE: ICD-10-CM

## 2021-03-24 LAB
ALBUMIN SERPL-MCNC: 4.3 G/DL (ref 3.5–5.2)
ALBUMIN/GLOB SERPL: 1.8 G/DL
ALP SERPL-CCNC: 128 U/L (ref 39–117)
ALT SERPL W P-5'-P-CCNC: 14 U/L (ref 1–33)
ANION GAP SERPL CALCULATED.3IONS-SCNC: 8.3 MMOL/L (ref 5–15)
AST SERPL-CCNC: 13 U/L (ref 1–32)
BH CV LOWER VASCULAR LEFT COMMON FEMORAL AUGMENT: NORMAL
BH CV LOWER VASCULAR LEFT COMMON FEMORAL COMPETENT: NORMAL
BH CV LOWER VASCULAR LEFT COMMON FEMORAL COMPRESS: NORMAL
BH CV LOWER VASCULAR LEFT COMMON FEMORAL PHASIC: NORMAL
BH CV LOWER VASCULAR LEFT COMMON FEMORAL SPONT: NORMAL
BH CV LOWER VASCULAR LEFT DISTAL FEMORAL COMPRESS: NORMAL
BH CV LOWER VASCULAR LEFT GASTRONEMIUS COMPRESS: NORMAL
BH CV LOWER VASCULAR LEFT GREATER SAPH AK COMPRESS: NORMAL
BH CV LOWER VASCULAR LEFT GREATER SAPH BK COMPRESS: NORMAL
BH CV LOWER VASCULAR LEFT LESSER SAPH COMPRESS: NORMAL
BH CV LOWER VASCULAR LEFT MID FEMORAL AUGMENT: NORMAL
BH CV LOWER VASCULAR LEFT MID FEMORAL COMPETENT: NORMAL
BH CV LOWER VASCULAR LEFT MID FEMORAL COMPRESS: NORMAL
BH CV LOWER VASCULAR LEFT MID FEMORAL PHASIC: NORMAL
BH CV LOWER VASCULAR LEFT MID FEMORAL SPONT: NORMAL
BH CV LOWER VASCULAR LEFT PERONEAL COMPRESS: NORMAL
BH CV LOWER VASCULAR LEFT POPLITEAL AUGMENT: NORMAL
BH CV LOWER VASCULAR LEFT POPLITEAL COMPETENT: NORMAL
BH CV LOWER VASCULAR LEFT POPLITEAL COMPRESS: NORMAL
BH CV LOWER VASCULAR LEFT POPLITEAL PHASIC: NORMAL
BH CV LOWER VASCULAR LEFT POPLITEAL SPONT: NORMAL
BH CV LOWER VASCULAR LEFT POSTERIOR TIBIAL COMPRESS: NORMAL
BH CV LOWER VASCULAR LEFT PROFUNDA FEMORAL COMPRESS: NORMAL
BH CV LOWER VASCULAR LEFT PROXIMAL FEMORAL COMPRESS: NORMAL
BH CV LOWER VASCULAR LEFT SAPHENOFEMORAL JUNCTION COMPRESS: NORMAL
BH CV LOWER VASCULAR RIGHT COMMON FEMORAL AUGMENT: NORMAL
BH CV LOWER VASCULAR RIGHT COMMON FEMORAL COMPETENT: NORMAL
BH CV LOWER VASCULAR RIGHT COMMON FEMORAL COMPRESS: NORMAL
BH CV LOWER VASCULAR RIGHT COMMON FEMORAL PHASIC: NORMAL
BH CV LOWER VASCULAR RIGHT COMMON FEMORAL SPONT: NORMAL
BH CV LOWER VASCULAR RIGHT DISTAL FEMORAL COMPRESS: NORMAL
BH CV LOWER VASCULAR RIGHT GASTRONEMIUS COMPRESS: NORMAL
BH CV LOWER VASCULAR RIGHT GREATER SAPH AK COMPRESS: NORMAL
BH CV LOWER VASCULAR RIGHT GREATER SAPH BK COMPRESS: NORMAL
BH CV LOWER VASCULAR RIGHT LESSER SAPH COMPRESS: NORMAL
BH CV LOWER VASCULAR RIGHT MID FEMORAL AUGMENT: NORMAL
BH CV LOWER VASCULAR RIGHT MID FEMORAL COMPETENT: NORMAL
BH CV LOWER VASCULAR RIGHT MID FEMORAL COMPRESS: NORMAL
BH CV LOWER VASCULAR RIGHT MID FEMORAL PHASIC: NORMAL
BH CV LOWER VASCULAR RIGHT MID FEMORAL SPONT: NORMAL
BH CV LOWER VASCULAR RIGHT PERONEAL COMPRESS: NORMAL
BH CV LOWER VASCULAR RIGHT POPLITEAL AUGMENT: NORMAL
BH CV LOWER VASCULAR RIGHT POPLITEAL COMPETENT: NORMAL
BH CV LOWER VASCULAR RIGHT POPLITEAL COMPRESS: NORMAL
BH CV LOWER VASCULAR RIGHT POPLITEAL PHASIC: NORMAL
BH CV LOWER VASCULAR RIGHT POPLITEAL SPONT: NORMAL
BH CV LOWER VASCULAR RIGHT POSTERIOR TIBIAL COMPRESS: NORMAL
BH CV LOWER VASCULAR RIGHT PROFUNDA FEMORAL COMPRESS: NORMAL
BH CV LOWER VASCULAR RIGHT PROXIMAL FEMORAL COMPRESS: NORMAL
BH CV LOWER VASCULAR RIGHT SAPHENOFEMORAL JUNCTION COMPRESS: NORMAL
BILIRUB SERPL-MCNC: 0.2 MG/DL (ref 0–1.2)
BUN SERPL-MCNC: 17 MG/DL (ref 8–23)
BUN/CREAT SERPL: 19.3 (ref 7–25)
CALCIUM SPEC-SCNC: 9.1 MG/DL (ref 8.6–10.5)
CHLORIDE SERPL-SCNC: 110 MMOL/L (ref 98–107)
CO2 SERPL-SCNC: 23.7 MMOL/L (ref 22–29)
CREAT SERPL-MCNC: 0.88 MG/DL (ref 0.57–1)
D DIMER PPP FEU-MCNC: 0.55 MCGFEU/ML (ref 0–0.49)
DEPRECATED RDW RBC AUTO: 43.6 FL (ref 37–54)
ERYTHROCYTE [DISTWIDTH] IN BLOOD BY AUTOMATED COUNT: 13.6 % (ref 12.3–15.4)
GFR SERPL CREATININE-BSD FRML MDRD: 63 ML/MIN/1.73
GLOBULIN UR ELPH-MCNC: 2.4 GM/DL
GLUCOSE SERPL-MCNC: 107 MG/DL (ref 65–99)
HCT VFR BLD AUTO: 41.6 % (ref 34–46.6)
HGB BLD-MCNC: 13.9 G/DL (ref 12–15.9)
MCH RBC QN AUTO: 29.9 PG (ref 26.6–33)
MCHC RBC AUTO-ENTMCNC: 33.4 G/DL (ref 31.5–35.7)
MCV RBC AUTO: 89.5 FL (ref 79–97)
NT-PROBNP SERPL-MCNC: 104.9 PG/ML (ref 0–900)
PLATELET # BLD AUTO: 219 10*3/MM3 (ref 140–450)
PMV BLD AUTO: 9.6 FL (ref 6–12)
POTASSIUM SERPL-SCNC: 3.8 MMOL/L (ref 3.5–5.2)
PROT SERPL-MCNC: 6.7 G/DL (ref 6–8.5)
RBC # BLD AUTO: 4.65 10*6/MM3 (ref 3.77–5.28)
SODIUM SERPL-SCNC: 142 MMOL/L (ref 136–145)
TSH SERPL DL<=0.05 MIU/L-ACNC: 1.46 UIU/ML (ref 0.27–4.2)
WBC # BLD AUTO: 5.34 10*3/MM3 (ref 3.4–10.8)

## 2021-03-24 PROCEDURE — 93970 EXTREMITY STUDY: CPT

## 2021-03-24 PROCEDURE — 36415 COLL VENOUS BLD VENIPUNCTURE: CPT

## 2021-03-24 PROCEDURE — 83880 ASSAY OF NATRIURETIC PEPTIDE: CPT | Performed by: NURSE PRACTITIONER

## 2021-03-24 PROCEDURE — 82565 ASSAY OF CREATININE: CPT

## 2021-03-24 PROCEDURE — 85379 FIBRIN DEGRADATION QUANT: CPT | Performed by: NURSE PRACTITIONER

## 2021-03-24 PROCEDURE — 99214 OFFICE O/P EST MOD 30 MIN: CPT | Performed by: NURSE PRACTITIONER

## 2021-03-24 PROCEDURE — 85027 COMPLETE CBC AUTOMATED: CPT

## 2021-03-24 PROCEDURE — 71275 CT ANGIOGRAPHY CHEST: CPT

## 2021-03-24 PROCEDURE — 84443 ASSAY THYROID STIM HORMONE: CPT

## 2021-03-24 PROCEDURE — 80053 COMPREHEN METABOLIC PANEL: CPT

## 2021-03-24 PROCEDURE — 0 IOPAMIDOL PER 1 ML: Performed by: NURSE PRACTITIONER

## 2021-03-24 RX ORDER — CETIRIZINE HYDROCHLORIDE 10 MG/1
10 TABLET ORAL DAILY
COMMUNITY
End: 2022-06-21

## 2021-03-24 RX ADMIN — IOPAMIDOL 95 ML: 755 INJECTION, SOLUTION INTRAVENOUS at 17:45

## 2021-03-24 NOTE — NURSING NOTE
Stat hold & call CTA chest results called to [facility]. Patient may D/C home and call office in A.M. Patient in Vascular Lab for a venous doppler. Vascular  Lab notified that  patient may discharge home after Study and IV d/c'd.

## 2021-03-24 NOTE — PROGRESS NOTES
Date of Office Visit: 2021  Encounter Provider: SLY Barker  Place of Service: [facility] CARDIOLOGY  Patient Name: Kourtney Lorenzana  :1945  Primary Cardiologist: Dr. Anum Hicks     Chief Complaint   Patient presents with   • Follow-up   • Leg Swelling   :     HPI: Kourtney Lorenzana is a pleasant 75 y.o. female who presents today for cardiac follow up visit. I have reviewed her medical records.     In May 2020, she was experiencing palpitations and diagnosed with nonsustained supraventricular tachycardia per Holter monitor (40 episodes the longest 5 beats in duration at a rate of 195).  Echocardiogram showed normal LVEF of 65% and grade 1 diastolic dysfunction.  She was started on metoprolol and magnesium.  She developed itching so the metoprolol and magnesium were discontinued and she was tried on atenolol.  On the atenolol she developed painful leg cramps, shortness of breath, and fatigue so that medication was discontinued.    She presents today for cardiac follow-up and perioperative cardiac risk assessment.  She is scheduled to undergo right total knee arthroplasty on 2021 by Dr. Mike De La Cruz.  She explains that she has recently moved and doing some renovations to a new basement.  She is going up and down steps more often which causes her some mild dyspnea and she has noticed new onset lower extremity edema.  She denies any other symptoms of DVT.  She has had 2 episodes of palpitations since her last visit and is tried bearing-down (vagal maneuver) and drinking cold water which resolved her symptoms.  She denies chest pain, PND, orthopnea, dizziness, syncope, or bleeding.  She recently had a colonoscopy and was diagnosed with diverticular disease.      Past Medical History:   Diagnosis Date   • Arthritis     OSTEO. KNEES AND BACK SEES DR MIKE DE LA CRUZ   • Bloating     WITH SOME NAUSEA   • Bruising tendency (CMS/[facility])    • Cholelithiasis 2019     Discovered by liver ultrasound   • Chronic low back pain    • Diverticular disease    • Elevated liver enzymes    • Gastroesophageal reflux disease without esophagitis 2/11/2016   • History of anxiety     BEEN A LONG TIME   • History of depression     SITUATIONAL. NOW RESOLVED   • History of panic disorder    • IBS (irritable bowel syndrome)    • Insomnia    • Internal hemorrhoids    • Lumbar canal stenosis    • Lumbosacral neuritis    • Multiple vitamin deficiency    • Neck pain    • Nephrolithiasis     July 2014 passage of kidney stone.   • Postmenopausal HRT (hormone replacement therapy)    • PSVT (paroxysmal supraventricular tachycardia) (CMS/HCC)    • Urine incontinence        Past Surgical History:   Procedure Laterality Date   • CATARACT EXTRACTION Bilateral 09/05/2017    Dr. Jefe Barakat OD, MD   • CHOLECYSTECTOMY     • CHOLECYSTECTOMY LAPAROSCOPIC INTRAOPERATIVE CHOLANGIOGRAM WITH LIVER BIOPSY N/A 12/3/2019    Procedure: CHOLECYSTECTOMY LAPAROSCOPIC INTRAOPERATIVE CHOLANGIOGRAM AND LIVER BIOPSY;  Surgeon: Benny Schneider MD;  Location: [facility];  Service: General   • COLONOSCOPY N/A 11/16/2012    Benign cecal polyp-Dr. Mak Rodriguez   • COLONOSCOPY N/A 2015    Dr. Seema Andrade   • ENDOSCOPY N/A 04/27/2018    , no specimen collected-Dr. Seema Andrade   • HYSTERECTOMY Bilateral 1997   • PUBOVAGINAL SLING N/A 06/17/2003    Dr. Ziggy Saba   • TOE SURGERY Right     METATARASAL SURGERY GREAT TOE FOR FRACTUE       Social History     Socioeconomic History   • Marital status:      Spouse name: Not on file   • Number of children: Not on file   • Years of education: Not on file   • Highest education level: Not on file   Tobacco Use   • Smoking status: Never Smoker   • Smokeless tobacco: Never Used   Vaping Use   • Vaping Use: Never used   Substance and Sexual Activity   • Alcohol use: No     Comment: caffeine use: 1 cups daily   • Drug use: No   • Sexual activity: Defer       Family History   Problem  "Relation Age of Onset   • Atrial fibrillation Father    • Atrial fibrillation Sister    • Atrial fibrillation Brother    • Other Brother         Coronary arteriosclerosis   • Colon cancer Mother    • Colon polyps Mother    • Liver disease Paternal Aunt    • Aneurysm Paternal Grandfather    • Malig Hyperthermia Neg Hx        The following portion of the patient's history were reviewed and updated as appropriate: past medical history, past surgical history, past social history, past family history, allergies, current medications, and problem list.    Review of Systems   Constitutional: Negative.   Cardiovascular: Positive for leg swelling and palpitations.   Respiratory: Negative.    Hematologic/Lymphatic: Negative.    Musculoskeletal: Positive for joint pain and joint swelling.   Neurological: Negative.        Allergies   Allergen Reactions   • Atenolol Other (See Comments)     Shortness of breath, leg cramps   • Metoprolol Itching   • Hydrocodone-Acetaminophen GI Intolerance   • Other Nausea And Vomiting     Unknown \"strong\" pain killer   • Tramadol GI Intolerance         Current Outpatient Medications:   •  acetaminophen (TYLENOL) 500 MG tablet, Take 500-1,000 mg by mouth Every 6 (Six) Hours As Needed for Mild Pain ., Disp: , Rfl:   •  cetirizine (zyrTEC) 10 MG tablet, Take 10 mg by mouth Daily., Disp: , Rfl:   •  Cholecalciferol (VITAMIN D3) 50 MCG (2000 UT) tablet, Take 2,000 Units by mouth Daily., Disp: , Rfl:   •  pantoprazole (PROTONIX) 40 MG EC tablet, TAKE ONE TABLET BY MOUTH DAILY, Disp: 90 tablet, Rfl: 2  •  vitamin C (ASCORBIC ACID) 250 MG tablet, Take 500 mg by mouth Daily., Disp: , Rfl:   No current facility-administered medications for this visit.        Objective:     Vitals:    03/24/21 1436 03/24/21 1441   BP: 134/86 134/86   BP Location: Left arm Right arm   Pulse: 76    Weight: 74.2 kg (163 lb 9.6 oz)    Height: 157.5 cm (62\")      Body mass index is 29.92 kg/m².    PHYSICAL EXAM:    Vitals " Reviewed.   General Appearance: No acute distress, well developed and well nourished.    Eyes: Conjunctiva and lids: No erythema, swelling, or discharge. Sclera non-icteric.   HENT: Atraumatic, normocephalic. External eyes, ears, and nose normal. No hearing loss noted. Mucous membranes normal. Lips not cyanotic. Neck supple with no tenderness.  Respiratory: No signs of respiratory distress. Respiration rhythm and depth normal.   Clear to auscultation. No rales, crackles, rhonchi, or wheezing auscultated.   Cardiovascular:  Jugular Venous Pressure: Normal  Heart Rate and Rhythm: Normal, Heart Sounds: Normal S1 and S2. No S3 or S4 noted.  Murmurs: No murmurs noted. No rubs, thrills, or gallops.   Lower Extremities: Trace bilateral lower extremity edema present.  Gastrointestinal:  Abdomen soft, non-distended, non-tender. Normal bowel sounds.    Musculoskeletal: Normal movement of extremities  Skin and Nails: General appearance normal. No pallor, cyanosis, diaphoresis. Skin temperature normal. No clubbing of fingernails.   Psychiatric: Patient alert and oriented to person, place, and time. Speech and behavior appropriate. Normal mood and affect.       ECG 12 Lead    Date/Time: 3/25/2021 2:39 AM  Performed by: Elizabeth Grullon APRN  Authorized by: Elizabeth Grullon APRN   Comparison: compared with previous ECG from 9/17/2020  Similar to previous ECG  Rhythm: sinus rhythm  Rate: normal  BPM: 76  Conduction: conduction normal  ST Segments: ST segments normal  T Waves: T waves normal  QRS axis: normal  Other: no other findings    Clinical impression: normal ECG              Assessment:       Diagnosis Plan   1. Lower extremity edema  CBC (No Diff)    Comprehensive Metabolic Panel    proBNP    TSH    D-dimer, Quantitative   2. Dyspnea on exertion  CBC (No Diff)    Comprehensive Metabolic Panel    proBNP    TSH    D-dimer, Quantitative   3. PSVT (paroxysmal supraventricular tachycardia) (CMS/HCC)     4. Encounter for  pre-operative cardiovascular clearance            Plan:       1.  Lower Extremity Edema: She has new onset lower extremity edema.  She wonders if it is because she is going up and down basement steps all the time and it hard on her knees.  Dr. Hicks evaluated her legs and recommended a bilateral venous duplex if her D-dimer was elevated.  If her D-dimer or venous duplex are negative, Dr. Hicks feels like this is due to her knee issues.  I have also ordered a CBC, CMP, TSH, and proBNP.    2.  Dyspnea on Exertion: Occurs when going up and down the steps.    3.  Paroxysmal Supraventricular Tachycardia: Diagnosed on Holter monitor in May 2020-40 episodes of nonsustained SVT the longest 5 beats in duration at a rate of 195 bpm.  She was tried on magnesium, metoprolol and atenolol, but all medications have been discontinued due to adverse effects.  She has had 2 episodes of palpitations which she used vagal maneuvers which helped to resolve the symptoms.  We will continue to monitor.    4.  Perioperative Cardiac Risk Assessment: She is scheduled to undergo right knee arthroplasty by Dr. Catarino De La Cruz on 5/7/2021. Patient is considered at acceptable risk for surgery from a cardiovascular standpoint. According to the [facility]'s Revised Cardiac Risk Index, patient is considered very low risk (0.4%) of adverse cardiovascular events occurring with moderate risk surgery.     5.  Further recommendations to follow testing is completed today.    ADDENDUM:  · proBNP normal.  CBC normal.  CMP normal except for glucose of 107 and chloride of 110.  TSH normal.  D-dimer elevated.    · I recommended a stat CTA of the chest to rule out pulmonary embolism with her shortness of breath.  There was no evidence of pulmonary embolism.    · Incidentally she was noted to have a left lower lobe nodule present and recommended to have a 3-month follow-up chest CT, peripherally in the right lower lobe nodule noted, medially in the right lobe nodule  noted, atelectasis in the lower lungs.    · I will discuss with patient and recommend that she follow-up with her PCP.    · Stat venous duplex was normal and showed no evidence of DVT.  · Patient will be informed of all findings and recommend to follow-up with RENE Robbins for the pulmonary nodules.    ADDENDUM 3/25/21:  · I spoke with patient about the test results and she verbalizes understanding.  Dr. Hicks recommended a pulmonary referral for the pulmonary nodule.  · As far as the lower extremity edema, this could be residual from her arthritis of her knees.  I recommended elevation of legs and low-sodium diet.  She could try compression stockings.    · I offered her a trial of furosemide and she declined.  · After she receives the appointment to see the pulmonologist, I asked her to call our office to make an appointment to see Dr. Hicks.      As always, it has been a pleasure to participate in your patient's care. Thank you.       Sincerely,       SLY Holloway  [facility] Cardiology      · COVID-19 Precautions - Patient was compliant in wearing a mask. When I saw the patient, I used appropriate personal protective equipment (PPE) including mask and eye shield (standard procedure).  Additionally, I used gown and gloves if indicated.  Hand hygiene was completed before and after seeing the patient.  · Dictated utilizing Dragon Dictation

## 2021-03-24 NOTE — TELEPHONE ENCOUNTER
I reviewed patient's blood work.  CBC, TSH and proBNP normal.  CMP normal except for glucose of 107, chloride of 110, and alkaline phosphatase 128.     Quantitative D-dimer positive.  I recommended a stat CTA of the chest-PE protocol and bilateral venous duplex to be completed.  Patient informed and she is coming back to the hospital.  I have informed SLY Chand.

## 2021-03-25 ENCOUNTER — TELEPHONE (OUTPATIENT)
Dept: CARDIOLOGY | Facility: CLINIC | Age: 76
End: 2021-03-25

## 2021-03-25 DIAGNOSIS — R91.8 PULMONARY NODULES: Primary | ICD-10-CM

## 2021-03-25 PROCEDURE — 93000 ELECTROCARDIOGRAM COMPLETE: CPT | Performed by: NURSE PRACTITIONER

## 2021-03-25 NOTE — TELEPHONE ENCOUNTER
I spoke with patient about the test results and she verbalizes understanding.  Dr. Hicks recommended a pulmonary referral for the pulmonary nodule.    As far as the lower extremity edema, this could be residual from her arthritis of her knees.  I recommended elevation of legs and low-sodium diet.  She could try compression stockings.  I offered her a trial of furosemide and she declined.    After she receives the appointment to see the pulmonologist, I asked her to call our office to make an appointment to see Dr. Hicks.

## 2021-03-25 NOTE — TELEPHONE ENCOUNTER
· proBNP normal.  CBC normal.  CMP normal except for glucose of 107 and chloride of 110.  TSH normal.  D-dimer elevated.    · I recommended a stat CTA of the chest to rule out pulmonary embolism with her shortness of breath.  There was no evidence of pulmonary embolism.    · Incidentally she was noted to have a left lower lobe nodule present and recommended to have a 3-month follow-up chest CT, peripherally in the right lower lobe nodule noted, medially in the right lobe nodule noted, atelectasis in the lower lungs.    · I will discuss with patient and recommend that she follow-up with her PCP.    · Stat venous duplex was normal and showed no evidence of DVT.  · Patient will be informed of all findings and recommend to follow-up with RENE Robbins for the pulmonary nodules.

## 2021-03-26 ENCOUNTER — OFFICE VISIT (OUTPATIENT)
Dept: GASTROENTEROLOGY | Facility: CLINIC | Age: 76
End: 2021-03-26

## 2021-03-26 VITALS — TEMPERATURE: 97.4 F | BODY MASS INDEX: 29.81 KG/M2 | HEIGHT: 62 IN | WEIGHT: 162 LBS

## 2021-03-26 DIAGNOSIS — K57.90 DIVERTICULOSIS: ICD-10-CM

## 2021-03-26 DIAGNOSIS — K58.2 IRRITABLE BOWEL SYNDROME WITH BOTH CONSTIPATION AND DIARRHEA: Primary | ICD-10-CM

## 2021-03-26 DIAGNOSIS — K62.4 ANAL STRICTURE: ICD-10-CM

## 2021-03-26 DIAGNOSIS — R10.30 LOWER ABDOMINAL PAIN: ICD-10-CM

## 2021-03-26 DIAGNOSIS — K21.9 GASTROESOPHAGEAL REFLUX DISEASE WITHOUT ESOPHAGITIS: ICD-10-CM

## 2021-03-26 PROCEDURE — 99214 OFFICE O/P EST MOD 30 MIN: CPT | Performed by: NURSE PRACTITIONER

## 2021-03-26 RX ORDER — DICYCLOMINE HCL 20 MG
20 TABLET ORAL
Qty: 90 TABLET | Refills: 5 | Status: SHIPPED | OUTPATIENT
Start: 2021-03-26 | End: 2021-07-14 | Stop reason: HOSPADM

## 2021-03-26 RX ORDER — PANTOPRAZOLE SODIUM 40 MG/1
40 TABLET, DELAYED RELEASE ORAL 2 TIMES DAILY
Qty: 180 TABLET | Refills: 3 | Status: SHIPPED | OUTPATIENT
Start: 2021-03-26 | End: 2022-04-19

## 2021-03-26 RX ORDER — NAPROXEN SODIUM 220 MG
220 TABLET ORAL 2 TIMES DAILY PRN
COMMUNITY
End: 2021-05-15 | Stop reason: HOSPADM

## 2021-03-26 NOTE — PROGRESS NOTES
Chief Complaint   Patient presents with   • Abdominal Pain   • Constipation       Kourtney Lorenzana is a  75 y.o. female here for a follow up visit for abdominal pain.    HPI  75-year-old female presents today for follow-up visit for lower abdominal pain.  She is a patient of Dr. Andrade.  She was last seen in the office on 9/29/2020 by Dr. Andrade.  She underwent colonoscopy on 2/5/2021 that showed diverticulosis, nonbleeding internal hemorrhoids and a stricture at 50 cm proximal to the anus.  It was not traversed.  Patient then underwent an air barium enema study which was negative for any strictures and showed diverticulosis.  Patient is taking daily stool softeners and Metamucil.  She is happy to report currently her bowels are moving very well.  She tells me she did have crazy diarrhea for about 2 weeks after her colonoscopy and then she had terrible constipation so bad she had to use her finger to get the impactions out after the barium enema.  She also had a CT scan of the abdomen and pelvis done which was negative for any acute abdominal process.  She does have a history of GERD and she admits normally she takes Protonix 40 mg once daily but here lately she has had worsening reflux and has had to take the Protonix twice a day several days a week.  She denies any dysphagia, nausea and vomiting, diarrhea, constipation, bleeding or melena.  She still is having abdominal cramping on both sides of her lower abdomen.  She does not really know what makes it better or worse.  She is not sure if it scar tissue or what it is.  Does not seem to be related necessarily to when she eats or having a bowel movement.  She does have history of cholecystectomy on 12/19.  She does have a history of colon polyps.  She also has a family history of colon cancer.  Past Medical History:   Diagnosis Date   • Arthritis     OSTEO. KNEES AND BACK SEES DR MIKE DOUGLAS   • Bloating     WITH SOME NAUSEA   • Bruising tendency (CMS/HCC)    •  "Cholelithiasis Nov 7, 2019    Discovered by liver ultrasound   • Chronic low back pain    • Diverticular disease    • Elevated liver enzymes    • Gastroesophageal reflux disease without esophagitis 2/11/2016   • History of anxiety     BEEN A LONG TIME   • History of depression     SITUATIONAL. NOW RESOLVED   • History of panic disorder    • IBS (irritable bowel syndrome)    • Insomnia    • Internal hemorrhoids    • Lumbar canal stenosis    • Lumbosacral neuritis    • Multiple vitamin deficiency    • Neck pain    • Nephrolithiasis     July 2014 passage of kidney stone.   • Postmenopausal HRT (hormone replacement therapy)    • PSVT (paroxysmal supraventricular tachycardia) (CMS/HCC)    • Pulmonary nodules 3/25/2021   • Urine incontinence        Past Surgical History:   Procedure Laterality Date   • CATARACT EXTRACTION Bilateral 09/05/2017    Dr. Jefe Barakat OD, MD   • CHOLECYSTECTOMY     • CHOLECYSTECTOMY LAPAROSCOPIC INTRAOPERATIVE CHOLANGIOGRAM WITH LIVER BIOPSY N/A 12/3/2019    Procedure: CHOLECYSTECTOMY LAPAROSCOPIC INTRAOPERATIVE CHOLANGIOGRAM AND LIVER BIOPSY;  Surgeon: Benny Schneider MD;  Location: [facility];  Service: General   • COLONOSCOPY N/A 11/16/2012    Benign cecal polyp-Dr. Mak Rodriguez   • COLONOSCOPY N/A 2015    Dr. Seema Andrade   • ENDOSCOPY N/A 04/27/2018    HH, no specimen collected-Dr. Seema Andrade   • HYSTERECTOMY Bilateral 1997   • PUBOVAGINAL SLING N/A 06/17/2003    Dr. Ziggy Saba   • TOE SURGERY Right     METATARASAL SURGERY GREAT TOE FOR FRACTUE       Scheduled Meds:    Continuous Infusions:No current facility-administered medications for this visit.      PRN Meds:.    Allergies   Allergen Reactions   • Atenolol Other (See Comments)     Shortness of breath, leg cramps   • Metoprolol Itching   • Hydrocodone-Acetaminophen GI Intolerance   • Other Nausea And Vomiting     Unknown \"strong\" pain killer   • Tramadol GI Intolerance       Social History     Socioeconomic History   • " Marital status:      Spouse name: Not on file   • Number of children: Not on file   • Years of education: Not on file   • Highest education level: Not on file   Tobacco Use   • Smoking status: Never Smoker   • Smokeless tobacco: Never Used   Vaping Use   • Vaping Use: Never used   Substance and Sexual Activity   • Alcohol use: No     Comment: caffeine use: 1 cups daily   • Drug use: No   • Sexual activity: Defer       Family History   Problem Relation Age of Onset   • Atrial fibrillation Father    • Atrial fibrillation Sister    • Atrial fibrillation Brother    • Other Brother         Coronary arteriosclerosis   • Colon cancer Mother    • Colon polyps Mother    • Liver disease Paternal Aunt    • Aneurysm Paternal Grandfather    • Malig Hyperthermia Neg Hx        Review of Systems   Constitutional: Negative for appetite change, chills, diaphoresis, fatigue, fever and unexpected weight change.   HENT: Negative for nosebleeds, postnasal drip, sore throat, trouble swallowing and voice change.    Respiratory: Negative for cough, choking, chest tightness, shortness of breath and wheezing.    Cardiovascular: Negative for chest pain, palpitations and leg swelling.   Gastrointestinal: Positive for abdominal pain. Negative for abdominal distention, anal bleeding, blood in stool, constipation, diarrhea, nausea, rectal pain and vomiting.   Endocrine: Negative for polydipsia, polyphagia and polyuria.   Musculoskeletal: Negative for gait problem.   Skin: Negative for rash and wound.   Allergic/Immunologic: Negative for food allergies.   Neurological: Negative for dizziness, speech difficulty and light-headedness.   Psychiatric/Behavioral: Negative for confusion, self-injury, sleep disturbance and suicidal ideas.       Vitals:    03/26/21 1101   Temp: 97.4 °F (36.3 °C)       Physical Exam  Constitutional:       General: She is not in acute distress.     Appearance: She is well-developed. She is not ill-appearing.   HENT:       Head: Normocephalic.   Eyes:      Pupils: Pupils are equal, round, and reactive to light.   Cardiovascular:      Rate and Rhythm: Normal rate and regular rhythm.      Heart sounds: Normal heart sounds.   Pulmonary:      Effort: Pulmonary effort is normal.      Breath sounds: Normal breath sounds.   Abdominal:      General: Bowel sounds are normal. There is no distension.      Palpations: Abdomen is soft. There is no mass.      Tenderness: There is no abdominal tenderness. There is no guarding or rebound.      Hernia: No hernia is present.   Musculoskeletal:         General: Normal range of motion.   Skin:     General: Skin is warm and dry.   Neurological:      Mental Status: She is alert and oriented to person, place, and time.   Psychiatric:         Speech: Speech normal.         Behavior: Behavior normal.         Judgment: Judgment normal.         CT Angiogram Chest With & Without Contrast    Result Date: 3/24/2021    1. Indeterminate pulmonary nodules, follow-up recommended. 2. No pulmonary embolism.  This report was finalized on 3/24/2021 6:01 PM by Dr. Aroldo Rao M.D.    Assessment and plan     1. Irritable bowel syndrome with both constipation and diarrhea    2. Anal stricture    3. Diverticulosis    4. Lower abdominal pain    5. Gastroesophageal reflux disease without esophagitis    Reviewed results of her barium enema study with her today.  It did not show any strictures but it did show diverticulosis.  Reviewed results of her colonoscopy that she did this past February.  It did show diverticulosis and a stricture that was not traversed at 50 cm proximal to the anus.  Patient is happy report her bowels are moving well as long she takes stool softeners and Metamucil daily.  Continue current bowel regimen.  Since she is still continuing to have bilateral lower abdominal pain and cramping I will trial her on Bentyl to see if it is bowel related.  If it is abdominal spasms and hopefully the Bentyl will  give her a little bit of relief.  I did let her know not to take too much of it because it can slow things down.  GERD is not well controlled on Protonix 40 mg daily.  Since she is having to take it more frequently as twice a day most days I am going to go ahead and increase her prescription so that she does not run out.  Continue Protonix 40 mg twice daily before meals.  Continue GERD precautions.  Overall she does seem to be better right now.  Bowels are moving well.  Patient to call the office next week with an update.  Patient to follow-up with Dr. Andrade as planned.  Patient is agreeable to the plan.

## 2021-03-29 LAB — CREAT BLDA-MCNC: 0.8 MG/DL (ref 0.6–1.3)

## 2021-03-30 ENCOUNTER — TREATMENT (OUTPATIENT)
Dept: PHYSICAL THERAPY | Facility: CLINIC | Age: 76
End: 2021-03-30

## 2021-03-30 DIAGNOSIS — M54.2 CERVICALGIA: ICD-10-CM

## 2021-03-30 DIAGNOSIS — M47.22 CERVICAL SPONDYLOSIS WITH RADICULOPATHY: Primary | ICD-10-CM

## 2021-03-30 DIAGNOSIS — M79.18 MYOFASCIAL PAIN: ICD-10-CM

## 2021-03-30 PROCEDURE — 97535 SELF CARE MNGMENT TRAINING: CPT | Performed by: PHYSICAL THERAPIST

## 2021-03-30 PROCEDURE — G0283 ELEC STIM OTHER THAN WOUND: HCPCS | Performed by: PHYSICAL THERAPIST

## 2021-03-30 PROCEDURE — 97140 MANUAL THERAPY 1/> REGIONS: CPT | Performed by: PHYSICAL THERAPIST

## 2021-03-30 PROCEDURE — 97110 THERAPEUTIC EXERCISES: CPT | Performed by: PHYSICAL THERAPIST

## 2021-03-30 NOTE — PROGRESS NOTES
------------------------------------------------------------------------------------------------------   MD PROGRESS NOTE / Re-Assessment      Patient: Kourtney Lorenzana        : 1945  Diagnosis/ICD-10 Code:  Cervical spondylosis with radiculopathy [M47.22]  Referring practitioner: Jose Napier MD  Date of Initial Visit: 10/28/2021                  Today's Date: 2021  _________________________________________________________________     Thank you for the referral of Ms. Lorenzana to [facility].  Ms. Lorenzana has attended 17 PT sessions and their treatment has consisted of: modalities prn, manual therapy, therapeutic exercise, patient education, and HEP.      Subjective   Kourtney Lorenzana reports: overall continuing to improve with decreasing instances of increased pain and improving mobility and function - She notes that she feels much better after PT for up to about 2 weeks before her pain increases, but pain is not nearly as severe as in the past -  She reports that she was doing well until  after she had been doing a lot of cleaning up of drywall dust from construction going on in her home over the last couple of days -  She notes increased pain (L) UT and neck and limited rotation to (L) - no radicular symptoms -     ___________________________________________________________________  Objective              OBSERVATION:  hypertrophy (L) UT, scalenes, supraclavicular region, but grossly less than in the past - Improved posturing of neck / Upper quarter, but some cuing required -  -               PALPATION: Tender (L) Scalenes, UT, levator origin, 1st rib, lower C-facets w/ multiple trigger points in UT, Levator, etc.               AROM: C-spine: Flexion 43-deg;  Extension 52-deg w/ pain (L) Lower neck at end range;  ROT (L) 50-deg; (R) 70-deg both w/ pain (L) UT/lower neck (Full c-spine rotation w/o pain after manual therapy               STRENGTH: UE Myotomes WNL grossly                DTR's/SENSATION: WNL grossly               SPECIAL TESTS: (-) C-Compression / Distraction - (+) Elevated First Rib (L)              ACTIVITY TOLERANCE:Improving tolerance to ADLs for up to 2 weeks following PT        See Exercise, Manual, and Modality Logs for complete treatment.      SELF CARE:  X 15 min  · TAPING / BRACING: NA  · FUNCTIONAL ASSESSMENT   · Jt protection, ADL modification; Posture and ergonomic education on modifying cleaning / vacuuming to prevent adverse stress to C-spine / upper quarter, etc.    ___________________________________________________________________   Assessment/Plan  (L) sided neck/UT pain; Myofascial pain (L) -   (Per MRI: Degenerative disc and facet change in the cervical spine  with grade 1 retrolisthesis of C4; C-DJD; Foraminal Stenosis)  Much improved with decreased pain and improved mobility and function - Intermittent flare-ups of pain, but less often and less severe -   Pain much less and much improved mobility after MT today -   Ms. Lorenzana would benefit from continued Physical Therapy as needed to allow improved function and avoid surgical or other costly interventions.    GOAL STATUS: STGs - All Met                             LTGs - Continuing to progress towards Function / pain goals -      P: Recommend continued Physical Therapy to allow a full and safe return to ADL's and normal job duties 1 times/1-2wk x 8 weeks or prn.    Please advise after your exam.     Thank you again for this referral of Ms. Lorenzana to [facility].     PT Signature: ______________________________   Jason Bower, PT  ______________________________________________________  Based upon review of the patient's progress and continued therapy plan, it is my medical opinion that Kourtney Lorenzana should continue physical therapy treatment per the recommendation above.      Signature: ____________________________   Date: ______   Jose Napier  MD   ______________________________________________________  Manual Therapy:    15     mins  31702;  Therapeutic Exercise:    20     mins  67369;     Neuromuscular Jenn:        mins  03091;    Therapeutic Activity:          mins  75231;   Self Care:                      15     mins  73029  Ultrasound:     06     mins  85443;  Iontophoresis:          mins  65810;    Electrical Stimulation:   15      mins  07039 ( );  Mechanical Traction:          mins  27430  Dry Needling          mins self-pay    Timed Treatment:   56   mins                  Total Treatment:     80   mins       ______________________________________________________________________  [address] [place] [address]  Phone: [phone]                 Fax: [phone]

## 2021-03-31 ENCOUNTER — APPOINTMENT (OUTPATIENT)
Dept: GENERAL RADIOLOGY | Facility: HOSPITAL | Age: 76
End: 2021-03-31

## 2021-04-05 ENCOUNTER — TELEPHONE (OUTPATIENT)
Dept: CARDIOLOGY | Facility: CLINIC | Age: 76
End: 2021-04-05

## 2021-04-05 NOTE — TELEPHONE ENCOUNTER
I spoke with patient. I explained that it can take a few weeks to have the pulmonary referral and appointment placed.     I explained the ddimer and CTA results. She verbalizes understanding.

## 2021-04-15 ENCOUNTER — APPOINTMENT (OUTPATIENT)
Dept: PREADMISSION TESTING | Facility: HOSPITAL | Age: 76
End: 2021-04-15

## 2021-04-22 ENCOUNTER — TRANSCRIBE ORDERS (OUTPATIENT)
Dept: PREADMISSION TESTING | Facility: HOSPITAL | Age: 76
End: 2021-04-22

## 2021-04-22 DIAGNOSIS — Z01.818 OTHER SPECIFIED PRE-OPERATIVE EXAMINATION: Primary | ICD-10-CM

## 2021-04-29 ENCOUNTER — PRE-ADMISSION TESTING (OUTPATIENT)
Dept: PREADMISSION TESTING | Facility: HOSPITAL | Age: 76
End: 2021-04-29

## 2021-04-29 VITALS
WEIGHT: 159 LBS | SYSTOLIC BLOOD PRESSURE: 162 MMHG | DIASTOLIC BLOOD PRESSURE: 87 MMHG | BODY MASS INDEX: 29.26 KG/M2 | HEART RATE: 76 BPM | TEMPERATURE: 97.7 F | RESPIRATION RATE: 16 BRPM | HEIGHT: 62 IN | OXYGEN SATURATION: 100 %

## 2021-04-29 DIAGNOSIS — M17.11 PRIMARY OSTEOARTHRITIS OF RIGHT KNEE: ICD-10-CM

## 2021-04-29 LAB
ANION GAP SERPL CALCULATED.3IONS-SCNC: 6.5 MMOL/L (ref 5–15)
BILIRUB UR QL STRIP: NEGATIVE
BUN SERPL-MCNC: 13 MG/DL (ref 8–23)
BUN/CREAT SERPL: 17.8 (ref 7–25)
CALCIUM SPEC-SCNC: 9.6 MG/DL (ref 8.6–10.5)
CHLORIDE SERPL-SCNC: 111 MMOL/L (ref 98–107)
CLARITY UR: CLEAR
CO2 SERPL-SCNC: 25.5 MMOL/L (ref 22–29)
COLOR UR: YELLOW
CREAT SERPL-MCNC: 0.73 MG/DL (ref 0.57–1)
DEPRECATED RDW RBC AUTO: 44.6 FL (ref 37–54)
ERYTHROCYTE [DISTWIDTH] IN BLOOD BY AUTOMATED COUNT: 14.6 % (ref 12.3–15.4)
GFR SERPL CREATININE-BSD FRML MDRD: 78 ML/MIN/1.73
GLUCOSE SERPL-MCNC: 84 MG/DL (ref 65–99)
GLUCOSE UR STRIP-MCNC: NEGATIVE MG/DL
HCT VFR BLD AUTO: 36.9 % (ref 34–46.6)
HGB BLD-MCNC: 13.2 G/DL (ref 12–15.9)
HGB UR QL STRIP.AUTO: NEGATIVE
KETONES UR QL STRIP: NEGATIVE
LEUKOCYTE ESTERASE UR QL STRIP.AUTO: NEGATIVE
MCH RBC QN AUTO: 33 PG (ref 26.6–33)
MCHC RBC AUTO-ENTMCNC: 35.8 G/DL (ref 31.5–35.7)
MCV RBC AUTO: 92.3 FL (ref 79–97)
NITRITE UR QL STRIP: NEGATIVE
PH UR STRIP.AUTO: 7 [PH] (ref 5–8)
PLATELET # BLD AUTO: 215 10*3/MM3 (ref 140–450)
PMV BLD AUTO: 9.8 FL (ref 6–12)
POTASSIUM SERPL-SCNC: 4.1 MMOL/L (ref 3.5–5.2)
PROT UR QL STRIP: NEGATIVE
RBC # BLD AUTO: 4 10*6/MM3 (ref 3.77–5.28)
SODIUM SERPL-SCNC: 143 MMOL/L (ref 136–145)
SP GR UR STRIP: 1.01 (ref 1–1.03)
UROBILINOGEN UR QL STRIP: NORMAL
WBC # BLD AUTO: 4.02 10*3/MM3 (ref 3.4–10.8)

## 2021-04-29 PROCEDURE — 85027 COMPLETE CBC AUTOMATED: CPT

## 2021-04-29 PROCEDURE — 81003 URINALYSIS AUTO W/O SCOPE: CPT

## 2021-04-29 PROCEDURE — A9270 NON-COVERED ITEM OR SERVICE: HCPCS | Performed by: NURSE PRACTITIONER

## 2021-04-29 PROCEDURE — 80048 BASIC METABOLIC PNL TOTAL CA: CPT

## 2021-04-29 PROCEDURE — 63710000001 MUPIROCIN 2 % OINTMENT: Performed by: NURSE PRACTITIONER

## 2021-04-29 PROCEDURE — 36415 COLL VENOUS BLD VENIPUNCTURE: CPT

## 2021-04-29 RX ORDER — ACETAMINOPHEN,DIPHENHYDRAMINE HCL 500; 25 MG/1; MG/1
1 TABLET, FILM COATED ORAL NIGHTLY PRN
COMMUNITY
End: 2021-05-15 | Stop reason: HOSPADM

## 2021-04-29 ASSESSMENT — KOOS JR
KOOS JR SCORE: 42.281
KOOS JR SCORE: 18

## 2021-05-03 ENCOUNTER — TRANSCRIBE ORDERS (OUTPATIENT)
Dept: ADMINISTRATIVE | Facility: HOSPITAL | Age: 76
End: 2021-05-03

## 2021-05-03 DIAGNOSIS — R91.1 LUNG NODULE: Primary | ICD-10-CM

## 2021-05-06 ENCOUNTER — OFFICE VISIT (OUTPATIENT)
Dept: ORTHOPEDIC SURGERY | Facility: CLINIC | Age: 76
End: 2021-05-06

## 2021-05-06 VITALS — WEIGHT: 158.95 LBS | TEMPERATURE: 97.8 F | HEIGHT: 62 IN | BODY MASS INDEX: 29.25 KG/M2

## 2021-05-06 DIAGNOSIS — M17.11 PRIMARY OSTEOARTHRITIS OF RIGHT KNEE: Primary | ICD-10-CM

## 2021-05-06 PROCEDURE — S0260 H&P FOR SURGERY: HCPCS | Performed by: NURSE PRACTITIONER

## 2021-05-06 PROCEDURE — 77077 JOINT SURVEY SINGLE VIEW: CPT | Performed by: ORTHOPAEDIC SURGERY

## 2021-05-06 NOTE — H&P
"Patient: Kourtney Lorenzana    Date of Admission: 5/14/2021    YOB: 1945    Medical Record Number: [id number]    Admitting Physician: Dr. Catarino De La Cruz    Reason for Admission: End Stage Right Knee OA    History of Present Illness: 75 y.o. female presents with severe end stage knee osteoarthritis which has not been responsive to the full compliment of conservative measures. Despite conservative attempts, there is still severe, constant activity limiting pain. Given the severity of the pain, the patient has elected to proceed with knee replacement.    Allergies:   Allergies   Allergen Reactions   • Atenolol Shortness Of Breath and Other (See Comments)     Shortness of breath, leg cramps   • Metoprolol Itching   • Hydrocodone-Acetaminophen Nausea Only and GI Intolerance   • Other Nausea And Vomiting     Unknown \"strong\" pain killer (POSSIBLY VICODIN)   • Tramadol Nausea Only and GI Intolerance         Current Medications:  Home Medications:    Current Outpatient Medications on File Prior to Visit   Medication Sig   • acetaminophen (TYLENOL) 500 MG tablet Take 500-1,000 mg by mouth Every 6 (Six) Hours As Needed for Mild Pain .   • cetirizine (zyrTEC) 10 MG tablet Take 10 mg by mouth Daily.   • Chlorhexidine Gluconate 2 % pads Apply  topically. As directed pre op   • Cholecalciferol (VITAMIN D3) 50 MCG (2000 UT) tablet Take 2,000 Units by mouth Every Morning.   • dicyclomine (BENTYL) 20 MG tablet Take 1 tablet by mouth 4 (Four) Times a Day Before Meals & at Bedtime As Needed (cramping).   • diphenhydrAMINE-acetaminophen (TYLENOL PM)  MG tablet per tablet Take 1 tablet by mouth At Night As Needed for Sleep.   • mupirocin (BACTROBAN) 2 % nasal ointment into the nostril(s) as directed by provider. As directed pre op   • naproxen sodium (ALEVE) 220 MG tablet Take 220 mg by mouth 2 (Two) Times a Day As Needed. PT HOLDING FOR SURGERY   • pantoprazole (PROTONIX) 40 MG EC tablet Take 1 tablet by mouth 2 (two) " times a day.   • vitamin C (ASCORBIC ACID) 250 MG tablet Take 500 mg by mouth Daily. PT HOLDING FOR SURGERY     Current Facility-Administered Medications on File Prior to Visit   Medication   • Chlorhexidine Gluconate 2 % pads 2 each     PRN Meds:.    PMH:     Past Medical History:   Diagnosis Date   • Arthritis     OSTEO. KNEES AND BACK SEES DR MIKE DOUGLAS   • Bloating     WITH SOME NAUSEA   • Bruising tendency (CMS/HCC)    • Cholelithiasis Nov 7, 2019    Discovered by liver ultrasound   • Chronic low back pain    • Chronic pain of both feet    • Diverticular disease    • Diverticulosis    • Elevated liver enzymes    • Gastroesophageal reflux disease without esophagitis 2/11/2016   • History of anxiety     BEEN A LONG TIME   • History of depression     SITUATIONAL. NOW RESOLVED   • History of panic disorder    • IBS (irritable bowel syndrome)    • Insomnia    • Internal hemorrhoids    • Knee pain    • Lumbar canal stenosis    • Lumbosacral neuritis    • Lung nodules    • Multiple vitamin deficiency    • Neck pain    • Nephrolithiasis     July 2014 passage of kidney stone.   • Postmenopausal HRT (hormone replacement therapy)    • PSVT (paroxysmal supraventricular tachycardia) (CMS/HCC)    • Pulmonary nodules 3/25/2021   • Seasonal allergies    • Shoulder pain    • SOB (shortness of breath) on exertion    • Trouble swallowing     INTERMITTENTLY   • Urine incontinence        PF/Surg/Soc Hx:     Past Surgical History:   Procedure Laterality Date   • CATARACT EXTRACTION Bilateral 09/05/2017    Dr. Jefe Barakat OD, MD   • CHOLECYSTECTOMY     • CHOLECYSTECTOMY LAPAROSCOPIC INTRAOPERATIVE CHOLANGIOGRAM WITH LIVER BIOPSY N/A 12/3/2019    Procedure: CHOLECYSTECTOMY LAPAROSCOPIC INTRAOPERATIVE CHOLANGIOGRAM AND LIVER BIOPSY;  Surgeon: Benny Schneider MD;  Location: [facility];  Service: General   • COLONOSCOPY N/A 11/16/2012    Benign cecal polyp-Dr. aMk Rodriguez   • COLONOSCOPY N/A 2015    Dr. Seema Andrade   •  "ENDOSCOPY N/A 04/27/2018    , no specimen collected-Dr. Seema Andrade   • HYSTERECTOMY Bilateral 1997   • PUBOVAGINAL SLING N/A 06/17/2003    Dr. Ziggy Saba   • TOE SURGERY Right     METATARASAL SURGERY GREAT TOE FOR FRACTUE        Social History     Occupational History   • Not on file   Tobacco Use   • Smoking status: Never Smoker   • Smokeless tobacco: Never Used   Vaping Use   • Vaping Use: Never used   Substance and Sexual Activity   • Alcohol use: No     Comment: caffeine use: 1 cups daily   • Drug use: No   • Sexual activity: Defer      Social History     Social History Narrative   • Not on file        Family History   Problem Relation Age of Onset   • Atrial fibrillation Father    • Atrial fibrillation Sister    • Atrial fibrillation Brother    • Other Brother         Coronary arteriosclerosis   • Colon cancer Mother    • Colon polyps Mother    • Liver disease Paternal Aunt    • Aneurysm Paternal Grandfather    • Malig Hyperthermia Neg Hx          Review of Systems:   A 14 point review of systems was performed, pertinent positives discussed above, all other systems are negative    Physical Exam: 75 y.o. female  Vital Signs :   Vitals:    05/06/21 1515   Temp: 97.8 °F (36.6 °C)   Weight: 72.1 kg (158 lb 15.2 oz)   Height: 157.5 cm (62.01\")   PainSc: 10-Worst pain ever     General: Alert and Oriented x 3, No acute distress.  Psych: mood and affect appropriate; recent and remote memory intact  Eyes: conjunctiva clear; pupils equally round and reactive, sclera nonicteric  CV: no peripheral edema  Resp: normal respiratory effort  Skin: no rashes or wounds; normal turgor  Musculosketetal; pain and crepitance with knee range of motion  Vascular: palpable distal pulses    Xrays:  -3 views (AP, lateral, and sunrise) were reviewed demonstrating end-stage OA with bone on bone articulation.  -A full length AP xray was ordered and reviewed today for purposes of operative alignment demonstrating end stage arthritic " findings. There are no previous full length films for review    Assessment:  End-stage Right knee osteoarthritis. Conservative measures have failed.      Plan:  The plan is to proceed with Right Total Knee Replacement. The patient voiced understanding of the risks, benefits, and alternative forms of treatment that were discussed with Dr De La Cruz at the time of scheduling. 23     Elsa Chiang, APRN  5/6/2021

## 2021-05-12 ENCOUNTER — LAB (OUTPATIENT)
Dept: LAB | Facility: HOSPITAL | Age: 76
End: 2021-05-12

## 2021-05-12 DIAGNOSIS — Z01.818 OTHER SPECIFIED PRE-OPERATIVE EXAMINATION: ICD-10-CM

## 2021-05-12 PROCEDURE — C9803 HOPD COVID-19 SPEC COLLECT: HCPCS

## 2021-05-12 PROCEDURE — U0005 INFEC AGEN DETEC AMPLI PROBE: HCPCS

## 2021-05-12 PROCEDURE — U0004 COV-19 TEST NON-CDC HGH THRU: HCPCS

## 2021-05-13 ENCOUNTER — ANESTHESIA EVENT (OUTPATIENT)
Dept: PERIOP | Facility: HOSPITAL | Age: 76
End: 2021-05-13

## 2021-05-13 LAB — SARS-COV-2 RNA RESP QL NAA+PROBE: NOT DETECTED

## 2021-05-14 ENCOUNTER — ANESTHESIA (OUTPATIENT)
Dept: PERIOP | Facility: HOSPITAL | Age: 76
End: 2021-05-14

## 2021-05-14 ENCOUNTER — TELEPHONE (OUTPATIENT)
Dept: CARDIOLOGY | Facility: CLINIC | Age: 76
End: 2021-05-14

## 2021-05-14 ENCOUNTER — HOSPITAL ENCOUNTER (OUTPATIENT)
Facility: HOSPITAL | Age: 76
Discharge: HOME HEALTH | End: 2021-05-15
Attending: ORTHOPAEDIC SURGERY | Admitting: ORTHOPAEDIC SURGERY

## 2021-05-14 ENCOUNTER — APPOINTMENT (OUTPATIENT)
Dept: GENERAL RADIOLOGY | Facility: HOSPITAL | Age: 76
End: 2021-05-14

## 2021-05-14 DIAGNOSIS — Z96.651 S/P TKR (TOTAL KNEE REPLACEMENT), RIGHT: Primary | ICD-10-CM

## 2021-05-14 DIAGNOSIS — M17.11 PRIMARY OSTEOARTHRITIS OF RIGHT KNEE: ICD-10-CM

## 2021-05-14 PROCEDURE — 63710000001 POLYETHYLENE GLYCOL 17 G PACK: Performed by: NURSE PRACTITIONER

## 2021-05-14 PROCEDURE — C1776 JOINT DEVICE (IMPLANTABLE): HCPCS | Performed by: ORTHOPAEDIC SURGERY

## 2021-05-14 PROCEDURE — A9270 NON-COVERED ITEM OR SERVICE: HCPCS | Performed by: NURSE PRACTITIONER

## 2021-05-14 PROCEDURE — C1889 IMPLANT/INSERT DEVICE, NOC: HCPCS | Performed by: ORTHOPAEDIC SURGERY

## 2021-05-14 PROCEDURE — G0378 HOSPITAL OBSERVATION PER HR: HCPCS

## 2021-05-14 PROCEDURE — 25010000003 CEFAZOLIN IN DEXTROSE 2-4 GM/100ML-% SOLUTION: Performed by: NURSE PRACTITIONER

## 2021-05-14 PROCEDURE — 25010000003 BUPIVACAINE LIPOSOME 1.3 % SUSPENSION 20 ML VIAL: Performed by: ORTHOPAEDIC SURGERY

## 2021-05-14 PROCEDURE — 63710000001 HYDROCODONE-ACETAMINOPHEN 7.5-325 MG TABLET: Performed by: NURSE PRACTITIONER

## 2021-05-14 PROCEDURE — 25010000002 VANCOMYCIN PER 500 MG: Performed by: NURSE PRACTITIONER

## 2021-05-14 PROCEDURE — 27447 TOTAL KNEE ARTHROPLASTY: CPT | Performed by: ORTHOPAEDIC SURGERY

## 2021-05-14 PROCEDURE — 97110 THERAPEUTIC EXERCISES: CPT

## 2021-05-14 PROCEDURE — 27447 TOTAL KNEE ARTHROPLASTY: CPT | Performed by: NURSE PRACTITIONER

## 2021-05-14 PROCEDURE — 73560 X-RAY EXAM OF KNEE 1 OR 2: CPT

## 2021-05-14 PROCEDURE — C1713 ANCHOR/SCREW BN/BN,TIS/BN: HCPCS | Performed by: ORTHOPAEDIC SURGERY

## 2021-05-14 PROCEDURE — 97161 PT EVAL LOW COMPLEX 20 MIN: CPT

## 2021-05-14 PROCEDURE — 25010000002 PROPOFOL 10 MG/ML EMULSION: Performed by: STUDENT IN AN ORGANIZED HEALTH CARE EDUCATION/TRAINING PROGRAM

## 2021-05-14 PROCEDURE — 63710000001 MUPIROCIN 2 % OINTMENT: Performed by: NURSE PRACTITIONER

## 2021-05-14 PROCEDURE — 25010000002 DEXAMETHASONE PER 1 MG: Performed by: STUDENT IN AN ORGANIZED HEALTH CARE EDUCATION/TRAINING PROGRAM

## 2021-05-14 PROCEDURE — C9290 INJ, BUPIVACAINE LIPOSOME: HCPCS | Performed by: ORTHOPAEDIC SURGERY

## 2021-05-14 DEVICE — LEGION PS HIGH FLEX XLPE SZ 1-2 11MM
Type: IMPLANTABLE DEVICE | Site: KNEE | Status: FUNCTIONAL
Brand: LEGION

## 2021-05-14 DEVICE — VIOLET ANTIBACTERIAL POLYDIOXANONE, KNOTLESS TISSUE CONTROL DEVICE
Type: IMPLANTABLE DEVICE | Site: KNEE | Status: FUNCTIONAL
Brand: STRATAFIX

## 2021-05-14 DEVICE — KNOTLESS TISSUE CONTROL DEVICE, UNDYED UNIDIRECTIONAL (ANTIBACTERIAL) SYNTHETIC ABSORBABLE DEVICE
Type: IMPLANTABLE DEVICE | Site: KNEE | Status: FUNCTIONAL
Brand: STRATAFIX

## 2021-05-14 DEVICE — CMT BONE PALACOS R HI/VISC 1X40: Type: IMPLANTABLE DEVICE | Site: KNEE | Status: FUNCTIONAL

## 2021-05-14 DEVICE — GENESIS II NON-POROUS TIBIAL                                    BASEPLATE SIZE 2 RIGHT
Type: IMPLANTABLE DEVICE | Site: KNEE | Status: FUNCTIONAL
Brand: GENESIS II

## 2021-05-14 DEVICE — IMPLANTABLE DEVICE: Type: IMPLANTABLE DEVICE | Site: KNEE | Status: FUNCTIONAL

## 2021-05-14 DEVICE — GENESIS II BICONVEX PATELLA 29MM
Type: IMPLANTABLE DEVICE | Site: KNEE | Status: FUNCTIONAL
Brand: GENESIS II

## 2021-05-14 DEVICE — LEGION POSTERIOR STABILIZED                                    OXINIUM FEMORAL SIZE 3 RIGHT
Type: IMPLANTABLE DEVICE | Site: KNEE | Status: FUNCTIONAL
Brand: LEGION

## 2021-05-14 RX ORDER — LIDOCAINE HYDROCHLORIDE 10 MG/ML
0.5 INJECTION, SOLUTION EPIDURAL; INFILTRATION; INTRACAUDAL; PERINEURAL ONCE AS NEEDED
Status: DISCONTINUED | OUTPATIENT
Start: 2021-05-14 | End: 2021-05-14 | Stop reason: HOSPADM

## 2021-05-14 RX ORDER — SODIUM CHLORIDE 0.9 % (FLUSH) 0.9 %
3 SYRINGE (ML) INJECTION EVERY 12 HOURS SCHEDULED
Status: DISCONTINUED | OUTPATIENT
Start: 2021-05-14 | End: 2021-05-14 | Stop reason: HOSPADM

## 2021-05-14 RX ORDER — PROPOFOL 10 MG/ML
VIAL (ML) INTRAVENOUS CONTINUOUS PRN
Status: DISCONTINUED | OUTPATIENT
Start: 2021-05-14 | End: 2021-05-14 | Stop reason: SURG

## 2021-05-14 RX ORDER — FLUMAZENIL 0.1 MG/ML
0.2 INJECTION INTRAVENOUS AS NEEDED
Status: DISCONTINUED | OUTPATIENT
Start: 2021-05-14 | End: 2021-05-14 | Stop reason: HOSPADM

## 2021-05-14 RX ORDER — POLYETHYLENE GLYCOL 3350 17 G/17G
17 POWDER, FOR SOLUTION ORAL 2 TIMES DAILY
Status: DISCONTINUED | OUTPATIENT
Start: 2021-05-14 | End: 2021-05-15 | Stop reason: HOSPADM

## 2021-05-14 RX ORDER — PROMETHAZINE HYDROCHLORIDE 25 MG/1
25 TABLET ORAL ONCE AS NEEDED
Status: DISCONTINUED | OUTPATIENT
Start: 2021-05-14 | End: 2021-05-14 | Stop reason: HOSPADM

## 2021-05-14 RX ORDER — ONDANSETRON 2 MG/ML
4 INJECTION INTRAMUSCULAR; INTRAVENOUS ONCE AS NEEDED
Status: DISCONTINUED | OUTPATIENT
Start: 2021-05-14 | End: 2021-05-14 | Stop reason: HOSPADM

## 2021-05-14 RX ORDER — ASPIRIN 81 MG/1
81 TABLET ORAL EVERY 12 HOURS SCHEDULED
Status: DISCONTINUED | OUTPATIENT
Start: 2021-05-15 | End: 2021-05-15 | Stop reason: HOSPADM

## 2021-05-14 RX ORDER — PROMETHAZINE HYDROCHLORIDE 25 MG/1
25 SUPPOSITORY RECTAL ONCE AS NEEDED
Status: DISCONTINUED | OUTPATIENT
Start: 2021-05-14 | End: 2021-05-14 | Stop reason: HOSPADM

## 2021-05-14 RX ORDER — HYDROCODONE BITARTRATE AND ACETAMINOPHEN 7.5; 325 MG/1; MG/1
2 TABLET ORAL EVERY 4 HOURS PRN
Status: DISCONTINUED | OUTPATIENT
Start: 2021-05-14 | End: 2021-05-15 | Stop reason: HOSPADM

## 2021-05-14 RX ORDER — SODIUM CHLORIDE, SODIUM LACTATE, POTASSIUM CHLORIDE, CALCIUM CHLORIDE 600; 310; 30; 20 MG/100ML; MG/100ML; MG/100ML; MG/100ML
9 INJECTION, SOLUTION INTRAVENOUS CONTINUOUS
Status: DISCONTINUED | OUTPATIENT
Start: 2021-05-14 | End: 2021-05-14 | Stop reason: HOSPADM

## 2021-05-14 RX ORDER — HYDROMORPHONE HYDROCHLORIDE 1 MG/ML
0.5 INJECTION, SOLUTION INTRAMUSCULAR; INTRAVENOUS; SUBCUTANEOUS
Status: DISCONTINUED | OUTPATIENT
Start: 2021-05-14 | End: 2021-05-14 | Stop reason: HOSPADM

## 2021-05-14 RX ORDER — FAMOTIDINE 10 MG/ML
20 INJECTION, SOLUTION INTRAVENOUS ONCE
Status: COMPLETED | OUTPATIENT
Start: 2021-05-14 | End: 2021-05-14

## 2021-05-14 RX ORDER — CEFAZOLIN SODIUM 2 G/100ML
2 INJECTION, SOLUTION INTRAVENOUS EVERY 8 HOURS
Status: COMPLETED | OUTPATIENT
Start: 2021-05-14 | End: 2021-05-15

## 2021-05-14 RX ORDER — DIPHENHYDRAMINE HYDROCHLORIDE 50 MG/ML
12.5 INJECTION INTRAMUSCULAR; INTRAVENOUS
Status: DISCONTINUED | OUTPATIENT
Start: 2021-05-14 | End: 2021-05-14 | Stop reason: HOSPADM

## 2021-05-14 RX ORDER — SODIUM CHLORIDE 0.9 % (FLUSH) 0.9 %
3-10 SYRINGE (ML) INJECTION AS NEEDED
Status: DISCONTINUED | OUTPATIENT
Start: 2021-05-14 | End: 2021-05-14 | Stop reason: HOSPADM

## 2021-05-14 RX ORDER — MELOXICAM 15 MG/1
15 TABLET ORAL DAILY
Status: DISCONTINUED | OUTPATIENT
Start: 2021-05-15 | End: 2021-05-15 | Stop reason: HOSPADM

## 2021-05-14 RX ORDER — MELOXICAM 15 MG/1
15 TABLET ORAL ONCE
Status: COMPLETED | OUTPATIENT
Start: 2021-05-14 | End: 2021-05-14

## 2021-05-14 RX ORDER — HYDROCODONE BITARTRATE AND ACETAMINOPHEN 7.5; 325 MG/1; MG/1
1 TABLET ORAL EVERY 4 HOURS PRN
Status: DISCONTINUED | OUTPATIENT
Start: 2021-05-14 | End: 2021-05-15 | Stop reason: HOSPADM

## 2021-05-14 RX ORDER — LIDOCAINE HYDROCHLORIDE 20 MG/ML
INJECTION, SOLUTION INFILTRATION; PERINEURAL AS NEEDED
Status: DISCONTINUED | OUTPATIENT
Start: 2021-05-14 | End: 2021-05-14 | Stop reason: SURG

## 2021-05-14 RX ORDER — MELOXICAM 7.5 MG/1
15 TABLET ORAL DAILY
Qty: 28 TABLET | Refills: 0 | Status: SHIPPED | OUTPATIENT
Start: 2021-05-14 | End: 2021-05-28

## 2021-05-14 RX ORDER — FENTANYL CITRATE 50 UG/ML
50 INJECTION, SOLUTION INTRAMUSCULAR; INTRAVENOUS
Status: DISCONTINUED | OUTPATIENT
Start: 2021-05-14 | End: 2021-05-14 | Stop reason: HOSPADM

## 2021-05-14 RX ORDER — VANCOMYCIN HYDROCHLORIDE 1 G/200ML
15 INJECTION, SOLUTION INTRAVENOUS ONCE
Status: COMPLETED | OUTPATIENT
Start: 2021-05-14 | End: 2021-05-14

## 2021-05-14 RX ORDER — HYDRALAZINE HYDROCHLORIDE 20 MG/ML
5 INJECTION INTRAMUSCULAR; INTRAVENOUS
Status: DISCONTINUED | OUTPATIENT
Start: 2021-05-14 | End: 2021-05-14 | Stop reason: HOSPADM

## 2021-05-14 RX ORDER — POLYETHYLENE GLYCOL 3350 17 G/17G
17 POWDER, FOR SOLUTION ORAL 2 TIMES DAILY
Qty: 255 G | Refills: 0 | Status: SHIPPED | OUTPATIENT
Start: 2021-05-14 | End: 2021-05-21

## 2021-05-14 RX ORDER — ONDANSETRON 2 MG/ML
4 INJECTION INTRAMUSCULAR; INTRAVENOUS EVERY 6 HOURS PRN
Status: DISCONTINUED | OUTPATIENT
Start: 2021-05-14 | End: 2021-05-15 | Stop reason: HOSPADM

## 2021-05-14 RX ORDER — PANTOPRAZOLE SODIUM 40 MG/1
40 TABLET, DELAYED RELEASE ORAL DAILY
Qty: 14 TABLET | Refills: 0 | Status: SHIPPED | OUTPATIENT
Start: 2021-05-14 | End: 2021-05-28

## 2021-05-14 RX ORDER — MAGNESIUM HYDROXIDE 1200 MG/15ML
LIQUID ORAL AS NEEDED
Status: DISCONTINUED | OUTPATIENT
Start: 2021-05-14 | End: 2021-05-14 | Stop reason: HOSPADM

## 2021-05-14 RX ORDER — BUPIVACAINE HYDROCHLORIDE 7.5 MG/ML
INJECTION, SOLUTION EPIDURAL; RETROBULBAR
Status: COMPLETED | OUTPATIENT
Start: 2021-05-14 | End: 2021-05-14

## 2021-05-14 RX ORDER — ONDANSETRON 4 MG/1
4 TABLET, FILM COATED ORAL EVERY 6 HOURS PRN
Status: DISCONTINUED | OUTPATIENT
Start: 2021-05-14 | End: 2021-05-15 | Stop reason: HOSPADM

## 2021-05-14 RX ORDER — PANTOPRAZOLE SODIUM 40 MG/1
40 TABLET, DELAYED RELEASE ORAL
Status: DISCONTINUED | OUTPATIENT
Start: 2021-05-15 | End: 2021-05-15 | Stop reason: HOSPADM

## 2021-05-14 RX ORDER — TRANEXAMIC ACID 100 MG/ML
INJECTION, SOLUTION INTRAVENOUS AS NEEDED
Status: DISCONTINUED | OUTPATIENT
Start: 2021-05-14 | End: 2021-05-14 | Stop reason: SURG

## 2021-05-14 RX ORDER — DEXAMETHASONE SODIUM PHOSPHATE 4 MG/ML
INJECTION, SOLUTION INTRA-ARTICULAR; INTRALESIONAL; INTRAMUSCULAR; INTRAVENOUS; SOFT TISSUE AS NEEDED
Status: DISCONTINUED | OUTPATIENT
Start: 2021-05-14 | End: 2021-05-14 | Stop reason: SURG

## 2021-05-14 RX ORDER — CEFAZOLIN SODIUM 2 G/100ML
2 INJECTION, SOLUTION INTRAVENOUS ONCE
Status: COMPLETED | OUTPATIENT
Start: 2021-05-14 | End: 2021-05-14

## 2021-05-14 RX ORDER — DIPHENHYDRAMINE HCL 25 MG
25 CAPSULE ORAL
Status: DISCONTINUED | OUTPATIENT
Start: 2021-05-14 | End: 2021-05-14 | Stop reason: HOSPADM

## 2021-05-14 RX ORDER — OXYCODONE AND ACETAMINOPHEN 7.5; 325 MG/1; MG/1
1 TABLET ORAL ONCE AS NEEDED
Status: DISCONTINUED | OUTPATIENT
Start: 2021-05-14 | End: 2021-05-14 | Stop reason: HOSPADM

## 2021-05-14 RX ORDER — HYDROCODONE BITARTRATE AND ACETAMINOPHEN 7.5; 325 MG/1; MG/1
TABLET ORAL
Qty: 42 TABLET | Refills: 0 | Status: SHIPPED | OUTPATIENT
Start: 2021-05-14 | End: 2021-08-04

## 2021-05-14 RX ORDER — MIDAZOLAM HYDROCHLORIDE 1 MG/ML
0.5 INJECTION INTRAMUSCULAR; INTRAVENOUS
Status: DISCONTINUED | OUTPATIENT
Start: 2021-05-14 | End: 2021-05-14 | Stop reason: HOSPADM

## 2021-05-14 RX ORDER — ONDANSETRON 4 MG/1
4 TABLET, FILM COATED ORAL EVERY 8 HOURS PRN
Qty: 10 TABLET | Refills: 0 | Status: SHIPPED | OUTPATIENT
Start: 2021-05-14 | End: 2021-08-04

## 2021-05-14 RX ORDER — EPHEDRINE SULFATE 50 MG/ML
5 INJECTION, SOLUTION INTRAVENOUS ONCE AS NEEDED
Status: DISCONTINUED | OUTPATIENT
Start: 2021-05-14 | End: 2021-05-14 | Stop reason: HOSPADM

## 2021-05-14 RX ORDER — NALOXONE HCL 0.4 MG/ML
0.2 VIAL (ML) INJECTION AS NEEDED
Status: DISCONTINUED | OUTPATIENT
Start: 2021-05-14 | End: 2021-05-14 | Stop reason: HOSPADM

## 2021-05-14 RX ORDER — DICYCLOMINE HYDROCHLORIDE 10 MG/1
20 CAPSULE ORAL
Status: DISCONTINUED | OUTPATIENT
Start: 2021-05-14 | End: 2021-05-15 | Stop reason: HOSPADM

## 2021-05-14 RX ORDER — UREA 10 %
1 LOTION (ML) TOPICAL NIGHTLY PRN
Status: DISCONTINUED | OUTPATIENT
Start: 2021-05-14 | End: 2021-05-15 | Stop reason: HOSPADM

## 2021-05-14 RX ORDER — ASPIRIN 81 MG/1
81 TABLET ORAL 2 TIMES DAILY
Qty: 60 TABLET | Refills: 0 | Status: SHIPPED | OUTPATIENT
Start: 2021-05-15 | End: 2021-06-14

## 2021-05-14 RX ORDER — PREGABALIN 75 MG/1
150 CAPSULE ORAL ONCE
Status: COMPLETED | OUTPATIENT
Start: 2021-05-14 | End: 2021-05-14

## 2021-05-14 RX ADMIN — POLYETHYLENE GLYCOL 3350 17 G: 17 POWDER, FOR SOLUTION ORAL at 20:41

## 2021-05-14 RX ADMIN — HYDROCODONE BITARTRATE AND ACETAMINOPHEN 1 TABLET: 7.5; 325 TABLET ORAL at 20:41

## 2021-05-14 RX ADMIN — FAMOTIDINE 20 MG: 10 INJECTION INTRAVENOUS at 08:10

## 2021-05-14 RX ADMIN — SODIUM CHLORIDE, POTASSIUM CHLORIDE, SODIUM LACTATE AND CALCIUM CHLORIDE 9 ML/HR: 600; 310; 30; 20 INJECTION, SOLUTION INTRAVENOUS at 07:46

## 2021-05-14 RX ADMIN — BUPIVACAINE HYDROCHLORIDE 1.3 ML: 7.5 INJECTION, SOLUTION EPIDURAL; RETROBULBAR at 09:51

## 2021-05-14 RX ADMIN — TRANEXAMIC ACID 1000 MG: 1 INJECTION, SOLUTION INTRAVENOUS at 10:43

## 2021-05-14 RX ADMIN — HYDROCODONE BITARTRATE AND ACETAMINOPHEN 1 TABLET: 7.5; 325 TABLET ORAL at 14:54

## 2021-05-14 RX ADMIN — PROPOFOL 140 MCG/KG/MIN: 10 INJECTION, EMULSION INTRAVENOUS at 09:55

## 2021-05-14 RX ADMIN — CEFAZOLIN SODIUM 2 G: 2 INJECTION, SOLUTION INTRAVENOUS at 18:28

## 2021-05-14 RX ADMIN — VANCOMYCIN HYDROCHLORIDE 1000 MG: 1 INJECTION, SOLUTION INTRAVENOUS at 08:10

## 2021-05-14 RX ADMIN — PREGABALIN 150 MG: 75 CAPSULE ORAL at 08:07

## 2021-05-14 RX ADMIN — DEXAMETHASONE SODIUM PHOSPHATE 8 MG: 4 INJECTION, SOLUTION INTRAMUSCULAR; INTRAVENOUS at 10:07

## 2021-05-14 RX ADMIN — CEFAZOLIN SODIUM 2 G: 2 INJECTION, SOLUTION INTRAVENOUS at 09:35

## 2021-05-14 RX ADMIN — LIDOCAINE HYDROCHLORIDE 40 MG: 20 INJECTION, SOLUTION INFILTRATION; PERINEURAL at 09:55

## 2021-05-14 RX ADMIN — MELOXICAM 15 MG: 15 TABLET ORAL at 08:07

## 2021-05-14 RX ADMIN — MUPIROCIN 1 APPLICATION: 20 OINTMENT TOPICAL at 20:41

## 2021-05-14 NOTE — TELEPHONE ENCOUNTER
I reviewed the pulmonary note from 4/21.  They will repeat a CT scan in 6 months for surveillance on the pulmonary nodule.

## 2021-05-14 NOTE — ANESTHESIA POSTPROCEDURE EVALUATION
"Patient: Kourtney Lorenzana    Procedure Summary     Date: 05/14/21 Room / Location: [facility] OR [address] [place] [address] MAIN OR    Anesthesia Start: 0941 Anesthesia Stop: 1128    Procedure: RIGHT TOTAL KNEE ARTHROPLASTY (Right Knee) Diagnosis:       Primary osteoarthritis of right knee      (Primary osteoarthritis of right knee [M17.11])    Surgeons: Catarino De La Cruz MD Provider: Steven Miranda MD    Anesthesia Type: spinal ASA Status: 2          Anesthesia Type: spinal    Vitals  Vitals Value Taken Time   /73 05/14/21 1200   Temp 36.4 °C (97.5 °F) 05/14/21 1130   Pulse 63 05/14/21 1211   Resp 16 05/14/21 1200   SpO2 94 % 05/14/21 1211   Vitals shown include unvalidated device data.        Post Anesthesia Care and Evaluation    Patient location during evaluation: bedside  Patient participation: complete - patient participated  Level of consciousness: sleepy but conscious  Pain score: 0  Pain management: adequate  Airway patency: patent  Anesthetic complications: No anesthetic complications    Cardiovascular status: acceptable  Respiratory status: acceptable  Hydration status: acceptable    Comments: /73   Pulse 62   Temp 36.4 °C (97.5 °F) (Oral)   Resp 16   Ht 157.5 cm (62\")   Wt 72.5 kg (159 lb 13.3 oz)   SpO2 95%   BMI 29.23 kg/m²         "

## 2021-05-14 NOTE — TELEPHONE ENCOUNTER
----- Message from SLY Price sent at 4/5/2021  4:57 PM EDT -----    Follow up on pulmonary referral

## 2021-05-14 NOTE — ANESTHESIA PREPROCEDURE EVALUATION
Anesthesia Evaluation     Patient summary reviewed   no history of anesthetic complications:  NPO Solid Status: > 8 hours  NPO Liquid Status: > 2 hours           Airway   Mallampati: II  No difficulty expected  Dental - normal exam     Pulmonary     breath sounds clear to auscultation  (-) shortness of breath, recent URI, not a smoker  Cardiovascular     ECG reviewed  Rhythm: regular  Rate: normal    (+) dysrhythmias (PSVT, reports rare now no syncope),   (-) past MI, angina    ROS comment: TTE 5/2020 - Interpretation Summary    · Calculated right ventricular systolic pressure from tricuspid regurgitation is 22 mmHg.  · Left ventricular systolic function is normal.  · Left ventricular diastolic dysfunction (grade I) consistent with impaired relaxation.  · Calculated EF = 65%.        Neuro/Psych  (+) numbness, psychiatric history Anxiety,     (-) seizures, CVA  GI/Hepatic/Renal/Endo    (+)  GERD,  liver disease history of elevated LFT, renal disease stones,     Musculoskeletal     (+) neck pain,   (-) neck stiffness  Abdominal    Substance History - negative use     OB/GYN negative ob/gyn ROS         Other   arthritis,                      Anesthesia Plan    ASA 2     spinal     intravenous induction     Anesthetic plan, all risks, benefits, and alternatives have been provided, discussed and informed consent has been obtained with: patient.    Plan discussed with CRNA.

## 2021-05-14 NOTE — ANESTHESIA PROCEDURE NOTES
Spinal Block      Patient reassessed immediately prior to procedure    Patient location during procedure: OR  Start Time: 5/14/2021 9:47 AM  Indication:at surgeon's request  Performed By  Anesthesiologist: Steven Miranda MD  CRNA: Franky Henao CRNA  Preanesthetic Checklist  Completed: patient identified, IV checked, site marked, risks and benefits discussed, surgical consent, monitors and equipment checked, pre-op evaluation and timeout performed  Spinal Block Prep:  Patient Position:sitting  Sterile Tech:cap, gloves, mask and sterile barriers  Prep:Chloraprep  Patient Monitoring:blood pressure monitoring and continuous pulse oximetry  Spinal Block Procedure  Approach:midline  Guidance:palpation technique  Location:L4-L5  Needle Type:Ralf  Needle Gauge:25 G  Placement of Spinal needle event:cerebrospinal fluid aspirated  Paresthesia: no  Fluid Appearance:clear  Medications: bupivacaine PF (MARCAINE) 0.75 % injection, 1.3 mL  Med Administered at 5/14/2021 9:51 AM   Post Assessment  Patient Tolerance:patient tolerated the procedure well with no apparent complications  Complications no

## 2021-05-15 ENCOUNTER — READMISSION MANAGEMENT (OUTPATIENT)
Dept: CALL CENTER | Facility: HOSPITAL | Age: 76
End: 2021-05-15

## 2021-05-15 VITALS
HEART RATE: 65 BPM | HEIGHT: 62 IN | DIASTOLIC BLOOD PRESSURE: 66 MMHG | RESPIRATION RATE: 18 BRPM | TEMPERATURE: 97.1 F | WEIGHT: 159.83 LBS | OXYGEN SATURATION: 99 % | SYSTOLIC BLOOD PRESSURE: 105 MMHG | BODY MASS INDEX: 29.41 KG/M2

## 2021-05-15 LAB
HCT VFR BLD AUTO: 30.9 % (ref 34–46.6)
HGB BLD-MCNC: 11.2 G/DL (ref 12–15.9)

## 2021-05-15 PROCEDURE — 63710000001 PANTOPRAZOLE 40 MG TABLET DELAYED-RELEASE: Performed by: NURSE PRACTITIONER

## 2021-05-15 PROCEDURE — A9270 NON-COVERED ITEM OR SERVICE: HCPCS | Performed by: NURSE PRACTITIONER

## 2021-05-15 PROCEDURE — 85018 HEMOGLOBIN: CPT | Performed by: NURSE PRACTITIONER

## 2021-05-15 PROCEDURE — 97110 THERAPEUTIC EXERCISES: CPT | Performed by: PHYSICAL THERAPIST

## 2021-05-15 PROCEDURE — G0378 HOSPITAL OBSERVATION PER HR: HCPCS

## 2021-05-15 PROCEDURE — 63710000001 ASPIRIN 81 MG TABLET DELAYED-RELEASE: Performed by: NURSE PRACTITIONER

## 2021-05-15 PROCEDURE — 63710000001 MUPIROCIN 2 % OINTMENT: Performed by: NURSE PRACTITIONER

## 2021-05-15 PROCEDURE — 85014 HEMATOCRIT: CPT | Performed by: NURSE PRACTITIONER

## 2021-05-15 PROCEDURE — 25010000003 CEFAZOLIN IN DEXTROSE 2-4 GM/100ML-% SOLUTION: Performed by: NURSE PRACTITIONER

## 2021-05-15 PROCEDURE — 63710000001 POLYETHYLENE GLYCOL 17 G PACK: Performed by: NURSE PRACTITIONER

## 2021-05-15 PROCEDURE — 63710000001 HYDROCODONE-ACETAMINOPHEN 7.5-325 MG TABLET: Performed by: NURSE PRACTITIONER

## 2021-05-15 RX ORDER — HYDROCODONE BITARTRATE AND ACETAMINOPHEN 7.5; 325 MG/1; MG/1
1 TABLET ORAL EVERY 4 HOURS PRN
Qty: 40 TABLET | Refills: 0 | Status: SHIPPED | OUTPATIENT
Start: 2021-05-15 | End: 2021-08-04

## 2021-05-15 RX ADMIN — CEFAZOLIN SODIUM 2 G: 2 INJECTION, SOLUTION INTRAVENOUS at 00:49

## 2021-05-15 RX ADMIN — HYDROCODONE BITARTRATE AND ACETAMINOPHEN 1 TABLET: 7.5; 325 TABLET ORAL at 05:46

## 2021-05-15 RX ADMIN — POLYETHYLENE GLYCOL 3350 17 G: 17 POWDER, FOR SOLUTION ORAL at 08:30

## 2021-05-15 RX ADMIN — MUPIROCIN 1 APPLICATION: 20 OINTMENT TOPICAL at 08:30

## 2021-05-15 RX ADMIN — HYDROCODONE BITARTRATE AND ACETAMINOPHEN 1 TABLET: 7.5; 325 TABLET ORAL at 09:58

## 2021-05-15 RX ADMIN — HYDROCODONE BITARTRATE AND ACETAMINOPHEN 1 TABLET: 7.5; 325 TABLET ORAL at 00:48

## 2021-05-15 RX ADMIN — PANTOPRAZOLE SODIUM 40 MG: 40 TABLET, DELAYED RELEASE ORAL at 05:46

## 2021-05-15 RX ADMIN — ASPIRIN 81 MG: 81 TABLET, COATED ORAL at 08:30

## 2021-05-15 NOTE — OUTREACH NOTE
Prep Survey      Responses   [facility] patient discharged from?  [place]   Is LACE score < 7 ?  Yes   Emergency Room discharge w/ pulse ox?  No   Eligibility  [facility]   Date of Admission  05/14/21   Date of Discharge  05/15/21   Discharge Disposition  Home or Self Care   Discharge diagnosis  TOTAL KNEE ARTHROPLASTY   Does the patient have one of the following disease processes/diagnoses(primary or secondary)?  Total Joint Replacement   Does the patient have Home health ordered?  Yes   What is the Home health agency?   [facility]   Is there a DME ordered?  No   Prep survey completed?  Yes          Thania Billingsley RN

## 2021-05-17 ENCOUNTER — TRANSITIONAL CARE MANAGEMENT TELEPHONE ENCOUNTER (OUTPATIENT)
Dept: CALL CENTER | Facility: HOSPITAL | Age: 76
End: 2021-05-17

## 2021-05-17 NOTE — OUTREACH NOTE
Call Center TCM Note      Responses   [facility] patient discharged from?  [place]   Does the patient have one of the following disease processes/diagnoses(primary or secondary)?  Total Joint Replacement   Joint surgery performed?  Knee   TCM attempt successful?  Yes [Namrata-daughter, Dagoberto-Granddaughter]   Call start time  1627   Call end time  1634   Discharge diagnosis  TOTAL KNEE ARTHROPLASTY   Person spoke with today (if not patient) and relationship  Namrata-daughter   Does the patient have all medications related to this admission filled (includes all antibiotics, pain medications, etc.)  Yes   Is the patient taking all medications as directed (includes completed medication regime)?  Yes   Is the patient able to teach back alternate methods of pain control?  Ice, Knee-elevation/no pillow under knee, Reposition   Does the patient have a follow up appointment with their surgeon?  Yes   Has the patient kept scheduled appointments due by today?  N/A   Comments  Pt has [facility] apt 5/21/21 at 11:00   What is the Home health agency?   [facility]   Has home health visited the patient within 72 hours of discharge?  Yes   Home health comments   visit 5/16/21   Psychosocial issues?  Yes   Has the patient began therapy sessions (either in the home or as an out patient)?  Yes   If the patient has started attending therapy, what post op day did they begin to attend (either in home or as an out patient)?    5/16/21   Does the patient have a wound vac in place?  N/A   Has the patient fallen since discharge?  No   Did the patient receive a copy of their discharge instructions?  Yes   Nursing interventions  Reviewed instructions with patient   What is the patient's perception of their functional status since discharge?  Improving   Is the patient able to teach back signs and symptoms of infection?  Temp >100.4 for 24h or longer, Incisional drainage, Blisters around incision, Increased swelling or redness around incision  (not associated with surgical edema), Severe discomfort or pain, Shortness of breath or chest pain, Changes in mobility   Is the patient able to teach back how to prevent infection?  Wash hands before and after touching incision, Check incision daily, No tub baths, hot tub or swimming, Eat well-balanced diet, Shower only as directed by surgeon   Is the patient able to teach back signs and symptoms of DVT?  Shortness of breath or chest pain, Area hot to touch, Redness in calf, Swelling in calf, Severe pain in calf   Did the patient implement home safety suggestions from pre-surgery classes if attended?  N/A   If the patient is a current smoker, are they able to teach back resources for cessation?  Not a smoker   Is the patient/caregiver able to teach back the hierarchy of who to call/visit for symptoms/problems? PCP, Specialist, Home health nurse, Urgent Care, ED, 911  Yes   TCM call completed?  Yes   Wrap up additional comments  Daughter states pt is doing ok,  still not bending knee. Daughter acknowledge [facility] fu appt 5/21/21. No questions/concerns.          Meghna Macias RN    5/17/2021, 16:37 EDT

## 2021-05-18 ENCOUNTER — TELEPHONE (OUTPATIENT)
Dept: ORTHOPEDIC SURGERY | Facility: CLINIC | Age: 76
End: 2021-05-18

## 2021-05-18 NOTE — TELEPHONE ENCOUNTER
Caller: KAVIN LUCERO   Relationship to Patient: PRATIBHA     Phone Number: [phone]   Reason for Call: PATIENTS GRANDDAUGHTER IS CALLING ASKING IF PATIENT CAN TAKE HYDROCODONE EVERY 3 HOURS INSTEAD OF 4 AND IF THE DOCTOR GAVE HER THE GO AHEAD TO TAKE 2 INSTEAD OF 1 IF SHE FELT LIKE SHE NEEDED IT

## 2021-05-18 NOTE — TELEPHONE ENCOUNTER
The patient does not need to take hydrocodone every 3 hours.  This will cause her to obtain too much Tylenol which could result in the liver failure.  Patient should take 1 tablet every 4-6 hours as needed.  She should only take a second pill with severe pain.

## 2021-05-19 NOTE — TELEPHONE ENCOUNTER
Patient granddaughter states patient is taking 1 tablet every four hours and 1/2 tablet in between. Patient has not been elevating knee, I informed the patient granddaughter to encourage patient to elevate and ice the knee, it can help with the pain. Granddaughter voiced understanding

## 2021-05-21 ENCOUNTER — TELEPHONE (OUTPATIENT)
Dept: ORTHOPEDIC SURGERY | Facility: CLINIC | Age: 76
End: 2021-05-21

## 2021-05-21 DIAGNOSIS — Z96.651 S/P TKR (TOTAL KNEE REPLACEMENT), RIGHT: ICD-10-CM

## 2021-05-21 RX ORDER — HYDROCODONE BITARTRATE AND ACETAMINOPHEN 7.5; 325 MG/1; MG/1
TABLET ORAL
Qty: 40 TABLET | Refills: 0 | Status: SHIPPED | OUTPATIENT
Start: 2021-05-21 | End: 2021-06-08 | Stop reason: SDUPTHER

## 2021-05-21 NOTE — TELEPHONE ENCOUNTER
PATIENT IS CALLING STATING SHE IS NEEDING A REFILL ON HER MEDICATION HYDROCODONE SHE WILL BE OUT BY THE WEEKEND AND SHE DOESN'T HAVE A APPOINTMENT UNTIL THURSDAY

## 2021-05-27 ENCOUNTER — OFFICE VISIT (OUTPATIENT)
Dept: ORTHOPEDIC SURGERY | Facility: CLINIC | Age: 76
End: 2021-05-27

## 2021-05-27 VITALS — WEIGHT: 160 LBS | HEIGHT: 62 IN | BODY MASS INDEX: 29.44 KG/M2 | TEMPERATURE: 97.7 F

## 2021-05-27 DIAGNOSIS — Z96.652 STATUS POST LEFT KNEE REPLACEMENT: Primary | ICD-10-CM

## 2021-05-27 PROCEDURE — 99024 POSTOP FOLLOW-UP VISIT: CPT | Performed by: ORTHOPAEDIC SURGERY

## 2021-05-27 NOTE — PROGRESS NOTES
Kourtney Lorenzana : 1945 MRN: [id number] DATE: 2021    DIAGNOSIS: 2 week follow up left total knee      SUBJECTIVE:Patient returns today for 2 week follow up of left total knee replacement. Patient reports doing well with no unusual complaints. Appears to be progressing appropriately.    OBJECTIVE:   Exam:. The incision is healing appropriately. No sign of infection. Range of motion is progressing as expected. The calf is soft and nontender with a negative Homans sign.    ASSESSMENT: 2 week status post left knee replacement.    PLAN: 1) Staples removed and steri strips applied   2) Order given for PT   3) Discontinue LATA hose   4) Continue ice PRN   5) aspirin 81 mg orally every day for 1 month   6) Follow up in 6 weeks with repeat Xrays of left knee (3views)    Catarino De La Cruz MD  2021

## 2021-06-02 ENCOUNTER — TREATMENT (OUTPATIENT)
Dept: PHYSICAL THERAPY | Facility: CLINIC | Age: 76
End: 2021-06-02

## 2021-06-02 DIAGNOSIS — M25.561 ACUTE PAIN OF RIGHT KNEE: ICD-10-CM

## 2021-06-02 DIAGNOSIS — Z96.651 STATUS POST RIGHT KNEE REPLACEMENT: Primary | ICD-10-CM

## 2021-06-02 DIAGNOSIS — M25.461 KNEE EFFUSION, RIGHT: ICD-10-CM

## 2021-06-02 DIAGNOSIS — M25.661 KNEE STIFFNESS, RIGHT: ICD-10-CM

## 2021-06-02 DIAGNOSIS — Z74.09 IMPAIRED FUNCTIONAL MOBILITY, BALANCE, GAIT, AND ENDURANCE: ICD-10-CM

## 2021-06-02 PROCEDURE — 97162 PT EVAL MOD COMPLEX 30 MIN: CPT | Performed by: PHYSICAL THERAPIST

## 2021-06-02 PROCEDURE — 97530 THERAPEUTIC ACTIVITIES: CPT | Performed by: PHYSICAL THERAPIST

## 2021-06-02 PROCEDURE — 97110 THERAPEUTIC EXERCISES: CPT | Performed by: PHYSICAL THERAPIST

## 2021-06-02 PROCEDURE — G0283 ELEC STIM OTHER THAN WOUND: HCPCS | Performed by: PHYSICAL THERAPIST

## 2021-06-02 NOTE — PATIENT INSTRUCTIONS
Access Code: [email or web address]  URL: [email or web address]  Date: 06/02/2021  Prepared by: Danielle Valencia    Exercises  Seated Knee Extension AROM - 3 x daily - 1 sets - 10 reps - 5 hold  Seated Knee Flexion AAROM - 3 x daily - 1 sets - 10 reps - 10 hold  Hooklying Single Knee to Chest Stretch - 3 x daily - 1 sets - 5 reps - 10 hold  Supine Heel Slide with Strap - 3 x daily - 1 sets - 10 reps - 10 hold  Supine Quadricep Sets - 3 x daily - 1 sets - 10 reps - 5 hold  Supine Active Ankle Pumps - 3 x daily - 1 sets - 30 reps  Seated Heel Slide - 3 x daily - 10 reps - 2 sets - 5 hold    Patient Education  Ice  Pain Management  Total Knee Replacement Handout    Patient was educated on findings of evaluation, purpose of treatment, and goals for therapy.  Treatment options discussed and questions answered.  Patient was educated on exercises/self treatment/pain relief techniques.

## 2021-06-02 NOTE — PROGRESS NOTES
Physical Therapy Initial Evaluation and Plan of Care    Patient: Kourtney Lorenzana   : 1945  Diagnosis/ICD-10 Code:  Status post right knee replacement [Z96.651]  Referring practitioner: SLY Rain    Subjective Evaluation    History of Present Illness  Date of surgery: 2021  Mechanism of injury: Pt is s/p R TKA and had HHPT until 22.  It's been more sore the last few days along the ant knee and thinks it's due to the weather.  I've been using a walker occasionally but don't feel secure when I try to use a cane.     PMH of PSVT, lumbar stenosis, neck pain - previous PT at this clinic for neck and knees      Patient Occupation: does not work Pain  Current pain ratin  At best pain ratin  At worst pain ratin  Quality: tight  Relieving factors: ice (elevation)  Aggravating factors: standing, ambulation, movement and squatting    Social Support  Patient lives at: walkout basement apartment - only has to manuever 1 step.  Lives with: others.    Patient Goals  Patient goals for therapy: decreased pain, increased motion and return to work         Subjective Questionnaire: LEFS:   PLOF: likes to volunteer at [employer] in [place] and [facility] with [facility].  Wants to be able to walk around neighborhood (it[place])  Medical Hx reviewed      Objective          Observations     Right Knee   Positive for effusion and incision (healing well/no drainage).       Tenderness     Right Knee   Tenderness in the medial joint line.     Neurological Testing     Sensation     Knee     Right Knee   Intact: light touch     Active Range of Motion     Right Knee   Flexion: 73 degrees with pain  Extension: 0 degrees     Strength/Myotome Testing     Right Hip   Planes of Motion   Flexion: 4    Right Knee   Flexion: 4  Extension: 4- (pain)  Quadriceps contraction: fair    Tests   Left Ankle/Foot   Negative for Homans' sign.     Additional Tests Details  Deferred secondary to post op  status    Swelling     Left Knee Girth Measurement (cm)   Joint line: 43.2.    Right Knee Girth Measurement (cm)   Joint line: 47 cm    Functional Assessment     Comments  5TSTS - 21.05 sec with US assist  TUG - 13.83 sec without UE assist          Assessment & Plan     Assessment  Impairments: abnormal gait, abnormal or restricted ROM, activity intolerance, impaired physical strength, lacks appropriate home exercise program and pain with function  Assessment details:   Kourtney Lorenzana is a 75 y.o. female referred to physical therapy s/p R TKA. She presents with decreased/painful AROM, jt effusion, antalgic gait without AD (uses walker at home), weak quads/unable to perform ASLR, requires UE assist to rise from a chair and has difficulty getting in/out car. She presents with an evolving clinical presentation. Pt was educated on proper use of ice (had been using for 30-40 minutes at a time) and encouraged to elevate and perform ankle pumps  Pt would benefit from skilled PT services in order to address listed impairments and increase tolerance to normal daily activities.     Prognosis: good  Functional Limitations: lifting, sleeping, walking, uncomfortable because of pain, moving in bed, standing and stooping  Goals  Plan Goals: Plan Goals: STG:  In 12 visits  1. Pt will be compliant with HEP.  2. Pt will improve AROM of R knee 0-100 degrees or greater in order to improve stair climbing and gait mechanics.  3. Pt will be able perform a good quad set and SLR on R with no extensor lag.  4. Pt will be able to ambulate for 5 min or greater on TM with good heel strike, push off and equal WBing.                             LTG:   In 24 visits  1. Pt will score >/= 45/80 on LEFS  2. Pt will be able to ambulate in community for 30 minutes or greater without use of AD in order to return to PLOF.  3. Pt will be able to climb steps reciprocally with use of 1 rail in order to safely climb steps in community and home  environment.  4.  Pt will demonstrate good knee ROM 0-115 for acceptable gait and transfers.   5.  Pt to perform 5TSTS in < 16 sec without UE assist to demonstrate good functional strength and agility.    6.  Pt to demonstrate good balance of SLS to at least 10 sec to provide safety on uneven terrain and outdoors.     Plan  Planned modality interventions: cryotherapy, electrical stimulation/Russian stimulation and TENS  Planned therapy interventions: therapeutic activities, strengthening, manual therapy, home exercise program, gait training, neuromuscular re-education, balance/weight-bearing training and stretching  Frequency: 3x week  Duration in weeks: 8  Treatment plan discussed with: patient        Timed:  Manual Therapy:    -     mins  95512;  Therapeutic Exercise:    15     mins  87596;     Neuromuscular Jenn:    -    mins  39282;    Therapeutic Activity:     8     mins  60045;     Gait Training:      -     mins  41826;     Ultrasound:     -     mins  46021;    Iontophoresis                 -     mins 62218    Untimed:  Electrical Stimulation:    15     mins  83401 (MC );  Mech traction                   -     mins 34317  Dry Needling                  -     Min self pay    Timed Treatment:   23   mins   Total Treatment:     75   mins    PT SIGNATURE: Danielle Valencia PT   [place] License # [id number]  DATE TREATMENT INITIATED: 6/2/2021    Medicare Initial Certification  Certification Period: 8/31/2021  I certify that the therapy services are furnished while this patient is under my care.  The services outlined above are required by this patient, and will be reviewed every 90 days.     PHYSICIAN: Aydin Farrell APRN      DATE:     Please sign and return via fax to [phone]. Thank you, [facility].

## 2021-06-04 ENCOUNTER — TREATMENT (OUTPATIENT)
Dept: PHYSICAL THERAPY | Facility: CLINIC | Age: 76
End: 2021-06-04

## 2021-06-04 DIAGNOSIS — M25.461 KNEE EFFUSION, RIGHT: ICD-10-CM

## 2021-06-04 DIAGNOSIS — Z74.09 IMPAIRED FUNCTIONAL MOBILITY, BALANCE, GAIT, AND ENDURANCE: ICD-10-CM

## 2021-06-04 DIAGNOSIS — M25.661 KNEE STIFFNESS, RIGHT: ICD-10-CM

## 2021-06-04 DIAGNOSIS — Z96.651 STATUS POST RIGHT KNEE REPLACEMENT: Primary | ICD-10-CM

## 2021-06-04 PROCEDURE — 97140 MANUAL THERAPY 1/> REGIONS: CPT | Performed by: PHYSICAL THERAPIST

## 2021-06-04 PROCEDURE — 97110 THERAPEUTIC EXERCISES: CPT | Performed by: PHYSICAL THERAPIST

## 2021-06-04 PROCEDURE — 97530 THERAPEUTIC ACTIVITIES: CPT | Performed by: PHYSICAL THERAPIST

## 2021-06-04 NOTE — PROGRESS NOTES
Physical Therapy Daily Progress Note    Patient Name: Kourtney Lorenzana         :  1945  Referring Physician: Aydin Farrell APRN      Subjective   Kourtney Lorenzana reports: having increased pain / tightness anterior knee and burning medial knee - (R)    Objective   Pt ambulating w/o AD - healing incision anterior knee -  Ambulating with good knee extension -   (R) knee flexion (start) 0-65-deg                          At end: 0-85-deg    See Exercise, Manual, and Modality Logs for complete treatment.     Functional / Therapeutic Activities:  10 min  · TAPING / BRACING: K-tape to 1) Unload medial knee (R);  Unload PF Jt (R)  · Jt protection, ADL modification; Posture and      Assessment/Plan  S/P (R) TKA  Very good extension; Limited flexion and very painful - Better after MT and taping    Progress strengthening /stabilization /functional activity       _________________________________________________  Manual Therapy:    20     mins  55516;  Therapeutic Exercise:    23     mins  47748;     Neuromuscular Jenn:        mins  22148;    Therapeutic Activity:     10     mins  27922;     Ultrasound:          mins  56589;  Iontophoresis:          mins  92467;    Electrical Stimulation:         mins  72496 ( );  Mechanical Traction:          mins  97166  Dry Needling          mins self-pay    Timed Treatment:   53   mins                  Total Treatment:     65   mins    Jason Bower PT  Physical Therapist

## 2021-06-07 ENCOUNTER — TREATMENT (OUTPATIENT)
Dept: PHYSICAL THERAPY | Facility: CLINIC | Age: 76
End: 2021-06-07

## 2021-06-07 DIAGNOSIS — Z96.651 S/P TKR (TOTAL KNEE REPLACEMENT), RIGHT: ICD-10-CM

## 2021-06-07 DIAGNOSIS — Z96.651 STATUS POST RIGHT KNEE REPLACEMENT: Primary | ICD-10-CM

## 2021-06-07 DIAGNOSIS — M25.461 KNEE EFFUSION, RIGHT: ICD-10-CM

## 2021-06-07 PROCEDURE — 97110 THERAPEUTIC EXERCISES: CPT | Performed by: PHYSICAL THERAPIST

## 2021-06-07 PROCEDURE — 97140 MANUAL THERAPY 1/> REGIONS: CPT | Performed by: PHYSICAL THERAPIST

## 2021-06-07 NOTE — PROGRESS NOTES
Physical Therapy Daily Progress Note        VISIT#: 3      Kourtney Lorenzana reports: She does not have a flat area to lie down on to do the HEP she was given at her eval. She was concentrating on the most recent on she did every two hours. She gets numbness in her lateral and ventral surface of her foot.   Current Pain Level:    5/10; Worst:   8-9/10; Best:  0/10  Location Of Pain: R knee around complete knee.   Response to Previous Session: No issues  Functional Deficits/Irritating Factors: ambulation, squatting, pushing, pulling, prolonged sitting  Progression: Improving  Compliance with HEP Reported: Partial compliance    Objective   Presents: ambulation w/o AD. Stiff leg gait on R...little to no knee flexion, push off. Tissues are soft and mobile around the knee joint, moderate edema  Increased sets/reps of:  none   Increased resistance on:  none  Added to Program: Airdyne w/chair, SciFit, knee flexion stretch w/strap, SB leg press  AROM R knee flexion = 90 deg      See Exercise, Manual, and Modality Logs for complete treatment.     Patient Education: Pt was educated on exercise biomechanical correctness, intensity, and speed.     Assessment:  Pt tissues are mostly soft and mobile around her knee. Tender to pressure mostly on lateral side of knee and tight band of tissue in distal vastus lateralis. Pt knee flexion AROM had no change since last PT session. Pt encouraged to continue to stretch at home. Pt may benefit from some TM training to normalize her gait pattern. Pt will continue to benefit from skilled PT interventions to address current functional deficits and impairments.       Plan: Progress to/Continue with current program. TM ambulation working on improving gait pattern        Manual Therapy:    10     mins  29088;  Ultrasound:   0 mins 75660  Electrical Stimulation: __15____ mins 21520/  Iontophoresis: 0 mins 62289  Traction: 0 mins  25027  Work Conditionin mins 76659  Therapeutic Exercise:    45      mins  93240;     Neuromuscular Jenn:    0    mins  84045;    Therapeutic Activity:     0     mins  84511;     Timed Treatment:   55   mins   Total Treatment:     55/70   mins    Berna Sampson, PHILLIP  [place] License # [id number]  Physical Therapist Assistant

## 2021-06-07 NOTE — TELEPHONE ENCOUNTER
Caller: WOOD LUCERO     Relationship: SELF    Best call back number: [phone]    Medication needed:   Requested Prescriptions      No prescriptions requested or ordered in this encounter   HYDROcodone-acetaminophen (Norco) 7.5-325 MG        When do you need the refill by: ASAP      Does the patient have less than a 3 day supply:  [x] Yes  [] No    What is the patient's preferred pharmacy:     [facility]

## 2021-06-08 RX ORDER — HYDROCODONE BITARTRATE AND ACETAMINOPHEN 7.5; 325 MG/1; MG/1
TABLET ORAL
Qty: 40 TABLET | Refills: 0 | Status: SHIPPED | OUTPATIENT
Start: 2021-06-08 | End: 2021-08-04

## 2021-06-09 ENCOUNTER — TREATMENT (OUTPATIENT)
Dept: PHYSICAL THERAPY | Facility: CLINIC | Age: 76
End: 2021-06-09

## 2021-06-09 DIAGNOSIS — Z74.09 IMPAIRED FUNCTIONAL MOBILITY, BALANCE, GAIT, AND ENDURANCE: ICD-10-CM

## 2021-06-09 DIAGNOSIS — M25.661 KNEE STIFFNESS, RIGHT: ICD-10-CM

## 2021-06-09 DIAGNOSIS — Z96.651 STATUS POST RIGHT KNEE REPLACEMENT: Primary | ICD-10-CM

## 2021-06-09 PROCEDURE — 97140 MANUAL THERAPY 1/> REGIONS: CPT | Performed by: PHYSICAL THERAPIST

## 2021-06-09 PROCEDURE — 97530 THERAPEUTIC ACTIVITIES: CPT | Performed by: PHYSICAL THERAPIST

## 2021-06-09 PROCEDURE — 97110 THERAPEUTIC EXERCISES: CPT | Performed by: PHYSICAL THERAPIST

## 2021-06-10 NOTE — PROGRESS NOTES
Physical Therapy Daily Progress Note    Patient Name: Kourtney Lorenzana         :  1945  Referring Physician: Aydin Farrell APRN      Subjective   Kourtney Lorenzana reports: severely increased pain since last treatment on Monday with loss of mobility and increased swellilng - Severe pain lasted thru yesterday - Says she was stretched aggressively by therapist which was very painful - She notes she felt great after PT Friday - taping very helpful and she felt good all weekend and was doing her knee flexion exercises well -     Objective   Pt presents today with antalgic gait with minimal knee flexion -   Pt became tearful when describing what happened the last session - scared she has gone back to square one -   Up to 90-deg knee flexion    See Exercise, Manual, and Modality Logs for complete treatment.     Functional / Therapeutic Activities:  15 min  · TAPING / BRACING: K-Tape to 1) Unload PF jt; 2) Unload ant/medial knee;   · Knee assessment  · Jt protection, ADL modification; Posture and      Assessment/Plan   S/P (R) TKA 2021  Pain limiting flexion ROM - Does NOT respond well to aggressive passive stretching -   Taping helpful       Progress per Plan of Care - NO FORCEFUL PROM Stretching       _________________________________________________  Manual Therapy:    15     mins  09732;  Therapeutic Exercise:    30     mins  45307;     Neuromuscular Jenn:        mins  96601;    Therapeutic Activity:     15     mins  76486;     Ultrasound:          mins  95146;  Iontophoresis:          mins  61158;    Electrical Stimulation:         mins  38751 ( );  Mechanical Traction:          mins  06614  Dry Needling          mins self-pay    Timed Treatment:   60   mins                  Total Treatment:     70   mins    Jasno Bower PT  Physical Therapist

## 2021-06-11 ENCOUNTER — TREATMENT (OUTPATIENT)
Dept: PHYSICAL THERAPY | Facility: CLINIC | Age: 76
End: 2021-06-11

## 2021-06-11 DIAGNOSIS — M25.661 KNEE STIFFNESS, RIGHT: ICD-10-CM

## 2021-06-11 DIAGNOSIS — Z96.651 STATUS POST RIGHT KNEE REPLACEMENT: Primary | ICD-10-CM

## 2021-06-11 DIAGNOSIS — Z74.09 IMPAIRED FUNCTIONAL MOBILITY, BALANCE, GAIT, AND ENDURANCE: ICD-10-CM

## 2021-06-11 PROCEDURE — 97530 THERAPEUTIC ACTIVITIES: CPT | Performed by: PHYSICAL THERAPIST

## 2021-06-11 PROCEDURE — 97140 MANUAL THERAPY 1/> REGIONS: CPT | Performed by: PHYSICAL THERAPIST

## 2021-06-11 PROCEDURE — 97110 THERAPEUTIC EXERCISES: CPT | Performed by: PHYSICAL THERAPIST

## 2021-06-14 NOTE — PROGRESS NOTES
Physical Therapy Daily Progress Note    Patient Name: Kourtney Lorenzana         :  1945  Referring Physician: Aydin Farrell APRN      Subjective   Kourtney Lorenzana reports: did well after last session - much less pain, less swelling and improved ability to bend her knee -   Pain anterior, ant/med and post/lateral (R) knee when trying to bend her knee -     Objective   Pt ambulating with increased WB-ing RLE and grossly decreased swelling -   Flexion very painful anterior, ant/med and post/lateral   Very tender distal lateral HS into proximal fib head -   Stabilizing proximal tib-fib joint allowed less pain with knee flexion up to 92-deg (R) knee - Full knee extension -     See Exercise, Manual, and Modality Logs for complete treatment.     Functional / Therapeutic Activities:  15 min  · TAPING / BRACING: K-Tape to 1) Unload PF jt; 2) Unload ant/medial knee; 3) Tape to stabilize proximal tib-fib joint w/ leukotape over (R) -   · Knee assessment  · Jt protection, ADL modification; Posture and       Assessment/Plan   S/P (R) TKA 2021  Pain limiting flexion ROM - Does NOT respond well to aggressive passive stretching -   Taping helpful         Progress per Plan of Care - NO FORCEFUL PROM Stretching     _________________________________________________  Manual Therapy:            15     mins  22269;  Therapeutic Exercise:    30     mins  66188;     Neuromuscular Jenn:        mins  22342;    Therapeutic Activity:      15     mins  89173;     Ultrasound:                          mins  73854;  Iontophoresis:                     mins  81572;    Electrical Stimulation:         mins  39235 ( );  Mechanical Traction:          mins  23205  Dry Needling                       mins self-pay     Timed Treatment:   60   mins                  Total Treatment:     70   mins    Jason Bower PT  Physical Therapist

## 2021-06-16 ENCOUNTER — TREATMENT (OUTPATIENT)
Dept: PHYSICAL THERAPY | Facility: CLINIC | Age: 76
End: 2021-06-16

## 2021-06-16 DIAGNOSIS — M25.661 KNEE STIFFNESS, RIGHT: ICD-10-CM

## 2021-06-16 DIAGNOSIS — Z96.651 STATUS POST RIGHT KNEE REPLACEMENT: Primary | ICD-10-CM

## 2021-06-16 DIAGNOSIS — Z74.09 IMPAIRED FUNCTIONAL MOBILITY, BALANCE, GAIT, AND ENDURANCE: ICD-10-CM

## 2021-06-16 PROCEDURE — 97530 THERAPEUTIC ACTIVITIES: CPT | Performed by: PHYSICAL THERAPIST

## 2021-06-16 PROCEDURE — 97110 THERAPEUTIC EXERCISES: CPT | Performed by: PHYSICAL THERAPIST

## 2021-06-17 NOTE — PROGRESS NOTES
Physical Therapy Daily Progress Note    Patient Name: Kourtney Lorenzana         :  1945  Referring Physician: Aydin Farrell APRN      Subjective   Kourtney Lorenzana reports: continued improvement w/ decreasing pain, improved gait / function  Still still bending    Objective   Pt ambulating w/ increased knee flexion RLE -   AROM up to 100-deg flexion after stretching - (started at 90-deg)    See Exercise, Manual, and Modality Logs for complete treatment.     Functional / Therapeutic Activities:  10 min  · TAPING / BRACING: K-tape to unload PF jt  · SEE EXERCISE FLOW SHEET -   · Jt protection, ADL modification; Posture and      Assessment/Plan  S/P (R) TKA 2021  Pain limiting flexion ROM - Does NOT respond well to aggressive passive stretching -   Taping helpful     Progress strengthening /stabilization /functional activity       _________________________________________________  Manual Therapy:        mins  08166;  Therapeutic Exercise:    35     mins  20969;     Neuromuscular Jenn:        mins  05360;    Therapeutic Activity:     10     mins  33191;     Ultrasound:          mins  85797;  Iontophoresis:         mins  48235;    Electrical Stimulation:         mins  92081 ( );  Mechanical Traction:          mins  65056  Dry Needling          mins self-pay    Timed Treatment:   45   mins                  Total Treatment:     55   mins    Jason Bower, PT  Physical Therapist

## 2021-06-18 ENCOUNTER — TREATMENT (OUTPATIENT)
Dept: PHYSICAL THERAPY | Facility: CLINIC | Age: 76
End: 2021-06-18

## 2021-06-18 DIAGNOSIS — Z96.651 STATUS POST RIGHT KNEE REPLACEMENT: Primary | ICD-10-CM

## 2021-06-18 DIAGNOSIS — Z74.09 IMPAIRED FUNCTIONAL MOBILITY, BALANCE, GAIT, AND ENDURANCE: ICD-10-CM

## 2021-06-18 DIAGNOSIS — M25.661 KNEE STIFFNESS, RIGHT: ICD-10-CM

## 2021-06-18 PROCEDURE — 97110 THERAPEUTIC EXERCISES: CPT | Performed by: PHYSICAL THERAPIST

## 2021-06-18 PROCEDURE — 97530 THERAPEUTIC ACTIVITIES: CPT | Performed by: PHYSICAL THERAPIST

## 2021-06-18 NOTE — PROGRESS NOTES
Physical Therapy Daily Progress Note    Patient Name: Kourtney Lorenzana         :  1945  Referring Physician: Aydin Farrell APRN      Subjective   Kourtney Lorenzana reports: continued improvement with decreasing pain and improving mobility, gait, and function - working hard at home - does not have a strap to do prone flexion stretch -     Objective   Pt demonstrating improved gait with increased knee flexion RLE - up to 100+-deg flexion (R) knee -  Able to make full revolutions on rec airdyne today   See Exercise, Manual, and Modality Logs for complete treatment.     Functional / Therapeutic Activities:  10 min  · TAPING / BRACING: K-tape to Unload PF jt and ant/med knee  · Jt protection, ADL modification; Posture and      Assessment/Plan  S/P (R) TKA 2021  Pain limiting flexion ROM - Does NOT respond well to aggressive passive stretching -   Taping helpful   ROM improving w/ gentle stretching and gentle manual therapy     Progress strengthening /stabilization /functional activity       _________________________________________________  Manual Therapy:                mins  68607;  Therapeutic Exercise:    35     mins  31746;     Neuromuscular Jenn:        mins  08973;    Therapeutic Activity:      10     mins  86640;     Ultrasound:                          mins  67428;  Iontophoresis:                    mins  24722;    Electrical Stimulation:         mins  64687 ( );  Mechanical Traction:          mins  15264  Dry Needling                       mins self-pay     Timed Treatment:   45   mins                  Total Treatment:     55   mins  Jason Bower PT  Physical Therapist

## 2021-06-23 ENCOUNTER — TREATMENT (OUTPATIENT)
Dept: PHYSICAL THERAPY | Facility: CLINIC | Age: 76
End: 2021-06-23

## 2021-06-23 DIAGNOSIS — Z74.09 IMPAIRED FUNCTIONAL MOBILITY, BALANCE, GAIT, AND ENDURANCE: ICD-10-CM

## 2021-06-23 DIAGNOSIS — Z96.651 STATUS POST RIGHT KNEE REPLACEMENT: Primary | ICD-10-CM

## 2021-06-23 DIAGNOSIS — M25.661 KNEE STIFFNESS, RIGHT: ICD-10-CM

## 2021-06-23 PROCEDURE — 97110 THERAPEUTIC EXERCISES: CPT | Performed by: PHYSICAL THERAPIST

## 2021-06-23 PROCEDURE — 97140 MANUAL THERAPY 1/> REGIONS: CPT | Performed by: PHYSICAL THERAPIST

## 2021-06-23 PROCEDURE — 97530 THERAPEUTIC ACTIVITIES: CPT | Performed by: PHYSICAL THERAPIST

## 2021-06-23 NOTE — PROGRESS NOTES
Physical Therapy Daily Progress Note    Patient Name: Kourtney Lorenzana         :  1945  Referring Physician: Aydin Farrell APRN      Subjective   Kourtney Lorenzana reports: continued improvement with decreasing pain and stiffness and improving mobility and function - notes tightness anterior knee w/ bending- No longer feels like she needs tape -     Objective   Pt ambulating w/ increased knee flexion and WB-ing RLE - mildly antalgic -  Tender along anterior knee scar, especially most distal and central aspect -   AROM at start 90-deg;  After gentle MT and gentle stretching; IUp to 105-deg flexion -       See Exercise, Manual, and Modality Logs for complete treatment.     Functional / Therapeutic Activities:  08 min  · TAPING / BRACING: NA  · SEE EXERCISE FLOW SHEET -   · Knee assessment -  · Jt protection, ADL modification; Posture and      Assessment/Plan  S/P (R) TKA 2021  Pain limiting flexion ROM - Does NOT respond well to aggressive passive stretching -   Taping helpful   ROM improving w/ gentle stretching and gentle manual therapy     Progress per Plan of Care - NO FORCEFUL PROM/STRETCHING MANUALLY       _________________________________________________  Manual Therapy:    10     mins  80847;  Therapeutic Exercise:    35     mins  66352;     Neuromuscular Jenn:        mins  91087;    Therapeutic Activity:     08     mins  71425;     Ultrasound:          mins  60920;  Iontophoresis:          mins  77201;    Electrical Stimulation:         mins  75445 ( );  Mechanical Traction:          mins  39230  Dry Needling          mins self-pay    Timed Treatment:   53   mins                  Total Treatment:     60   mins    Jason Bower, PT  Physical Therapist

## 2021-06-24 ENCOUNTER — TELEPHONE (OUTPATIENT)
Dept: ORTHOPEDIC SURGERY | Facility: CLINIC | Age: 76
End: 2021-06-24

## 2021-06-24 NOTE — TELEPHONE ENCOUNTER
Caller: ABILIO LUCERO     Relationship to patient: SELF     Best call back number: [phone]    Chief complaint: 6 WEEK F/U    Type of visit: POST OP     Requested date: ANY DAY BEFORE NOON     If rescheduling, when is the original appointment: 07/08/21     Additional notes:DOES NOT HAVE A RIDE THIS DAY

## 2021-06-25 ENCOUNTER — TREATMENT (OUTPATIENT)
Dept: PHYSICAL THERAPY | Facility: CLINIC | Age: 76
End: 2021-06-25

## 2021-06-25 DIAGNOSIS — Z96.651 STATUS POST RIGHT KNEE REPLACEMENT: Primary | ICD-10-CM

## 2021-06-25 DIAGNOSIS — M25.661 KNEE STIFFNESS, RIGHT: ICD-10-CM

## 2021-06-25 DIAGNOSIS — Z74.09 IMPAIRED FUNCTIONAL MOBILITY, BALANCE, GAIT, AND ENDURANCE: ICD-10-CM

## 2021-06-25 PROCEDURE — 97530 THERAPEUTIC ACTIVITIES: CPT | Performed by: PHYSICAL THERAPIST

## 2021-06-25 PROCEDURE — 97110 THERAPEUTIC EXERCISES: CPT | Performed by: PHYSICAL THERAPIST

## 2021-06-25 NOTE — PROGRESS NOTES
Physical Therapy Daily Progress Note    Patient Name: Kourtney Lorenzana         :  1945  Referring Physician: Aydin Farrell APRN      Subjective   Kourtney Lorenzana reports: continued improvement - noticed she was able to cross her legs this morning - able to go all the way around on the bike fwd and bkwd easily now as well - Has to concentrate on her walking to avoid walking stiff-legged and swinging her leg around -     Objective   Pt ambulating with increased knee flexion, but stiff at times and requires occ'l cuing for correction -     See Exercise, Manual, and Modality Logs for complete treatment.     Functional / Therapeutic Activities:  08 min  · TAPING / BRACING: NA  · SEE EXERCISE FLOW SHEET -   · Jt protection, ADL modification; Posture and      Assessment/Plan  S/P (R) TKA 2021  Pain limiting flexion ROM - Does NOT respond well to aggressive passive stretching   Doing well with improved mobility, gait and function -     Progress strengthening /stabilization /functional activity       _________________________________________________    Manual Therapy:                 mins  01758;  Therapeutic Exercise:    35    mins  10188;     Neuromuscular Jenn:        mins  57816;    Therapeutic Activity:     08      mins  10615;     Ultrasound:                          mins  74867;  Iontophoresis:                     mins  19277;    Electrical Stimulation:         mins  49197 ( );  Mechanical Traction:          mins  43673  Dry Needling                       mins self-pay     Timed Treatment:   43    mins                  Total Treatment:     55    mins    Jason Bower, PT  Physical Therapist

## 2021-06-29 NOTE — PROGRESS NOTES
Physical Therapy Daily Progress Note    Patient Name: Kourtney Lorenzana         :  1945  Referring Physician: Aydin aFrrell APRN      Subjective   Kourtney Salomón reports:   -SEE MD PROGRESS NOTE-      Objective   -SEE MD PROGRESS NOTE-      See Exercise, Manual, and Modality Logs for complete treatment.     Functional / Therapeutic Activities:  X 25 min  · TAPING / BRACING: K-tape w/ leukotape over to stabilize Proximal tib-fib joint (R) to alleviate pain and allow improved ROM and function   · SEE EXERCISE FLOW SHEET -   · FUNCTIONAL ASSESSMENT -    · Jt protection, ADL modification; Posture and        Assessment/Plan  -SEE MD PROGRESS NOTE-         _________________________________________________    Manual Therapy:           08      mins  44902;  Therapeutic Exercise:    35    mins  00368;     Neuromuscular Jenn:        mins  22890;    Therapeutic Activity:     25      mins  77865;     Ultrasound:                          mins  73673;  Iontophoresis:                     mins  64114;    Electrical Stimulation:         mins  15398 ( );  Mechanical Traction:          mins  53690  Dry Needling                       mins self-pay     Timed Treatment:   68    mins                  Total Treatment:     75    mins    Jason Bower PT  Physical Therapist

## 2021-06-30 ENCOUNTER — TREATMENT (OUTPATIENT)
Dept: PHYSICAL THERAPY | Facility: CLINIC | Age: 76
End: 2021-06-30

## 2021-06-30 DIAGNOSIS — Z74.09 IMPAIRED FUNCTIONAL MOBILITY, BALANCE, GAIT, AND ENDURANCE: ICD-10-CM

## 2021-06-30 DIAGNOSIS — Z96.651 STATUS POST RIGHT KNEE REPLACEMENT: Primary | ICD-10-CM

## 2021-06-30 DIAGNOSIS — M25.661 KNEE STIFFNESS, RIGHT: ICD-10-CM

## 2021-06-30 PROCEDURE — 97110 THERAPEUTIC EXERCISES: CPT | Performed by: PHYSICAL THERAPIST

## 2021-06-30 PROCEDURE — 97140 MANUAL THERAPY 1/> REGIONS: CPT | Performed by: PHYSICAL THERAPIST

## 2021-06-30 PROCEDURE — 97530 THERAPEUTIC ACTIVITIES: CPT | Performed by: PHYSICAL THERAPIST

## 2021-06-30 NOTE — PROGRESS NOTES
------------------------------------------------------------------------------------------------------   MD PROGRESS NOTE    Patient: Kourtney Lorenzana        : 1945  Diagnosis/ICD-10 Code:  Status post right knee replacement [Z96.651]  Referring practitioner: SLY Rain  Date of Initial Visit: 2021                  Today's Date: 2021  _________________________________________________________________    Thank you for the referral of Ms. Lorenzana to [facility].  Ms. Lorenzana has attended 10 PT sessions and their treatment has consisted of: modalities prn, manual therapy, therapeutic exercise, patient education, and HEP.     Subjective   Kourtney Lorenzana reports: improving with decreasing pain and improved mobility, gait, function - Most pain with knee flexion coming from proximal lateral lower leg and radiating down to her ankle    ___________________________________________________________________  Objective              OBSERVATION: Pt ambulating w/ increased WB-ing RLE and improved knee flexion -               PALPATION: Very tender over proximal tib/fib joint w/ palpable hypermobility; Tender proximal / distal scar and quad tendon region        AROM: (R) Knee up to 115-deg flexion (lateral lower leg pain alleviated w/ stabilization of prox tib-fib joint)    STRENGTH: Improved endurance to ADLs and PT activities -    SENSATION: Some residual numbness around scar (R) knee -   SPECIAL TESTS: Hypermobility at proximal tib-fib joint -   ACTIVITY TOLERANCE: Improved tolerance to ADLs -      ___________________________________________________________________   Assessment/Plan  74 yo female - S/P (R) TKA 2021  Pain limiting flexion ROM, but improving - Stabilization of Proximal tib-fib joint alleviated a lot of her pain limiting her knee flexion - Doing well with improved mobility, gait and function -  Ms. Lorenzana would benefit from continued Physical Therapy.     P: Recommend  continued Physical Therapy to allow a full and safe return to ADL's and normal job duties 2x/wk x 4 weeks to address ROM/mobility and function goals -    Please advise after your exam.    Thank you again for this referral of Ms. Lorenzana to [facility].    PT Signature: ______________________________   Jason Bower, PT  ______________________________________________________  Based upon review of the patient's progress and continued therapy plan, it is my medical opinion that Kourtney Lorenzana should continue physical therapy treatment per the recommendation above.     Signature: ____________________________   Date: ______    Aydin Farrell APRN    ______________________________________________________________________  [address] [place] [address]  Phone: [phone] Fax: [phone]

## 2021-07-01 ENCOUNTER — OFFICE VISIT (OUTPATIENT)
Dept: ORTHOPEDIC SURGERY | Facility: CLINIC | Age: 76
End: 2021-07-01

## 2021-07-01 VITALS — TEMPERATURE: 97.8 F | HEIGHT: 62 IN | WEIGHT: 160 LBS | BODY MASS INDEX: 29.44 KG/M2

## 2021-07-01 DIAGNOSIS — Z96.651 S/P TKR (TOTAL KNEE REPLACEMENT), RIGHT: Primary | ICD-10-CM

## 2021-07-01 PROCEDURE — 99024 POSTOP FOLLOW-UP VISIT: CPT | Performed by: NURSE PRACTITIONER

## 2021-07-01 PROCEDURE — 73562 X-RAY EXAM OF KNEE 3: CPT | Performed by: NURSE PRACTITIONER

## 2021-07-01 NOTE — PROGRESS NOTES
Kourtney Lorenzana : 1945 MRN: [id number] DATE: 2021    DIAGNOSIS: 8 week follow up right total knee      SUBJECTIVE:Patient returns today for 8 week follow up of right total knee replacement. Patient reports doing well with no unusual complaints. Appears to be progressing appropriately.    OBJECTIVE:   Exam:. The incision is well healed. No sign of infection. Range of motion is measured at 0 to 115. The calf is soft and nontender with a negative Homans sign. Strength is progressing and the patient is ambulating appropriately.    DIAGNOSTIC STUDIES  Xrays: 3 views of the right knee (AP, lateral, and sunrise) were ordered and reviewed for evaluation of recent knee replacement. They demonstrate a well positioned, well aligned knee replacement without complicating factors noted. In comparison with previous films there has been no change.    ASSESSMENT: 8 week status post right knee replacement.    PLAN: 1) Continue with PT exercises as prescribed   2) Follow up 10 months    SLY Rain  2021

## 2021-07-02 ENCOUNTER — TREATMENT (OUTPATIENT)
Dept: PHYSICAL THERAPY | Facility: CLINIC | Age: 76
End: 2021-07-02

## 2021-07-02 DIAGNOSIS — M25.661 KNEE STIFFNESS, RIGHT: ICD-10-CM

## 2021-07-02 DIAGNOSIS — Z96.651 STATUS POST RIGHT KNEE REPLACEMENT: Primary | ICD-10-CM

## 2021-07-02 DIAGNOSIS — Z74.09 IMPAIRED FUNCTIONAL MOBILITY, BALANCE, GAIT, AND ENDURANCE: ICD-10-CM

## 2021-07-02 PROCEDURE — 97530 THERAPEUTIC ACTIVITIES: CPT | Performed by: PHYSICAL THERAPIST

## 2021-07-02 PROCEDURE — 97110 THERAPEUTIC EXERCISES: CPT | Performed by: PHYSICAL THERAPIST

## 2021-07-02 NOTE — PROGRESS NOTES
Physical Therapy Daily Progress Note    Patient Name: Kourtney Lorenzana         :  1945  Referring Physician: yAdin Farrell APRN      Subjective   Kourtney Lorenzana reports: continued improvement - saw Provider who was pleased w/ progress - Pain is mostly prox lateral lower leg and down lateral lower leg into foot (R) w/ steps, WBing and bending her knee and this pain including post/lat knee pain limits her ability to flex her knee and function - taping very helpful -     Objective   Pt ambulating with mildly antalgic gait w/ some limitation in knee flexion - Very tender at proximal fib / tib-fib joint and distal lateral HS into insertion at fib head and palpable hyermobility of prox tib-fib joint (R) -     See Exercise, Manual, and Modality Logs for complete treatment.     Functional / Therapeutic Activities:  20  min  · TAPING / BRACING: K-tape w/ leukotape over x 3 strips to stabilize proximal tib-fib joint (R) to decrease pain and improve mobility and function -   · SEE EXERCISE FLOW SHEET -   · Jt protection, ADL modification; Posture and      Assessment/Plan  76 yo female - S/P (R) TKA 2021  Pain limiting flexion ROM, but improving - Stabilization of Proximal tib-fib joint alleviated a lot of her pain limiting her knee flexion - Doing well with improved mobility, gait and function -    Progress strengthening /stabilization /functional activity       _________________________________________________    Manual Therapy:                 mins  12831;  Therapeutic Exercise:   33    mins  03717;     Neuromuscular Jenn:        mins  29887;    Therapeutic Activity:     20      mins  82991;     Ultrasound:                          mins  18423;  Iontophoresis:                     mins  87901;    Electrical Stimulation:         mins  59315 ( );  Mechanical Traction:          mins  52130  Dry Needling                       mins self-pay     Timed Treatment:   53    mins                  Total  Treatment:     60    mins    Jason Bower, PT  Physical Therapist

## 2021-07-06 NOTE — PROGRESS NOTES
Physical Therapy Daily Progress Note    Patient Name: Kourtney Lorenzana         :  1945  Referring Physician: Aydin Farrell APRN      Subjective   Kourtney Lorenzana reports: trying the patellar strap to stabilize proximal tib-fib joint very helpful in reducing pain     Objective   AROM (R) knee up to 115-deg flexion - Good AROM w/ exercises in PT    See Exercise, Manual, and Modality Logs for complete treatment.    Functional / Therapeutic Activities:  20  min  · TAPING / BRACING:  Assessed / fitted / adjusted strap to stabilize proximal tib fib joint (R)    · SEE EXERCISE FLOW SHEET -   · Jt protection, ADL modification; Posture and      Assessment/Plan  76 yo female - S/P (R) TKA 2021  Pain limiting flexion ROM, but improving - Stabilization of Proximal tib-fib joint alleviated a lot of her pain limiting her knee flexion - Doing well with improved mobility, gait and function -    Progress strengthening /stabilization /functional activity       _________________________________________________    Manual Therapy:                 mins  15321;  Therapeutic Exercise:   33    mins  59424;     Neuromuscular Jenn:        mins  30824;    Therapeutic Activity:     20      mins  13485;     Ultrasound:                          mins  83589;  Iontophoresis:                     mins  11465;    Electrical Stimulation:         mins  43280 ( );  Mechanical Traction:          mins  98988  Dry Needling                       mins self-pay     Timed Treatment:   53    mins                  Total Treatment:     60    mins      Jason Bower, PT  Physical Therapist

## 2021-07-07 ENCOUNTER — TREATMENT (OUTPATIENT)
Dept: PHYSICAL THERAPY | Facility: CLINIC | Age: 76
End: 2021-07-07

## 2021-07-07 DIAGNOSIS — M25.661 KNEE STIFFNESS, RIGHT: ICD-10-CM

## 2021-07-07 DIAGNOSIS — Z96.651 STATUS POST RIGHT KNEE REPLACEMENT: Primary | ICD-10-CM

## 2021-07-07 DIAGNOSIS — Z74.09 IMPAIRED FUNCTIONAL MOBILITY, BALANCE, GAIT, AND ENDURANCE: ICD-10-CM

## 2021-07-07 PROCEDURE — 97530 THERAPEUTIC ACTIVITIES: CPT | Performed by: PHYSICAL THERAPIST

## 2021-07-07 PROCEDURE — 97110 THERAPEUTIC EXERCISES: CPT | Performed by: PHYSICAL THERAPIST

## 2021-07-09 ENCOUNTER — TREATMENT (OUTPATIENT)
Dept: PHYSICAL THERAPY | Facility: CLINIC | Age: 76
End: 2021-07-09

## 2021-07-09 DIAGNOSIS — Z96.651 STATUS POST RIGHT KNEE REPLACEMENT: Primary | ICD-10-CM

## 2021-07-09 DIAGNOSIS — Z74.09 IMPAIRED FUNCTIONAL MOBILITY, BALANCE, GAIT, AND ENDURANCE: ICD-10-CM

## 2021-07-09 DIAGNOSIS — M25.661 KNEE STIFFNESS, RIGHT: ICD-10-CM

## 2021-07-09 PROCEDURE — 97112 NEUROMUSCULAR REEDUCATION: CPT | Performed by: PHYSICAL THERAPIST

## 2021-07-09 PROCEDURE — 97530 THERAPEUTIC ACTIVITIES: CPT | Performed by: PHYSICAL THERAPIST

## 2021-07-09 PROCEDURE — 97110 THERAPEUTIC EXERCISES: CPT | Performed by: PHYSICAL THERAPIST

## 2021-07-09 NOTE — PROGRESS NOTES
Physical Therapy Daily Progress Note    Patient Name: Kourtney Lorenzana         :  1945  Referring Physician: Aydin Farrell APRN      Subjective   Kourtney Lorenzana reports: the strap to stabilize her upper lower leg /prox tib-fib joint alleviates her pain and numbness down her lateral lower leg and foot and alleviates pain with knee flexion, gait, etc.    Objective   Pt ambulating with increased WB-ing RLE w/ strap to stabilize proximal tib-fib joint -     See Exercise, Manual, and Modality Logs for complete treatment.     Functional / Therapeutic Activities:  15 min  · TAPING / BRACING: : Adjusted strap to stabilize proximal tib-fib joint (R);   · SEE EXERCISE FLOW SHEET -        Assessment/Plan  76 yo female - S/P (R) TKA 2021  Pain limiting flexion ROM, but improving - Stabilization of Proximal tib-fib joint alleviated a lot of her pain limiting her knee flexion - Doing well with improved mobility, gait and function -    Progress strengthening /stabilization /functional activity       _________________________________________________    Manual Therapy:                 mins  03648;  Therapeutic Exercise:   30    mins  22152;     Neuromuscular Jenn:   06     mins  14789;    Therapeutic Activity:     20      mins  80575;     Ultrasound:                          mins  82295;  Iontophoresis:                     mins  36706;    Electrical Stimulation:         mins  53560 ( );  Mechanical Traction:          mins  04660  Dry Needling                       mins self-pay     Timed Treatment:   56    mins                  Total Treatment:     65    mins    Jason Bower PT  Physical Therapist

## 2021-07-13 ENCOUNTER — TREATMENT (OUTPATIENT)
Dept: PHYSICAL THERAPY | Facility: CLINIC | Age: 76
End: 2021-07-13

## 2021-07-13 DIAGNOSIS — M25.661 KNEE STIFFNESS, RIGHT: ICD-10-CM

## 2021-07-13 DIAGNOSIS — Z96.651 STATUS POST RIGHT KNEE REPLACEMENT: Primary | ICD-10-CM

## 2021-07-13 DIAGNOSIS — Z74.09 IMPAIRED FUNCTIONAL MOBILITY, BALANCE, GAIT, AND ENDURANCE: ICD-10-CM

## 2021-07-13 PROCEDURE — 97112 NEUROMUSCULAR REEDUCATION: CPT | Performed by: PHYSICAL THERAPIST

## 2021-07-13 PROCEDURE — 97110 THERAPEUTIC EXERCISES: CPT | Performed by: PHYSICAL THERAPIST

## 2021-07-13 PROCEDURE — 97530 THERAPEUTIC ACTIVITIES: CPT | Performed by: PHYSICAL THERAPIST

## 2021-07-13 NOTE — PROGRESS NOTES
Physical Therapy Daily Progress Note    Patient Name: Kourtney Lorenzana         :  1945  Referring Physician: Aydin Farerll APRN      Subjective   Kourtney Lorenzana reports: continued improvement with decreasing pain and improving mobility - Strap to stabilize her upper fibula alleviates her lateral lower leg pain and numbness into her foot and needs to wear it day/night even sleeping as her numbness / pain increased when sleeping w/o strap - also much more able tolerate stretching into flexion w/ strap in place -  Noted increased knee swelling yesterday w/no known reason - better today -     Objective   Pt ambulating w/o AD and increased WB-ing and knee flexion -   Good AROM with PT activities and getting in / out of car, etc.     See Exercise, Manual, and Modality Logs for complete treatment.     Functional / Therapeutic Activities:  15 min  · TAPING / BRACING: : NA  · SEE EXERCISE FLOW SHEET -         Assessment/Plan  74 yo female - S/P (R) TKA 2021 w/ probable proximal tib-fib hypermobility (R) -   Pain limiting flexion ROM, but improving - Stabilization of Proximal tib-fib joint alleviated a lot of her pain limiting her knee flexion and lower leg pain and numbness along peroneal distribution -- Doing well with improved mobility, gait and function -    Progress strengthening /stabilization /functional activity       _________________________________________________    Manual Therapy:                 mins  57476;  Therapeutic Exercise:    32   mins  40041;     Neuromuscular Jenn:   06     mins  86098;    Therapeutic Activity:     15     mins  22111;     Ultrasound:                          mins  58332;  Iontophoresis:                     mins  62729;    Electrical Stimulation:         mins  22165 ( );  Mechanical Traction:          mins  80109  Dry Needling                       mins self-pay     Timed Treatment:   53    mins                  Total Treatment:     65    mins    Jason Bower, PT  Physical  Therapist

## 2021-07-14 ENCOUNTER — OFFICE VISIT (OUTPATIENT)
Dept: GASTROENTEROLOGY | Facility: CLINIC | Age: 76
End: 2021-07-14

## 2021-07-14 VITALS — BODY MASS INDEX: 29.66 KG/M2 | HEIGHT: 62 IN | WEIGHT: 161.2 LBS | TEMPERATURE: 98.8 F

## 2021-07-14 DIAGNOSIS — R10.30 LOWER ABDOMINAL PAIN: Primary | ICD-10-CM

## 2021-07-14 DIAGNOSIS — K59.09 OTHER CONSTIPATION: ICD-10-CM

## 2021-07-14 PROCEDURE — 99213 OFFICE O/P EST LOW 20 MIN: CPT | Performed by: INTERNAL MEDICINE

## 2021-07-14 NOTE — PROGRESS NOTES
Subjective   Chief Complaint   Patient presents with   • Follow-up   • Abdominal Pain       Kourtney Lorenzana is a  75 y.o. female here for a follow up visit for abdominal pain.     She has a history of colonoscopy in February of this year that showed diverticulosis internal hemorrhoids.  She had a tight angulation in the sigmoid colon that could not be traversed.  Follow-up barium enema showed no significant stricture.  I suspect that this is contributing to her abdominal pain.  She has been doing better since her constipation is better controlled.  She has not had the same lower abdominal pain.  She has had less gas.  She had one episode last week where she did not have a bowel movement in the morning and she became uncomfortable at [employer] and had urgency and had to leave the store.  This has not happened in some time.  She is no longer taking medication for pain.  We discussed the results of her colonoscopy and barium enema.    [place] she had a small tubular adenoma removed from her colon.  No family history of colon cancer or polyps.    She had a recent TKR in May - no issues with constipation - she used miralax and stool softener.    She reports that the BE caused significant constipation.    She takes a stool softener daily.  She doesn't have to use the miralax much now. She eats a lot fruit in the summer   HPI  Past Medical History:   Diagnosis Date   • Arthritis     OSTEO. KNEES AND BACK SEES DR MIKE DOUGLAS   • Bloating     WITH SOME NAUSEA   • Bruising tendency (CMS/HCC)    • Cholelithiasis Nov 7, 2019    Discovered by liver ultrasound   • Chronic low back pain    • Chronic pain of both feet    • Diverticular disease    • Diverticulosis    • Elevated liver enzymes    • Gastroesophageal reflux disease without esophagitis 2/11/2016   • History of anxiety     BEEN A LONG TIME   • History of depression     SITUATIONAL. NOW RESOLVED   • History of panic disorder    • IBS (irritable bowel syndrome)    • Insomnia     • Internal hemorrhoids    • Knee pain    • Lumbar canal stenosis    • Lumbosacral neuritis    • Lung nodules    • Multiple vitamin deficiency    • Neck pain    • Nephrolithiasis     July 2014 passage of kidney stone.   • Postmenopausal HRT (hormone replacement therapy)    • PSVT (paroxysmal supraventricular tachycardia) (CMS/HCC)    • Pulmonary nodules 3/25/2021   • Seasonal allergies    • Shoulder pain    • SOB (shortness of breath) on exertion    • Trouble swallowing     INTERMITTENTLY   • Urine incontinence      Past Surgical History:   Procedure Laterality Date   • CATARACT EXTRACTION Bilateral 09/05/2017    Dr. Jefe Barakat OD, MD   • CHOLECYSTECTOMY     • CHOLECYSTECTOMY LAPAROSCOPIC INTRAOPERATIVE CHOLANGIOGRAM WITH LIVER BIOPSY N/A 12/3/2019    Procedure: CHOLECYSTECTOMY LAPAROSCOPIC INTRAOPERATIVE CHOLANGIOGRAM AND LIVER BIOPSY;  Surgeon: Benny Schneider MD;  Location: [facility];  Service: General   • COLONOSCOPY N/A 11/16/2012    Benign cecal polyp-Dr. Mak Rodriguez   • COLONOSCOPY N/A 2015    Dr. Seema Andrade   • ENDOSCOPY N/A 04/27/2018    HH, no specimen collected-Dr. Seema Andrade   • HYSTERECTOMY Bilateral 1997   • PUBOVAGINAL SLING N/A 06/17/2003    Dr. Ziggy Saba   • TOE SURGERY Right     METATARASAL SURGERY GREAT TOE FOR FRACTUE   • TOTAL KNEE ARTHROPLASTY Right 5/14/2021    Procedure: RIGHT TOTAL KNEE ARTHROPLASTY;  Surgeon: Catarino De La Cruz MD;  Location: [facility];  Service: Orthopedics;  Laterality: Right;       Current Outpatient Medications:   •  acetaminophen (TYLENOL) 500 MG tablet, Take 500-1,000 mg by mouth Every 6 (Six) Hours As Needed for Mild Pain ., Disp: , Rfl:   •  cetirizine (zyrTEC) 10 MG tablet, Take 10 mg by mouth Daily., Disp: , Rfl:   •  dicyclomine (BENTYL) 20 MG tablet, Take 1 tablet by mouth 4 (Four) Times a Day Before Meals & at Bedtime As Needed (cramping)., Disp: 90 tablet, Rfl: 5  •  pantoprazole (PROTONIX) 40 MG EC tablet, Take 1 tablet by mouth 2 (two)  "times a day., Disp: 180 tablet, Rfl: 3  •  HYDROcodone-acetaminophen (NORCO) 7.5-325 MG per tablet, 1-2 po q 4-6 hr prn pain, Disp: 42 tablet, Rfl: 0  •  HYDROcodone-acetaminophen (Norco) 7.5-325 MG per tablet, Take 1 tablet by mouth Every 4 (Four) Hours As Needed for Severe Pain ., Disp: 40 tablet, Rfl: 0  •  HYDROcodone-acetaminophen (Norco) 7.5-325 MG per tablet, Take 1 tab q 4-6hrs prn pain., Disp: 40 tablet, Rfl: 0  •  ondansetron (Zofran) 4 MG tablet, Take 1 tablet by mouth Every 8 (Eight) Hours As Needed for Nausea or Vomiting for up to 10 doses., Disp: 10 tablet, Rfl: 0  No current facility-administered medications for this visit.    Facility-Administered Medications Ordered in Other Visits:   •  Chlorhexidine Gluconate 2 % pads 2 each, 2 pad, Apply externally, BID, Андрей, Elsa L, APRN  PRN Meds:.  Allergies   Allergen Reactions   • Atenolol Shortness Of Breath and Other (See Comments)     Shortness of breath, leg cramps   • Metoprolol Itching   • Hydrocodone-Acetaminophen Nausea Only and GI Intolerance   • Other Nausea And Vomiting     Unknown \"strong\" pain killer (POSSIBLY VICODIN)   • Tramadol Nausea Only and GI Intolerance     Social History     Socioeconomic History   • Marital status:      Spouse name: Not on file   • Number of children: Not on file   • Years of education: Not on file   • Highest education level: Not on file   Tobacco Use   • Smoking status: Never Smoker   • Smokeless tobacco: Never Used   Vaping Use   • Vaping Use: Never used   Substance and Sexual Activity   • Alcohol use: No     Comment: caffeine use: 1 cups daily   • Drug use: No   • Sexual activity: Defer     Family History   Problem Relation Age of Onset   • Atrial fibrillation Father    • Atrial fibrillation Sister    • Atrial fibrillation Brother    • Other Brother         Coronary arteriosclerosis   • Colon cancer Mother    • Colon polyps Mother    • Liver disease Paternal Aunt    • Aneurysm Paternal Grandfather  "   • Malig Hyperthermia Neg Hx      Review of Systems   Constitutional: Negative for appetite change and unexpected weight change.   Gastrointestinal: Negative for abdominal distention, blood in stool and constipation.     Vitals:    07/14/21 1328   Temp: 98.8 °F (37.1 °C)         07/14/21  1328   Weight: 73.1 kg (161 lb 3.2 oz)       Objective   Physical Exam  Constitutional:       Appearance: Normal appearance.   Abdominal:      General: There is no distension.      Palpations: Abdomen is soft.   Neurological:      Mental Status: She is alert.       No radiology results for the last 7 days    Assessment/Plan   Diagnoses and all orders for this visit:    Lower abdominal pain  Comments:  Suspect due to scarring from previous surgery in the pelvis and immobility of the sigmoid colon    Other constipation      Plan:  · She is doing better overall.  We discussed results of her colonoscopy and barium enema.  I think that scarring in her pelvis from her previous pelvic and gynecologic surgery is negatively contributing to her symptoms.  Overall she would be better to keep her stool soft and regular to prevent gas and discomfort.  She is managing this well with stool softeners plus or minus MiraLAX.  · Her colon is not amenable to further colonoscopy safely.  Could consider CT colonography if needed for further evaluation.  She is not sure she wants to have any additional screening in the future.  She had a bad experience with a barium enema.

## 2021-07-16 ENCOUNTER — TREATMENT (OUTPATIENT)
Dept: PHYSICAL THERAPY | Facility: CLINIC | Age: 76
End: 2021-07-16

## 2021-07-16 DIAGNOSIS — Z74.09 IMPAIRED FUNCTIONAL MOBILITY, BALANCE, GAIT, AND ENDURANCE: ICD-10-CM

## 2021-07-16 DIAGNOSIS — Z96.651 STATUS POST RIGHT KNEE REPLACEMENT: Primary | ICD-10-CM

## 2021-07-16 DIAGNOSIS — M25.661 KNEE STIFFNESS, RIGHT: ICD-10-CM

## 2021-07-16 PROCEDURE — 97530 THERAPEUTIC ACTIVITIES: CPT | Performed by: PHYSICAL THERAPIST

## 2021-07-16 PROCEDURE — 97110 THERAPEUTIC EXERCISES: CPT | Performed by: PHYSICAL THERAPIST

## 2021-07-16 PROCEDURE — 97112 NEUROMUSCULAR REEDUCATION: CPT | Performed by: PHYSICAL THERAPIST

## 2021-07-16 NOTE — PROGRESS NOTES
Physical Therapy Daily Progress Note    Patient Name: Kourtney Lorenzana         :  1945  Referring Physician: Aydin Farrell APRN      Subjective   Kourtney Lorenzana reports: if the strap stabilizing her proximal fib gets loose, her pain is increased down her lateral lower leg into her foot w/ Numbness and has more pain when bending her knee, but when its on properly this pain is alleviated and she can bend her knee better and farther - -     Objective   Pt ambulating with increased WB-ing RLE w/ strap to stabilize proximal tib-fib joint (R)   Good AROM with activities in PT today -     See Exercise, Manual, and Modality Logs for complete treatment.     Functional / Therapeutic Activities:  15 min  · TAPING / BRACING: : NA  · SEE EXERCISE FLOW SHEET -         Assessment/Plan  74 yo female - S/P (R) TKA 2021 w/ probable proximal tib-fib hypermobility (R) -   Pain limiting flexion ROM, but improving - Stabilization of Proximal tib-fib joint alleviated a lot of her pain limiting her knee flexion and lower leg pain and numbness along peroneal distribution -- Doing well with improved mobility, gait and function -     Progress strengthening /stabilization /functional activity     _________________________________________________     Manual Therapy:                 mins  33593;  Therapeutic Exercise:    32   mins  92143;     Neuromuscular Jenn:   06     mins  67822;    Therapeutic Activity:     15     mins  59436;     Ultrasound:                          mins  04099;  Iontophoresis:                     mins  59385;    Electrical Stimulation:         mins  76644 ( );  Mechanical Traction:          mins  75142  Dry Needling                       mins self-pay     Timed Treatment:   53    mins                  Total Treatment:     65    mins    Jason Bower, PT  Physical Therapist

## 2021-07-23 ENCOUNTER — TREATMENT (OUTPATIENT)
Dept: PHYSICAL THERAPY | Facility: CLINIC | Age: 76
End: 2021-07-23

## 2021-07-23 DIAGNOSIS — Z74.09 IMPAIRED FUNCTIONAL MOBILITY, BALANCE, GAIT, AND ENDURANCE: ICD-10-CM

## 2021-07-23 DIAGNOSIS — Z96.651 STATUS POST RIGHT KNEE REPLACEMENT: Primary | ICD-10-CM

## 2021-07-23 DIAGNOSIS — M25.661 KNEE STIFFNESS, RIGHT: ICD-10-CM

## 2021-07-23 PROCEDURE — 97530 THERAPEUTIC ACTIVITIES: CPT | Performed by: PHYSICAL THERAPIST

## 2021-07-23 PROCEDURE — 97110 THERAPEUTIC EXERCISES: CPT | Performed by: PHYSICAL THERAPIST

## 2021-07-23 PROCEDURE — 97112 NEUROMUSCULAR REEDUCATION: CPT | Performed by: PHYSICAL THERAPIST

## 2021-07-23 NOTE — PROGRESS NOTES
Physical Therapy Daily Progress Note    Patient Name: Kourtney Lorenzana         :  1945  Referring Physician: Aydin Farrell APRN      Subjective   Kourtney Lorenzana reports: having flare-up of scar tissue pain from previous hysterectomy, bladder surgery, etc - pain increased with heavy activities and she admits to feeling so good she worked out in the yard recently resulting in this increased pain (NOT KNEE PAIN) - She says she shouldn't do squat, leg press exercises in PT today -  Strap on lower leg alleviates the post/lat/prox lower leg pain and alleviates the pain radiating down her leg with NT-ing down same and into foot - This pain returns if she removes the brace, even at rest -     Objective   Pt ambulating with     See Exercise, Manual, and Modality Logs for complete treatment.     Functional / Therapeutic Activities:  08 min  · TAPING / BRACING: NA  · SEE EXERCISE FLOW SHEET -   · Jt protection, ADL modification; Posture and      Assessment/Plan  76 yo female - S/P (R) TKA 2021 w/ probable proximal tib-fib hypermobility (R) -   Pain limiting flexion ROM, but improving - Stabilization of Proximal tib-fib joint alleviated a lot of her pain limiting her knee flexion and lower leg pain and numbness along peroneal distribution -- Doing well with improved mobility, gait and function -  Increased SCAR TISSUE from other surgical procedures in past flared up limiting her tolerance to PT today -     Progress strengthening /stabilization /functional activity - resume full program as pain noted above decreases -        _________________________________________________    Manual Therapy:                 mins  17727;  Therapeutic Exercise:    33    mins  04479;     Neuromuscular Jenn:   08     mins  72374;    Therapeutic Activity:     08      mins  70923;     Ultrasound:                          mins  55236;  Iontophoresis:                     mins  10113;    Electrical Stimulation:         mins  04809  ( );  Mechanical Traction:          mins  19644  Dry Needling                       mins self-pay     Timed Treatment:   49    mins                  Total Treatment:     65    mins    Jason Bower, PT  Physical Therapist

## 2021-07-28 ENCOUNTER — TREATMENT (OUTPATIENT)
Dept: PHYSICAL THERAPY | Facility: CLINIC | Age: 76
End: 2021-07-28

## 2021-07-28 DIAGNOSIS — Z74.09 IMPAIRED FUNCTIONAL MOBILITY, BALANCE, GAIT, AND ENDURANCE: ICD-10-CM

## 2021-07-28 DIAGNOSIS — M25.661 KNEE STIFFNESS, RIGHT: ICD-10-CM

## 2021-07-28 DIAGNOSIS — Z96.651 STATUS POST RIGHT KNEE REPLACEMENT: Primary | ICD-10-CM

## 2021-07-28 DIAGNOSIS — M25.461 KNEE EFFUSION, RIGHT: ICD-10-CM

## 2021-07-28 PROCEDURE — 97112 NEUROMUSCULAR REEDUCATION: CPT | Performed by: PHYSICAL THERAPIST

## 2021-07-28 PROCEDURE — 97530 THERAPEUTIC ACTIVITIES: CPT | Performed by: PHYSICAL THERAPIST

## 2021-07-28 PROCEDURE — 97110 THERAPEUTIC EXERCISES: CPT | Performed by: PHYSICAL THERAPIST

## 2021-07-28 NOTE — PROGRESS NOTES
Physical Therapy Daily Progress Note    Patient Name: Kourtney Lorenzana         :  1945  Referring Physician: Aydin Farrell APRN      Subjective   Kourtney Lorenzana reports:   -SEE MD PROGRESS NOTE-      Objective   -SEE MD PROGRESS NOTE-      See Exercise, Manual, and Modality Logs for complete treatment.     Functional / Therapeutic Activities:  30 min  · TAPING / BRACING: Reviewed fitting / placement of brace to stabilize Proximal tib-fib joint (R)   · SEE EXERCISE FLOW SHEET -   · FUNCTIONAL ASSESSMENT -   · Jt protection, ADL modification; Posture and      Assessment/Plan  -SEE MD PROGRESS NOTE-           _________________________________________________    Manual Therapy:                 mins  74297;  Therapeutic Exercise:    20    mins  08153;     Neuromuscular Jenn:   06     mins  15427;    Therapeutic Activity:     30      mins  83574;     Ultrasound:                          mins  83431;  Iontophoresis:                     mins  47406;    Electrical Stimulation:         mins  45812 ( );  Mechanical Traction:          mins  95040  Dry Needling                       mins self-pay     Timed Treatment:   56   mins                  Total Treatment:     70    mins    Jason Bower PT  Physical Therapist

## 2021-07-28 NOTE — PROGRESS NOTES
------------------------------------------------------------------------------------------------------   MD PROGRESS NOTE     Patient: Kourtney Lorenzana        : 1945  Diagnosis/ICD-10 Code:  Status post right knee replacement [Z96.651]  Referring practitioner: SLY Rain  Date of Initial Visit: 2021                  Today's Date: 2021  _________________________________________________________________     Thank you for the referral of Ms. Lorenzana to [facility].  Ms. Lorenzana has attended 17 PT sessions and their treatment has consisted of: modalities prn, manual therapy, therapeutic exercise, patient education, and HEP.      Subjective   Kourtney Lorenzana reports: continued improvement with decreasing pain and improved mobility, gait, function - Most pain  coming from proximal lateral lower leg and radiating down to her ankle when she is flexing her knee, and even at rest/sleeping, but strapping of this region completely alleviates this pain and allows her to function and sleep w/o pain -  Able to wear her strap less overall - Notes inabiility to ambulate down stairs reciprocally -   ___________________________________________________________________  Objective              OBSERVATION: Pt ambulating w/ increased WB-ing RLE and improved knee flexion - Difficulty w/ ambulating down stairs reciprocally -               PALPATION: Very tender over proximal tib/fib joint w/ palpable hypermobility; Less Tender proximal / distal scar and quad tendon region              AROM: (R) Knee up to 115-deg flexion with pain at that point and beyond (lateral lower leg pain alleviated w/ stabilization of prox tib-fib joint) - Full Knee extension              STRENGTH: Improved endurance to ADLs and PT activities -               SENSATION: Some residual numbness around scar (R) knee, but improved grossly - -              SPECIAL TESTS: Hypermobility at proximal tib-fib joint and pain -               ACTIVITY TOLERANCE: Improved tolerance to ADLs -           ___________________________________________________________________   Assessment/Plan  76 yo female - S/P (R) TKA 5/14/2021  Pain limiting flexion ROM, but improving - Stabilization of Proximal tib-fib joint alleviated a lot of her pain limiting her knee flexion - Doing well with improved mobility, gait and function -  Ms. Lorenzana would benefit from continuing her HEP and bracing of proximal tib-fib joint and discontinuing  Physical Therapy. Goal status: STGs - MET: LTGs Progressing towards function goals -      P: Recommend continued Physical Therapy to allow a full and safe return to ADL's and normal job duties 2x/wk x 4 weeks to address ROM/mobility and function goals - with DC earlier as goals met -    Please advise after your exam.     Thank you again for this referral of Ms. Lorenzana to [facility].     PT Signature: ______________________________   Jason Bower, PT  ______________________________________________________  Based upon review of the patient's progress and continued therapy plan, it is my medical opinion that Kourtney Lorenzana should continue physical therapy treatment per the recommendation above.      Signature: ____________________________   Date: ______    Aydin Farrell APRN     ______________________________________________________________________  [address] [place] [address]  Phone: [phone]                 Fax: [phone]

## 2021-07-30 ENCOUNTER — TREATMENT (OUTPATIENT)
Dept: PHYSICAL THERAPY | Facility: CLINIC | Age: 76
End: 2021-07-30

## 2021-07-30 DIAGNOSIS — M25.461 KNEE EFFUSION, RIGHT: ICD-10-CM

## 2021-07-30 DIAGNOSIS — M25.661 KNEE STIFFNESS, RIGHT: ICD-10-CM

## 2021-07-30 DIAGNOSIS — Z96.651 STATUS POST RIGHT KNEE REPLACEMENT: Primary | ICD-10-CM

## 2021-07-30 DIAGNOSIS — Z74.09 IMPAIRED FUNCTIONAL MOBILITY, BALANCE, GAIT, AND ENDURANCE: ICD-10-CM

## 2021-07-30 PROCEDURE — 97110 THERAPEUTIC EXERCISES: CPT | Performed by: PHYSICAL THERAPIST

## 2021-07-30 PROCEDURE — 97530 THERAPEUTIC ACTIVITIES: CPT | Performed by: PHYSICAL THERAPIST

## 2021-07-30 PROCEDURE — 97112 NEUROMUSCULAR REEDUCATION: CPT | Performed by: PHYSICAL THERAPIST

## 2021-08-04 ENCOUNTER — OFFICE VISIT (OUTPATIENT)
Dept: CARDIOLOGY | Facility: CLINIC | Age: 76
End: 2021-08-04

## 2021-08-04 VITALS
HEIGHT: 62 IN | SYSTOLIC BLOOD PRESSURE: 118 MMHG | HEART RATE: 71 BPM | BODY MASS INDEX: 29.26 KG/M2 | WEIGHT: 159 LBS | DIASTOLIC BLOOD PRESSURE: 82 MMHG

## 2021-08-04 DIAGNOSIS — R91.8 PULMONARY NODULES: ICD-10-CM

## 2021-08-04 DIAGNOSIS — I47.1 PSVT (PAROXYSMAL SUPRAVENTRICULAR TACHYCARDIA) (HCC): Primary | ICD-10-CM

## 2021-08-04 DIAGNOSIS — R60.0 LOWER EXTREMITY EDEMA: ICD-10-CM

## 2021-08-04 PROCEDURE — 93000 ELECTROCARDIOGRAM COMPLETE: CPT | Performed by: NURSE PRACTITIONER

## 2021-08-04 PROCEDURE — 99213 OFFICE O/P EST LOW 20 MIN: CPT | Performed by: NURSE PRACTITIONER

## 2021-08-04 RX ORDER — DIPHENHYDRAMINE HCL 25 MG
25 CAPSULE ORAL EVERY 6 HOURS PRN
COMMUNITY
End: 2022-06-21

## 2021-08-04 NOTE — PROGRESS NOTES
Date of Office Visit: 2021  Encounter Provider: SLY Barker  Place of Service: [facility] CARDIOLOGY  Patient Name: Kourtney Lorenzana  :1945  Primary Cardiologist: Dr. Anum Hicks     Chief Complaint   Patient presents with   • Rapid Heart Rate   • Follow-up   :     HPI: Kourtney Lorenzana is a pleasant 75 y.o. female who presents today for follow-up on palpitations and paroxysmal supraventricular tachycardia.  I have reviewed her medical records.     In May 2020, she was experiencing palpitations and diagnosed with nonsustained supraventricular tachycardia per Holter monitor (40 episodes the longest 5 beats in duration at a rate of 195).  Echocardiogram showed normal LVEF of 65% and grade 1 diastolic dysfunction.  She was started on metoprolol and magnesium.  She developed itching so the metoprolol and magnesium were discontinued.  She was placed on atenolol and then developed painful leg cramps, shortness of breath, and fatigue so the atenolol was discontinued.    In 2021, I saw her for perioperative cardiac risk assessment for an upcoming right total knee surgery to be completed in May 2021.  She was experiencing worsening lower extremity edema and mild dyspnea going up and down steps.  She had had 2 episodes of palpitations.  She had a normal venous duplex.  A CTA of the chest showed no evidence of pulmonary embolism and pulmonary nodules.  She was referred to pulmonary and they recommended a follow-up CT scan in 2021.  I felt that she was at acceptable risk to proceed with the knee surgery from a cardiac standpoint.    She presents today for follow-up visit.  She underwent her right knee surgery and said it went well.  She just finished with physical therapy this past week.  She reports some chronic lower extremity edema, right greater than left.  On occasion she will experience a brief palpitation and then she will hold her breath.  The  palpitations then subside.  She denies chest pain, shortness of breath, PND, orthopnea, dizziness, syncope, or bleeding.  Her blood pressure and heart rate are normal today.    Past Medical History:   Diagnosis Date   • Arthritis     OSTEO. KNEES AND BACK SEES DR MIKE DOUGLAS   • Bloating     WITH SOME NAUSEA   • Bruising tendency (CMS/HCC)    • Cholelithiasis Nov 7, 2019    Discovered by liver ultrasound   • Chronic low back pain    • Chronic pain of both feet    • Diverticular disease    • Diverticulosis    • Elevated liver enzymes    • Gastroesophageal reflux disease without esophagitis 2/11/2016   • History of anxiety     BEEN A LONG TIME   • History of depression     SITUATIONAL. NOW RESOLVED   • History of panic disorder    • IBS (irritable bowel syndrome)    • Insomnia    • Internal hemorrhoids    • Knee pain    • Lumbar canal stenosis    • Lumbosacral neuritis    • Lung nodules    • Multiple vitamin deficiency    • Neck pain    • Nephrolithiasis     July 2014 passage of kidney stone.   • Postmenopausal HRT (hormone replacement therapy)    • PSVT (paroxysmal supraventricular tachycardia) (CMS/HCC)    • Pulmonary nodules 3/25/2021   • Seasonal allergies    • Shoulder pain    • SOB (shortness of breath) on exertion    • Trouble swallowing     INTERMITTENTLY   • Urine incontinence        Past Surgical History:   Procedure Laterality Date   • CATARACT EXTRACTION Bilateral 09/05/2017    Dr. Jefe Barakat OD, MD   • CHOLECYSTECTOMY     • CHOLECYSTECTOMY LAPAROSCOPIC INTRAOPERATIVE CHOLANGIOGRAM WITH LIVER BIOPSY N/A 12/3/2019    Procedure: CHOLECYSTECTOMY LAPAROSCOPIC INTRAOPERATIVE CHOLANGIOGRAM AND LIVER BIOPSY;  Surgeon: Benny Schneider MD;  Location: [facility];  Service: General   • COLONOSCOPY N/A 11/16/2012    Benign cecal polyp-Dr. Mak Rodriguez   • COLONOSCOPY N/A 2015    Dr. Seema Andrade   • ENDOSCOPY N/A 04/27/2018    , no specimen collected-Dr. Seema Andrade   • HYSTERECTOMY Bilateral 1997   •  "PUBOVAGINAL SLING N/A 06/17/2003    Dr. Ziggy Saba   • TOE SURGERY Right     METATARASAL SURGERY GREAT TOE FOR FRACTUE   • TOTAL KNEE ARTHROPLASTY Right 5/14/2021    Procedure: RIGHT TOTAL KNEE ARTHROPLASTY;  Surgeon: Catarino De La Cruz MD;  Location: [facility];  Service: Orthopedics;  Laterality: Right;       Social History     Socioeconomic History   • Marital status:      Spouse name: Not on file   • Number of children: Not on file   • Years of education: Not on file   • Highest education level: Not on file   Tobacco Use   • Smoking status: Never Smoker   • Smokeless tobacco: Never Used   Vaping Use   • Vaping Use: Never used   Substance and Sexual Activity   • Alcohol use: No     Comment: caffeine use: 1 cups daily   • Drug use: No   • Sexual activity: Defer       Family History   Problem Relation Age of Onset   • Atrial fibrillation Father    • Atrial fibrillation Sister    • Atrial fibrillation Brother    • Other Brother         Coronary arteriosclerosis   • Colon cancer Mother    • Colon polyps Mother    • Liver disease Paternal Aunt    • Aneurysm Paternal Grandfather    • Malig Hyperthermia Neg Hx        The following portion of the patient's history were reviewed and updated as appropriate: past medical history, past surgical history, past social history, past family history, allergies, current medications, and problem list.    Review of Systems   Constitutional: Negative.   Cardiovascular: Positive for leg swelling and palpitations.   Respiratory: Negative.    Hematologic/Lymphatic: Negative.    Musculoskeletal: Positive for joint pain and joint swelling.   Neurological: Negative.        Allergies   Allergen Reactions   • Atenolol Shortness Of Breath and Other (See Comments)     Shortness of breath, leg cramps   • Metoprolol Itching   • Hydrocodone-Acetaminophen Nausea Only and GI Intolerance   • Other Nausea And Vomiting     Unknown \"strong\" pain killer (POSSIBLY VICODIN)   • Tramadol Nausea Only " "and GI Intolerance         Current Outpatient Medications:   •  acetaminophen (TYLENOL) 500 MG tablet, Take 500-1,000 mg by mouth Every 6 (Six) Hours As Needed for Mild Pain ., Disp: , Rfl:   •  cetirizine (zyrTEC) 10 MG tablet, Take 10 mg by mouth Daily., Disp: , Rfl:   •  diphenhydrAMINE (BENADRYL) 25 mg capsule, Take 25 mg by mouth Every 6 (Six) Hours As Needed for Itching., Disp: , Rfl:   •  pantoprazole (PROTONIX) 40 MG EC tablet, Take 1 tablet by mouth 2 (two) times a day., Disp: 180 tablet, Rfl: 3  No current facility-administered medications for this visit.        Objective:     Vitals:    08/04/21 1113 08/04/21 1115   BP: 112/90 118/82   BP Location: Left arm Right arm   Pulse: 71    Weight: 72.1 kg (159 lb)    Height: 157.5 cm (62\")      Body mass index is 29.08 kg/m².    PHYSICAL EXAM:    Vitals Reviewed.   General Appearance: No acute distress, well developed and well nourished.    Eyes: Conjunctiva and lids: No erythema, swelling, or discharge. Sclera non-icteric.   HENT: Atraumatic, normocephalic. External eyes, ears, and nose normal. No hearing loss noted. Mucous membranes normal. Lips not cyanotic. Neck supple with no tenderness.  Respiratory: No signs of respiratory distress. Respiration rhythm and depth normal.   Clear to auscultation. No rales, crackles, rhonchi, or wheezing auscultated.   Cardiovascular:  Jugular Venous Pressure: Normal  Heart Rate and Rhythm: Normal, Heart Sounds: Normal S1 and S2. No S3 or S4 noted.  Murmurs: No murmurs noted. No rubs, thrills, or gallops.   Lower Extremities: Bilateral lower extremity edema present; right greater than left.  Gastrointestinal:  Abdomen soft, non-distended, non-tender. Normal bowel sounds.    Musculoskeletal: Normal movement of extremities  Skin and Nails: General appearance normal. No pallor, cyanosis, diaphoresis. Skin temperature normal. No clubbing of fingernails.   Psychiatric: Patient alert and oriented to person, place, and time. Speech " and behavior appropriate. Normal mood and affect.       ECG 12 Lead    Date/Time: 8/4/2021 11:14 AM  Performed by: Elizabeth Grullon APRN  Authorized by: Elizabeth Grullon APRN   Comparison: compared with previous ECG from 3/24/2021  Similar to previous ECG  Rhythm: sinus rhythm  Rate: normal  BPM: 71  Q waves: V1, V2 and V3    ST Segments: ST segments normal  T Waves: T waves normal  QRS axis: normal    Clinical impression: non-specific ECG              Assessment:       Diagnosis Plan   1. PSVT (paroxysmal supraventricular tachycardia) (CMS/HCC)     2. Lower extremity edema     3. Pulmonary nodules            Plan:      1.  Paroxysmal Supraventricular Tachycardia: In May 2020, she wore a Holter monitor which showed 40 episodes of nonsustained SVT-the longest 5 beats in duration at a rate of 195 bpm.  She was tried on magnesium, metoprolol and atenolol, but all medications have been discontinued due to adverse effects.  She has occasional brief palpitations in which she uses the vagal maneuver and her symptoms resolved.  I asked her to call with any worsening symptoms.    2.  Lower Extremity Edema: Right greater than left and I think it is due to the right knee surgery.  Venous duplex was negative earlier this year.  I recommended elevation of legs, low-sodium diet, and compression stockings.    3.  Pulmonary Nodules: Noted on CT of the chest earlier this year.  She has been referred to pulmonary and will be having a repeat CT scan in November 2021.      4.  I recommended follow-up with Dr. Anum Hicks in 1 year, unless otherwise needed sooner.    As always, it has been a pleasure to participate in your patient's care. Thank you.       Sincerely,       SLY Holloway  [facility]      · COVID-19 Precautions - Patient was compliant in wearing a mask. When I saw the patient, I used appropriate personal protective equipment (PPE) including mask and eye shield (standard  procedure).  Additionally, I used gown and gloves if indicated.  Hand hygiene was completed before and after seeing the patient.  · Dictated utilizing Dragon Dictation  I spent 20 minutes reviewing her medical records/testing/previous office notes/labs, face-to-face interaction with patient, physical examination, formulating the plan of care, and discussion of plan of care with patient.

## 2021-10-22 ENCOUNTER — HOSPITAL ENCOUNTER (OUTPATIENT)
Dept: CT IMAGING | Facility: HOSPITAL | Age: 76
Discharge: HOME | End: 2021-10-22
Admitting: INTERNAL MEDICINE

## 2021-10-22 DIAGNOSIS — R91.1 LUNG NODULE: ICD-10-CM

## 2021-10-22 PROCEDURE — 71250 CT THORAX DX C-: CPT

## 2021-12-13 ENCOUNTER — TRANSCRIBE ORDERS (OUTPATIENT)
Dept: ADMINISTRATIVE | Facility: HOSPITAL | Age: 76
End: 2021-12-13

## 2021-12-13 DIAGNOSIS — R91.8 LUNG NODULES: Primary | ICD-10-CM

## 2022-04-19 ENCOUNTER — OFFICE VISIT (OUTPATIENT)
Dept: GASTROENTEROLOGY | Facility: CLINIC | Age: 77
End: 2022-04-19

## 2022-04-19 VITALS
WEIGHT: 171.2 LBS | DIASTOLIC BLOOD PRESSURE: 92 MMHG | SYSTOLIC BLOOD PRESSURE: 151 MMHG | HEART RATE: 68 BPM | TEMPERATURE: 96.5 F | BODY MASS INDEX: 31.5 KG/M2 | OXYGEN SATURATION: 97 % | HEIGHT: 62 IN

## 2022-04-19 DIAGNOSIS — K21.9 GASTROESOPHAGEAL REFLUX DISEASE WITHOUT ESOPHAGITIS: ICD-10-CM

## 2022-04-19 DIAGNOSIS — R13.14 PHARYNGOESOPHAGEAL DYSPHAGIA: Primary | ICD-10-CM

## 2022-04-19 PROCEDURE — 99214 OFFICE O/P EST MOD 30 MIN: CPT | Performed by: PHYSICIAN ASSISTANT

## 2022-04-19 RX ORDER — NAPROXEN SODIUM 220 MG
220 TABLET ORAL 2 TIMES DAILY PRN
COMMUNITY

## 2022-04-19 RX ORDER — PYRIDOXINE HCL (VITAMIN B6) 50 MG
50 TABLET ORAL ONCE
COMMUNITY
End: 2022-07-07

## 2022-04-19 RX ORDER — CHOLECALCIFEROL (VITAMIN D3) 50 MCG
TABLET ORAL
COMMUNITY

## 2022-04-19 RX ORDER — DOCUSATE CALCIUM 240 MG
240 CAPSULE ORAL 2 TIMES DAILY
COMMUNITY
End: 2022-06-21

## 2022-04-19 RX ORDER — CHOLECALCIFEROL (VITAMIN D3) 125 MCG
5 CAPSULE ORAL
COMMUNITY

## 2022-04-19 RX ORDER — OMEPRAZOLE 40 MG/1
40 CAPSULE, DELAYED RELEASE ORAL DAILY
Qty: 30 CAPSULE | Refills: 3 | Status: SHIPPED | OUTPATIENT
Start: 2022-04-19 | End: 2022-08-29

## 2022-04-19 NOTE — PROGRESS NOTES
Chief Complaint  Abdominal Pain, Heartburn, Nausea, Constipation, and Diarrhea    Subjective        History of Present Illness  Kourtney Lorenzana is a  76 y.o. female patient of Dr. Andrade, new to me today, here for follow-up regarding abdominal pain, heartburn, nausea and alternation between constipation and diarrhea.  She was last seen by Dr. Andrade on 7/14/2021.    She reports increase in heartburn symptoms as well as difficulty swallowing worsening since January.  She denies any nausea, vomiting, melena, hematemesis.  She does take Aleve regularly for osteoarthritis.  She tried to discontinue and change to Tylenol but this does not help alleviate her symptoms.    In regards to her bowels, she alternates with soft stools and diarrhea.  She does note trouble wiping clean following bowel movements.  She has a least a small bowel movement every day denies fevers, rigors, nausea, vomiting, heartburn/reflux, dysphagia, hematemesis, hematochezia, intense pruritus, darkly pigmented urine, jaundice, acholic stools, recent NSAIDs use.  She takes MiraLAX on a as needed basis.  She also endorses intermittent right lower quadrant pain; often exacerbated by movement itself.  She does suffer from lumbar spine pain as well as cervical spine issues.     EGD April 2018: Small hiatal hernia, no endoscopic abnormality to explain dysphagia but esophagus was empirically dilated, normal stomach, normal duodenum.    Colonoscopy February 2021: Diverticulosis, internal hemorrhoids as well as a tight angulation within the sigmoid colon that could not be traversed.  She subsequently underwent a barium enema which showed no significant stricture.  She does have history of previous pelvic/gynecological surgery.  No plans for further colonoscopy.  To consider CT colonography if needed for further evaluation.    Past Medical History:   Diagnosis Date   • Arthritis     OSTEO. KNEES AND BACK SEES DR MIKE DOUGLAS   • Bloating     WITH SOME NAUSEA   •  Bruising tendency (HCC)    • Cholelithiasis Nov 7, 2019    Discovered by liver ultrasound   • Chronic low back pain    • Chronic pain of both feet    • Diverticular disease    • Diverticulosis    • Elevated liver enzymes    • Gastroesophageal reflux disease without esophagitis 2/11/2016   • History of anxiety     BEEN A LONG TIME   • History of depression     SITUATIONAL. NOW RESOLVED   • History of panic disorder    • IBS (irritable bowel syndrome)    • Insomnia    • Internal hemorrhoids    • Knee pain    • Lumbar canal stenosis    • Lumbosacral neuritis    • Lung nodules    • Multiple vitamin deficiency    • Neck pain    • Nephrolithiasis     July 2014 passage of kidney stone.   • Postmenopausal HRT (hormone replacement therapy)    • PSVT (paroxysmal supraventricular tachycardia) (HCC)    • Pulmonary nodules 3/25/2021   • Seasonal allergies    • Shoulder pain    • SOB (shortness of breath) on exertion    • Trouble swallowing     INTERMITTENTLY   • Urine incontinence        Past Surgical History:   Procedure Laterality Date   • CATARACT EXTRACTION Bilateral 09/05/2017    Dr. Jefe Barakat OD, MD   • CHOLECYSTECTOMY     • CHOLECYSTECTOMY LAPAROSCOPIC INTRAOPERATIVE CHOLANGIOGRAM WITH LIVER BIOPSY N/A 12/3/2019    Procedure: CHOLECYSTECTOMY LAPAROSCOPIC INTRAOPERATIVE CHOLANGIOGRAM AND LIVER BIOPSY;  Surgeon: Benny Schneider MD;  Location: [facility];  Service: General   • COLONOSCOPY N/A 11/16/2012    Benign cecal polyp-Dr. Mak Rodriguez   • COLONOSCOPY N/A 2015    Dr. Seema Andrade   • ENDOSCOPY N/A 04/27/2018    , no specimen collected-Dr. Seema Andrade   • HYSTERECTOMY Bilateral 1997   • PUBOVAGINAL SLING N/A 06/17/2003    Dr. Ziggy Saba   • TOE SURGERY Right     METATARASAL SURGERY GREAT TOE FOR FRACTUE   • TOTAL KNEE ARTHROPLASTY Right 5/14/2021    Procedure: RIGHT TOTAL KNEE ARTHROPLASTY;  Surgeon: Catarino De La Cruz MD;  Location: [facility];  Service: Orthopedics;  Laterality: Right;       Family  "History   Problem Relation Age of Onset   • Atrial fibrillation Father    • Atrial fibrillation Sister    • Atrial fibrillation Brother    • Other Brother         Coronary arteriosclerosis   • Colon cancer Mother    • Colon polyps Mother    • Liver disease Paternal Aunt    • Aneurysm Paternal Grandfather    • Malig Hyperthermia Neg Hx        Social History     Socioeconomic History   • Marital status:    Tobacco Use   • Smoking status: Never Smoker   • Smokeless tobacco: Never Used   Vaping Use   • Vaping Use: Never used   Substance and Sexual Activity   • Alcohol use: No     Comment: caffeine use: 1 cups daily   • Drug use: No   • Sexual activity: Defer       Allergies   Allergen Reactions   • Atenolol Shortness Of Breath and Other (See Comments)     Shortness of breath, leg cramps   • Metoprolol Itching   • Hydrocodone-Acetaminophen Nausea Only and GI Intolerance   • Other Nausea And Vomiting     Unknown \"strong\" pain killer (POSSIBLY VICODIN)   • Tramadol Nausea Only and GI Intolerance       Current Outpatient Medications on File Prior to Visit   Medication Sig Dispense Refill   • acetaminophen (TYLENOL) 500 MG tablet Take 500-1,000 mg by mouth Every 6 (Six) Hours As Needed for Mild Pain .     • cetirizine (zyrTEC) 10 MG tablet Take 10 mg by mouth Daily.     • Cholecalciferol (D3) 50 MCG (2000 UT) tablet Take  by mouth.     • cyanocobalamin (VITAMIN B-12) 50 MCG tablet tablet Take 50 mcg by mouth 1 (One) Time.     • docusate calcium (SURFAK) 240 MG capsule Take 240 mg by mouth 2 (Two) Times a Day.     • melatonin 5 MG tablet tablet Take 5 mg by mouth.     • naproxen sodium (ALEVE) 220 MG tablet Take 220 mg by mouth 2 (Two) Times a Day As Needed.     • [DISCONTINUED] pantoprazole (PROTONIX) 40 MG EC tablet Take 1 tablet by mouth 2 (two) times a day. 180 tablet 3   • diphenhydrAMINE (BENADRYL) 25 mg capsule Take 25 mg by mouth Every 6 (Six) Hours As Needed for Itching.       No current facility-administered " "medications on file prior to visit.       Review of Systems   Constitutional: Negative for chills, fever, unexpected weight gain and unexpected weight loss.   HENT: Positive for trouble swallowing.    Respiratory: Negative for cough and shortness of breath.    Cardiovascular: Negative for chest pain and palpitations.   Gastrointestinal: Positive for abdominal pain, constipation, diarrhea and GERD. Negative for anal bleeding, blood in stool, nausea and vomiting.   Musculoskeletal: Positive for back pain. Negative for gait problem.        Objective   Vital Signs:   /92   Pulse 68   Temp 96.5 °F (35.8 °C)   Ht 157.5 cm (62\")   Wt 77.7 kg (171 lb 3.2 oz)   SpO2 97%   BMI 31.31 kg/m²       Physical Exam  Vitals and nursing note reviewed.   Constitutional:       General: She is not in acute distress.     Appearance: Normal appearance. She is not ill-appearing.   HENT:      Head: Normocephalic and atraumatic.      Right Ear: External ear normal.      Left Ear: External ear normal.   Eyes:      General: No scleral icterus.     Conjunctiva/sclera: Conjunctivae normal.      Pupils: Pupils are equal, round, and reactive to light.   Cardiovascular:      Rate and Rhythm: Normal rate and regular rhythm.      Heart sounds: Normal heart sounds.   Pulmonary:      Effort: Pulmonary effort is normal.      Breath sounds: Normal breath sounds.   Abdominal:      General: Abdomen is flat. Bowel sounds are normal. There is no distension.      Palpations: Abdomen is soft.      Tenderness: There is no abdominal tenderness. There is no guarding or rebound.      Comments: Mild tenderness located over the xiphoid process   Musculoskeletal:      Cervical back: Normal range of motion and neck supple.   Skin:     General: Skin is warm and dry.   Neurological:      Mental Status: She is alert and oriented to person, place, and time.   Psychiatric:         Mood and Affect: Mood normal.         Behavior: Behavior normal.          Result " Review :              Common labs    Common Labsle 4/29/21 4/29/21 5/15/21    1101 1101    Glucose  84    BUN  13    Creatinine  0.73    eGFR Non African Am  78    Sodium  143    Potassium  4.1    Chloride  111 (A)    Calcium  9.6    WBC 4.02     Hemoglobin 13.2  11.2 (A)   Hematocrit 36.9  30.9 (A)   Platelets 215     (A) Abnormal value                   Assessment and Plan    Diagnoses and all orders for this visit:    1. Pharyngoesophageal dysphagia (Primary)  -     FL Esophagram Complete Double-Contrast; Future    2. Gastroesophageal reflux disease without esophagitis    Other orders  -     omeprazole (priLOSEC) 40 MG capsule; Take 1 capsule by mouth Daily.  Dispense: 30 capsule; Refill: 3    · She has been on long-term pantoprazole, will switch to 40 mg omeprazole daily and see if we get her symptoms were under control.  · She is on daily NSAIDs and reports she is unable to wean off of them due to lack of quality of life given her significant osteoarthritis.  · We will obtain an esophagram to evaluate complaints of dysphagia.  · She is to begin taking her Metamucil daily to see if this helps aid in complete evacuation of her bowels.  · Some of her right lower quadrant pain appears to be related to lumbar spine pain.  We discussed trial of heat or ice as well as topical analgesics such as Bengay cream.        Follow Up   Return in about 3 months (around 7/19/2022).    Dragon dictation used throughout this note.     RENE Collins

## 2022-05-03 ENCOUNTER — OFFICE VISIT (OUTPATIENT)
Dept: ORTHOPEDIC SURGERY | Facility: CLINIC | Age: 77
End: 2022-05-03

## 2022-05-03 VITALS — WEIGHT: 172.6 LBS | TEMPERATURE: 97.3 F | HEIGHT: 62 IN | BODY MASS INDEX: 31.76 KG/M2

## 2022-05-03 DIAGNOSIS — R52 PAIN: Primary | ICD-10-CM

## 2022-05-03 DIAGNOSIS — Z96.651 STATUS POST RIGHT KNEE REPLACEMENT: ICD-10-CM

## 2022-05-03 DIAGNOSIS — M17.12 PRIMARY OSTEOARTHRITIS OF LEFT KNEE: ICD-10-CM

## 2022-05-03 PROCEDURE — 73562 X-RAY EXAM OF KNEE 3: CPT | Performed by: ORTHOPAEDIC SURGERY

## 2022-05-03 PROCEDURE — 99213 OFFICE O/P EST LOW 20 MIN: CPT | Performed by: ORTHOPAEDIC SURGERY

## 2022-05-03 NOTE — PROGRESS NOTES
"Kourtney Lorenzana : 1945 MRN: [id number] DATE: 5/3/2022    DIAGNOSIS: Annual follow up right total knee      SUBJECTIVE:Patient returns today for  1 year follow up of right total knee replacement. Patient reports doing well with no unusual complaints. Denies any limitations due to the knee.  Is also complaining of some right lower quadrant abdominal/inguinal region pain which has been present for about a month or so.  She saw a GI nurse practitioner who felt that it could possibly be coming from her lumbar spine.    OBJECTIVE:    Temp 97.3 °F (36.3 °C)   Ht 157.5 cm (62\")   Wt 78.3 kg (172 lb 9.6 oz)   BMI 31.57 kg/m²   Family History   Problem Relation Age of Onset   • Atrial fibrillation Father    • Atrial fibrillation Sister    • Atrial fibrillation Brother    • Other Brother         Coronary arteriosclerosis   • Colon cancer Mother    • Colon polyps Mother    • Liver disease Paternal Aunt    • Aneurysm Paternal Grandfather    • Malig Hyperthermia Neg Hx      Past Medical History:   Diagnosis Date   • Arthritis     OSTEO. KNEES AND BACK SEES DR MIKE DOUGLAS   • Bloating     WITH SOME NAUSEA   • Bruising tendency (HCC)    • Cholelithiasis 2019    Discovered by liver ultrasound   • Chronic low back pain    • Chronic pain of both feet    • Diverticular disease    • Diverticulosis    • Elevated liver enzymes    • Gastroesophageal reflux disease without esophagitis 2016   • History of anxiety     BEEN A LONG TIME   • History of depression     SITUATIONAL. NOW RESOLVED   • History of panic disorder    • IBS (irritable bowel syndrome)    • Insomnia    • Internal hemorrhoids    • Knee pain    • Lumbar canal stenosis    • Lumbosacral neuritis    • Lung nodules    • Multiple vitamin deficiency    • Neck pain    • Nephrolithiasis     2014 passage of kidney stone.   • Postmenopausal HRT (hormone replacement therapy)    • PSVT (paroxysmal supraventricular tachycardia) (HCC)    • Pulmonary nodules " 3/25/2021   • Seasonal allergies    • Shoulder pain    • SOB (shortness of breath) on exertion    • Trouble swallowing     INTERMITTENTLY   • Urine incontinence      Past Surgical History:   Procedure Laterality Date   • CATARACT EXTRACTION Bilateral 09/05/2017    Dr. Jefe Barakat OD, MD   • CHOLECYSTECTOMY     • CHOLECYSTECTOMY LAPAROSCOPIC INTRAOPERATIVE CHOLANGIOGRAM WITH LIVER BIOPSY N/A 12/3/2019    Procedure: CHOLECYSTECTOMY LAPAROSCOPIC INTRAOPERATIVE CHOLANGIOGRAM AND LIVER BIOPSY;  Surgeon: Benny Schneider MD;  Location: [facility];  Service: General   • COLONOSCOPY N/A 11/16/2012    Benign cecal polyp-Dr. Mak Rodriguez   • COLONOSCOPY N/A 2015    Dr. Seema Andrade   • ENDOSCOPY N/A 04/27/2018    HH, no specimen collected-Dr. Seema Andrade   • HYSTERECTOMY Bilateral 1997   • PUBOVAGINAL SLING N/A 06/17/2003    Dr. Ziggy Saba   • TOE SURGERY Right     METATARASAL SURGERY GREAT TOE FOR FRACTUE   • TOTAL KNEE ARTHROPLASTY Right 5/14/2021    Procedure: RIGHT TOTAL KNEE ARTHROPLASTY;  Surgeon: Catarino De La Cruz MD;  Location: [facility];  Service: Orthopedics;  Laterality: Right;     Social History     Socioeconomic History   • Marital status:    Tobacco Use   • Smoking status: Never Smoker   • Smokeless tobacco: Never Used   Vaping Use   • Vaping Use: Never used   Substance and Sexual Activity   • Alcohol use: No     Comment: caffeine use: 1 cups daily   • Drug use: No   • Sexual activity: Defer     Review of Systems: 14 point review of systems performed, all systems negative     Exam:. The incision is well healed. Range of motion is measured at 0 to 125. The calf is soft and nontender with a negative Homans sign. Alignment is neutral. Good quad strength. There is no evidence of varus/valgus or flexion instability. No effusion. Intact to light touch with palpable distal pulses.  There is tenderness to palpation about the right lower quadrant and inguinal region.  There is no irritability with hip  range of motion with a negative Stinchfield test there is no pain with flexion and internal rotation.    DIAGNOSTIC STUDIES  Xrays: 3 views(AP bilateral knees, lateral right, and sunrise bilateral knees) were ordered and reviewed for evaluation of right knee replacement. They demonstrate a well positioned, well aligned knee replacement without complicating factors noted. In comparison with previous films there has been no change.    ASSESSMENT: Annual follow up right knee replacement.  Overall doing very well.  As far as her right lower quadrant abdominal/inguinal region pain I do not think this is related to the hip.  I also do not think this looks to be radicular from lumbar spine.  In my opinion its either intra-abdominal right lower quadrant or inguinal.  I have suggested that she get back in touch with Dr. Andrade to rule out any intra-abdominal cause.  If that is ruled out possibly evaluation to see if this could be related to an hernia.  At any rate I do not think a hip intervention would be helpful for this.    PLAN:  Continue activities as tolerated    Follow up JAMA De La Cruz MD  5/3/2022

## 2022-05-09 ENCOUNTER — HOSPITAL ENCOUNTER (OUTPATIENT)
Dept: GENERAL RADIOLOGY | Facility: HOSPITAL | Age: 77
Discharge: HOME | End: 2022-05-09
Admitting: PHYSICIAN ASSISTANT

## 2022-05-09 DIAGNOSIS — R13.14 PHARYNGOESOPHAGEAL DYSPHAGIA: ICD-10-CM

## 2022-05-09 PROCEDURE — A9270 NON-COVERED ITEM OR SERVICE: HCPCS | Performed by: PHYSICIAN ASSISTANT

## 2022-05-09 PROCEDURE — 0 DIATRIZOATE MEGLUMINE & SODIUM PER 1 ML: Performed by: PHYSICIAN ASSISTANT

## 2022-05-09 PROCEDURE — 74221 X-RAY XM ESOPHAGUS 2CNTRST: CPT

## 2022-05-09 PROCEDURE — 63710000001 SOD BICARB-CITRIC ACID-SIMETHICONE 2.21-1.53-0.04 G PACK: Performed by: PHYSICIAN ASSISTANT

## 2022-05-09 RX ADMIN — DIATRIZOATE MEGLUMINE AND DIATRIZOATE SODIUM 240 ML: 660; 100 LIQUID ORAL; RECTAL at 10:42

## 2022-05-09 RX ADMIN — ANTACID/ANTIFLATULENT 1 PACKET: 380; 550; 10; 10 GRANULE, EFFERVESCENT ORAL at 10:42

## 2022-05-12 ENCOUNTER — TELEPHONE (OUTPATIENT)
Dept: GASTROENTEROLOGY | Facility: CLINIC | Age: 77
End: 2022-05-12

## 2022-05-12 NOTE — TELEPHONE ENCOUNTER
----- Message from RENE Collins sent at 5/12/2022  8:47 AM EDT -----  Please contact Kourtney and let her know that I have reviewed her esophagram.  She has a slight compression of her esophagus from her cervical spine.  There is no strictures noted that would require dilatation.  Check and see how she is doing since we   changed her proton pump inhibitor please. Thanks!

## 2022-05-12 NOTE — PROGRESS NOTES
Please contact Kourtney and let her know that I have reviewed her esophagram.  She has a slight compression of her esophagus from her cervical spine.  There is no strictures noted that would require dilatation.  Check and see how she is doing since we changed her proton pump inhibitor please. Thanks!

## 2022-05-12 NOTE — TELEPHONE ENCOUNTER
Called pt and advised of Claudia LÓPEZ's note. Pt verb understanding.     Pt reports that since changing the medication she is doing really good.  She reports she has had great improvement.  Advised will update Claudia LÓPEZ.

## 2022-05-19 ENCOUNTER — TELEPHONE (OUTPATIENT)
Dept: GASTROENTEROLOGY | Facility: CLINIC | Age: 77
End: 2022-05-19

## 2022-05-19 NOTE — TELEPHONE ENCOUNTER
----- Message from Ashlyn Johnson RN sent at 5/3/2022  1:48 PM EDT -----  Regarding: FW: Pain on right side    ----- Message -----  From: Kourtney Lorenzana  Sent: 5/3/2022   1:05 PM EDT  To: [facility]  Subject: Pain on right side                               Hi Dr. Andrade  When I saw Ms. Taylor we talked about this pain on right side . she said it could be from lumbar spinal that spreads around to sides. Something to that effect, jerri I have had lower back pain for years. The pain has continued and was feeling like it was my hip. Today I had follow up with Dr. De La Cruz for my knee and I mentioned to him about my hip hurting. He checked it all out ...said not my hip. Told me to get back with you because of location. If its not my hip, I'm concerned as to what's going on in there.  Thank you for your input. Kourtney Lorenzana

## 2022-05-19 NOTE — TELEPHONE ENCOUNTER
See note from 7/14/21 - I felt that her lower abdominal pain was related to scar tissue at the time of that appt - have not see her since - recommend office f/u

## 2022-06-21 ENCOUNTER — OFFICE VISIT (OUTPATIENT)
Dept: FAMILY MEDICINE CLINIC | Facility: CLINIC | Age: 77
End: 2022-06-21

## 2022-06-21 VITALS
SYSTOLIC BLOOD PRESSURE: 142 MMHG | DIASTOLIC BLOOD PRESSURE: 82 MMHG | HEIGHT: 62 IN | BODY MASS INDEX: 32.02 KG/M2 | OXYGEN SATURATION: 97 % | TEMPERATURE: 97.5 F | WEIGHT: 174 LBS | HEART RATE: 84 BPM

## 2022-06-21 DIAGNOSIS — G89.29 CHRONIC PAIN OF LEFT KNEE: ICD-10-CM

## 2022-06-21 DIAGNOSIS — E53.8 B12 DEFICIENCY: ICD-10-CM

## 2022-06-21 DIAGNOSIS — E78.49 OTHER HYPERLIPIDEMIA: ICD-10-CM

## 2022-06-21 DIAGNOSIS — R53.82 CHRONIC FATIGUE: ICD-10-CM

## 2022-06-21 DIAGNOSIS — M54.12 CERVICAL RADICULOPATHY: ICD-10-CM

## 2022-06-21 DIAGNOSIS — M54.2 CERVICALGIA: ICD-10-CM

## 2022-06-21 DIAGNOSIS — M43.12 SPONDYLOLISTHESIS, CERVICAL REGION: ICD-10-CM

## 2022-06-21 DIAGNOSIS — K21.9 GASTROESOPHAGEAL REFLUX DISEASE, UNSPECIFIED WHETHER ESOPHAGITIS PRESENT: ICD-10-CM

## 2022-06-21 DIAGNOSIS — M48.02 SPINAL STENOSIS, CERVICAL REGION: ICD-10-CM

## 2022-06-21 DIAGNOSIS — R51.9 CHRONIC NONINTRACTABLE HEADACHE, UNSPECIFIED HEADACHE TYPE: Primary | ICD-10-CM

## 2022-06-21 DIAGNOSIS — Z12.83 SCREENING EXAM FOR SKIN CANCER: ICD-10-CM

## 2022-06-21 DIAGNOSIS — G47.9 SLEEP DISTURBANCES: ICD-10-CM

## 2022-06-21 DIAGNOSIS — D48.5 NEOPLASM OF UNCERTAIN BEHAVIOR OF SKIN: ICD-10-CM

## 2022-06-21 DIAGNOSIS — M50.30 DDD (DEGENERATIVE DISC DISEASE), CERVICAL: ICD-10-CM

## 2022-06-21 DIAGNOSIS — M25.562 CHRONIC PAIN OF LEFT KNEE: ICD-10-CM

## 2022-06-21 DIAGNOSIS — G89.29 CHRONIC NONINTRACTABLE HEADACHE, UNSPECIFIED HEADACHE TYPE: Primary | ICD-10-CM

## 2022-06-21 DIAGNOSIS — G47.19 OTHER HYPERSOMNIA: ICD-10-CM

## 2022-06-21 DIAGNOSIS — E55.9 VITAMIN D DEFICIENCY: ICD-10-CM

## 2022-06-21 DIAGNOSIS — R74.8 ELEVATED ALKALINE PHOSPHATASE LEVEL: ICD-10-CM

## 2022-06-21 PROCEDURE — 99214 OFFICE O/P EST MOD 30 MIN: CPT | Performed by: PHYSICIAN ASSISTANT

## 2022-06-22 LAB
25(OH)D3+25(OH)D2 SERPL-MCNC: 42.5 NG/ML (ref 30–100)
ALBUMIN SERPL-MCNC: 4.2 G/DL (ref 3.7–4.7)
ALBUMIN/GLOB SERPL: 1.7 {RATIO} (ref 1.2–2.2)
ALP SERPL-CCNC: 164 IU/L (ref 44–121)
ALT SERPL-CCNC: 16 IU/L (ref 0–32)
APPEARANCE UR: CLEAR
AST SERPL-CCNC: 11 IU/L (ref 0–40)
BACTERIA #/AREA URNS HPF: NORMAL /[HPF]
BASOPHILS # BLD AUTO: 0 X10E3/UL (ref 0–0.2)
BASOPHILS NFR BLD AUTO: 1 %
BILIRUB SERPL-MCNC: 0.3 MG/DL (ref 0–1.2)
BILIRUB UR QL STRIP: NEGATIVE
BUN SERPL-MCNC: 16 MG/DL (ref 8–27)
BUN/CREAT SERPL: 16 (ref 12–28)
CALCIUM SERPL-MCNC: 9.3 MG/DL (ref 8.7–10.3)
CASTS URNS QL MICRO: NORMAL /LPF
CHLORIDE SERPL-SCNC: 107 MMOL/L (ref 96–106)
CHOLEST SERPL-MCNC: 266 MG/DL (ref 100–199)
CK SERPL-CCNC: 112 U/L (ref 32–182)
CO2 SERPL-SCNC: 21 MMOL/L (ref 20–29)
COLOR UR: YELLOW
CREAT SERPL-MCNC: 0.99 MG/DL (ref 0.57–1)
EGFRCR SERPLBLD CKD-EPI 2021: 59 ML/MIN/1.73
EOSINOPHIL # BLD AUTO: 0.1 X10E3/UL (ref 0–0.4)
EOSINOPHIL NFR BLD AUTO: 3 %
EPI CELLS #/AREA URNS HPF: NORMAL /HPF (ref 0–10)
ERYTHROCYTE [DISTWIDTH] IN BLOOD BY AUTOMATED COUNT: 14.2 % (ref 11.7–15.4)
FERRITIN SERPL-MCNC: 65 NG/ML (ref 15–150)
FOLATE SERPL-MCNC: 13.1 NG/ML
GLOBULIN SER CALC-MCNC: 2.5 G/DL (ref 1.5–4.5)
GLUCOSE SERPL-MCNC: 96 MG/DL (ref 65–99)
GLUCOSE UR QL STRIP: NEGATIVE
HCT VFR BLD AUTO: 42.2 % (ref 34–46.6)
HDLC SERPL-MCNC: 56 MG/DL
HGB BLD-MCNC: 13.3 G/DL (ref 11.1–15.9)
HGB UR QL STRIP: NEGATIVE
IMM GRANULOCYTES # BLD AUTO: 0 X10E3/UL (ref 0–0.1)
IMM GRANULOCYTES NFR BLD AUTO: 1 %
IRON SATN MFR SERPL: 20 % (ref 15–55)
IRON SERPL-MCNC: 66 UG/DL (ref 27–139)
KETONES UR QL STRIP: NEGATIVE
LDLC SERPL CALC-MCNC: 179 MG/DL (ref 0–99)
LDLC/HDLC SERPL: 3.2 RATIO (ref 0–3.2)
LEUKOCYTE ESTERASE UR QL STRIP: NEGATIVE
LYMPHOCYTES # BLD AUTO: 1.7 X10E3/UL (ref 0.7–3.1)
LYMPHOCYTES NFR BLD AUTO: 32 %
Lab: NORMAL
MCH RBC QN AUTO: 28.4 PG (ref 26.6–33)
MCHC RBC AUTO-ENTMCNC: 31.5 G/DL (ref 31.5–35.7)
MCV RBC AUTO: 90 FL (ref 79–97)
MICRO URNS: NORMAL
MICRO URNS: NORMAL
MONOCYTES # BLD AUTO: 0.5 X10E3/UL (ref 0.1–0.9)
MONOCYTES NFR BLD AUTO: 10 %
NEUTROPHILS # BLD AUTO: 2.8 X10E3/UL (ref 1.4–7)
NEUTROPHILS NFR BLD AUTO: 53 %
NITRITE UR QL STRIP: NEGATIVE
PH UR STRIP: 6 [PH] (ref 5–7.5)
PLATELET # BLD AUTO: 193 X10E3/UL (ref 150–450)
POTASSIUM SERPL-SCNC: 3.6 MMOL/L (ref 3.5–5.2)
PROT SERPL-MCNC: 6.7 G/DL (ref 6–8.5)
PROT UR QL STRIP: NEGATIVE
RBC # BLD AUTO: 4.69 X10E6/UL (ref 3.77–5.28)
RBC #/AREA URNS HPF: NORMAL /HPF (ref 0–2)
SODIUM SERPL-SCNC: 144 MMOL/L (ref 134–144)
SP GR UR STRIP: 1.02 (ref 1–1.03)
T3FREE SERPL-MCNC: 2.6 PG/ML (ref 2–4.4)
T4 FREE SERPL-MCNC: 1.08 NG/DL (ref 0.82–1.77)
TIBC SERPL-MCNC: 328 UG/DL (ref 250–450)
TRIGL SERPL-MCNC: 167 MG/DL (ref 0–149)
TSH SERPL DL<=0.005 MIU/L-ACNC: 1.35 UIU/ML (ref 0.45–4.5)
UIBC SERPL-MCNC: 262 UG/DL (ref 118–369)
URINALYSIS REFLEX: NORMAL
UROBILINOGEN UR STRIP-MCNC: 0.2 MG/DL (ref 0.2–1)
VIT B12 SERPL-MCNC: 1697 PG/ML (ref 232–1245)
VLDLC SERPL CALC-MCNC: 31 MG/DL (ref 5–40)
WBC # BLD AUTO: 5.3 X10E3/UL (ref 3.4–10.8)
WBC #/AREA URNS HPF: NORMAL /HPF (ref 0–5)

## 2022-06-26 RX ORDER — ATORVASTATIN CALCIUM 40 MG/1
40 TABLET, FILM COATED ORAL NIGHTLY
Qty: 90 TABLET | Refills: 0 | Status: SHIPPED | OUTPATIENT
Start: 2022-06-26 | End: 2022-08-10 | Stop reason: ALTCHOICE

## 2022-07-05 ENCOUNTER — OFFICE VISIT (OUTPATIENT)
Dept: ORTHOPEDIC SURGERY | Facility: CLINIC | Age: 77
End: 2022-07-05

## 2022-07-05 VITALS — BODY MASS INDEX: 31.12 KG/M2 | HEIGHT: 62 IN | TEMPERATURE: 97.7 F | WEIGHT: 169.1 LBS

## 2022-07-05 DIAGNOSIS — M47.22 CERVICAL SPONDYLOSIS WITH RADICULOPATHY: ICD-10-CM

## 2022-07-05 DIAGNOSIS — M54.2 NECK PAIN: Primary | ICD-10-CM

## 2022-07-05 PROCEDURE — 72040 X-RAY EXAM NECK SPINE 2-3 VW: CPT | Performed by: ORTHOPAEDIC SURGERY

## 2022-07-05 PROCEDURE — 99214 OFFICE O/P EST MOD 30 MIN: CPT | Performed by: ORTHOPAEDIC SURGERY

## 2022-07-05 NOTE — PROGRESS NOTES
New patient or new problem visit    CC: Neck pain right shoulder pain    HPI: I seen her in the past for neck and left shoulder pain which improved with physical therapy.  Now 2 years later she is having cervical occipital headaches and recently esophagram for swallowing difficulties demonstrated anterior osteophytes.  No recent balance difficulties bowel complaints, but she has had some bladder incontinence at times.  More of an overflow type situation.  .    PFSH: See attached    ROS: See attached    PE: BMI is 31.  On exam she exhibits good strength in the upper extremities bilaterally reflexes are subdued but symmetrical and sensation appears intact Homer test negative mild tenderness along the trapezii bilaterally.  Her gait appears stable.    XRAY: Cervical plain films demonstrate spondylosis at 4556 slight anterolisthesis at 3 4 and minimal disc base narrowing at C6-7.  Small anterior osteophytes are noted but I did review the esophagram and there is slight indentation on the esophagus at C6-7 which the radiologist noted as well.  Prior cervical MRI from 2020 demonstrated broad-based hard/soft disc protrusion and foraminal narrowing at C4-5 and C5-6.    Other: n/a    Impression: Cervical spondylosis with radiculopathy    Plan: For now physical therapy.  She also asked to repeat epidurals and I think that is fine if she is tried them before with success.  I reiterated the risk of the epidurals.  We do not need a new MRI for the epidurals but if she fails to improve we will need a new cervical MRI before considering surgical intervention.  We will see her back as needed.

## 2022-07-07 PROBLEM — M25.562 CHRONIC PAIN OF LEFT KNEE: Status: ACTIVE | Noted: 2022-07-07

## 2022-07-07 PROBLEM — G89.29 CHRONIC PAIN OF LEFT KNEE: Status: ACTIVE | Noted: 2022-07-07

## 2022-07-18 ENCOUNTER — OFFICE VISIT (OUTPATIENT)
Dept: GASTROENTEROLOGY | Facility: CLINIC | Age: 77
End: 2022-07-18

## 2022-07-18 VITALS
HEIGHT: 62 IN | BODY MASS INDEX: 30.88 KG/M2 | DIASTOLIC BLOOD PRESSURE: 88 MMHG | HEART RATE: 74 BPM | WEIGHT: 167.8 LBS | TEMPERATURE: 95 F | SYSTOLIC BLOOD PRESSURE: 131 MMHG

## 2022-07-18 DIAGNOSIS — R74.8 ELEVATED ALKALINE PHOSPHATASE LEVEL: ICD-10-CM

## 2022-07-18 DIAGNOSIS — R10.30 LOWER ABDOMINAL PAIN: ICD-10-CM

## 2022-07-18 DIAGNOSIS — K21.9 GASTROESOPHAGEAL REFLUX DISEASE WITHOUT ESOPHAGITIS: Primary | ICD-10-CM

## 2022-07-18 DIAGNOSIS — K59.09 OTHER CONSTIPATION: ICD-10-CM

## 2022-07-18 PROCEDURE — 99214 OFFICE O/P EST MOD 30 MIN: CPT | Performed by: INTERNAL MEDICINE

## 2022-07-18 RX ORDER — MULTIVIT WITH MINERALS/LUTEIN
250 TABLET ORAL DAILY
COMMUNITY

## 2022-07-18 RX ORDER — CHLORAL HYDRATE 500 MG
CAPSULE ORAL
COMMUNITY

## 2022-07-18 RX ORDER — DOCUSATE SODIUM 100 MG/1
100 CAPSULE, LIQUID FILLED ORAL 2 TIMES DAILY
COMMUNITY

## 2022-07-18 NOTE — PROGRESS NOTES
Subjective   Chief Complaint   Patient presents with   • Difficulty Swallowing       Kourtney Lorenzana is a  76 y.o. female here for a follow up visit for dysphagia.     She reports that her heartburn has been better since starting omeprazole.  Still having a little trouble swallowing.    She has changed her eating habits by eliminating sugar and bread - she has lost 8 lbs.  This has helped her gerd.  She has a recent esophagram which showed some mild compression of the cervical esophagus by osteophytes.  She has seen Dr Napier for this    she has chronic elevation of her alk phos - she had w/u previously for this with normal alk phos isoenzymes and GGT.  Her AMA was elevated however her GGT was normal so this was likely not significant.     She continues to have lower abdominal pain at times.  This has been chronic.  She had multiple tests to better define this finding.  She had a colonoscopy with an inability to traverse the sigmoid colon due to tight angulation and immobile sigmoid colon.  She does report that she has discomfort right before she has a bowel movement.  CT scan barium enema failed to show any other abnormality.  She takes a stool softener daily.  She notes that her eating habit changes have improved.  HPI  Past Medical History:   Diagnosis Date   • Arthritis     OSTEO. KNEES AND BACK SEES DR MIKE DOUGLAS   • Bloating     WITH SOME NAUSEA   • Bruising tendency (HCC)    • Cholelithiasis Nov 7, 2019    Discovered by liver ultrasound   • Chronic low back pain    • Chronic pain of both feet    • Diverticular disease    • Diverticulosis    • Elevated liver enzymes    • Gastroesophageal reflux disease without esophagitis 2/11/2016   • History of anxiety     BEEN A LONG TIME   • History of depression     SITUATIONAL. NOW RESOLVED   • History of panic disorder    • IBS (irritable bowel syndrome)    • Insomnia    • Internal hemorrhoids    • Knee pain    • Lumbar canal stenosis    • Lumbosacral neuritis    • Lung  nodules    • Multiple vitamin deficiency    • Neck pain    • Nephrolithiasis     July 2014 passage of kidney stone.   • Postmenopausal HRT (hormone replacement therapy)    • PSVT (paroxysmal supraventricular tachycardia) (HCC)    • Pulmonary nodules 3/25/2021   • Seasonal allergies    • Shoulder pain    • SOB (shortness of breath) on exertion    • Trouble swallowing     INTERMITTENTLY   • Urine incontinence      Past Surgical History:   Procedure Laterality Date   • CATARACT EXTRACTION Bilateral 09/05/2017    Dr. Jefe Barakat OD, MD   • CHOLECYSTECTOMY     • CHOLECYSTECTOMY LAPAROSCOPIC INTRAOPERATIVE CHOLANGIOGRAM WITH LIVER BIOPSY N/A 12/3/2019    Procedure: CHOLECYSTECTOMY LAPAROSCOPIC INTRAOPERATIVE CHOLANGIOGRAM AND LIVER BIOPSY;  Surgeon: Benny Schneider MD;  Location: [facility];  Service: General   • COLONOSCOPY N/A 11/16/2012    Benign cecal polyp-Dr. Mak Rodriguez   • COLONOSCOPY N/A 2015    Dr. Seema Andrade   • ENDOSCOPY N/A 04/27/2018    HH, no specimen collected-Dr. Seema Andrade   • HYSTERECTOMY Bilateral 1997   • PUBOVAGINAL SLING N/A 06/17/2003    Dr. Ziggy Saba   • TOE SURGERY Right     METATARASAL SURGERY GREAT TOE FOR FRACTUE   • TOTAL KNEE ARTHROPLASTY Right 5/14/2021    Procedure: RIGHT TOTAL KNEE ARTHROPLASTY;  Surgeon: Catarino De La Cruz MD;  Location: [facility];  Service: Orthopedics;  Laterality: Right;       Current Outpatient Medications:   •  acetaminophen (TYLENOL) 500 MG tablet, Take 500-1,000 mg by mouth Every 6 (Six) Hours As Needed for Mild Pain ., Disp: , Rfl:   •  Cholecalciferol (D3) 50 MCG (2000 UT) tablet, Take  by mouth., Disp: , Rfl:   •  docusate sodium (Stool Softener) 100 MG capsule, Take 100 mg by mouth 2 (Two) Times a Day., Disp: , Rfl:   •  melatonin 5 MG tablet tablet, Take 5 mg by mouth., Disp: , Rfl:   •  naproxen sodium (ALEVE) 220 MG tablet, Take 220 mg by mouth 2 (Two) Times a Day As Needed., Disp: , Rfl:   •  Omega-3 Fatty Acids (fish oil) 1000 MG capsule  "capsule, Take  by mouth Daily With Breakfast., Disp: , Rfl:   •  omeprazole (priLOSEC) 40 MG capsule, Take 1 capsule by mouth Daily., Disp: 30 capsule, Rfl: 3  •  vitamin C (ASCORBIC ACID) 250 MG tablet, Take 250 mg by mouth Daily., Disp: , Rfl:   •  atorvastatin (Lipitor) 40 MG tablet, Take 1 tablet by mouth Every Night., Disp: 90 tablet, Rfl: 0  PRN Meds:.  Allergies   Allergen Reactions   • Atenolol Shortness Of Breath and Other (See Comments)     Shortness of breath, leg cramps   • Metoprolol Itching   • Hydrocodone-Acetaminophen Nausea Only and GI Intolerance   • Other Nausea And Vomiting     Unknown \"strong\" pain killer (POSSIBLY VICODIN)   • Tramadol Nausea Only and GI Intolerance     Social History     Socioeconomic History   • Marital status:    Tobacco Use   • Smoking status: Never Smoker   • Smokeless tobacco: Never Used   Vaping Use   • Vaping Use: Never used   Substance and Sexual Activity   • Alcohol use: No     Comment: caffeine use: 1 cups daily   • Drug use: No   • Sexual activity: Defer     Family History   Problem Relation Age of Onset   • Atrial fibrillation Father    • Atrial fibrillation Sister    • Atrial fibrillation Brother    • Other Brother         Coronary arteriosclerosis   • Colon cancer Mother    • Colon polyps Mother    • Liver disease Paternal Aunt    • Aneurysm Paternal Grandfather    • Malig Hyperthermia Neg Hx      Review of Systems   Constitutional: Negative for appetite change and unexpected weight change.   HENT: Positive for trouble swallowing.    Gastrointestinal: Positive for abdominal pain and constipation. Negative for blood in stool and diarrhea.     Vitals:    07/18/22 1217   BP: 131/88   Pulse: 74   Temp: 95 °F (35 °C)         07/18/22  1217   Weight: 76.1 kg (167 lb 12.8 oz)       Objective   Physical Exam  Constitutional:       Appearance: Normal appearance. She is well-developed.   HENT:      Head: Normocephalic and atraumatic.   Eyes:      General: No scleral " icterus.     Conjunctiva/sclera: Conjunctivae normal.   Pulmonary:      Effort: Pulmonary effort is normal.   Abdominal:      General: There is no distension.      Palpations: Abdomen is soft.      Tenderness: There is no abdominal tenderness.   Musculoskeletal:      Cervical back: Normal range of motion and neck supple.   Skin:     General: Skin is warm and dry.   Neurological:      Mental Status: She is alert.   Psychiatric:         Mood and Affect: Mood normal.         Behavior: Behavior normal.       No radiology results for the last 7 days    Assessment & Plan   Diagnoses and all orders for this visit:    Gastroesophageal reflux disease without esophagitis    Lower abdominal pain    Other constipation    Elevated alkaline phosphatase level    Other orders  -     Omega-3 Fatty Acids (fish oil) 1000 MG capsule capsule; Take  by mouth Daily With Breakfast.  -     docusate sodium (Stool Softener) 100 MG capsule; Take 100 mg by mouth 2 (Two) Times a Day.  -     vitamin C (ASCORBIC ACID) 250 MG tablet; Take 250 mg by mouth Daily.      Plan:  · GERD now well controlled with weight loss and Prilosec 40 mg daily-continue same  · Swallowing has improved-recent esophagram with no esophageal obstruction.  We discussed that it could be the osteophytes, could be dysmotility or spasm.  She feels like this is tolerable at this point and does not wish to pursue further testing.  · Lower abdominal pain likely related to fixed sigmoid colon and mild constipation.  This is better with a stool softener-she can add MiraLAX as needed but continued bowel movements will help to improve this pain.  · Elevated alk phos is chronic-previous work-up has shown it not to be liver related.  No further work-up is necessary

## 2022-07-19 ENCOUNTER — APPOINTMENT (OUTPATIENT)
Dept: PAIN MEDICINE | Facility: HOSPITAL | Age: 77
End: 2022-07-19

## 2022-08-05 ENCOUNTER — PREP FOR SURGERY (OUTPATIENT)
Dept: SURGERY | Facility: SURGERY CENTER | Age: 77
End: 2022-08-05

## 2022-08-05 ENCOUNTER — OFFICE VISIT (OUTPATIENT)
Dept: PAIN MEDICINE | Facility: CLINIC | Age: 77
End: 2022-08-05

## 2022-08-05 VITALS
OXYGEN SATURATION: 97 % | HEIGHT: 62 IN | DIASTOLIC BLOOD PRESSURE: 84 MMHG | TEMPERATURE: 96.5 F | HEART RATE: 72 BPM | SYSTOLIC BLOOD PRESSURE: 128 MMHG | BODY MASS INDEX: 30.59 KG/M2 | WEIGHT: 166.2 LBS | RESPIRATION RATE: 12 BRPM

## 2022-08-05 DIAGNOSIS — M48.02 CERVICAL SPINAL STENOSIS: Primary | ICD-10-CM

## 2022-08-05 DIAGNOSIS — M48.02 SPINAL STENOSIS, CERVICAL REGION: Primary | ICD-10-CM

## 2022-08-05 DIAGNOSIS — M54.12 CERVICAL RADICULOPATHY: ICD-10-CM

## 2022-08-05 PROCEDURE — 99214 OFFICE O/P EST MOD 30 MIN: CPT | Performed by: NURSE PRACTITIONER

## 2022-08-05 RX ORDER — SODIUM CHLORIDE 0.9 % (FLUSH) 0.9 %
10 SYRINGE (ML) INJECTION AS NEEDED
Status: CANCELLED | OUTPATIENT
Start: 2022-08-05

## 2022-08-05 RX ORDER — SODIUM CHLORIDE 0.9 % (FLUSH) 0.9 %
10 SYRINGE (ML) INJECTION EVERY 12 HOURS SCHEDULED
Status: CANCELLED | OUTPATIENT
Start: 2022-08-05

## 2022-08-05 NOTE — PROGRESS NOTES
CHIEF COMPLAINT  New patient in office for evaluation of neck pain onset 4/2022.     Subjective   Kourtney Lorenzana is a 76 y.o. female.   She presents to the office for evaluation of M54.2 (ICD-10-CM) - Neck pain. She was referred here by Jose Napier MD.     The patient has a history of neck pain years ago which was treated with PRABHAKAR. Her neck pain reoccurred in April 2022.  Her pain started while working in the yard while lifting rocks and pushing a heavy wheelbarrow.       Today her pain is 2/10VAS in severity. She describes her pain as continuous dull aching pain that is radiating into her right shoulder and right upper arm.  She also reports N/T in her bilateral hands which occurs each morning. Her pain is worsened by ROM, bending, twisting, looking down to read; it is improved by Aleve, heat, rest, and certain positions of her neck.     Past pain medications: Hydrocodone following knee surgery, no history of chronic pain medications. OTC Tylenol (not helpful).      Current pain medications: OTC Aleve 1 tablet nightly.     Past therapies:  Physical Therapy: Yes, currently in PT. Initial session caused worsening pain.   Chiropractor: None  Massage Therapy: None  TENS: Yes, has used in the past  Neck or back surgery: None  Past pain management: [facility] anesthesia group and Dr. Sotelo (performed lumbar epidurals).      Previous Injections: PRABHAKAR x 2 at [facility] (7/24/2020, 8/21/2020)  Effect of Injection (%): 100% relief  Length of Relief: over 2 years.     Neck Pain   This is a chronic problem. The current episode started more than 1 month ago. The problem occurs constantly. The problem has been gradually worsening. The pain is associated with lifting a heavy object. The pain is present in the right side. The quality of the pain is described as aching. The pain is at a severity of 2/10. The symptoms are aggravated by position, twisting and bending. Associated symptoms include headaches, numbness (HANDS) and  weakness (HANDS). Pertinent negatives include no chest pain or fever. She has tried NSAIDs, acetaminophen, heat and home exercises (PT ) for the symptoms.      PEG Assessment   What number best describes your pain on average in the past week?6  What number best describes how, during the past week, pain has interfered with your enjoyment of life?6  What number best describes how, during the past week, pain has interfered with your general activity?  6      Current Outpatient Medications:   •  acetaminophen (TYLENOL) 500 MG tablet, Take 500-1,000 mg by mouth Every 6 (Six) Hours As Needed for Mild Pain ., Disp: , Rfl:   •  atorvastatin (Lipitor) 40 MG tablet, Take 1 tablet by mouth Every Night., Disp: 90 tablet, Rfl: 0  •  Cholecalciferol (D3) 50 MCG (2000 UT) tablet, Take  by mouth., Disp: , Rfl:   •  docusate sodium (COLACE) 100 MG capsule, Take 100 mg by mouth 2 (Two) Times a Day., Disp: , Rfl:   •  melatonin 5 MG tablet tablet, Take 5 mg by mouth., Disp: , Rfl:   •  naproxen sodium (ALEVE) 220 MG tablet, Take 220 mg by mouth 2 (Two) Times a Day As Needed., Disp: , Rfl:   •  Omega-3 Fatty Acids (fish oil) 1000 MG capsule capsule, Take  by mouth Daily With Breakfast., Disp: , Rfl:   •  omeprazole (priLOSEC) 40 MG capsule, Take 1 capsule by mouth Daily., Disp: 30 capsule, Rfl: 3  •  vitamin C (ASCORBIC ACID) 250 MG tablet, Take 250 mg by mouth Daily., Disp: , Rfl:     The following portions of the patient's history were reviewed and updated as appropriate: allergies, current medications, past family history, past medical history, past social history, past surgical history and problem list.    REVIEW OF PERTINENT MEDICAL DATA    Office visit from 7/5/2022 with Dr. Napier reviewed.  Patient has a history of neck pain with right shoulder pain.  She seen in the past for neck and left shoulder pain which improved with physical therapy.  2 years later she is now having cervical occipital headaches with a recent esophagram  for swallowing difficulties demonstrating anterior osteophytes.  Recommend physical therapy, she also asks about repeating epidurals which is fine.  She does not need new MRI for epidurals but if she fails to improve will need a new cervical MRI before considering surgical intervention.    CERVICAL SPINE MRI WITHOUT CONTRAST     HISTORY: Posterior and left-sided neck pain.     TECHNIQUE: Cervical spine MRI was performed without contrast.     FINDINGS: The visualized posterior fossa contents appear normal.  Cervical vertebral body height is normal. There is about 2 mm  retrolisthesis of C4 in relation to the adjacent levels. Marrow signal  is normal except for reactive marrow edema around the left C3-4 facet  joint.     The articulations of the skull base with C1 and C1 with C2 appear  normal. The canal is widely patent at these levels.     At C2-3, the disc and the facets appear normal. The canal and foramina  are patent.     At C3-4, there is disc narrowing with grade 1 anterolisthesis of three  on four. Facet joint appears normal on the right. There is degenerative  change at the facet joint on the left with marginal osteophyte and  periarticular bone marrow edema as already mentioned. There is also  uncovertebral osteophyte. The canal and the right foramen are patent.  There is moderate to severe left foraminal stenosis.     At C4-5, there is complete loss of disc height with slight  retrolisthesis of 4 on 5. The facet joints appear preserved but there is  uncovertebral arthropathy and hypertrophic change. There appears to be  severe bony foraminal stenosis on the right and mild to moderate  foraminal stenosis on the left. The canal is patent.     At C5-6, there is loss of disc height with a posterior disc/osteophyte  complex which indents the thecal sac. The canal is mildly stenotic,  measuring 7 to 9 mm AP diameter at the midline. There is diminished CSF  signal anterior and posterior to cord but there is no  "cortical  remodeling or cord signal abnormality. There is uncovertebral osteophyte  and severe appearing foraminal stenosis bilaterally.     At C6-C7, there is loss of disc height with minimal posterior disc  bulge. Facet joints appear fairly well preserved. The canal is patent.  The foramina appear mildly and moderately stenotic bilaterally.     At C7-T1 and in the visualized upper thoracic spine, the discs appear  normal and the canal and foramina are patent.     The spinal cord is normal in caliber and signal.     IMPRESSION:  Degenerative disc and facet change in the cervical spine  with grade 1 retrolisthesis of C4. There is reactive bone marrow edema  around the left C3-4 facet joint where there is advanced degenerative  change. Foraminal narrowing is demonstrated at several levels as  delineated level by level above. No disc herniation or high-grade canal  stenosis is present.     This report was finalized on 6/8/2020 6:37 PM by Dr. Ziggy London M.D.    Review of Systems   Constitutional: Negative for activity change (LESS), fatigue and fever.   HENT: Negative for congestion.    Eyes: Negative for visual disturbance.   Respiratory: Negative for cough and chest tightness.    Cardiovascular: Negative for chest pain.   Gastrointestinal: Negative for abdominal pain, constipation and diarrhea.   Genitourinary: Negative for difficulty urinating and dysuria.   Musculoskeletal: Positive for neck pain.   Neurological: Positive for weakness (HANDS), numbness (HANDS) and headaches. Negative for dizziness and light-headedness.   Psychiatric/Behavioral: Positive for sleep disturbance. Negative for agitation and suicidal ideas. The patient is not nervous/anxious.      Vitals:    08/05/22 1004   BP: 128/84   BP Location: Left arm   Patient Position: Sitting   Pulse: 72   Resp: 12   Temp: 96.5 °F (35.8 °C)   SpO2: 97%   Weight: 75.4 kg (166 lb 3.2 oz)   Height: 157.5 cm (62\")   PainSc:   3   PainLoc: Neck     Objective "   Physical Exam  Vitals and nursing note reviewed.   Constitutional:       Appearance: Normal appearance. She is well-developed.   Eyes:      General: Lids are normal.   Cardiovascular:      Rate and Rhythm: Normal rate.   Pulmonary:      Effort: Pulmonary effort is normal.   Musculoskeletal:      Cervical back: Tenderness present. Pain with movement present. Decreased range of motion.      Comments:   Chapo Spurling--Negative   Neurological:      Mental Status: She is alert and oriented to person, place, and time.      Motor: No weakness.   Psychiatric:         Attention and Perception: Attention normal.         Mood and Affect: Mood normal.         Speech: Speech normal.         Behavior: Behavior normal.         Judgment: Judgment normal.       Assessment & Plan   Diagnoses and all orders for this visit:    1. Cervical spinal stenosis (Primary)    2. Cervical radiculopathy      --- PRABHAKAR   Reviewed the procedure at length with the patient.  Included in the review was expectations, complications, risk and benefits.The procedure was described in detail and the risks, benefits and alternatives were discussed with the patient (including but not limited to: bleeding, infection, nerve damage, worsening of pain, inability to perform injection, paralysis, seizures, coma, no pain relief and death) who agreed to proceed.  Discussed the potential for sedation if warranted/wanted.  The procedure will plan to be performed at [facility] with fluoroscopic guidance(unless ultrasound is indicated) and could potentially have steroids and contrast dye used. Questions were answered and in a way the patient could understand.  Patient verbalized understanding and wishes to proceed.  This intervention will be ordered.  Discussed with patient that all procedures are part of a multimodal plan of care and include either formal PT or a home exercise program.  Patient has no evidence of coagulopathy or current  infection.    Discussed with the patient that sedation is optional for this procedure.  The sedation offered is called conscious sedation which is different from general anesthesia that is utilized in surgical procedures. The dosing of the sedation is determined by the physician and they will be monitored throughout the procedure. With conscious sedation it is possible to remember parts or all of the procedure, this is normal. They will need to have a  with them as driving is prohibited following conscious sedation.     NPO instructions for conscious sedation:  --- Do not eat 6 hours prior to the procedure.   --- Do not drink any dairy or citrus 4 hours prior to the procedure.   --- Do not drink anything, including clear liquids, 2 hours prior to procedure.     If the NPO instructions are not followed then the procedure may be performed without sedation or the procedure will need to be rescheduled.     --- Follow-up after procedure      Dictated utilizing Dragon dictation.     This document is intended for medical expert use only. Reading of this document by patients and/or patient's family without participating medical staff guidance may result in misinterpretation and unintended morbidity.   Any interpretation of such data is the responsibility of the patient and/or family member responsible for the patient in concert with their primary or specialist providers, not to be left for sources of online searches such as [employer], [employer] or similar queries. Relying on these approaches to knowledge may result in misinterpretation, misguided goals of care and even death should patients or family members try recommendations outside of the realm of professional medical care in a supervised way.

## 2022-08-09 ENCOUNTER — TRANSCRIBE ORDERS (OUTPATIENT)
Dept: SURGERY | Facility: SURGERY CENTER | Age: 77
End: 2022-08-09

## 2022-08-09 DIAGNOSIS — Z41.9 SURGERY, ELECTIVE: Primary | ICD-10-CM

## 2022-08-09 PROBLEM — M48.02 SPINAL STENOSIS, CERVICAL REGION: Status: ACTIVE | Noted: 2022-08-09

## 2022-08-09 PROBLEM — M54.12 CERVICAL RADICULOPATHY: Status: ACTIVE | Noted: 2022-08-09

## 2022-08-10 ENCOUNTER — OFFICE VISIT (OUTPATIENT)
Dept: CARDIOLOGY | Facility: CLINIC | Age: 77
End: 2022-08-10

## 2022-08-10 VITALS
HEIGHT: 62 IN | WEIGHT: 166 LBS | SYSTOLIC BLOOD PRESSURE: 124 MMHG | HEART RATE: 70 BPM | BODY MASS INDEX: 30.55 KG/M2 | DIASTOLIC BLOOD PRESSURE: 78 MMHG

## 2022-08-10 DIAGNOSIS — I47.1 PSVT (PAROXYSMAL SUPRAVENTRICULAR TACHYCARDIA): Primary | ICD-10-CM

## 2022-08-10 DIAGNOSIS — E78.2 MIXED HYPERLIPIDEMIA: ICD-10-CM

## 2022-08-10 PROCEDURE — 93000 ELECTROCARDIOGRAM COMPLETE: CPT | Performed by: INTERNAL MEDICINE

## 2022-08-10 PROCEDURE — 99214 OFFICE O/P EST MOD 30 MIN: CPT | Performed by: INTERNAL MEDICINE

## 2022-08-10 NOTE — PROGRESS NOTES
Date of Office Visit: 08/10/2022  Encounter Provider: Anum Hicks MD  Place of Service: [facility] CARDIOLOGY  Patient Name: Kourtney Lorenzana  :1945      Patient ID:  Kourtney Lorenzana is a 76 y.o. female is here for  followup for palpitations        History of Present Illness    She has a history of GERD, irritable bowel syndrome, history of anemia and elevated liver enzymes.     She is , has 2 children is retired.  She is Dagoberto Lorenzana's grandmother.  She uses no cigarettes, alcohol or drugs and has 1 cup of coffee per day.     Her father hypertension, diabetes and heart disease.  Her sister, father and brother all have atrial fibrillation.  Her brother has also had a myocardial infarction. She has a sister with strokes and her father also had strokes.     She was in the emergency department on 2020 with palpitations and chest pain.  The episodes were intermittent and have been going on since 2019.  Most of the time they are associate with activity.  In the emergency department, blood pressure was 142/85, heart rate 98.  Her CMP was normal, magnesium normal, CBC normal, troponin negative, normal lipase.       She had a normal stress echocardiogram in .  She had an echocardiogram done 3/2016 showed ejection fraction 65% with grade 1 diastolic dysfunction which was no change from prior.     On 2020, she was evaluated for palpitations.  There was concerned about atrial fibrillation.  TSH was normal.  Echocardiogram completed 2020 showed EF of 65%, grade 1 diastolic dysfunction, trace aortic valve regurgitation, trace tricuspid regurgitation, and trace pulmonic valve regurgitation.  14-day Holter monitor worn 2020 showed 40 episodes of nonsustained SVT the longest 5 beats in duration at a rate of 195 bpm. On 2020, she was started her on metoprolol tartrate 25 mg nightly for nonsustained supraventricular tachycardia.     In 2021  she followed up with me in the office.  She had been placed on metoprolol and atenolol at different times for palpitations which seem to help but caused itching she thought possibly.  Magnesium never seem to help.  She had an episode of right facial numbness but has cervical spine disease and has chronic left facial numbness.  I reassured her that did not sound like a stroke.  It could be due to her cervical spine disease or even Bell's palsy but she does not have a facial droop.  She has not been sleeping well so Elavil was started.       She is on no medications for SVT but has had no real episodes.  She does not feel chest tightness or pressure with activity no short windedness.  She had lipids done about 6 weeks ago and her total cholesterol was 266, , triglycerides 167, HDL 56, VLDL 31.  She did not want to use lipid medication for this but really has changed her diet.  With dietary changes, she is eliminated carbohydrates and her blood pressure is better.  She also seems to have more energy.  She is having issues though with arthritis in all of her joints and is seeking care for this.  She is had no dizziness or syncope.  She has no orthopnea or PND.    Past Medical History:   Diagnosis Date   • Arthritis     OSTEO. KNEES AND BACK SEES DR MIKE DOUGLAS   • Bloating     WITH SOME NAUSEA   • Bruising tendency (HCC)    • Cholelithiasis Nov 7, 2019    Discovered by liver ultrasound   • Chronic low back pain    • Chronic pain of both feet    • Diverticular disease    • Diverticulosis    • Elevated liver enzymes    • Gastroesophageal reflux disease without esophagitis 2/11/2016   • History of anxiety     BEEN A LONG TIME   • History of depression     SITUATIONAL. NOW RESOLVED   • History of panic disorder    • IBS (irritable bowel syndrome)    • Insomnia    • Internal hemorrhoids    • Knee pain    • Lumbar canal stenosis    • Lumbosacral neuritis    • Lung nodules    • Multiple vitamin deficiency    • Neck pain     • Nephrolithiasis     July 2014 passage of kidney stone.   • Postmenopausal HRT (hormone replacement therapy)    • PSVT (paroxysmal supraventricular tachycardia) (HCC)    • Pulmonary nodules 3/25/2021   • Seasonal allergies    • Shoulder pain    • SOB (shortness of breath) on exertion    • Trouble swallowing     INTERMITTENTLY   • Urine incontinence          Past Surgical History:   Procedure Laterality Date   • CATARACT EXTRACTION Bilateral 09/05/2017    Dr. Jefe Barakat OD, MD   • CHOLECYSTECTOMY     • CHOLECYSTECTOMY LAPAROSCOPIC INTRAOPERATIVE CHOLANGIOGRAM WITH LIVER BIOPSY N/A 12/3/2019    Procedure: CHOLECYSTECTOMY LAPAROSCOPIC INTRAOPERATIVE CHOLANGIOGRAM AND LIVER BIOPSY;  Surgeon: Benny Schneider MD;  Location: [facility];  Service: General   • COLONOSCOPY N/A 11/16/2012    Benign cecal polyp-Dr. Mak Rodriguez   • COLONOSCOPY N/A 2015    Dr. Seema Andrade   • ENDOSCOPY N/A 04/27/2018    HH, no specimen collected-Dr. Seema Andrade   • HYSTERECTOMY Bilateral 1997   • PUBOVAGINAL SLING N/A 06/17/2003    Dr. Ziggy Saba   • TOE SURGERY Right     METATARASAL SURGERY GREAT TOE FOR FRACTUE   • TOTAL KNEE ARTHROPLASTY Right 5/14/2021    Procedure: RIGHT TOTAL KNEE ARTHROPLASTY;  Surgeon: Catarino De La Cruz MD;  Location: [facility];  Service: Orthopedics;  Laterality: Right;       Current Outpatient Medications on File Prior to Visit   Medication Sig Dispense Refill   • acetaminophen (TYLENOL) 500 MG tablet Take 500-1,000 mg by mouth Every 6 (Six) Hours As Needed for Mild Pain .     • Cholecalciferol (D3) 50 MCG (2000 UT) tablet Take  by mouth.     • docusate sodium (COLACE) 100 MG capsule Take 100 mg by mouth 2 (Two) Times a Day.     • melatonin 5 MG tablet tablet Take 5 mg by mouth.     • naproxen sodium (ALEVE) 220 MG tablet Take 220 mg by mouth 2 (Two) Times a Day As Needed.     • Omega-3 Fatty Acids (fish oil) 1000 MG capsule capsule Take  by mouth Daily With Breakfast.     • omeprazole (priLOSEC) 40  "MG capsule Take 1 capsule by mouth Daily. 30 capsule 3   • vitamin C (ASCORBIC ACID) 250 MG tablet Take 250 mg by mouth Daily.     • [DISCONTINUED] atorvastatin (Lipitor) 40 MG tablet Take 1 tablet by mouth Every Night. 90 tablet 0     No current facility-administered medications on file prior to visit.       Social History     Socioeconomic History   • Marital status:    Tobacco Use   • Smoking status: Never Smoker   • Smokeless tobacco: Never Used   Vaping Use   • Vaping Use: Never used   Substance and Sexual Activity   • Alcohol use: No     Comment: caffeine use: 1 cups daily   • Drug use: No   • Sexual activity: Defer           ROS    Procedures    ECG 12 Lead    Date/Time: 8/10/2022 12:08 PM  Performed by: Anum Hicks MD  Authorized by: Anum Hicks MD   Comparison: compared with previous ECG   Similar to previous ECG  Rhythm: sinus rhythm  Other findings: low voltage    Clinical impression: abnormal EKG                Objective:      Vitals:    08/10/22 1151   BP: 124/78   Pulse: 70   Weight: 75.3 kg (166 lb)   Height: 157.5 cm (62\")     Body mass index is 30.36 kg/m².    Vitals reviewed.   Constitutional:       General: Not in acute distress.     Appearance: Well-developed. Not diaphoretic.   Eyes:      General: No scleral icterus.     Conjunctiva/sclera: Conjunctivae normal.   HENT:      Head: Normocephalic and atraumatic.   Neck:      Thyroid: No thyromegaly.      Vascular: No carotid bruit or JVD.      Lymphadenopathy: No cervical adenopathy.   Pulmonary:      Effort: Pulmonary effort is normal. No respiratory distress.      Breath sounds: Normal breath sounds. No wheezing. No rhonchi. No rales.   Chest:      Chest wall: Not tender to palpatation.   Cardiovascular:      Normal rate. Regular rhythm.      Murmurs: There is no murmur.      No gallop.   Pulses:     Intact distal pulses.   Edema:     Peripheral edema absent.   Abdominal:      General: Bowel sounds are normal. There is " no distension or abdominal bruit.      Palpations: Abdomen is soft. There is no abdominal mass.      Tenderness: There is no abdominal tenderness.   Musculoskeletal:         General: No deformity.      Extremities: No clubbing present.     Cervical back: Neck supple. Skin:     General: Skin is warm and dry. There is no cyanosis.      Coloration: Skin is not pale.      Findings: No rash.   Neurological:      Mental Status: Alert and oriented to person, place, and time.      Cranial Nerves: No cranial nerve deficit.   Psychiatric:         Judgment: Judgment normal.         Lab Review:       Assessment:      Diagnosis Plan   1. PSVT (paroxysmal supraventricular tachycardia) (HCC)     2. Mixed hyperlipidemia       1. Paroxysmal supraventricular tachycardia.   Magnesium did not help. off metoprolol as thought it caused itching but may not have.  Atrial fibrillation has not been detected.  She is having very little SVT at this time, continue dietary modifications.  2. Family history of atrial fibrillation.  3. Hyperlipidemia, trying to treat this with diet and activity.        Plan:       See me in 1 year.  No changes.

## 2022-08-19 ENCOUNTER — TRANSCRIBE ORDERS (OUTPATIENT)
Dept: SURGERY | Facility: SURGERY CENTER | Age: 77
End: 2022-08-19

## 2022-08-22 ENCOUNTER — OFFICE VISIT (OUTPATIENT)
Dept: ORTHOPEDIC SURGERY | Facility: CLINIC | Age: 77
End: 2022-08-22

## 2022-08-22 VITALS — WEIGHT: 164 LBS | HEIGHT: 62 IN | TEMPERATURE: 97.3 F | BODY MASS INDEX: 30.18 KG/M2

## 2022-08-22 DIAGNOSIS — M25.511 RIGHT SHOULDER PAIN, UNSPECIFIED CHRONICITY: Primary | ICD-10-CM

## 2022-08-22 PROCEDURE — 99213 OFFICE O/P EST LOW 20 MIN: CPT | Performed by: ORTHOPAEDIC SURGERY

## 2022-08-22 PROCEDURE — 73030 X-RAY EXAM OF SHOULDER: CPT | Performed by: ORTHOPAEDIC SURGERY

## 2022-08-22 PROCEDURE — 20610 DRAIN/INJ JOINT/BURSA W/O US: CPT | Performed by: ORTHOPAEDIC SURGERY

## 2022-08-22 RX ORDER — METHYLPREDNISOLONE ACETATE 80 MG/ML
80 INJECTION, SUSPENSION INTRA-ARTICULAR; INTRALESIONAL; INTRAMUSCULAR; SOFT TISSUE
Status: COMPLETED | OUTPATIENT
Start: 2022-08-22 | End: 2022-08-22

## 2022-08-22 RX ORDER — LIDOCAINE HYDROCHLORIDE 10 MG/ML
2 INJECTION, SOLUTION EPIDURAL; INFILTRATION; INTRACAUDAL; PERINEURAL
Status: COMPLETED | OUTPATIENT
Start: 2022-08-22 | End: 2022-08-22

## 2022-08-22 RX ADMIN — METHYLPREDNISOLONE ACETATE 80 MG: 80 INJECTION, SUSPENSION INTRA-ARTICULAR; INTRALESIONAL; INTRAMUSCULAR; SOFT TISSUE at 08:43

## 2022-08-22 RX ADMIN — LIDOCAINE HYDROCHLORIDE 2 ML: 10 INJECTION, SOLUTION EPIDURAL; INFILTRATION; INTRACAUDAL; PERINEURAL at 08:43

## 2022-08-22 NOTE — PROGRESS NOTES
CC:  Right shoulder pain    Ms. Lorenzana comes in today for new complaint of right shoulder pain.  I have seen her for the left shoulder in the past.  She says this seems to be a different type of pain than that which she was experiencing on the left.  She was having some neck pain and she thought it may be coming from the neck.  She saw Dr. Napier and has been referred for cervical epidurals.  These are to be done on September 16.  She has also been going to therapy for the neck.  They have worked with her shoulder a little bit but it has not helped.  She thinks the right shoulder pain may be a different issue than the left shoulder.  She says she was hauling rocks in a wheelbarrow back in April when she felt something pull in the shoulder.  It has continued to bother her ever since.  Most of the pain is anterolateral.  It is worse with reaching and lifting overhead, laying on her side, and at night.  She has not really noticed any weakness.  She says that she is still able to do everything that she wants to do on a daily basis albeit with discomfort.  Denies shooting pain down the arm, distal weakness, numbness or tingling.    Right shoulder is examined.  Skin is benign.  No gross abnormalities on inspection including any atrophy, swellings, or masses.  No palpable masses or adenopathy.  Mild anterolateral tenderness.  No effusion.  Full shoulder motion.  No evident instability or apprehension.  Positive Neer and Lozano manuevers.  5- out of 5 strength with resisted forward elevation in the scapular plane.  5 out of 5 strength and no pain with internal and external rotation.  Good strength in her deltoid, biceps, triceps, and .  Intact sensation throughout the arm.  Palpable radial pulse.  Good skin turgor.  Brisk cap refill.    AP, scapular Y, and axillary views of the right shoulder are ordered by myself and reviewed to evaluate the patient's complaint.  No comparison films are immediately available.  The  x-rays show no obvious acute abnormalities, lesions, masses, significant degenerative changes, or other concerning findings.  The acromiohumeral interval is normal.  Glenoid version appears normal as well.    Assessment: Right shoulder pain, suspected subacromial/subdeltoid bursitis with possible small or partial-thickness rotator cuff tear    Plan: We discussed her options.  I offered to refer her back to PT to have them really focused on the shoulder.  Thus far it sounds like they have mainly focused on her neck.  She says that she feels like the therapy is not really helping and she would like to try something else.  I suggested that an injection may be beneficial.  The risk, benefits and alternatives were discussed.  She consented and the injection was performed as described below.  I told her to give this about 2 weeks and then call me if no better.  Large Joint Arthrocentesis: R subacromial bursa  Date/Time: 8/22/2022 8:43 AM  Consent given by: patient  Site marked: site marked  Timeout: Immediately prior to procedure a time out was called to verify the correct patient, procedure, equipment, support staff and site/side marked as required   Supporting Documentation  Indications: pain   Procedure Details  Location: shoulder - R subacromial bursa  Preparation: Patient was prepped and draped in the usual sterile fashion  Needle gauge: 21 G.  Medications administered: 2 mL lidocaine PF 1% 1 %; 80 mg methylPREDNISolone acetate 80 MG/ML  Patient tolerance: patient tolerated the procedure well with no immediate complications            Jaxon Garcia MD

## 2022-08-29 RX ORDER — OMEPRAZOLE 40 MG/1
CAPSULE, DELAYED RELEASE ORAL
Qty: 30 CAPSULE | Refills: 3 | Status: SHIPPED | OUTPATIENT
Start: 2022-08-29 | End: 2023-04-04

## 2022-08-31 ENCOUNTER — TELEPHONE (OUTPATIENT)
Dept: PAIN MEDICINE | Facility: CLINIC | Age: 77
End: 2022-08-31

## 2022-08-31 NOTE — TELEPHONE ENCOUNTER
Caller: Kourtney Lorenzana    Relationship to patient: Self    Best call back number: [phone]    Chief complaint: PATIENT CALLED - WANTS TO CANCEL PROCEDURE SCHEDULED FOR 09.16.22 -- IF THERE ARE ANY QUESTIONS/CONCERNS PLEASE CONTACT PATIENT. TY!

## 2022-10-04 ENCOUNTER — OFFICE VISIT (OUTPATIENT)
Dept: FAMILY MEDICINE CLINIC | Facility: CLINIC | Age: 77
End: 2022-10-04

## 2022-10-04 VITALS
TEMPERATURE: 97.5 F | DIASTOLIC BLOOD PRESSURE: 68 MMHG | OXYGEN SATURATION: 100 % | RESPIRATION RATE: 16 BRPM | SYSTOLIC BLOOD PRESSURE: 118 MMHG | HEART RATE: 77 BPM | BODY MASS INDEX: 28.71 KG/M2 | WEIGHT: 156 LBS | HEIGHT: 62 IN

## 2022-10-04 DIAGNOSIS — M50.30 DDD (DEGENERATIVE DISC DISEASE), CERVICAL: ICD-10-CM

## 2022-10-04 DIAGNOSIS — G47.9 SLEEP DISTURBANCES: ICD-10-CM

## 2022-10-04 DIAGNOSIS — M54.12 CERVICAL RADICULOPATHY: ICD-10-CM

## 2022-10-04 DIAGNOSIS — M43.12 SPONDYLOLISTHESIS, CERVICAL REGION: ICD-10-CM

## 2022-10-04 DIAGNOSIS — R51.9 CHRONIC NONINTRACTABLE HEADACHE, UNSPECIFIED HEADACHE TYPE: ICD-10-CM

## 2022-10-04 DIAGNOSIS — E55.9 VITAMIN D DEFICIENCY: ICD-10-CM

## 2022-10-04 DIAGNOSIS — M54.2 CERVICALGIA: ICD-10-CM

## 2022-10-04 DIAGNOSIS — M25.562 CHRONIC PAIN OF LEFT KNEE: ICD-10-CM

## 2022-10-04 DIAGNOSIS — R53.82 CHRONIC FATIGUE: ICD-10-CM

## 2022-10-04 DIAGNOSIS — E78.49 OTHER HYPERLIPIDEMIA: Primary | ICD-10-CM

## 2022-10-04 DIAGNOSIS — E83.42 HYPOMAGNESEMIA: ICD-10-CM

## 2022-10-04 DIAGNOSIS — R74.8 ELEVATED ALKALINE PHOSPHATASE LEVEL: ICD-10-CM

## 2022-10-04 DIAGNOSIS — E53.8 B12 DEFICIENCY: ICD-10-CM

## 2022-10-04 DIAGNOSIS — G47.19 OTHER HYPERSOMNIA: ICD-10-CM

## 2022-10-04 DIAGNOSIS — H53.9 VISION CHANGES: ICD-10-CM

## 2022-10-04 DIAGNOSIS — G89.29 CHRONIC NONINTRACTABLE HEADACHE, UNSPECIFIED HEADACHE TYPE: ICD-10-CM

## 2022-10-04 DIAGNOSIS — G89.29 CHRONIC PAIN OF LEFT KNEE: ICD-10-CM

## 2022-10-04 DIAGNOSIS — K21.9 GASTROESOPHAGEAL REFLUX DISEASE, UNSPECIFIED WHETHER ESOPHAGITIS PRESENT: ICD-10-CM

## 2022-10-04 DIAGNOSIS — M48.02 SPINAL STENOSIS, CERVICAL REGION: ICD-10-CM

## 2022-10-04 PROCEDURE — 99214 OFFICE O/P EST MOD 30 MIN: CPT | Performed by: PHYSICIAN ASSISTANT

## 2022-10-04 NOTE — PROGRESS NOTES
Subjective   Kourtney Lorenzana is a 76 y.o. female who presents today in follow up of hyperlipidemia, elevated alkaline phosphatase, B12 and vitamin D deficiencies, GERD, chronic fatigue, sleep, headache, significant back, neck, and joint pain, and vision change.    Headache       Moved and living in the walkout basement with her daughter.   Weight watchers-has continued to be compliant and continued with weight loss.  7/1/2022- she had to change diet- cutting sugars completely and decreased carbs. Recently restarted 1 slice of bread every other day- whole wheat with no sugars. If any meat- Tuna, chicken, rarely beef.   Not able to exercise much other than the cleaning and housekeeping and yard work. Swimming some this summer.   Hyperlipidemia- Rod all is higher 6/2022-started Lipitor 40 mg nightly.  patient has been diligent with diet and exercise with weight watchers.  Continued with weight loss.  Elevated alk phos- was taking Aleve and stopped then started again with headaches.  Was taking 2 Tylenol at bedtime.  Cholecystectomy 12/3/2019 with Dr. Schneider.  Went for surgery follow-up with incisions healing well.  She was taking Aleve and stopped. She was also taking 2 Tylenol at bedtime. It was thought elevated alkaline phosphatase could have been related to GB disease. She underwent cholecystectomy 12/3/2019 with Dr. Schneider and went for surgery follow-up with incisions healing well.   B12 deficiency- 6/2022-advised decrease B12 to 500 mcg 3 times weekly rather than daily. Women's vitamin with B6- small amount. Stopped vitain D.   Previously 500 mcg daily. Tolerating without AE  Vitamin D deficiency- taking 2000 IU daily. Tolerating without AE  Magnesium-No longer taking magnesium oxide 400 mg. Had side effects    GERD- She has had improvement with change in eating.   she was having increased heartburn and reflux but changing Protonix to Omeprazole has helped.   She was previously stable on Protonix 40 mg daily.  No breakthrough symptoms when taking medication regularly.  She has been seen by GI was advised to continue.    · Patient was seen 12/10/2019 by Dr. Mitchell and was given Mobic for back pain. She then had RUQ pain and Dr Schneider advised to increase to 15 mg for 2 weeks.   · 12/30/2019-patient went to ER with sharp chest pain.  Was given Protonix 40 mg daily and advised to see cardiology.  Appointment was made for March 2020. Thinks it was a side effect from Mobic.  Sleep-Waking up 4 x nightly to urinate but can usually go back to sleep.  She takes Benadryl sleep aid 50 mg and has taken Tylenol at bedtime.  Fatigue- still feeling tired. Restarted B12 about 1 month ago with increased fatigue. Helps a little then about 2 pm, she has increased fatigue.   She was seen 6/2019 and had been very fatigued all the time. She could only tolerate being active a few hours then she was tired for the rest of the day, complained of shortness of breath and fatigued with exertion. Patient also noted bilateral toes were blue. She had normal DP and PT pulses bilaterally with normal temperature of the feet. She felt overall cool/ cold all the time. She then joined weight watchers and has improved some. Patient continues waking up 4 x nightly to urinate but states she can usually go back to sleep. She is taking Benadryl sleep aid 50 mg and Tylenol at bedtime as needed. If she has persistent sleep disturbances or fatigue, we will consider sleep study.    Headache- did physical therapy- did not help much. Went for 6 weeks  Started 1/2022 with bilateral temporal, parietal, and at the top of head and back of head. Throbbing pain and mild pain. 5/10- lasts about 1 hour or so. Tries not to keep track and tries to stay busy. No associated dizziness, vision changes, nausea, vomiting, confusion, change in speech, gait abnormality, or other neurological symptoms. Tylenol not helping enough and having to use Aleve. She is now going back and forth with  "Tylenol and Aleve. Worried about her liver. Taking 1-2 every night and sometimes 1 in the morning if bad. She reports it depends on the weather. Rain and sudden changes in weather- cold to hot and humid.     Neck and shoulder pain- did PT but did not help a lot. Went for 6 weeks. Went to Dr Napier- told that the bone spurs were not concerning for esophagus and thought no surgery at this time. Recommended PT and ANNE. PT was helping and did not feel she needed ANNE. May want to do \"down the road\" but not right now. Seen by Dr Garcia with injection. Helped some.   6/2022- right side of neck is swollen more than the other side. She has pain and stiffness in neck and goes to right shoulder.   Patient also noted pain in her neck and down her left upper arm. She was not sore to touch or feel but is a deep pain. She also has bilateral hand numbness. We started with labs and advised we would consider PT and additional workup if no improvement or recurrence.  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  She also has persistent neck stiffness with the shoulder pain. I will refer to PT today. If persistent neck, shoulder, and knee symptoms, or persistent numbness, we will consider EMG and further imaging. She should call or return if no resolution of symptoms.    Vision change- She has had gradual vision change.   She also has a change in color on skin of left side of face. If she pulls back on the lesion, she has a pinching pain. She has had another spot on right side of face and place on right side of nose that scabs and the scab falls off and recurs. Pinching pain if she pushes on it or moves it.     Knee pain- had right knee surgery 5/2021- she did well with surgery and has healed well. The other knee needs surgery but not ready for recovery.   · Patient continues follow up with Dr Catarino De La Cruz, orthopedist, who advised PT and " knee injections. Her right knee pain improved and her left knee still hurts. She will return as needed for injections with orthopedist. Patient's last appt was 1/7/2020 for OA knee and shoulder pain.    He advised no surgical intervention at this time for the knee and to continue PT.  Orthopedic surgery- Dr. Catarino De La Cruz- patient was seen 1/7/2020 for OA knee and shoulder pain.  They advised she was starting to develop frozen shoulder and ordered MRI shoulder to make further recommendations pending results.  Advised no surgical intervention at this time for the knee- continue PT. Reports neck stiffness with the shoulder pain.     Rash-rash is gone but she continues with pain in the scar area when she is needing to have BM or urinate and is not able to go right away.   At her previous visit, she stated she has a scar on her abdomen with pain in the area of her scar from hysterectomy. When she rubbed it, the skin came off. Also, she had itching under bilateral breasts with a rash. On exam, she appeared to have cutaneous candidiasis and was treated with Nystatin cream and powder in both areas and has had improvement.    Vaginal spotting- no longer has that. Still has incontinence of urine.   She also discussed spotting with Urogyn and was advised this was from vaginal dryness. She is rarely has spotting will continue follow up with them as directed.      The following portions of the patient's history were reviewed and updated as appropriate: allergies, current medications, past family history, past medical history, past social history, past surgical history and problem list.    Review of Systems   Constitutional: Positive for fatigue.   Eyes: Positive for visual disturbance.   Respiratory: Negative.    Cardiovascular: Negative.    Musculoskeletal: Positive for arthralgias, back pain, neck pain and neck stiffness.   Skin: Positive for color change and rash.   Neurological: Positive for headaches.    Psychiatric/Behavioral: Positive for sleep disturbance.       Objective    Vitals:    10/04/22 1010   BP: 118/68   Pulse: 77   Resp: 16   Temp: 97.5 °F (36.4 °C)   SpO2: 100%     Body mass index is 28.53 kg/m².    Physical Exam   Constitutional: She is oriented to person, place, and time. She appears well-developed.   HENT:   Head: Normocephalic and atraumatic.   Right Ear: External ear normal.   Left Ear: External ear normal.   Nose: Nose normal.   Eyes: Conjunctivae and lids are normal.   Neck: Carotid bruit is not present.   Cardiovascular: Normal rate, regular rhythm and normal heart sounds. Exam reveals no gallop and no friction rub.   No murmur heard.  Pulmonary/Chest: Effort normal and breath sounds normal. No respiratory distress. She has no wheezes. She has no rhonchi. She has no rales.   Musculoskeletal: No deformity.   Neurological: She is alert and oriented to person, place, and time. Gait normal.   Skin: Skin is warm and dry.   Psychiatric: Her speech is normal and behavior is normal. Thought content normal.   Nursing note and vitals reviewed.      Assessment & Plan   Diagnoses and all orders for this visit:    1. Other hyperlipidemia (Primary)  -     Comprehensive Metabolic Panel  -     CK  -     Lipid Panel With LDL / HDL Ratio  -     TSH    2. Elevated alkaline phosphatase level  -     Comprehensive Metabolic Panel  -     TSH  -     Urinalysis With Culture If Indicated -    3. Vitamin D deficiency  -     Comprehensive Metabolic Panel  -     Vitamin D 25 Hydroxy  -     TSH    4. B12 deficiency  -     CBC & Differential  -     Vitamin B12 & Folate    5. Hypomagnesemia  -     TSH  -     Magnesium    6. Gastroesophageal reflux disease, unspecified whether esophagitis present    7. Sleep disturbances    8. Other hypersomnia     9. Chronic fatigue    10. Chronic nonintractable headache, unspecified headache type    11. DDD (degenerative disc disease), cervical    12. Spondylolisthesis, cervical  region    13. Spinal stenosis, cervical region    14. Cervicalgia    15. Cervical radiculopathy    16. Vision changes    17. Chronic pain of left knee    Other orders  -     Microscopic Examination -  -     Urine culture, Comprehensive - ,         Assessment and plan  Patient will have fasting labs. Call if no results in 1 week. Stability of conditions, plan, follow up, and further recommendations pending labs. Follow up in 3-6 months pending results.     · Headache- Patient has had headaches for about 6 months. She has no associated symptoms but reports it depends on weather and temperature changes. She does have significant neck pain and stiffness. This could be contributing to her headaches. She also has sleep disturbance. I will refer for sleep study. Patient to consider MRI brain, further workup, and treatment pending evaluation and labs.   · Neck and shoulder pain- She Continues to have stiffness in her neck to her right shoulder, pain down her left arm, headaches, bilateral hand numbness, and feels there is an asymmetry of her neck. She had MRI cervical spine 2020 with advanced degenerative changes, spondylolisthesis. I will refer to ortho spine Dr Napier for evaluation and further recommendations. She may also need follow up with Dr Garcia with previous concerns for frozen shoulder.   · Hyperlipidemia- Continued dietary changes with weight loss. Await labs for further recommendations.   · Elevated alk phos- Avoid NSAIDS. Cholecystectomy healed. I will continue to monitor and make further recommendations.   · B12 deficiency- Continue 500 mcg daily. Further recommendations pending lab results.   · Vitamin D deficiency- Patient to continue 2000 IU daily. Dosing recommendations pending labs.   · Magnesium-Continue magnesium oxide 400 mg. I will check levels and make dosing recommendations.   · GERD- Continue Prilosec 20 mg once daily. To see GI in follow up if breakthrough symptoms or any concerns.    · Sleep  disturbance, hypersomnia, fatigue- I will refer for home sleep study and check labs today. We will consider further workup if persistent symptoms and negative testing.   · Knee pain- Right knee healed well. She continues with left knee pain. Continue follow up with orthopedist, Dr Catarino De La Cruz.   · Changing skin lesion- I will refer to dermatology for evaluation and annual skin monitoring.   · Rash- If recurrence, can use Nystatin cream and powder. She should also see dermatology if recurrence.     · Vaginal spotting- Patient to see Urogynecology ASAP if recurrence of bleeding or follow up as directed by them.    I spent 35 minutes caring for Kourtney Lorenzana on this date of service. This time includes time spent by me in the following activities as necessary: preparing for the visit, reviewing tests, specialists records and previous visits, obtaining and/or reviewing a separately obtained history, performing a medically appropriate exam and/or evaluation, counseling and educating the patient, family, caregiver, referring and/or communicating with other healthcare professionals, documenting information in the medical record, independently interpreting results and communicating that information with the patient, family, caregiver, and developing a medically appropriate treatment plan with consideration of other conditions, medications, and treatments.

## 2022-10-07 LAB
25(OH)D3+25(OH)D2 SERPL-MCNC: 67.9 NG/ML (ref 30–100)
ALBUMIN SERPL-MCNC: 4.4 G/DL (ref 3.5–5.2)
ALBUMIN/GLOB SERPL: 2.4 G/DL
ALP SERPL-CCNC: 131 U/L (ref 39–117)
ALT SERPL-CCNC: 21 U/L (ref 1–33)
APPEARANCE UR: CLEAR
AST SERPL-CCNC: 12 U/L (ref 1–32)
BACTERIA #/AREA URNS HPF: NORMAL /HPF
BACTERIA UR CULT: NORMAL
BACTERIA UR CULT: NORMAL
BASOPHILS # BLD AUTO: 0.02 10*3/MM3 (ref 0–0.2)
BASOPHILS NFR BLD AUTO: 0.4 % (ref 0–1.5)
BILIRUB SERPL-MCNC: 0.5 MG/DL (ref 0–1.2)
BILIRUB UR QL STRIP: NEGATIVE
BUN SERPL-MCNC: 13 MG/DL (ref 8–23)
BUN/CREAT SERPL: 14.1 (ref 7–25)
CALCIUM SERPL-MCNC: 9.7 MG/DL (ref 8.6–10.5)
CASTS URNS QL MICRO: NORMAL /LPF
CHLORIDE SERPL-SCNC: 108 MMOL/L (ref 98–107)
CHOLEST SERPL-MCNC: 207 MG/DL (ref 0–200)
CK SERPL-CCNC: 111 U/L (ref 20–180)
CO2 SERPL-SCNC: 23.9 MMOL/L (ref 22–29)
COLOR UR: YELLOW
CREAT SERPL-MCNC: 0.92 MG/DL (ref 0.57–1)
EGFRCR SERPLBLD CKD-EPI 2021: 65.1 ML/MIN/1.73
EOSINOPHIL # BLD AUTO: 0.09 10*3/MM3 (ref 0–0.4)
EOSINOPHIL NFR BLD AUTO: 1.9 % (ref 0.3–6.2)
EPI CELLS #/AREA URNS HPF: NORMAL /HPF (ref 0–10)
ERYTHROCYTE [DISTWIDTH] IN BLOOD BY AUTOMATED COUNT: 13.7 % (ref 12.3–15.4)
FOLATE SERPL-MCNC: 8.63 NG/ML (ref 4.78–24.2)
GLOBULIN SER CALC-MCNC: 1.8 GM/DL
GLUCOSE SERPL-MCNC: 85 MG/DL (ref 65–99)
GLUCOSE UR QL STRIP: NEGATIVE
HCT VFR BLD AUTO: 39.9 % (ref 34–46.6)
HDLC SERPL-MCNC: 57 MG/DL (ref 40–60)
HGB BLD-MCNC: 13.5 G/DL (ref 12–15.9)
HGB UR QL STRIP: NEGATIVE
IMM GRANULOCYTES # BLD AUTO: 0.01 10*3/MM3 (ref 0–0.05)
IMM GRANULOCYTES NFR BLD AUTO: 0.2 % (ref 0–0.5)
KETONES UR QL STRIP: ABNORMAL
LDLC SERPL CALC-MCNC: 137 MG/DL (ref 0–100)
LDLC/HDLC SERPL: 2.37 {RATIO}
LEUKOCYTE ESTERASE UR QL STRIP: ABNORMAL
LYMPHOCYTES # BLD AUTO: 1.51 10*3/MM3 (ref 0.7–3.1)
LYMPHOCYTES NFR BLD AUTO: 32.1 % (ref 19.6–45.3)
MAGNESIUM SERPL-MCNC: 2.1 MG/DL (ref 1.6–2.4)
MCH RBC QN AUTO: 29.7 PG (ref 26.6–33)
MCHC RBC AUTO-ENTMCNC: 33.8 G/DL (ref 31.5–35.7)
MCV RBC AUTO: 87.7 FL (ref 79–97)
MICRO URNS: ABNORMAL
MONOCYTES # BLD AUTO: 0.44 10*3/MM3 (ref 0.1–0.9)
MONOCYTES NFR BLD AUTO: 9.4 % (ref 5–12)
NEUTROPHILS # BLD AUTO: 2.63 10*3/MM3 (ref 1.7–7)
NEUTROPHILS NFR BLD AUTO: 56 % (ref 42.7–76)
NITRITE UR QL STRIP: NEGATIVE
PH UR STRIP: 7 [PH] (ref 5–7.5)
PLATELET # BLD AUTO: 203 10*3/MM3 (ref 140–450)
POTASSIUM SERPL-SCNC: 4.3 MMOL/L (ref 3.5–5.2)
PROT SERPL-MCNC: 6.2 G/DL (ref 6–8.5)
PROT UR QL STRIP: NEGATIVE
RBC # BLD AUTO: 4.55 10*6/MM3 (ref 3.77–5.28)
RBC #/AREA URNS HPF: NORMAL /HPF (ref 0–2)
SODIUM SERPL-SCNC: 142 MMOL/L (ref 136–145)
SP GR UR STRIP: 1.02 (ref 1–1.03)
TRIGL SERPL-MCNC: 74 MG/DL (ref 0–150)
TSH SERPL DL<=0.005 MIU/L-ACNC: 1.69 UIU/ML (ref 0.27–4.2)
URINALYSIS REFLEX: ABNORMAL
UROBILINOGEN UR STRIP-MCNC: 0.2 MG/DL (ref 0.2–1)
VIT B12 SERPL-MCNC: 496 PG/ML (ref 211–946)
VLDLC SERPL CALC-MCNC: 13 MG/DL (ref 5–40)
WBC # BLD AUTO: 4.7 10*3/MM3 (ref 3.4–10.8)
WBC #/AREA URNS HPF: NORMAL /HPF (ref 0–5)

## 2022-10-19 ENCOUNTER — HOSPITAL ENCOUNTER (OUTPATIENT)
Dept: CT IMAGING | Facility: HOSPITAL | Age: 77
Discharge: HOME | End: 2022-10-19
Admitting: INTERNAL MEDICINE

## 2022-10-19 DIAGNOSIS — R91.8 LUNG NODULES: ICD-10-CM

## 2022-10-19 PROCEDURE — 71250 CT THORAX DX C-: CPT

## 2022-11-21 ENCOUNTER — HOSPITAL ENCOUNTER (OUTPATIENT)
Dept: CT IMAGING | Facility: HOSPITAL | Age: 77
Discharge: HOME | End: 2022-11-21
Admitting: PHYSICIAN ASSISTANT

## 2022-11-21 ENCOUNTER — OFFICE VISIT (OUTPATIENT)
Dept: FAMILY MEDICINE CLINIC | Facility: CLINIC | Age: 77
End: 2022-11-21

## 2022-11-21 VITALS
WEIGHT: 154 LBS | OXYGEN SATURATION: 98 % | HEIGHT: 62 IN | DIASTOLIC BLOOD PRESSURE: 64 MMHG | TEMPERATURE: 97.7 F | HEART RATE: 74 BPM | BODY MASS INDEX: 28.34 KG/M2 | RESPIRATION RATE: 16 BRPM | SYSTOLIC BLOOD PRESSURE: 118 MMHG

## 2022-11-21 DIAGNOSIS — R10.9 LEFT FLANK PAIN: ICD-10-CM

## 2022-11-21 DIAGNOSIS — R10.32 LEFT LOWER QUADRANT PAIN: ICD-10-CM

## 2022-11-21 DIAGNOSIS — R68.83 CHILLS: ICD-10-CM

## 2022-11-21 DIAGNOSIS — R30.9 PAINFUL URINATION: ICD-10-CM

## 2022-11-21 DIAGNOSIS — R30.9 PAINFUL URINATION: Primary | ICD-10-CM

## 2022-11-21 LAB
BILIRUB BLD-MCNC: NEGATIVE MG/DL
CLARITY, POC: CLEAR
COLOR UR: YELLOW
GLUCOSE UR STRIP-MCNC: NEGATIVE MG/DL
KETONES UR QL: NEGATIVE
LEUKOCYTE EST, POC: ABNORMAL
NITRITE UR-MCNC: NEGATIVE MG/ML
PH UR: 5 [PH] (ref 5–8)
PROT UR STRIP-MCNC: NEGATIVE MG/DL
RBC # UR STRIP: ABNORMAL /UL
SP GR UR: 1 (ref 1–1.03)
UROBILINOGEN UR QL: NORMAL

## 2022-11-21 PROCEDURE — 81002 URINALYSIS NONAUTO W/O SCOPE: CPT | Performed by: PHYSICIAN ASSISTANT

## 2022-11-21 PROCEDURE — 74176 CT ABD & PELVIS W/O CONTRAST: CPT

## 2022-11-21 PROCEDURE — 99213 OFFICE O/P EST LOW 20 MIN: CPT | Performed by: PHYSICIAN ASSISTANT

## 2022-11-21 RX ORDER — SULFAMETHOXAZOLE AND TRIMETHOPRIM 800; 160 MG/1; MG/1
1 TABLET ORAL 2 TIMES DAILY
Qty: 20 TABLET | Refills: 0 | Status: SHIPPED | OUTPATIENT
Start: 2022-11-21

## 2022-11-23 LAB
BACTERIA UR CULT: NORMAL
BACTERIA UR CULT: NORMAL

## 2023-04-04 RX ORDER — OMEPRAZOLE 40 MG/1
CAPSULE, DELAYED RELEASE ORAL
Qty: 30 CAPSULE | Refills: 3 | Status: SHIPPED | OUTPATIENT
Start: 2023-04-04

## 2023-04-27 ENCOUNTER — OFFICE VISIT (OUTPATIENT)
Dept: FAMILY MEDICINE CLINIC | Facility: CLINIC | Age: 78
End: 2023-04-27
Payer: MEDICARE

## 2023-04-27 VITALS
OXYGEN SATURATION: 96 % | HEIGHT: 62 IN | HEART RATE: 69 BPM | TEMPERATURE: 97.1 F | SYSTOLIC BLOOD PRESSURE: 128 MMHG | BODY MASS INDEX: 28.71 KG/M2 | DIASTOLIC BLOOD PRESSURE: 74 MMHG | WEIGHT: 156 LBS

## 2023-04-27 DIAGNOSIS — R30.9 PAINFUL URINATION: Primary | ICD-10-CM

## 2023-04-27 PROCEDURE — 99213 OFFICE O/P EST LOW 20 MIN: CPT | Performed by: NURSE PRACTITIONER

## 2023-04-27 RX ORDER — CEFDINIR 300 MG/1
300 CAPSULE ORAL 2 TIMES DAILY
Qty: 14 CAPSULE | Refills: 0 | Status: SHIPPED | OUTPATIENT
Start: 2023-04-27

## 2023-04-27 NOTE — PROGRESS NOTES
"Chief Complaint  Difficulty Urinating (Patient states that she thinks she may have a uti. She states that she also has back/ gi pain. She started to notice symptoms on Tuesday. ) and Nocturia    Subjective        Kourtney Lorenzana presents to [facility]  History of Present Illness  This is a 77-year-old female patient here today for evaluation of dysuria.  Patient reports symptoms started on Tuesday with dysuria and low back pain.  She denies any fever chills or abdominal pain.  She has been taking Azo Standard.  She is afebrile today.      Objective   Vital Signs:  /74   Pulse 69   Temp 97.1 °F (36.2 °C)   Ht 157.5 cm (62.01\")   Wt 70.8 kg (156 lb)   SpO2 96%   BMI 28.53 kg/m²   Estimated body mass index is 28.53 kg/m² as calculated from the following:    Height as of this encounter: 157.5 cm (62.01\").    Weight as of this encounter: 70.8 kg (156 lb).             Physical Exam  Vitals and nursing note reviewed.   HENT:      Head: Normocephalic.      Nose: Nose normal.   Eyes:      Pupils: Pupils are equal, round, and reactive to light.   Cardiovascular:      Rate and Rhythm: Normal rate and regular rhythm.      Pulses: Normal pulses.      Heart sounds: Normal heart sounds.   Pulmonary:      Effort: Pulmonary effort is normal. No respiratory distress.      Breath sounds: Normal breath sounds. No wheezing or rales.   Abdominal:      General: Bowel sounds are normal. There is no distension.      Tenderness: There is no abdominal tenderness. There is no right CVA tenderness or left CVA tenderness.   Musculoskeletal:         General: No swelling.      Cervical back: Neck supple.      Right lower leg: No edema.      Left lower leg: No edema.   Skin:     General: Skin is warm and dry.   Neurological:      Mental Status: She is alert and oriented to person, place, and time.   Psychiatric:         Mood and Affect: Mood normal.        Result Review :                   Assessment and " Plan   Diagnoses and all orders for this visit:    1. Painful urination (Primary)  -     Cancel: POCT urinalysis dipstick, automated  -     Urine Culture - Urine, Urine, Clean Catch    Other orders  -     cefdinir (OMNICEF) 300 MG capsule; Take 1 capsule by mouth 2 (Two) Times a Day.  Dispense: 14 capsule; Refill: 0    Will send her urine for culture and start Omnicef.  I did encourage the patient to force fluids and if symptoms worsen over the weekend to report to ER.         Follow Up   No follow-ups on file.  Patient was given instructions and counseling regarding her condition or for health maintenance advice. Please see specific information pulled into the AVS if appropriate.

## 2023-05-02 LAB
BACTERIA UR CULT: ABNORMAL
BACTERIA UR CULT: ABNORMAL
OTHER ANTIBIOTIC SUSC ISLT: ABNORMAL

## 2023-05-05 ENCOUNTER — TELEPHONE (OUTPATIENT)
Dept: FAMILY MEDICINE CLINIC | Facility: CLINIC | Age: 78
End: 2023-05-05

## 2023-05-05 RX ORDER — NITROFURANTOIN 25; 75 MG/1; MG/1
100 CAPSULE ORAL 2 TIMES DAILY
Qty: 14 CAPSULE | Refills: 0 | Status: SHIPPED | OUTPATIENT
Start: 2023-05-05 | End: 2023-05-09

## 2023-05-09 ENCOUNTER — OFFICE VISIT (OUTPATIENT)
Dept: FAMILY MEDICINE CLINIC | Facility: CLINIC | Age: 78
End: 2023-05-09
Payer: MEDICARE

## 2023-05-09 VITALS
HEIGHT: 62 IN | OXYGEN SATURATION: 98 % | TEMPERATURE: 98.6 F | BODY MASS INDEX: 29.7 KG/M2 | RESPIRATION RATE: 16 BRPM | HEART RATE: 79 BPM | DIASTOLIC BLOOD PRESSURE: 78 MMHG | SYSTOLIC BLOOD PRESSURE: 122 MMHG | WEIGHT: 161.4 LBS

## 2023-05-09 DIAGNOSIS — M43.12 SPONDYLOLISTHESIS, CERVICAL REGION: ICD-10-CM

## 2023-05-09 DIAGNOSIS — E55.9 VITAMIN D DEFICIENCY: ICD-10-CM

## 2023-05-09 DIAGNOSIS — E78.49 OTHER HYPERLIPIDEMIA: ICD-10-CM

## 2023-05-09 DIAGNOSIS — E83.42 HYPOMAGNESEMIA: ICD-10-CM

## 2023-05-09 DIAGNOSIS — M50.30 DDD (DEGENERATIVE DISC DISEASE), CERVICAL: ICD-10-CM

## 2023-05-09 DIAGNOSIS — R74.8 ELEVATED ALKALINE PHOSPHATASE LEVEL: ICD-10-CM

## 2023-05-09 DIAGNOSIS — N30.00 ACUTE CYSTITIS WITHOUT HEMATURIA: ICD-10-CM

## 2023-05-09 DIAGNOSIS — R51.9 CHRONIC NONINTRACTABLE HEADACHE, UNSPECIFIED HEADACHE TYPE: ICD-10-CM

## 2023-05-09 DIAGNOSIS — H53.9 VISION CHANGES: ICD-10-CM

## 2023-05-09 DIAGNOSIS — G47.9 SLEEP DISTURBANCES: ICD-10-CM

## 2023-05-09 DIAGNOSIS — R30.0 DYSURIA: Primary | ICD-10-CM

## 2023-05-09 DIAGNOSIS — E53.8 B12 DEFICIENCY: ICD-10-CM

## 2023-05-09 DIAGNOSIS — M54.12 CERVICAL RADICULOPATHY: ICD-10-CM

## 2023-05-09 DIAGNOSIS — G89.29 CHRONIC NONINTRACTABLE HEADACHE, UNSPECIFIED HEADACHE TYPE: ICD-10-CM

## 2023-05-09 DIAGNOSIS — K21.9 GASTROESOPHAGEAL REFLUX DISEASE, UNSPECIFIED WHETHER ESOPHAGITIS PRESENT: ICD-10-CM

## 2023-05-09 DIAGNOSIS — R53.82 CHRONIC FATIGUE: ICD-10-CM

## 2023-05-09 DIAGNOSIS — M48.02 SPINAL STENOSIS, CERVICAL REGION: ICD-10-CM

## 2023-05-09 DIAGNOSIS — M54.2 CERVICALGIA: ICD-10-CM

## 2023-05-09 DIAGNOSIS — G47.19 OTHER HYPERSOMNIA: ICD-10-CM

## 2023-05-09 LAB
BILIRUB BLD-MCNC: NEGATIVE MG/DL
CLARITY, POC: CLEAR
COLOR UR: YELLOW
EXPIRATION DATE: NORMAL
GLUCOSE UR STRIP-MCNC: NEGATIVE MG/DL
KETONES UR QL: NEGATIVE
LEUKOCYTE EST, POC: NEGATIVE
Lab: NORMAL
NITRITE UR-MCNC: NEGATIVE MG/ML
PH UR: 5 [PH] (ref 5–8)
PROT UR STRIP-MCNC: NEGATIVE MG/DL
RBC # UR STRIP: NEGATIVE /UL
SP GR UR: 1.01 (ref 1–1.03)
UROBILINOGEN UR QL: NORMAL

## 2023-05-09 NOTE — PROGRESS NOTES
Subjective   Kourtney Lorenzana is a 77 y.o. female who presents today for evaluation of UTI and in follow up of hyperlipidemia, vitamin D deficiency, B12 deficiency, elevated alkaline phosphatase, magnesium, GERD, chronic fatigue, sleep, headache, significant back, neck, and joint pain, and vision change.    Headache  Urinary Tract Infection       UTI- patient was seen by SLY Spencer 4/27/2023 with UTI.  She was given Omnicef that was appropriate by culture.  Patient then called again on 5/5/2023 and reported she still had burning sensation with urination and had some back pain and fatigue.  She was advised she needed to come in to leave another urine.  However, Macrobid was called into the pharmacy by SLY Spencer. She has burning with urination. She also has lower back pain. She usually gets relief with Tylenol and Aleve but is not getting relief now. Now sure if related to urination.   · 11/2022- she had left flank pain with radiation to left lower abdomen. Then resolved with Aleve and heating pad.   · Started at 2 am 11/19. Burning with urination. She had UTI in the past frequency. She had not had in years but noted fast.  No fever but had chills 11/19/2022 tylenol and Azo every 6 hours. She was worried about not treating UTI.   Vaginal spotting- no longer has that. Still has incontinence of urine.   · She also discussed spotting with Urogyn and was advised this was from vaginal dryness. She is rarely has spotting will continue follow up with them as directed.    · Last urogynecology appointment 11/2018- negative evaluation for spotting and vaginal bleeding and no erosions on exam.  She was not using HRT or vaginal estrogen.  She also had mixed incontinence that was worsening.  She did not respond to Myrbetriq and had GI side effects-discontinue medication.  Prior sling for JOSEPH that worked for only a couple years.  Discussed PTNS versus Botox versus InterStim for her urge and discussed surgical options for  treatment of stress incontinence including mid urethral sling with mesh versus biologic material, urethral injections or Christy procedures.  They started vaginal estrogen for the atrophy.  Advised follow-up in 6 months.  Patient did not follow-up    Moved and living in the walkout basement with her daughter.   Weight watchers-has continued to be compliant and continued with weight loss.  7/1/2022- she had to change diet- cutting sugars completely and decreased carbs. Recently restarted 1 slice of bread every other day- whole wheat with no sugars. If any meat- Tuna, chicken, rarely beef.   Not able to exercise much other than the cleaning and housekeeping and yard work. Swimming some this summer.   Hyperlipidemia- cholesterol was higher 6/2022-started Lipitor 40 mg nightly. She did not take medication. Diet helped with her last labs. Since Christmas, has not been as good but being more careful again. Still better about limiting bread, staying away from pies, cakes, and cookies to an extent. Better than she was previously.   Patient was diligent with diet and exercise with weight watchers.  Continued with weight loss.  Vitamin D deficiency- taking 2000 IU daily. Tolerating without AE  B12 deficiency- 10/2022-advised to restart B12 100 mcg daily or 500 mcg twice weekly. She did not restart   · 6/2022-advised decrease B12 to 500 mcg 3 times weekly rather than daily. Women's vitamin with B6- small amount. Stopped vitain D.   · Previously 500 mcg daily. Tolerating without AE  Elevated alk phos- taking Aleve and stopped then started again with headaches.  Was taking 2 Tylenol at bedtime.  Cholecystectomy 12/3/2019 with Dr. Schneider.  Went for surgery follow-up with incisions healing well.  She was taking Aleve and stopped. She was also taking 2 Tylenol at bedtime. It was thought elevated alkaline phosphatase could have been related to GB disease. She underwent cholecystectomy 12/3/2019 with Dr. Schneider and went for surgery  follow-up with incisions healing well.   Magnesium-No longer taking magnesium oxide 400 mg. Had side effects. Did not restart.   Added Quercetin, dandelion root, and Zinc- reports she thinks these are good for heart, liver, and immune system. She is not vaccinated for Covid 19.     GERD- taking Omeprazole every other day or every 3rd day. Still has heartburn if she eats the wrong thing and is on the 3rd day. Not eating hardly any fried foods and allows her to take less often.   She has had improvement with change in eating.   she was having increased heartburn and reflux but changing Protonix to Omeprazole has helped.   She was previously stable on Protonix 40 mg daily. No breakthrough symptoms when taking medication regularly.  She has been seen by GI was advised to continue.    · Patient was seen 12/10/2019 by Dr. Mitchell and was given Mobic for back pain. She then had RUQ pain and Dr Schneider advised to increase to 15 mg for 2 weeks.   · 12/30/2019-patient went to ER with sharp chest pain.  Was given Protonix 40 mg daily and advised to see cardiology.  Appointment was made for March 2020. Thinks it was a side effect from Mobic.    Sleep-Waking up 4 x nightly to urinate but can usually go back to sleep.  She was taking Benadryl sleep aid 50 mg and has taken Tylenol at bedtime. Was taking Benadryl instead of Zyrtec but now taking Zyrtec daily.   Fatigue- still feeling tired.   Previously, restarted B12 about 1 month ago with increased fatigue. Helps a little then about 2 pm, she has increased fatigue.   She was seen 6/2019 and had been very fatigued all the time. She could only tolerate being active a few hours then she was tired for the rest of the day, complained of shortness of breath and fatigued with exertion. Patient also noted bilateral toes were blue. She had normal DP and PT pulses bilaterally with normal temperature of the feet. She felt overall cool/ cold all the time. She then joined weight watchers and  "has improved some. Patient continues waking up 4 x nightly to urinate but states she can usually go back to sleep. She is taking Benadryl sleep aid 50 mg and Tylenol at bedtime as needed. If she has persistent sleep disturbances or fatigue, we will consider sleep study.    Headache- did physical therapy- did not help much. Went for 6 weeks. She goes for massage once monthly- helps. Lasts a couple weeks. Has not been too bad.   Started 1/2022 with bilateral temporal, parietal, and at the top of head and back of head. Throbbing pain and mild pain. 5/10- lasts about 1 hour or so. Tries not to keep track and tries to stay busy. No associated dizziness, vision changes, nausea, vomiting, confusion, change in speech, gait abnormality, or other neurological symptoms. Tylenol not helping enough and having to use Aleve. She is now going back and forth with Tylenol and Aleve. Worried about her liver. Taking 1-2 every night and sometimes 1 in the morning if bad. She reports it depends on the weather. Rain and sudden changes in weather- cold to hot and humid.     Neck and shoulder pain- She goes for massage once monthly- helps. Lasts a couple weeks. Has not been too bad.   She completed PT but did not help a lot.  She completed physical therapy for 6 weeks.  Been seen by Dr Napier- told that the bone spurs were not concerning for esophagus and thought no surgery at this time. Recommended PT and ANNE. PT was helping and did not feel she needed ANNE. May want to do \"down the road\" but not right now. Seen by Dr Garcia with injection. Helped some.   · 6/2022- right side of neck is swollen more than the other side. She has pain and stiffness in neck and goes to right shoulder.   · Patient also noted pain in her neck and down her left upper arm. She was not sore to touch or feel but is a deep pain. She also has bilateral hand numbness. We started with labs and advised we would consider PT and additional workup if no improvement or " recurrence.  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  · She also has persistent neck stiffness with the shoulder pain. I will refer to PT today. If persistent neck, shoulder, and knee symptoms, or persistent numbness, we will consider EMG and further imaging. She should call or return if no resolution of symptoms.    Vision change- She had gradual vision change. She had treatment on both eyes- laser treatment. Helped her vision.   · She also has a change in color on skin of left side of face. If she pulls back on the lesion, she has a pinching pain. She has had another spot on right side of face and place on right side of nose that scabs and the scab falls off and recurs. Pinching pain if she pushes on it or moves it.     Knee pain- The other knee needs surgery but not ready for recovery. Right is ok after operation and left is not too bad.   · Patient continues follow up with Dr Catarino De La Cruz, orthopedist, who advised PT and knee injections. Her right knee pain improved and her left knee still hurts. She will return as needed for injections with orthopedist. Patient's last appt was 1/7/2020 for OA knee and shoulder pain.    He advised no surgical intervention at this time for the knee and to continue PT.  · She had right knee surgery 5/2021- she did well with surgery and has healed well.   Orthopedic surgery- Dr. Catarino De La Cruz- patient was seen 1/7/2020 for OA knee and shoulder pain.  They advised she was starting to develop frozen shoulder and ordered MRI shoulder to make further recommendations pending results.  Advised no surgical intervention at this time for the knee- continue PT. Reports neck stiffness with the shoulder pain.     Rash-rash is gone but she continues with pain in the scar area when she is needing to have BM or urinate and is not able to go right away.   · At her previous visit, she stated she  has a scar on her abdomen with pain in the area of her scar from hysterectomy. When she rubbed it, the skin came off. Also, she had itching under bilateral breasts with a rash. On exam, she appeared to have cutaneous candidiasis and was treated with Nystatin cream and powder in both areas and has had improvement.      The following portions of the patient's history were reviewed and updated as appropriate: allergies, current medications, past family history, past medical history, past social history, past surgical history and problem list.    Review of Systems   Constitutional: Positive for fatigue.   Eyes: Positive for visual disturbance.   Respiratory: Negative.    Cardiovascular: Negative.    Musculoskeletal: Positive for arthralgias, back pain, neck pain and neck stiffness.   Skin: Positive for color change and rash.   Neurological: Positive for headaches.   Psychiatric/Behavioral: Positive for sleep disturbance.       Objective    Vitals:    05/09/23 0931   BP: 122/78   Pulse: 79   Resp: 16   Temp: 98.6 °F (37 °C)   SpO2: 98%     Body mass index is 29.51 kg/m².    Physical Exam   Constitutional: She is oriented to person, place, and time. She appears well-developed.   HENT:   Head: Normocephalic and atraumatic.   Right Ear: External ear normal.   Left Ear: External ear normal.   Nose: Nose normal.   Eyes: Conjunctivae and lids are normal.   Neck: Carotid bruit is not present.   Cardiovascular: Normal rate, regular rhythm and normal heart sounds. Exam reveals no gallop and no friction rub.   No murmur heard.  Pulmonary/Chest: Effort normal and breath sounds normal. No respiratory distress. She has no wheezes. She has no rhonchi. She has no rales.   Abdominal: Soft. Normal appearance and bowel sounds are normal. She exhibits no shifting dullness, no distension, no abdominal bruit and no mass. There is no abdominal tenderness. There is no rigidity, no rebound and no guarding. No hernia.   Musculoskeletal: No  deformity.   Neurological: She is alert and oriented to person, place, and time. Gait normal.   Skin: Skin is warm and dry. She is not diaphoretic.   Psychiatric: Her speech is normal and behavior is normal. Judgment and thought content normal.   Nursing note and vitals reviewed.      Assessment & Plan   Diagnoses and all orders for this visit:    1. Dysuria (Primary)  -     POCT urinalysis dipstick, automated  -     Urine Culture - Urine, Urine, Clean Catch    2. Other hyperlipidemia  -     Comprehensive Metabolic Panel  -     Lipid Panel With LDL / HDL Ratio  -     TSH    3. Vitamin D deficiency  -     Comprehensive Metabolic Panel  -     Vitamin D,25-Hydroxy  -     TSH    4. B12 deficiency  -     CBC & Differential  -     Vitamin B12 & Folate  -     TSH    5. Elevated alkaline phosphatase level  -     Comprehensive Metabolic Panel  -     TSH    6. Hypomagnesemia  -     TSH  -     Magnesium    7. Gastroesophageal reflux disease, unspecified whether esophagitis present    8. Sleep disturbances  -     TSH    9. Other hypersomnia   -     TSH    10. Chronic fatigue  -     TSH    11. Chronic nonintractable headache, unspecified headache type    12. DDD (degenerative disc disease), cervical    13. Spondylolisthesis, cervical region    14. Spinal stenosis, cervical region    15. Cervicalgia    16. Cervical radiculopathy    17. Vision changes    18. Acute cystitis without hematuria  -     Urine Culture - Urine, Urine, Clean Catch         Assessment and plan  Patient will have fasting labs. Call if no results in 1 week. Stability of conditions, plan, follow up, and further recommendations pending labs. Follow up in 6 months if labs are stable.  She declines AWV.    · Dysuria, recent UTI- she has not had urinary tract infection in several years and had a UTI 11/2022 then again 4/2023.  She has persistent dysuria and low back pain following completion of antibiotic.  Another antibiotic was sent 5/5/2023 but patient was  unaware of this, as I advised she return to office for recheck urine rather than calling in another antibiotic.  She will await urine culture for consideration of treatment.  We will need to consider return to urogynecology or to be seen by urology if she has recurrent urinary tract infections, as she has postmenopausal atrophy and stress and urge incontinence that is chronic.  · Vaginal spotting- Patient was seen by urogynecology but has had no currence of bleeding.  She will have to see specialist ASAP if she develops any recurrence of bleeding.  · Hyperlipidemia- She did not take Lipitor as prescribed and wanted to work on diet and exercise.  Continued dietary changes with weight loss. Await labs for further recommendations.   · Vitamin D deficiency- Patient to continue 2000 IU daily. Dosing recommendations pending labs.  · B12 deficiency- Further recommendations pending lab results.   · Elevated alk phos- Avoid NSAIDS. Cholecystectomy healed. I will continue to monitor and make further recommendations.   · Magnesium- I will check levels and make dosing recommendations.   · GERD- Per GI, she was to take omeprazole 40 mg daily, however, she is taking Prilosec 40 mg every 2 to 3 days.  She does have some breakthrough if she waits 3 days or eats chocolate or other foods that trigger her GERD symptoms.  Reconsider daily medication if she has continued breakthrough symptoms.  To see GI in follow up if breakthrough symptoms or any concerns.    · Sleep disturbance, hypersomnia, fatigue- I referred for home sleep study, but this was not completed.  She was seen by pulmonology 12/2022 and discharged back here but sleep disturbance and fatigue was not discussed. We will consider referral to sleep medicine or reordering home sleep study if persistent symptoms.   · Headache- Patient had headaches for about 6 months with no associated symptoms but seem to be more with on weather and temperature changes. She had significant  neck pain and stiffness. This could be contributing to her headaches. She underwent PT and headaches were stable. She also has sleep disturbance. I referred for sleep study. Patient to consider MRI brain, further workup, and treatment pending evaluation and labs.   · Neck and shoulder pain- She has had stiffness in her neck to her right shoulder, pain down her left arm, headaches, bilateral hand numbness, and feels there is an asymmetry of her neck. She had MRI cervical spine 2020 with advanced degenerative changes, spondylolisthesis. She was seen by Dr Napier and advised PT and possible ANNE. She is stable for now. She may also need follow up with Dr Garcia with previous concerns for frozen shoulder.   · Knee pain- Right knee healed well. She continues with left knee pain but reports this is not consistent and is stable at this time. Continue follow up with orthopedist, Dr Catarino De La Cruz.   · Changing skin lesion- I referred to dermatology for evaluation and annual skin monitoring.   · Rash- If recurrence, can use Nystatin cream and powder. She should also see dermatology if recurrence.       I spent 35 minutes caring for Kourtney Lorenzana on this date of service. This time includes time spent by me in the following activities as necessary: preparing for the visit, reviewing tests, specialists records and previous visits, obtaining and/or reviewing a separately obtained history, performing a medically appropriate exam and/or evaluation, counseling and educating the patient, family, caregiver, referring and/or communicating with other healthcare professionals, documenting information in the medical record, independently interpreting results and communicating that information with the patient, family, caregiver, and developing a medically appropriate treatment plan with consideration of other conditions, medications, and treatments.

## 2023-05-10 NOTE — TELEPHONE ENCOUNTER
Caller: Kourtney Lorenzana    Relationship: Self    Best call back number: [phone]  What is the best time to reach you:ANYTIME   Who are you requesting to speak with (clinical staff, provider,  specific staff member): CLINICAL STAFF     Do you know the name of the person who called: SELF   What was the call regarding: PATIENT CALLED AND STATED SHE STILL HAS NOT CLEARED UP AND STILL HAVING A BURNING SENSATION WHILE PEEING. PATIENT STATES TO PLEASE CALL IN A 2ND DOSE OF ANTIBIOTICS TO [facility] IN [place].THANKS     Do you require a callback: YES     
Patient is still having UTI symptoms. Please advise if you could send in a new medication or if patient needs to be seen in the office again. - KL   
This was addressed at visit.   
We will need to recheck urine. Please ensure no abdominal pain, back pain, flank pain, weakness, dizziness, confusion, nausea, vomiting, or fever. If she only has some urinary frequency/ urgency, dysuria, I can place an order to recheck urine.   
---

## 2023-05-12 LAB
BACTERIA UR CULT: ABNORMAL
OTHER ANTIBIOTIC SUSC ISLT: ABNORMAL

## 2023-05-16 LAB
25(OH)D3+25(OH)D2 SERPL-MCNC: 56 NG/ML (ref 30–100)
ALBUMIN SERPL-MCNC: 4.4 G/DL (ref 3.5–5.2)
ALBUMIN/GLOB SERPL: 2 G/DL
ALP SERPL-CCNC: 149 U/L (ref 39–117)
ALT SERPL-CCNC: 15 U/L (ref 1–33)
AST SERPL-CCNC: 16 U/L (ref 1–32)
BASOPHILS # BLD AUTO: ABNORMAL 10*3/UL
BILIRUB SERPL-MCNC: 0.5 MG/DL (ref 0–1.2)
BUN SERPL-MCNC: 15 MG/DL (ref 8–23)
BUN/CREAT SERPL: 19.2 (ref 7–25)
CALCIUM SERPL-MCNC: 9.4 MG/DL (ref 8.6–10.5)
CHLORIDE SERPL-SCNC: 109 MMOL/L (ref 98–107)
CHOLEST SERPL-MCNC: 236 MG/DL (ref 0–200)
CO2 SERPL-SCNC: 21.3 MMOL/L (ref 22–29)
CREAT SERPL-MCNC: 0.78 MG/DL (ref 0.57–1)
DIFFERENTIAL COMMENT: NORMAL
EGFRCR SERPLBLD CKD-EPI 2021: 78.8 ML/MIN/1.73
EOSINOPHIL # BLD AUTO: ABNORMAL 10*3/UL
EOSINOPHIL # BLD MANUAL: 0.05 10*3/MM3 (ref 0–0.4)
EOSINOPHIL NFR BLD AUTO: ABNORMAL %
EOSINOPHIL NFR BLD MANUAL: 1 % (ref 0.3–6.2)
ERYTHROCYTE [DISTWIDTH] IN BLOOD BY AUTOMATED COUNT: 13.1 % (ref 12.3–15.4)
FOLATE SERPL-MCNC: 9.03 NG/ML (ref 4.78–24.2)
GLOBULIN SER CALC-MCNC: 2.2 GM/DL
GLUCOSE SERPL-MCNC: 85 MG/DL (ref 65–99)
HCT VFR BLD AUTO: 33.1 % (ref 34–46.6)
HDLC SERPL-MCNC: 66 MG/DL (ref 40–60)
HGB BLD-MCNC: 13.3 G/DL (ref 12–15.9)
LDLC SERPL CALC-MCNC: 157 MG/DL (ref 0–100)
LDLC/HDLC SERPL: 2.35 {RATIO}
LYMPHOCYTES # BLD AUTO: ABNORMAL 10*3/UL
LYMPHOCYTES # BLD MANUAL: 1.76 10*3/MM3 (ref 0.7–3.1)
LYMPHOCYTES NFR BLD AUTO: ABNORMAL %
LYMPHOCYTES NFR BLD MANUAL: 36.4 % (ref 19.6–45.3)
MAGNESIUM SERPL-MCNC: 2.4 MG/DL (ref 1.6–2.4)
MCH RBC QN AUTO: 36.2 PG (ref 26.6–33)
MCHC RBC AUTO-ENTMCNC: 40.2 G/DL (ref 31.5–35.7)
MCV RBC AUTO: 90.2 FL (ref 79–97)
MONOCYTES # BLD MANUAL: 0.39 10*3/MM3 (ref 0.1–0.9)
MONOCYTES NFR BLD AUTO: ABNORMAL %
MONOCYTES NFR BLD MANUAL: 8.1 % (ref 5–12)
NEUTROPHILS # BLD MANUAL: 2.63 10*3/MM3 (ref 1.7–7)
NEUTROPHILS NFR BLD AUTO: ABNORMAL %
NEUTROPHILS NFR BLD MANUAL: 54.5 % (ref 42.7–76)
PLATELET # BLD AUTO: 197 10*3/MM3 (ref 140–450)
PLATELET BLD QL SMEAR: NORMAL
POTASSIUM SERPL-SCNC: 4.5 MMOL/L (ref 3.5–5.2)
PROT SERPL-MCNC: 6.6 G/DL (ref 6–8.5)
RBC # BLD AUTO: 3.67 10*6/MM3 (ref 3.77–5.28)
RBC MORPH BLD: NORMAL
SODIUM SERPL-SCNC: 145 MMOL/L (ref 136–145)
TRIGL SERPL-MCNC: 75 MG/DL (ref 0–150)
TSH SERPL DL<=0.005 MIU/L-ACNC: 2.2 UIU/ML (ref 0.27–4.2)
VIT B12 SERPL-MCNC: 457 PG/ML (ref 211–946)
VLDLC SERPL CALC-MCNC: 13 MG/DL (ref 5–40)
WBC # BLD AUTO: 4.83 10*3/MM3 (ref 3.4–10.8)

## 2023-06-14 DIAGNOSIS — R79.89 ABNORMAL CBC: Primary | ICD-10-CM

## 2023-06-14 LAB
BASOPHILS # BLD AUTO: 0.04 10*3/MM3 (ref 0–0.2)
BASOPHILS NFR BLD AUTO: 0.8 % (ref 0–1.5)
EOSINOPHIL # BLD AUTO: 0.14 10*3/MM3 (ref 0–0.4)
EOSINOPHIL NFR BLD AUTO: 2.8 % (ref 0.3–6.2)
ERYTHROCYTE [DISTWIDTH] IN BLOOD BY AUTOMATED COUNT: 13.3 % (ref 12.3–15.4)
HCT VFR BLD AUTO: 41.2 % (ref 34–46.6)
HGB BLD-MCNC: 13.6 G/DL (ref 12–15.9)
IMM GRANULOCYTES # BLD AUTO: 0.01 10*3/MM3 (ref 0–0.05)
IMM GRANULOCYTES NFR BLD AUTO: 0.2 % (ref 0–0.5)
LYMPHOCYTES # BLD AUTO: 1.64 10*3/MM3 (ref 0.7–3.1)
LYMPHOCYTES NFR BLD AUTO: 32.3 % (ref 19.6–45.3)
MCH RBC QN AUTO: 29.3 PG (ref 26.6–33)
MCHC RBC AUTO-ENTMCNC: 33 G/DL (ref 31.5–35.7)
MCV RBC AUTO: 88.8 FL (ref 79–97)
MONOCYTES # BLD AUTO: 0.38 10*3/MM3 (ref 0.1–0.9)
MONOCYTES NFR BLD AUTO: 7.5 % (ref 5–12)
NEUTROPHILS # BLD AUTO: 2.86 10*3/MM3 (ref 1.7–7)
NEUTROPHILS NFR BLD AUTO: 56.4 % (ref 42.7–76)
PLATELET # BLD AUTO: 194 10*3/MM3 (ref 140–450)
RBC # BLD AUTO: 4.64 10*6/MM3 (ref 3.77–5.28)
WBC # BLD AUTO: 5.07 10*3/MM3 (ref 3.4–10.8)

## 2023-08-16 ENCOUNTER — OFFICE VISIT (OUTPATIENT)
Dept: CARDIOLOGY | Facility: CLINIC | Age: 78
End: 2023-08-16
Payer: MEDICARE

## 2023-08-16 VITALS
BODY MASS INDEX: 30.55 KG/M2 | SYSTOLIC BLOOD PRESSURE: 120 MMHG | HEIGHT: 62 IN | WEIGHT: 166 LBS | HEART RATE: 67 BPM | DIASTOLIC BLOOD PRESSURE: 84 MMHG

## 2023-08-16 DIAGNOSIS — E78.2 MIXED HYPERLIPIDEMIA: ICD-10-CM

## 2023-08-16 DIAGNOSIS — I47.1 PSVT (PAROXYSMAL SUPRAVENTRICULAR TACHYCARDIA): Primary | ICD-10-CM

## 2023-08-16 PROCEDURE — 1159F MED LIST DOCD IN RCRD: CPT | Performed by: INTERNAL MEDICINE

## 2023-08-16 PROCEDURE — 99213 OFFICE O/P EST LOW 20 MIN: CPT | Performed by: INTERNAL MEDICINE

## 2023-08-16 PROCEDURE — 1160F RVW MEDS BY RX/DR IN RCRD: CPT | Performed by: INTERNAL MEDICINE

## 2023-08-16 PROCEDURE — 93000 ELECTROCARDIOGRAM COMPLETE: CPT | Performed by: INTERNAL MEDICINE

## 2023-08-16 NOTE — PROGRESS NOTES
Date of Office Visit: 2023  Encounter Provider: Anum Hicks MD  Place of Service: [facility] CARDIOLOGY  Patient Name: Kourtney Lorenzana  :1945      Patient ID:  Kourtney Lorenzana is a 77 y.o. female is here for  followup for PSVT and hyperlipidemia.        History of Present Illness    She has a history of GERD, irritable bowel syndrome, history of anemia and elevated liver enzymes.     She is , has 2 children is retired.  She is Dagoberto Lorenzana's grandmother.  She uses no cigarettes, alcohol or drugs and has 1 cup of coffee per day.     Her father hypertension, diabetes and heart disease.  Her sister, father and brother all have atrial fibrillation.  Her brother has also had a myocardial infarction. She has a sister with strokes and her father also had strokes.     She was in the emergency department on 2020 with palpitations and chest pain.  The episodes were intermittent and have been going on since 2019.  Most of the time they are associate with activity.  In the emergency department, blood pressure was 142/85, heart rate 98.  Her CMP was normal, magnesium normal, CBC normal, troponin negative, normal lipase.       She had a normal stress echocardiogram in .  She had an echocardiogram done 3/2016 showed ejection fraction 65% with grade 1 diastolic dysfunction which was no change from prior.     On 2020, she was evaluated for palpitations.  There was concerned about atrial fibrillation.  TSH was normal.  Echocardiogram completed 2020 showed EF of 65%, grade 1 diastolic dysfunction, trace aortic valve regurgitation, trace tricuspid regurgitation, and trace pulmonic valve regurgitation.  14-day Holter monitor worn 2020 showed 40 episodes of nonsustained SVT the longest 5 beats in duration at a rate of 195 bpm. On 2020, she was started her on metoprolol tartrate 25 mg nightly for nonsustained supraventricular tachycardia.      Labs on 5/15/2023 show normal CMP, normal TSH, total cholesterol 236, HDL 66, , triglycerides 75, VLDL 13, normal CBC.  She had a noncontrasted CT chest done 10/19/2022 shows no coronary artery calcification, no calcification of the aorta.  She has no chest pain or pressure.  She does not have orthopnea or PND.  She has minimal exertional dyspnea.  Sometimes she feels her heart racing and pounding but she has had no dizziness or syncope.  She does have more reflux and will be seeing Dr. Andrade for this.    Past Medical History:   Diagnosis Date    Arthritis     OSTEO. KNEES AND BACK SEES DR MIKE DOUGLAS    Bloating     WITH SOME NAUSEA    Bruising tendency     Cholelithiasis Nov 7, 2019    Discovered by liver ultrasound    Chronic low back pain     Chronic pain of both feet     Diverticular disease     Diverticulosis     Elevated liver enzymes     Gastroesophageal reflux disease without esophagitis 2/11/2016    History of anxiety     BEEN A LONG TIME    History of depression     SITUATIONAL. NOW RESOLVED    History of panic disorder     IBS (irritable bowel syndrome)     Insomnia     Internal hemorrhoids     Knee pain     Lumbar canal stenosis     Lumbosacral neuritis     Lung nodules     Multiple vitamin deficiency     Neck pain     Nephrolithiasis     July 2014 passage of kidney stone.    Postmenopausal HRT (hormone replacement therapy)     PSVT (paroxysmal supraventricular tachycardia)     Pulmonary nodules 3/25/2021    Seasonal allergies     Shoulder pain     SOB (shortness of breath) on exertion     Trouble swallowing     INTERMITTENTLY    Urine incontinence          Past Surgical History:   Procedure Laterality Date    CATARACT EXTRACTION Bilateral 09/05/2017    Dr. Jefe Barakat OD, MD    CHOLECYSTECTOMY      CHOLECYSTECTOMY LAPAROSCOPIC INTRAOPERATIVE CHOLANGIOGRAM WITH LIVER BIOPSY N/A 12/3/2019    Procedure: CHOLECYSTECTOMY LAPAROSCOPIC INTRAOPERATIVE CHOLANGIOGRAM AND LIVER BIOPSY;  Surgeon:  Benny Schneider MD;  Location: [facility] MAIN OR;  Service: General    COLONOSCOPY N/A 11/16/2012    Benign cecal polyp-Dr. Mak Rodriguez    COLONOSCOPY N/A 2015    Dr. Seema Andrade    ENDOSCOPY N/A 04/27/2018    , no specimen collected-Dr. Seema Andrade    HYSTERECTOMY Bilateral 1997    PUBOVAGINAL SLING N/A 06/17/2003    Dr. Ziggy Saba    TOE SURGERY Right     METATARASAL SURGERY GREAT TOE FOR FRACTUE    TOTAL KNEE ARTHROPLASTY Right 5/14/2021    Procedure: RIGHT TOTAL KNEE ARTHROPLASTY;  Surgeon: Catarino De La Cruz MD;  Location: [facility] MAIN OR;  Service: Orthopedics;  Laterality: Right;       Current Outpatient Medications on File Prior to Visit   Medication Sig Dispense Refill    acetaminophen (TYLENOL) 500 MG tablet Take 1-2 tablets by mouth Every 6 (Six) Hours As Needed for Mild Pain.      Cholecalciferol (D3) 50 MCG (2000 UT) tablet Take  by mouth.      Cyanocobalamin (VITAMIN B-12 SL) Place  under the tongue.      docusate sodium (COLACE) 100 MG capsule Take 1 capsule by mouth 2 (Two) Times a Day.      melatonin 5 MG tablet tablet Take 1 tablet by mouth.      naproxen sodium (ALEVE) 220 MG tablet Take 1 tablet by mouth 2 (Two) Times a Day As Needed.      Omega-3 Fatty Acids (fish oil) 1000 MG capsule capsule Take  by mouth Daily With Breakfast.      omeprazole (priLOSEC) 40 MG capsule TAKE ONE CAPSULE BY MOUTH DAILY 30 capsule 3    vitamin C (ASCORBIC ACID) 250 MG tablet Take 1 tablet by mouth Daily.      [DISCONTINUED] Multiple Vitamins-Minerals (ZINC PO) Take  by mouth. (Patient not taking: Reported on 8/16/2023)      [DISCONTINUED] TURMERIC PO Take  by mouth.       No current facility-administered medications on file prior to visit.       Social History     Socioeconomic History    Marital status:    Tobacco Use    Smoking status: Never     Passive exposure: Never    Smokeless tobacco: Never   Vaping Use    Vaping Use: Never used   Substance and Sexual Activity    Alcohol use: No     Comment:  "caffeine use: 1 cups daily    Drug use: No    Sexual activity: Defer           ROS    Procedures    ECG 12 Lead    Date/Time: 8/16/2023 1:00 PM  Performed by: Anum Hicks MD  Authorized by: Anum Hicks MD   Comparison: compared with previous ECG   Similar to previous ECG  Rhythm: sinus rhythm    Clinical impression: normal ECG            Objective:      Vitals:    08/16/23 1248   BP: 120/84   Pulse: 67   Weight: 75.3 kg (166 lb)   Height: 157.5 cm (62\")     Body mass index is 30.36 kg/mý.    Vitals reviewed.   Constitutional:       General: Not in acute distress.     Appearance: Well-developed. Not diaphoretic.   Eyes:      General: No scleral icterus.     Conjunctiva/sclera: Conjunctivae normal.   HENT:      Head: Normocephalic and atraumatic.   Neck:      Thyroid: No thyromegaly.      Vascular: No carotid bruit or JVD.      Lymphadenopathy: No cervical adenopathy.   Pulmonary:      Effort: Pulmonary effort is normal. No respiratory distress.      Breath sounds: Normal breath sounds. No wheezing. No rhonchi. No rales.   Chest:      Chest wall: Not tender to palpatation.   Cardiovascular:      Normal rate. Regular rhythm.      Murmurs: There is no murmur.      No gallop.  No rub.   Pulses:     Intact distal pulses.      Carotid: 2+ bilaterally.     Radial: 2+ bilaterally.     Dorsalis pedis: 2+ bilaterally.     Posterior tibial: 2+ bilaterally.  Edema:     Peripheral edema absent.   Abdominal:      General: Bowel sounds are normal. There is no distension or abdominal bruit.      Palpations: Abdomen is soft. There is no abdominal mass.      Tenderness: There is no abdominal tenderness.   Musculoskeletal:         General: No deformity.      Extremities: No clubbing present.     Cervical back: Neck supple. Skin:     General: Skin is warm and dry. There is no cyanosis.      Coloration: Skin is not pale.      Findings: No rash.   Neurological:      Mental Status: Alert and oriented to person, place, " and time.      Cranial Nerves: No cranial nerve deficit.   Psychiatric:         Judgment: Judgment normal.       Lab Review:       Assessment:      Diagnosis Plan   1. PSVT (paroxysmal supraventricular tachycardia)        2. Mixed hyperlipidemia          Paroxysmal supraventricular tachycardia.   Magnesium did not help. off metoprolol as thought it caused itching but may not have.  Atrial fibrillation has not been detected.  She is having very little SVT at this time, continue dietary modifications.  Family history of atrial fibrillation.  Hyperlipidemia, trying to treat this with diet and activity.  She has no coronary calcification and no calcification in her thoracic aorta.     Plan:       See Elizabeth in 1 year, no medication changes, no other testing at this time, overall looks great.

## 2023-08-23 ENCOUNTER — OFFICE VISIT (OUTPATIENT)
Dept: GASTROENTEROLOGY | Facility: CLINIC | Age: 78
End: 2023-08-23
Payer: MEDICARE

## 2023-08-23 VITALS
TEMPERATURE: 96.2 F | DIASTOLIC BLOOD PRESSURE: 90 MMHG | BODY MASS INDEX: 30.69 KG/M2 | HEIGHT: 62 IN | WEIGHT: 166.8 LBS | SYSTOLIC BLOOD PRESSURE: 138 MMHG

## 2023-08-23 DIAGNOSIS — K21.9 GASTROESOPHAGEAL REFLUX DISEASE WITHOUT ESOPHAGITIS: Primary | ICD-10-CM

## 2023-08-23 DIAGNOSIS — K58.1 IRRITABLE BOWEL SYNDROME WITH CONSTIPATION: ICD-10-CM

## 2023-08-23 RX ORDER — FAMOTIDINE 20 MG/1
40 TABLET, FILM COATED ORAL NIGHTLY
Qty: 180 TABLET | Refills: 2 | Status: SHIPPED | OUTPATIENT
Start: 2023-08-23

## 2023-08-23 NOTE — PROGRESS NOTES
"Chief Complaint  Heartburn and Abdominal Pain    Subjective          History Of Present Illness:    Kourtney Lorenzana is a  77 y.o. female and of Dr. Andrade's with a history of dysphagia and GERD.    On her last visit patient was having better controlled reflux symptoms.  She had had some weight loss that was helping with that and had remained on his omeprazole.  Recently she has developed some more reflux symptoms.  She reports that she is hesitant to stay on a PPI as she is read about the long-term effects of these medications.  She feels that her symptoms are often worse in the mornings. She denies any dysphagia and says this has been well controlled. She tries to avoid her food triggers.    She additionally has been complaining of some bloating and she continues to be somewhat constipated. She goes approximately 2 to 3 days without a bowel movement. She takes her daily stool softener but forgets to take her miralax daily. She denies any melena or hematochezia.     I reviewed her last EGD which was in 2018 in which she had a small hiatal hernia, normal stomach, normal duodenum.  An esophagram on 4/19/2022 with tiny sliding hiatal hernia, mild mass effect of the cervical esophagus from cervical osteophytes otherwise no stenosis.    Objective   Vital Signs:   /90   Temp 96.2 øF (35.7 øC)   Ht 157.5 cm (62\")   Wt 75.7 kg (166 lb 12.8 oz)   BMI 30.51 kg/mý       Physical Exam  Vitals reviewed.   Constitutional:       General: She is not in acute distress.     Appearance: Normal appearance. She is not ill-appearing.   HENT:      Head: Normocephalic and atraumatic.      Nose: Nose normal.      Mouth/Throat:      Mouth: Mucous membranes are moist.      Pharynx: Oropharynx is clear.   Eyes:      Extraocular Movements: Extraocular movements intact.      Conjunctiva/sclera: Conjunctivae normal.      Pupils: Pupils are equal, round, and reactive to light.   Pulmonary:      Effort: Pulmonary effort is normal. "   Abdominal:      General: There is no distension.      Palpations: There is no mass.      Tenderness: There is no abdominal tenderness.   Musculoskeletal:         General: No swelling. Normal range of motion.      Cervical back: Normal range of motion.   Skin:     General: Skin is warm and dry.      Findings: No bruising or rash.   Neurological:      General: No focal deficit present.      Mental Status: She is alert and oriented to person, place, and time.      Motor: No weakness.      Gait: Gait normal.   Psychiatric:         Mood and Affect: Mood normal.        Result Review :   The following data was reviewed by: Nadege Moore PA-C on 08/23/2023:  CMP          10/4/2022    10:30 5/15/2023    09:24   CMP   Glucose 85  85    BUN 13  15    Creatinine 0.92  0.78    Sodium 142  145    Potassium 4.3  4.5    Chloride 108  109    Calcium 9.7  9.4    Total Protein 6.2  6.6    Albumin 4.40  4.4    Globulin 1.8  2.2    Total Bilirubin 0.5  0.5    Alkaline Phosphatase 131  149    AST (SGOT) 12  16    ALT (SGPT) 21  15    BUN/Creatinine Ratio 14.1  19.2      CBC          10/4/2022    10:30 5/15/2023    09:24 6/14/2023    10:36   CBC   WBC 4.70  4.83  5.07    RBC 4.55  3.67  4.64    Hemoglobin 13.5  13.3  13.6    Hematocrit 39.9  33.1  41.2    MCV 87.7  90.2  88.8    MCH 29.7  36.2  29.3    MCHC 33.8  40.2  33.0    RDW 13.7  13.1  13.3    Platelets 203  197  194            Assessment and Plan    Diagnoses and all orders for this visit:    1. Gastroesophageal reflux disease without esophagitis (Primary)  Overview:  Small hiatal hernia noted on endoscopy May 1, 2018    Orders:  -     famotidine (Pepcid) 20 MG tablet; Take 2 tablets by mouth Every Night.  Dispense: 180 tablet; Refill: 2    2. Irritable bowel syndrome with constipation       We will trial adding nightly Pepcid to her regimen to see if this helps with her worsening reflux symptoms. She does not want to take omeprazole twice daily and I agree with her on  this. With the addition of Pepcid and she is going to try to take her omeprazole every other day since she just refilled her prescription.  If this is efficacious then we will drop her dose to 20 mg and see how she does on this with Pepcid.  As discussed before in the setting of her fixed sigmoid colon she would benefit from going more regularly.  I recommend she try and take MiraLAX more regularly.  Continue taking stool softener as she is doing. I believe if she can go more regularly she will have a less bloating.    Follow Up   Return in about 1 year (around 8/23/2024) for Dr. Andrade or Nadege Quiroga PA-C.    Dragon dictation used throughout this note.            Nadege Quiroga PA-C   [facility]  [address] [place] [address]  Office: [phone]

## 2023-10-03 ENCOUNTER — OFFICE VISIT (OUTPATIENT)
Dept: FAMILY MEDICINE CLINIC | Facility: CLINIC | Age: 78
End: 2023-10-03
Payer: MEDICARE

## 2023-10-03 VITALS
HEIGHT: 62 IN | WEIGHT: 164 LBS | OXYGEN SATURATION: 98 % | SYSTOLIC BLOOD PRESSURE: 110 MMHG | BODY MASS INDEX: 30.18 KG/M2 | RESPIRATION RATE: 14 BRPM | DIASTOLIC BLOOD PRESSURE: 70 MMHG | HEART RATE: 74 BPM | TEMPERATURE: 98.9 F

## 2023-10-03 DIAGNOSIS — J34.89 NOSE PAIN: Primary | ICD-10-CM

## 2023-10-03 DIAGNOSIS — J34.89 NASAL LESION: ICD-10-CM

## 2023-10-03 PROCEDURE — 1160F RVW MEDS BY RX/DR IN RCRD: CPT | Performed by: PHYSICIAN ASSISTANT

## 2023-10-03 PROCEDURE — 1159F MED LIST DOCD IN RCRD: CPT | Performed by: PHYSICIAN ASSISTANT

## 2023-10-03 PROCEDURE — 99213 OFFICE O/P EST LOW 20 MIN: CPT | Performed by: PHYSICIAN ASSISTANT

## 2023-10-03 RX ORDER — VALACYCLOVIR HYDROCHLORIDE 1 G/1
1000 TABLET, FILM COATED ORAL 3 TIMES DAILY
Qty: 30 TABLET | Refills: 0 | Status: SHIPPED | OUTPATIENT
Start: 2023-10-03

## 2023-10-03 RX ORDER — SULFAMETHOXAZOLE AND TRIMETHOPRIM 800; 160 MG/1; MG/1
1 TABLET ORAL 2 TIMES DAILY
Qty: 20 TABLET | Refills: 0 | Status: SHIPPED | OUTPATIENT
Start: 2023-10-03 | End: 2023-10-03 | Stop reason: SDUPTHER

## 2023-10-03 RX ORDER — SULFAMETHOXAZOLE AND TRIMETHOPRIM 800; 160 MG/1; MG/1
1 TABLET ORAL 2 TIMES DAILY
Qty: 20 TABLET | Refills: 0 | Status: SHIPPED | OUTPATIENT
Start: 2023-10-03

## 2023-10-03 NOTE — PROGRESS NOTES
Subjective   Kourtney Lorenzana is a 77 y.o. female who presents today for evaluation of pain in the right nare.     History of Present Illness     She was smelling stinky feet or dog poop. She was smelling things that were not noticed by others. She has a lesion that started a couple months ago with pain, crusting and lesion in her nose. Always tender but worse at times. Never resolves completely. No blood from nose. She has pain and lesion only on the right side. No symptoms on the left.     Has used Neosporin and Netipot to treat. Neosporin helped to take some of the pain away. Constantly with runny nose.     The following portions of the patient's history were reviewed and updated as appropriate: allergies, current medications, past family history, past medical history, past social history, past surgical history and problem list.    Review of Systems    Objective   Vitals:    10/03/23 0924   BP: 110/70   Pulse: 74   Resp: 14   Temp: 98.9 °F (37.2 °C)   SpO2: 98%     Body mass index is 29.99 kg/m².    Physical Exam  Vitals and nursing note reviewed.   Constitutional:       Appearance: She is well-developed.   HENT:      Head: Normocephalic and atraumatic.      Right Ear: External ear normal.      Left Ear: External ear normal.      Nose:      Right Turbinates: Swollen.      Left Turbinates: Swollen.      Comments: Tenderness with palpation external nose and just inside right nare.  There is a small pustule noted.  Area cleaned with alcohol, barely opened with 25-gauge needle to be able to get a culture.  Only tiny amount of discharge.  Eyes:      General: Lids are normal.      Conjunctiva/sclera: Conjunctivae normal.   Neck:      Vascular: No carotid bruit.   Cardiovascular:      Rate and Rhythm: Normal rate and regular rhythm.      Heart sounds: Normal heart sounds. No murmur heard.    No friction rub. No gallop.   Pulmonary:      Effort: Pulmonary effort is normal. No respiratory distress.      Breath sounds:  Normal breath sounds. No wheezing, rhonchi or rales.   Musculoskeletal:         General: No deformity.      Cervical back: Neck supple.   Skin:     General: Skin is warm and dry.   Neurological:      Mental Status: She is alert and oriented to person, place, and time.      Gait: Gait normal.   Psychiatric:         Speech: Speech normal.         Behavior: Behavior normal.         Thought Content: Thought content normal.       Assessment & Plan   Diagnoses and all orders for this visit:    1. Nose pain (Primary)  -     Anaerobic & Aerobic Culture ([facility] Only) - Swab, Naris, Right; Future  -     Anaerobic & Aerobic Culture ([facility] Only) - Swab, Naris, Right    2. Nasal lesion  -     Anaerobic & Aerobic Culture ([facility] Only) - Swab, Naris, Right; Future  -     Anaerobic & Aerobic Culture ([facility] Only) - Swab, Naris, Right  -     Discontinue: sulfamethoxazole-trimethoprim (Bactrim DS) 800-160 MG per tablet; Take 1 tablet by mouth 2 (Two) Times a Day.  Dispense: 20 tablet; Refill: 0  -     Discontinue: mupirocin (BACTROBAN) 2 % nasal ointment; 1 application  into the nostril(s) as directed by provider 3 (Three) Times a Day for 10 days.  Dispense: 30 g; Refill: 0  -     sulfamethoxazole-trimethoprim (Bactrim DS) 800-160 MG per tablet; Take 1 tablet by mouth 2 (Two) Times a Day.  Dispense: 20 tablet; Refill: 0  -     mupirocin (BACTROBAN) 2 % nasal ointment; 1 application  into the nostril(s) as directed by provider 3 (Three) Times a Day for 10 days.  Dispense: 30 g; Refill: 0  -     valACYclovir (Valtrex) 1000 MG tablet; Take 1 tablet by mouth 3 (Three) Times a Day.  Dispense: 30 tablet; Refill: 0        Assessment and Plan  Patient with ongoing pain in right nare following abnormal smell.  She reports intermittent crusting but continued pain.  She did have HSV on the right side of her mouth when this initially started.  I will treat for the possibility of HSV with Valtrex 1 g 3 times daily for 10 days.  I will also  have her use Bactroban nasal 3 times daily for 10 days and will start Bactrim DS while awaiting culture.  If she has no improvement, worsening, new or changing symptoms, or any associated symptoms, we will need to get her to ENT for further work-up and treatment.    I spent 20 minutes caring for Kourtney Lorenzana on this date of service. This time includes time spent by me in the following activities as necessary: preparing for the visit, reviewing tests, specialists records and previous visits, obtaining and/or reviewing a separately obtained history, performing a medically appropriate exam and/or evaluation, counseling and educating the patient, family, caregiver, referring and/or communicating with other healthcare professionals, documenting information in the medical record, independently interpreting results and communicating that information with the patient, family, caregiver, and developing a medically appropriate treatment plan with consideration of other conditions, medications, and treatments.

## 2023-10-04 RX ORDER — OMEPRAZOLE 40 MG/1
40 CAPSULE, DELAYED RELEASE ORAL DAILY
Qty: 90 CAPSULE | Refills: 3 | Status: SHIPPED | OUTPATIENT
Start: 2023-10-04

## 2023-10-06 ENCOUNTER — TELEPHONE (OUTPATIENT)
Dept: FAMILY MEDICINE CLINIC | Facility: CLINIC | Age: 78
End: 2023-10-06

## 2023-10-06 NOTE — TELEPHONE ENCOUNTER
Caller: Kourtney Lorenzana    Relationship: Self    Best call back number:[phone]     What test was performed: NASAL CULTURE     When was the test performed: 10/3    Where was the test performed: IN OFFICE    Additional notes: PLEASE CALL WITH RESULTS

## 2023-10-09 ENCOUNTER — PATIENT MESSAGE (OUTPATIENT)
Dept: FAMILY MEDICINE CLINIC | Facility: CLINIC | Age: 78
End: 2023-10-09
Payer: COMMERCIAL

## 2023-10-10 ENCOUNTER — TELEPHONE (OUTPATIENT)
Dept: FAMILY MEDICINE CLINIC | Facility: CLINIC | Age: 78
End: 2023-10-10

## 2023-10-10 NOTE — TELEPHONE ENCOUNTER
Pharmacy Name:  [facility] [place], [place]     Pharmacy representative phone number: [phone]    What medication are you calling in regards to: MUPIROCIN OINTMENT     What question does the pharmacy have: WRITTEN AS MUPIROCIN NASAL OINTMENT AS THEY ARE 1 GRAM TUBES OF OINTMENT, SO NEED TO KNOW IF IT CAN BE THE 22 GRAM TUBE OF OINTMENT.      Who is the provider that prescribed the medication: CRAI HARLEY    Additional notes: PLEASE CONTACT PHARMACY TO CONFIRM

## 2023-10-12 LAB
BACTERIA SPEC AEROBE CULT: ABNORMAL
BACTERIA SPEC ANAEROBE CULT: ABNORMAL
BACTERIA SPEC CULT: ABNORMAL
BACTERIA SPEC CULT: ABNORMAL
OTHER ANTIBIOTIC SUSC ISLT: ABNORMAL

## 2023-10-13 ENCOUNTER — TELEPHONE (OUTPATIENT)
Dept: FAMILY MEDICINE CLINIC | Facility: CLINIC | Age: 78
End: 2023-10-13
Payer: COMMERCIAL

## 2023-10-13 NOTE — TELEPHONE ENCOUNTER
left message to call [facility] FOR HUB TO RELAY.    Bacteria should be sensitive to Bactrim from culture.

## 2023-10-13 NOTE — TELEPHONE ENCOUNTER
[phone] (Mobile)     [facility] PROVIDED THE RELAY MESSAGE FROM THE OFFICE  PATIENT   ADDITIONAL INFORMATION:   left message to call [facility] FOR [facility]UB TO RELAY.     Bacteria should be sensitive to Bactrim from culture.          PATIENT ADVISED.  SHE WAS NOT SURE SHE SHOULD TAKE THE MEDICATION NOW SINCE SYMPTOMS HAD CLEARED, TRANSFERRED OF OFFICE.    [facility] PROVIDED THE RELAY MESSAGE FROM THE OFFICE  PATIENT HAS FURTHER QUESTIONS, WAS ABLE TO SPEAK WITH OFFICE STAFF.

## 2023-11-29 ENCOUNTER — OFFICE VISIT (OUTPATIENT)
Dept: FAMILY MEDICINE CLINIC | Facility: CLINIC | Age: 78
End: 2023-11-29
Payer: MEDICARE

## 2023-11-29 VITALS
WEIGHT: 169 LBS | DIASTOLIC BLOOD PRESSURE: 62 MMHG | HEART RATE: 78 BPM | BODY MASS INDEX: 31.1 KG/M2 | TEMPERATURE: 98.4 F | HEIGHT: 62 IN | SYSTOLIC BLOOD PRESSURE: 118 MMHG | OXYGEN SATURATION: 97 % | RESPIRATION RATE: 18 BRPM

## 2023-11-29 DIAGNOSIS — R79.89 ABNORMAL CBC: ICD-10-CM

## 2023-11-29 DIAGNOSIS — R74.8 ELEVATED ALKALINE PHOSPHATASE LEVEL: ICD-10-CM

## 2023-11-29 DIAGNOSIS — Z87.440 HISTORY OF UTI: ICD-10-CM

## 2023-11-29 DIAGNOSIS — M50.30 DDD (DEGENERATIVE DISC DISEASE), CERVICAL: ICD-10-CM

## 2023-11-29 DIAGNOSIS — K59.09 CHRONIC CONSTIPATION: ICD-10-CM

## 2023-11-29 DIAGNOSIS — K21.9 GASTROESOPHAGEAL REFLUX DISEASE, UNSPECIFIED WHETHER ESOPHAGITIS PRESENT: ICD-10-CM

## 2023-11-29 DIAGNOSIS — Z82.49 FAMILY HISTORY OF CEREBRAL ANEURYSM: ICD-10-CM

## 2023-11-29 DIAGNOSIS — M48.02 SPINAL STENOSIS, CERVICAL REGION: ICD-10-CM

## 2023-11-29 DIAGNOSIS — E83.42 HYPOMAGNESEMIA: ICD-10-CM

## 2023-11-29 DIAGNOSIS — R51.9 CHRONIC NONINTRACTABLE HEADACHE, UNSPECIFIED HEADACHE TYPE: ICD-10-CM

## 2023-11-29 DIAGNOSIS — M43.12 SPONDYLOLISTHESIS, CERVICAL REGION: ICD-10-CM

## 2023-11-29 DIAGNOSIS — H53.9 VISION CHANGES: ICD-10-CM

## 2023-11-29 DIAGNOSIS — N93.9 VAGINAL SPOTTING: ICD-10-CM

## 2023-11-29 DIAGNOSIS — G89.29 CHRONIC PAIN OF LEFT KNEE: ICD-10-CM

## 2023-11-29 DIAGNOSIS — E55.9 VITAMIN D DEFICIENCY: ICD-10-CM

## 2023-11-29 DIAGNOSIS — R10.32 LEFT LOWER QUADRANT PAIN: ICD-10-CM

## 2023-11-29 DIAGNOSIS — E78.49 OTHER HYPERLIPIDEMIA: Primary | ICD-10-CM

## 2023-11-29 DIAGNOSIS — R53.82 CHRONIC FATIGUE: ICD-10-CM

## 2023-11-29 DIAGNOSIS — Z11.59 ENCOUNTER FOR HEPATITIS C SCREENING TEST FOR LOW RISK PATIENT: ICD-10-CM

## 2023-11-29 DIAGNOSIS — G47.9 SLEEP DISTURBANCES: ICD-10-CM

## 2023-11-29 DIAGNOSIS — G89.29 CHRONIC NONINTRACTABLE HEADACHE, UNSPECIFIED HEADACHE TYPE: ICD-10-CM

## 2023-11-29 DIAGNOSIS — G47.19 OTHER HYPERSOMNIA: ICD-10-CM

## 2023-11-29 DIAGNOSIS — E53.8 B12 DEFICIENCY: ICD-10-CM

## 2023-11-29 DIAGNOSIS — H93.8X1 ABNORMAL SENSATION IN RIGHT EAR: ICD-10-CM

## 2023-11-29 DIAGNOSIS — M54.2 CERVICALGIA: ICD-10-CM

## 2023-11-29 DIAGNOSIS — Z78.0 POSTMENOPAUSAL: ICD-10-CM

## 2023-11-29 DIAGNOSIS — M54.12 CERVICAL RADICULOPATHY: ICD-10-CM

## 2023-11-29 DIAGNOSIS — M25.562 CHRONIC PAIN OF LEFT KNEE: ICD-10-CM

## 2023-11-29 DIAGNOSIS — N32.81 OAB (OVERACTIVE BLADDER): ICD-10-CM

## 2023-11-29 PROCEDURE — 1160F RVW MEDS BY RX/DR IN RCRD: CPT | Performed by: PHYSICIAN ASSISTANT

## 2023-11-29 PROCEDURE — 99214 OFFICE O/P EST MOD 30 MIN: CPT | Performed by: PHYSICIAN ASSISTANT

## 2023-11-29 PROCEDURE — 1159F MED LIST DOCD IN RCRD: CPT | Performed by: PHYSICIAN ASSISTANT

## 2023-11-29 NOTE — PROGRESS NOTES
Subjective   Kourtney Lorenzana is a 78 y.o. female who presents today fin follow up of hyperlipidemia, vitamin D deficiency, B12 deficiency, elevated alkaline phosphatase, magnesium, history of UTI, vaginal spotting, GERD, chronic fatigue, sleep, headache, significant back, neck, and joint pain, and vision change.    HPI    Reports she has been having sensation of hearing heartbeat in her ear with yawn and worse at night. Has been there years- 3-4 years. It was rare but is now happening often. She would have daily but tries to keep in yawning. Won't happen if does not yawn. If turns head a certain way, she has will start as well. She will try to stop with putting her finger in ear. Has to run it's course and stops on it's own but lasts up to 1 hour. Has had a few times and was there constantly for an hour and keeping awake. No other associated symptoms with it. Does have some hearing loss. No ringing in ears.   PGF- brain aneurysm. Father with TIAs.     Lifestyle changes-   Moved and living in the walkout basement with her daughter.   Weight watchers-has continued to be compliant and continued with weight loss.  7/1/2022- she had to change diet- cutting sugars completely and decreased carbs. Recently restarted 1 slice of bread every other day- whole wheat with no sugars. If any meat- Tuna, chicken, rarely beef.   Not able to exercise much other than the cleaning and housekeeping and yard work. Swimming some this summer.   Hyperlipidemia- cholesterol was higher 6/2022- advised she start Lipitor 40 mg nightly. She did not take medication. Diet helped with previous labs.   After [place], she was as good but being more careful again. Still better about limiting bread, staying away from pies, cakes, and cookies to an extent. Better than she was previously.   Patient was diligent with diet and exercise with weight watchers.  Continued with weight loss.  Vitamin D deficiency- taking 2000 IU daily. Tolerating without AE  B12  deficiency- taking 1 dropper full daily. 1000 mcg daily.   10/2022-advised to restart B12 100 mcg daily or 500 mcg twice weekly. She did not restart   6/2022-advised decrease B12 to 500 mcg 3 times weekly rather than daily. Women's vitamin with B6- small amount. Stopped vitain D.   Previously 500 mcg daily. Tolerating without AE  Elevated alk phos- taking Aleve and stopped then started again with headaches.  Was taking 2 Tylenol at bedtime.    Cholecystectomy 12/3/2019 with Dr. Schneider.  Went for surgery follow-up with incisions healing well.  She was taking Aleve and stopped. She was also taking 2 Tylenol at bedtime. It was thought elevated alkaline phosphatase could have been related to GB disease. She underwent cholecystectomy 12/3/2019 with Dr. Schneider and went for surgery follow-up with incisions healing well.   Magnesium-No longer taking magnesium oxide 400 mg. Had side effects. Did not restart.   Added Quercetin, dandelion root, and Zinc- reports she thinks these are good for heart, liver, and immune system. She is not vaccinated for Covid 19.     UTI- no issues at this time.   11/2022- she had left flank pain with radiation to left lower abdomen. Then resolved with Aleve and heating pad.   Started at 2 am 11/19. Burning with urination. She had UTI in the past frequency. She had not had in years but noted fast.  No fever but had chills 11/19/2022 tylenol and Azo every 6 hours. She was worried about not treating UTI.   Patient was seen by SLY Spencer 4/27/2023 with UTI.  She was given Omnicef that was appropriate by culture.  Patient then called again on 5/5/2023 and reported she still had burning sensation with urination and had some back pain and fatigue.  She was advised she needed to come in to leave another urine.  However, Macrobid was called into the pharmacy by SLY Spencer. She had burning with urination, lower back pain. She got relief with Tylenol and Aleve but was not getting relief  at  that time. She was unsure if related to urination.   Vaginal spotting- still vaginal spotting.   Previously reported she no longer had spotting but had incontinence of urine.   She also discussed spotting with Urogyn and was advised this was from vaginal dryness. She is rarely has spotting will continue follow up with them as directed.    Last urogynecology appointment 11/2018- negative evaluation for spotting and vaginal bleeding and no erosions on exam.  She was not using HRT or vaginal estrogen.  She also had mixed incontinence that was worsening.  She did not respond to Myrbetriq and had GI side effects-discontinue medication.  Prior sling for JSOEPH that worked for only a couple years.  Discussed PTNS versus Botox versus InterStim for her urge and discussed surgical options for treatment of stress incontinence including mid urethral sling with mesh versus biologic material, urethral injections or Christy procedures.  They started vaginal estrogen for the atrophy.  Advised follow-up in 6 months.  Patient did not follow-up    GERD- taking Omeprazole every other day or every 3rd day. Still has heartburn if she eats the wrong thing and is on the 3rd day. Not eating hardly any fried foods and allows her to take less often.   She was previously stable on Protonix 40 mg daily. No breakthrough symptoms when taking medication regularly.  She was seen by GI was advised to continue.    She was having increased heartburn and reflux but changing Protonix to Omeprazole helped.   She had improvement with change in eating.  Patient was seen 12/10/2019 by Dr. Mitchell and was given Mobic for back pain. She then had RUQ pain and Dr Schneider advised to increase to 15 mg for 2 weeks.   12/30/2019-patient went to ER with sharp chest pain.  Was given Protonix 40 mg daily and advised to see cardiology.  Appointment was made for March 2020. Thinks it was a side effect from Mobic.  Abdominal pain- Also has talked with GI- Dr Andrade- she has  scar tissue in abdomen and was told that she has some crooked places in intestines. No further colonoscopies. Happening more often that she has increased pain in lower abdomen. Flares of pain prevent bending over. Sometimes LLQ and sometimes RLQ. If gets on couch with feet up and heating pad, takes Tylenol and eases up. Never knows when will hit. She will feel limited in how far she goes and how much she does.   She is also struggling with constipation. Advised Miralax and stool softeners and eating prunes.   Did not want to take Miralax all the time. Always trying to find something- came across bio3 complete- has probiotics and was for gut health. Not helping as much as fiber.   When has constipation, she has increased pain.     Sleep-Waking up 4 x nightly to urinate but can usually go back to sleep.  She was taking Benadryl sleep aid 50 mg and has taken Tylenol at bedtime. Was taking Benadryl instead of Zyrtec but now taking Zyrtec daily.   Fatigue- still feeling tired.   Previously, restarted B12 about 1 month ago with increased fatigue. Helps a little then about 2 pm, she has increased fatigue.   She was seen 6/2019 and had been very fatigued all the time. She could only tolerate being active a few hours then she was tired for the rest of the day, complained of shortness of breath and fatigued with exertion. Patient also noted bilateral toes were blue. She had normal DP and PT pulses bilaterally with normal temperature of the feet. She felt overall cool/ cold all the time. She then joined weight watchers and has improved some. Patient continues waking up 4 x nightly to urinate but states she can usually go back to sleep. She is taking Benadryl sleep aid 50 mg and Tylenol at bedtime as needed. If she has persistent sleep disturbances or fatigue, we will consider sleep study.    Headache- did physical therapy- did not help much. Went for 6 weeks. She goes for massage once monthly- helps. Lasts a couple weeks. Has  "not been too bad.   Started 1/2022 with bilateral temporal, parietal, and at the top of head and back of head. Throbbing pain and mild pain. 5/10- lasts about 1 hour or so. Tries not to keep track and tries to stay busy. No associated dizziness, vision changes, nausea, vomiting, confusion, change in speech, gait abnormality, or other neurological symptoms. Tylenol not helping enough and having to use Aleve. She is now going back and forth with Tylenol and Aleve. Worried about her liver. Taking 1-2 every night and sometimes 1 in the morning if bad. She reports it depends on the weather. Rain and sudden changes in weather- cold to hot and humid.     Vision change- She had gradual vision change. She had treatment on both eyes- laser treatment. Helped her vision.   She also has a change in color on skin of left side of face. If she pulls back on the lesion, she has a pinching pain. She has had another spot on right side of face and place on right side of nose that scabs and the scab falls off and recurs. Pinching pain if she pushes on it or moves it.     Neck and shoulder pain- She goes for massage once monthly- helps. Lasts a couple weeks. Has not been too bad.   She completed PT but did not help a lot.  She completed physical therapy for 6 weeks.  Been seen by Dr Napier- told that the bone spurs were not concerning for esophagus and thought no surgery at this time. Recommended PT and ANNE. PT was helping and did not feel she needed ANNE. May want to do \"down the road\" but not right now. Seen by Dr Garcia with injection. Helped some.   6/2022- right side of neck is swollen more than the other side. She has pain and stiffness in neck and goes to right shoulder.   Patient also noted pain in her neck and down her left upper arm. She was not sore to touch or feel but is a deep pain. She also has bilateral hand numbness. We started with labs and advised we would consider PT and additional workup if no improvement or " recurrence.  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  She also has persistent neck stiffness with the shoulder pain. I will refer to PT today. If persistent neck, shoulder, and knee symptoms, or persistent numbness, we will consider EMG and further imaging. She should call or return if no resolution of symptoms.    Knee pain- The other knee needs surgery but not ready for recovery. Right is ok after operation and left is not too bad.   Patient continues follow up with Dr Catarino De La Cruz, orthopedist, who advised PT and knee injections. Her right knee pain improved and her left knee still hurts. She will return as needed for injections with orthopedist. Patient's last appt was 1/7/2020 for OA knee and shoulder pain.    He advised no surgical intervention at this time for the knee and to continue PT.  She had right knee surgery 5/2021- she did well with surgery and has healed well.   Orthopedic surgery- Dr. Catarino De La Cruz- patient was seen 1/7/2020 for OA knee and shoulder pain.  They advised she was starting to develop frozen shoulder and ordered MRI shoulder to make further recommendations pending results.  Advised no surgical intervention at this time for the knee- continue PT. Reports neck stiffness with the shoulder pain.     Rash-rash is gone but she continues with pain in the scar area when she is needing to have BM or urinate and is not able to go right away.   At her previous visit, she stated she has a scar on her abdomen with pain in the area of her scar from hysterectomy. When she rubbed it, the skin came off. Also, she had itching under bilateral breasts with a rash. On exam, she appeared to have cutaneous candidiasis and was treated with Nystatin cream and powder in both areas and has had improvement.      The following portions of the patient's history were reviewed and updated as appropriate: allergies,  current medications, past family history, past medical history, past social history, past surgical history and problem list.    Review of Systems   Constitutional:  Positive for fatigue.   Eyes:  Positive for visual disturbance.   Respiratory: Negative.     Cardiovascular: Negative.    Musculoskeletal:  Positive for arthralgias, back pain, neck pain and neck stiffness.   Skin:  Positive for color change and rash.   Neurological:  Positive for headaches.   Psychiatric/Behavioral:  Positive for sleep disturbance.        Objective    Vitals:    11/29/23 1014   BP: 118/62   Pulse: 78   Resp: 18   Temp: 98.4 °F (36.9 °C)   SpO2: 97%     BMI is >= 30 and <35. (Class 1 Obesity). The following options were offered after discussion;: exercise counseling/recommendations and nutrition counseling/recommendations    Physical Exam   Constitutional: She is oriented to person, place, and time. She appears well-developed.   HENT:   Head: Normocephalic and atraumatic.   Right Ear: External ear normal.   Left Ear: External ear normal.   Nose: Nose normal.   Eyes: Conjunctivae and lids are normal.   Neck: Carotid bruit is not present.   Cardiovascular: Normal rate, regular rhythm and normal heart sounds. Exam reveals no gallop and no friction rub.   No murmur heard.  Pulmonary/Chest: Effort normal and breath sounds normal. No respiratory distress. She has no wheezes. She has no rhonchi. She has no rales.   Abdominal: Soft. Normal appearance and bowel sounds are normal. She exhibits no shifting dullness, no distension, no abdominal bruit and no mass. There is no abdominal tenderness. There is no rigidity, no rebound and no guarding. No hernia.   Musculoskeletal: No deformity.   Neurological: She is alert and oriented to person, place, and time. Gait normal.   Skin: Skin is warm and dry. She is not diaphoretic.   Psychiatric: Her speech is normal and behavior is normal. Judgment and thought content normal.   Nursing note and vitals  reviewed.      Assessment & Plan   Diagnoses and all orders for this visit:    1. Other hyperlipidemia (Primary)  -     Comprehensive Metabolic Panel  -     CK  -     Lipid Panel With LDL / HDL Ratio  -     TSH    2. Vitamin D deficiency  -     Comprehensive Metabolic Panel  -     Vitamin D,25-Hydroxy  -     TSH    3. B12 deficiency  -     CBC & Differential  -     Vitamin B12 & Folate    4. Abnormal CBC  -     CBC & Differential  -     Vitamin B12 & Folate  -     TSH    5. Elevated alkaline phosphatase level  -     Comprehensive Metabolic Panel    6. Hypomagnesemia  -     Magnesium    7. History of UTI  -     Urinalysis With Culture If Indicated -    8. OAB (overactive bladder)  -     Urinalysis With Culture If Indicated -    9. Vaginal spotting  -     Ambulatory Referral to Gynecology    10. Gastroesophageal reflux disease, unspecified whether esophagitis present    11. Left lower quadrant pain    12. Chronic constipation  -     Discontinue: linaclotide (Linzess) 72 MCG capsule capsule; Take 1 capsule by mouth Every Morning Before Breakfast.  Dispense: 30 capsule; Refill: 0    13. Sleep disturbances    14. Other hypersomnia     15. Chronic fatigue    16. Chronic nonintractable headache, unspecified headache type    17. Vision changes    18. DDD (degenerative disc disease), cervical    19. Spondylolisthesis, cervical region    20. Spinal stenosis, cervical region    21. Cervicalgia    22. Cervical radiculopathy    23. Chronic pain of left knee    24. Encounter for hepatitis C screening test for low risk patient  -     Hepatitis C antibody    25. Postmenopausal  -     DEXA Bone Density Axial; Future    26. Abnormal sensation in right ear  -     CT angiogram head; Future  -     CT angiogram neck w wo contrast; Future    27. Family history of cerebral aneurysm  -     CT angiogram head; Future  -     CT angiogram neck w wo contrast; Future    Other orders  -     Microscopic Examination -           Assessment and  plan  Patient will have fasting labs. Call if no results in 1 week. Stability of conditions, plan, follow up, and further recommendations pending labs. Follow up in 6 months if labs are stable.  She declines AWV.    Hyperlipidemia- She did not take Lipitor as prescribed and wanted to work on diet and exercise.  I have counseled about her concerns for statins vs risks of not taking medication. Continued dietary changes with weight loss. Await labs for further recommendations.   Vitamin D deficiency- Patient to continue 2000 IU daily. Dosing recommendations pending labs.  B12 deficiency- Continue B12. Further recommendations pending lab results.   Elevated alk phos- Avoid NSAIDS. Cholecystectomy healed. I will continue to monitor and make further recommendations.   Magnesium- I will recheck and make further recommendations.   Dysuria, recent UTI- She has no symptoms. Await labs for recommendations. We will need to consider return to urogynecology or to be seen by urology if she has recurrent urinary tract infections, as she has postmenopausal atrophy and stress and urge incontinence that is chronic.  Vaginal spotting- Patient was seen by urogynecology but has had continued vaginal spotting after previously reporting this had resolved. I will refer back to GYN for evaluation and workup/ treatment recommendations.  GERD- Per GI, she was to take omeprazole 40 mg daily, however, she is taking Prilosec 40 mg every 2 to 3 days.  She does have some breakthrough if she waits 3 days or eats chocolate or other foods that trigger her GERD symptoms.  Reconsider daily medication if she has continued breakthrough symptoms.  To see GI in follow up if breakthrough symptoms or any concerns.    Chronic lower abdominal pain, chronic constipation- She has chronic constipation. I will have her try Linzess 72 mcg daily. If no improvement, we can increase to 145 mcg then 290 mcg daily or can adjust dosing to every other day, twice weekly,  or once weekly to have regular BM and avoid constipation contributing to abdominal pain with adhesions. Follow up with GI if persistnet, worsening, new, or changing symptoms or to be seen at ER if intractable or severe pain.   Sleep disturbance, hypersomnia, fatigue- I referred for home sleep study, but this was not completed.  She was seen by pulmonology 12/2022 and discharged back here but sleep disturbance and fatigue was not discussed. We will consider referral to sleep medicine or reordering home sleep study if persistent symptoms.   Headache, abnormal sensation/ sound noted- Patient had headaches for about 6 months with no associated symptoms but seem to be more with on weather and temperature changes. She had significant neck pain and stiffness. This could be contributing to her headaches. She underwent PT and headaches were stable. She also has sleep disturbance. I referred for sleep study that was not completed. Patient to complete CTA head and neck and consider MRI brain, further workup, and treatment pending evaluation and labs. To ER if worsening, new or changing symptoms or if any other neurological symptoms.   Neck and shoulder pain- She has had stiffness in her neck to her right shoulder, pain down her left arm, headaches, bilateral hand numbness, and feels there is an asymmetry of her neck. She had MRI cervical spine 2020 with advanced degenerative changes, spondylolisthesis. She was seen by Dr Napier and advised PT and possible ANNE. She is stable for now. She may also need follow up with Dr Garcia with previous concerns for frozen shoulder.   Knee pain- Right knee healed well. She continues with left knee pain but reports this is not consistent and is stable at this time. Continue follow up with orthopedist, Dr Catarino De La Cruz.   Changing skin lesion- I referred to dermatology for evaluation and annual skin monitoring.   Rash- If recurrence, can use Nystatin cream and powder. She should also see  dermatology if recurrence.       I spent 35 minutes caring for Kourtney Lorenzana on this date of service. This time includes time spent by me in the following activities as necessary: preparing for the visit, reviewing tests, specialists records and previous visits, obtaining and/or reviewing a separately obtained history, performing a medically appropriate exam and/or evaluation, counseling and educating the patient, family, caregiver, referring and/or communicating with other healthcare professionals, documenting information in the medical record, independently interpreting results and communicating that information with the patient, family, caregiver, and developing a medically appropriate treatment plan with consideration of other conditions, medications, and treatments.

## 2023-11-30 ENCOUNTER — TELEPHONE (OUTPATIENT)
Dept: FAMILY MEDICINE CLINIC | Facility: CLINIC | Age: 78
End: 2023-11-30

## 2023-11-30 LAB
25(OH)D3+25(OH)D2 SERPL-MCNC: 49.6 NG/ML (ref 30–100)
ALBUMIN SERPL-MCNC: 4.7 G/DL (ref 3.5–5.2)
ALBUMIN/GLOB SERPL: 2 G/DL
ALP SERPL-CCNC: 155 U/L (ref 39–117)
ALT SERPL-CCNC: 19 U/L (ref 1–33)
APPEARANCE UR: CLEAR
AST SERPL-CCNC: 14 U/L (ref 1–32)
BACTERIA #/AREA URNS HPF: NORMAL /HPF
BASOPHILS # BLD AUTO: 0.06 10*3/MM3 (ref 0–0.2)
BASOPHILS NFR BLD AUTO: 0.8 % (ref 0–1.5)
BILIRUB SERPL-MCNC: 0.5 MG/DL (ref 0–1.2)
BILIRUB UR QL STRIP: NEGATIVE
BUN SERPL-MCNC: 16 MG/DL (ref 8–23)
BUN/CREAT SERPL: 20 (ref 7–25)
CALCIUM SERPL-MCNC: 9.7 MG/DL (ref 8.6–10.5)
CASTS URNS QL MICRO: NORMAL /LPF
CHLORIDE SERPL-SCNC: 106 MMOL/L (ref 98–107)
CHOLEST SERPL-MCNC: 266 MG/DL (ref 0–200)
CK SERPL-CCNC: 138 U/L (ref 20–180)
CO2 SERPL-SCNC: 27.1 MMOL/L (ref 22–29)
COLOR UR: YELLOW
CREAT SERPL-MCNC: 0.8 MG/DL (ref 0.57–1)
EGFRCR SERPLBLD CKD-EPI 2021: 76 ML/MIN/1.73
EOSINOPHIL # BLD AUTO: 0.13 10*3/MM3 (ref 0–0.4)
EOSINOPHIL NFR BLD AUTO: 1.7 % (ref 0.3–6.2)
EPI CELLS #/AREA URNS HPF: NORMAL /HPF (ref 0–10)
ERYTHROCYTE [DISTWIDTH] IN BLOOD BY AUTOMATED COUNT: 13.6 % (ref 12.3–15.4)
FOLATE SERPL-MCNC: 11.2 NG/ML (ref 4.78–24.2)
GLOBULIN SER CALC-MCNC: 2.4 GM/DL
GLUCOSE SERPL-MCNC: 98 MG/DL (ref 65–99)
GLUCOSE UR QL STRIP: NEGATIVE
HCT VFR BLD AUTO: 43.3 % (ref 34–46.6)
HCV IGG SERPL QL IA: NON REACTIVE
HDLC SERPL-MCNC: 64 MG/DL (ref 40–60)
HGB BLD-MCNC: 14 G/DL (ref 12–15.9)
HGB UR QL STRIP: NEGATIVE
IMM GRANULOCYTES # BLD AUTO: 0.03 10*3/MM3 (ref 0–0.05)
IMM GRANULOCYTES NFR BLD AUTO: 0.4 % (ref 0–0.5)
KETONES UR QL STRIP: NEGATIVE
LDLC SERPL CALC-MCNC: 181 MG/DL (ref 0–100)
LDLC/HDLC SERPL: 2.79 {RATIO}
LEUKOCYTE ESTERASE UR QL STRIP: NEGATIVE
LYMPHOCYTES # BLD AUTO: 2.31 10*3/MM3 (ref 0.7–3.1)
LYMPHOCYTES NFR BLD AUTO: 30.8 % (ref 19.6–45.3)
MAGNESIUM SERPL-MCNC: 2.3 MG/DL (ref 1.6–2.4)
MCH RBC QN AUTO: 29.5 PG (ref 26.6–33)
MCHC RBC AUTO-ENTMCNC: 32.3 G/DL (ref 31.5–35.7)
MCV RBC AUTO: 91.4 FL (ref 79–97)
MICRO URNS: NORMAL
MICRO URNS: NORMAL
MONOCYTES # BLD AUTO: 0.6 10*3/MM3 (ref 0.1–0.9)
MONOCYTES NFR BLD AUTO: 8 % (ref 5–12)
NEUTROPHILS # BLD AUTO: 4.38 10*3/MM3 (ref 1.7–7)
NEUTROPHILS NFR BLD AUTO: 58.3 % (ref 42.7–76)
NITRITE UR QL STRIP: NEGATIVE
PH UR STRIP: 7.5 [PH] (ref 5–7.5)
PLATELET # BLD AUTO: 227 10*3/MM3 (ref 140–450)
POTASSIUM SERPL-SCNC: 3.9 MMOL/L (ref 3.5–5.2)
PROT SERPL-MCNC: 7.1 G/DL (ref 6–8.5)
PROT UR QL STRIP: NEGATIVE
RBC # BLD AUTO: 4.74 10*6/MM3 (ref 3.77–5.28)
RBC #/AREA URNS HPF: NORMAL /HPF (ref 0–2)
SODIUM SERPL-SCNC: 142 MMOL/L (ref 136–145)
SP GR UR STRIP: 1.01 (ref 1–1.03)
TRIGL SERPL-MCNC: 118 MG/DL (ref 0–150)
TSH SERPL DL<=0.005 MIU/L-ACNC: 1.71 UIU/ML (ref 0.27–4.2)
URINALYSIS REFLEX: NORMAL
UROBILINOGEN UR STRIP-MCNC: 0.2 MG/DL (ref 0.2–1)
VIT B12 SERPL-MCNC: 1855 PG/ML (ref 211–946)
VLDLC SERPL CALC-MCNC: 21 MG/DL (ref 5–40)
WBC # BLD AUTO: 7.51 10*3/MM3 (ref 3.4–10.8)
WBC #/AREA URNS HPF: NORMAL /HPF (ref 0–5)

## 2023-11-30 NOTE — TELEPHONE ENCOUNTER
Caller: Kourtney Lorenzana    Relationship: Self    Best call back number: [phone]    What medication are you requesting: SOMETHING TO REPLACE THE LINZESS    What are your current symptoms: CONSTIPATION    Have you had these symptoms before:    [x] Yes  [] No    Have you been treated for these symptoms before:   [x] Yes  [] No    If a prescription is needed, what is your preferred pharmacy and phone number: [facility]/PHARMACY #[id number] - [place], [place] - [address] [phone] [facility] [phone]      Additional notes: PATIENT STATED THAT PHARMACY HAS CHANGED TO [facility].    TRINI IS NOT LONGER COVERED WITH HER INSURANCE.    PATIENT STATED THAT EVEN WITH INSURANCE HER PORTION OUT OF POCKET IS $500.00 PER MONTH AND IT IS NOT COST EFFECTIVE.

## 2023-12-01 NOTE — TELEPHONE ENCOUNTER
I do not have an alternative. See if it is cheaper with Good Rx coupon. Please see if she would be willing to see GI for follow up and recommendations.

## 2023-12-07 DIAGNOSIS — E78.49 OTHER HYPERLIPIDEMIA: Primary | ICD-10-CM

## 2023-12-07 RX ORDER — ROSUVASTATIN CALCIUM 20 MG/1
20 TABLET, COATED ORAL DAILY
Qty: 90 TABLET | Refills: 0 | Status: SHIPPED | OUTPATIENT
Start: 2023-12-07

## 2023-12-13 ENCOUNTER — OFFICE VISIT (OUTPATIENT)
Dept: GASTROENTEROLOGY | Facility: CLINIC | Age: 78
End: 2023-12-13
Payer: MEDICARE

## 2023-12-13 ENCOUNTER — HOSPITAL ENCOUNTER (OUTPATIENT)
Dept: GENERAL RADIOLOGY | Facility: HOSPITAL | Age: 78
Discharge: HOME | End: 2023-12-13
Payer: MEDICARE

## 2023-12-13 VITALS
HEIGHT: 62 IN | BODY MASS INDEX: 30.73 KG/M2 | DIASTOLIC BLOOD PRESSURE: 89 MMHG | OXYGEN SATURATION: 98 % | WEIGHT: 167 LBS | SYSTOLIC BLOOD PRESSURE: 133 MMHG | TEMPERATURE: 96.3 F | HEART RATE: 74 BPM

## 2023-12-13 DIAGNOSIS — R10.30 LOWER ABDOMINAL PAIN: ICD-10-CM

## 2023-12-13 DIAGNOSIS — K58.1 IRRITABLE BOWEL SYNDROME WITH CONSTIPATION: Primary | ICD-10-CM

## 2023-12-13 DIAGNOSIS — K58.1 IRRITABLE BOWEL SYNDROME WITH CONSTIPATION: ICD-10-CM

## 2023-12-13 PROCEDURE — 74018 RADEX ABDOMEN 1 VIEW: CPT

## 2023-12-13 NOTE — Clinical Note
28-year-old female presenting for follow-up for IBS-C and lower abdominal pain.  Not currently taking MiraLAX or anything for constipation as she would prefer to treat this organically.  She is drinking smooth move tea which allows her to have 1 small bowel movement each day.  Was prescribed Linzess by PCP but this was too expensive and she does not want to add on any medication.  Had lengthy discussion that smooth move tea may not be sufficient to having bowels move regularly enough and she reports that she will consider trying MiraLAX again.  Also discussed IBgard to help.  I was hesitant to give Bentyl or Levsin for abdominal pain given her age and for concern of causing worsening constipation and overall worsening the problem.  I did order KUB to assess stool burden.

## 2023-12-13 NOTE — PROGRESS NOTES
"Chief Complaint  Abdominal Pain (lower), Nausea, Constipation, Diarrhea, and Heartburn    Subjective          Kourtney Lorenzana is a  78 y.o. female presents for follow-up for abdominal pain.  She is a patient of Dr. Andrade and is new to me.  Last seen by Nadege Quiroga in the office on 8/23/23.      Still having lower abdominal pain. Does not want to take Miralax everyday as she prefers more organic treatments. She has been taking Smooth move tea which helps. Last episode was Friday 12/8. Normally pain relieved with Tylenol. 12/8 was longest episode, lasting about 1 hour. Sometimes when this happens she is able to go to bathroom, but sometimes she is not able to go. Has small loose BM yesterday. No black/bloody stools, no weight loss, denies vomiting. Does become nauseous during these episodes. Always feels bloated.     Family history of colon cancer in her mother who passed away from this at the age of 66.     Objective   Vital Signs:   /89   Pulse 74   Temp 96.3 °F (35.7 °C) (Temporal)   Ht 157.5 cm (62\")   Wt 75.8 kg (167 lb)   SpO2 98%   BMI 30.54 kg/m²       Physical Exam  Constitutional:       General: She is not in acute distress.     Appearance: Normal appearance.   Eyes:      General: No scleral icterus.  Cardiovascular:      Rate and Rhythm: Normal rate.   Pulmonary:      Effort: Pulmonary effort is normal.   Abdominal:      General: Abdomen is flat. Bowel sounds are normal. There is no distension.      Tenderness: There is no abdominal tenderness. There is no guarding.   Skin:     Coloration: Skin is not jaundiced.   Neurological:      General: No focal deficit present.      Mental Status: She is alert and oriented to person, place, and time.   Psychiatric:         Mood and Affect: Mood normal.         Behavior: Behavior normal.          Result Review :   The following data was reviewed by: Mona Day PA-C on 12/13/2023:  CMP          5/15/2023    09:24 11/29/2023    11:16   CMP " "  Glucose 85  98    BUN 15  16    Creatinine 0.78  0.80    Sodium 145  142    Potassium 4.5  3.9    Chloride 109  106    Calcium 9.4  9.7    Total Protein 6.6  7.1    Albumin 4.4  4.7    Globulin 2.2  2.4    Total Bilirubin 0.5  0.5    Alkaline Phosphatase 149  155    AST (SGOT) 16  14    ALT (SGPT) 15  19    BUN/Creatinine Ratio 19.2  20.0      CBC          5/15/2023    09:24 6/14/2023    10:36 11/29/2023    11:16   CBC   WBC 4.83  5.07  7.51    RBC 3.67  4.64  4.74    Hemoglobin 13.3  13.6  14.0    Hematocrit 33.1  41.2  43.3    MCV 90.2  88.8  91.4    MCH 36.2  29.3  29.5    MCHC 40.2  33.0  32.3    RDW 13.1  13.3  13.6    Platelets 197  194  227        I reviewed her CT from 11/2022      Assessment:   Diagnoses and all orders for this visit:    1. Irritable bowel syndrome with constipation (Primary)  -     XR Abdomen KUB; Future    2. Lower abdominal pain  -     XR Abdomen KUB; Future        Plan:   -Discussed that tea may not be enough to keep bowels moving and had lengthy discussion about resuming Miralax. She is agreeable to trying Miralax for now to see if it helps. She was given a prescription for Linzess by her PCP but unfortunately this was too expensive. -Discussed seeing if Amitiza or Trulance would be more cost effective, but patient would like to avoid these medications at this time.   I will get a KUB today to assess stool burden.   -Discussed advantages of Ibgard which may help with constipation and abdominal pain.   -I am hesitant to use Bentyl for her abdominal discomfort as it could contribute to her constipation and make the issue worse. I am hopeful that if we get her bowels moving a bit more often it will help to alleviate some of her symptoms  -Per 7/14/2021 note: \"Colon not amenable to further colonoscopy safely. Could consider CT colonography if needed for further evaluation\"      Follow Up   Return in about 3 months (around 3/13/2024).    Dragon dictation used throughout this note. "         Mona Day PA-C  [facility]  [facility] [place] [address]  Office: [phone]

## 2023-12-15 ENCOUNTER — TELEPHONE (OUTPATIENT)
Dept: GASTROENTEROLOGY | Facility: CLINIC | Age: 78
End: 2023-12-15

## 2023-12-15 ENCOUNTER — TELEPHONE (OUTPATIENT)
Dept: GASTROENTEROLOGY | Facility: CLINIC | Age: 78
End: 2023-12-15
Payer: COMMERCIAL

## 2023-12-15 NOTE — PROGRESS NOTES
Please let patient know that there is quite a bit of stool seen on her abdominal x ray. I think going to the bathroom more regularly would help with a lot of her discomfort. Recommend Miralax as we discussed and she can use Ibgard for discomfort.     Thank you,   Mona Day PA-C

## 2023-12-15 NOTE — TELEPHONE ENCOUNTER
Record reviewed and noted VUD on 3/26/03 with MBC of over 400ml, low pressure and no leakage after UL/R in 1994 and [place] in [address]. Could talk with her. See other call dated today 12/15/2023.

## 2023-12-15 NOTE — TELEPHONE ENCOUNTER
----- Message from Mona Day PA-C sent at 12/15/2023  7:43 AM EST -----  Please let patient know that there is quite a bit of stool seen on her abdominal x ray. I think going to the bathroom more regularly would help with a lot of her discomfort. Recommend Miralax as we discussed and she can use Ibgard for discomfort.     Thank you,   Mona Day PA-C

## 2023-12-21 ENCOUNTER — HOSPITAL ENCOUNTER (OUTPATIENT)
Dept: CT IMAGING | Facility: HOSPITAL | Age: 78
Discharge: HOME | End: 2023-12-21
Admitting: PHYSICIAN ASSISTANT
Payer: MEDICARE

## 2023-12-21 DIAGNOSIS — Z82.49 FAMILY HISTORY OF CEREBRAL ANEURYSM: ICD-10-CM

## 2023-12-21 DIAGNOSIS — H93.8X1 ABNORMAL SENSATION IN RIGHT EAR: ICD-10-CM

## 2023-12-21 PROCEDURE — 70496 CT ANGIOGRAPHY HEAD: CPT

## 2023-12-21 PROCEDURE — 25510000001 IOPAMIDOL PER 1 ML: Performed by: PHYSICIAN ASSISTANT

## 2023-12-21 PROCEDURE — 70498 CT ANGIOGRAPHY NECK: CPT

## 2023-12-21 RX ADMIN — IOPAMIDOL 95 ML: 755 INJECTION, SOLUTION INTRAVENOUS at 15:06

## 2023-12-26 ENCOUNTER — TELEPHONE (OUTPATIENT)
Dept: FAMILY MEDICINE CLINIC | Facility: CLINIC | Age: 78
End: 2023-12-26
Payer: MEDICARE

## 2023-12-26 ENCOUNTER — HOSPITAL ENCOUNTER (OUTPATIENT)
Dept: MRI IMAGING | Facility: HOSPITAL | Age: 78
Discharge: HOME | End: 2023-12-26
Admitting: PHYSICIAN ASSISTANT
Payer: MEDICARE

## 2023-12-26 DIAGNOSIS — R93.0 ABNORMAL COMPUTED TOMOGRAPHY ANGIOGRAPHY OF HEAD: ICD-10-CM

## 2023-12-26 DIAGNOSIS — R93.0 ABNORMAL COMPUTED TOMOGRAPHY ANGIOGRAPHY OF HEAD: Primary | ICD-10-CM

## 2023-12-26 PROCEDURE — 70551 MRI BRAIN STEM W/O DYE: CPT

## 2023-12-26 NOTE — TELEPHONE ENCOUNTER
STAT brain MRI scheduled at [place] today at 3 pm. Arrive by 2:45, south entrance and register in [address]. Patient informed

## 2023-12-27 ENCOUNTER — TELEPHONE (OUTPATIENT)
Dept: FAMILY MEDICINE CLINIC | Facility: CLINIC | Age: 78
End: 2023-12-27

## 2023-12-27 NOTE — TELEPHONE ENCOUNTER
Caller: Kourtney Lorenzana    Relationship: Self    Best call back number: [phone]     What test was performed: MRI    When was the test performed: 12/26/23    Additional notes: PATIENT REQUESTS A CALL BACK TO DISCUSS RESULTS WHEN AVAILABLE

## 2023-12-29 DIAGNOSIS — R93.0 ABNORMAL COMPUTED TOMOGRAPHY ANGIOGRAPHY OF HEAD: Primary | ICD-10-CM

## 2023-12-29 DIAGNOSIS — Z86.73 HISTORY OF LACUNAR CEREBROVASCULAR ACCIDENT: ICD-10-CM

## 2024-01-02 ENCOUNTER — OFFICE VISIT (OUTPATIENT)
Dept: FAMILY MEDICINE CLINIC | Facility: CLINIC | Age: 79
End: 2024-01-02
Payer: MEDICARE

## 2024-01-02 VITALS
WEIGHT: 170 LBS | RESPIRATION RATE: 18 BRPM | OXYGEN SATURATION: 99 % | BODY MASS INDEX: 31.28 KG/M2 | HEIGHT: 62 IN | DIASTOLIC BLOOD PRESSURE: 74 MMHG | HEART RATE: 82 BPM | TEMPERATURE: 98.8 F | SYSTOLIC BLOOD PRESSURE: 120 MMHG

## 2024-01-02 DIAGNOSIS — M54.2 CERVICALGIA: ICD-10-CM

## 2024-01-02 DIAGNOSIS — M48.02 SPINAL STENOSIS, CERVICAL REGION: ICD-10-CM

## 2024-01-02 DIAGNOSIS — M54.12 CERVICAL RADICULOPATHY: ICD-10-CM

## 2024-01-02 DIAGNOSIS — R93.0 ABNORMAL COMPUTED TOMOGRAPHY ANGIOGRAPHY OF HEAD: ICD-10-CM

## 2024-01-02 DIAGNOSIS — E78.49 OTHER HYPERLIPIDEMIA: Primary | ICD-10-CM

## 2024-01-02 DIAGNOSIS — H53.9 VISION CHANGES: ICD-10-CM

## 2024-01-02 DIAGNOSIS — M43.12 SPONDYLOLISTHESIS, CERVICAL REGION: ICD-10-CM

## 2024-01-02 DIAGNOSIS — G89.29 CHRONIC NONINTRACTABLE HEADACHE, UNSPECIFIED HEADACHE TYPE: ICD-10-CM

## 2024-01-02 DIAGNOSIS — R51.9 CHRONIC NONINTRACTABLE HEADACHE, UNSPECIFIED HEADACHE TYPE: ICD-10-CM

## 2024-01-02 DIAGNOSIS — M50.30 DDD (DEGENERATIVE DISC DISEASE), CERVICAL: ICD-10-CM

## 2024-01-02 DIAGNOSIS — Z86.73 HISTORY OF LACUNAR CEREBROVASCULAR ACCIDENT: ICD-10-CM

## 2024-01-02 DIAGNOSIS — E53.8 B12 DEFICIENCY: ICD-10-CM

## 2024-01-02 LAB
ALBUMIN SERPL-MCNC: 4.4 G/DL (ref 3.5–5.2)
ALBUMIN/GLOB SERPL: 1.7 G/DL
ALP SERPL-CCNC: 141 U/L (ref 39–117)
ALT SERPL-CCNC: 21 U/L (ref 1–33)
AST SERPL-CCNC: 12 U/L (ref 1–32)
BILIRUB SERPL-MCNC: 0.4 MG/DL (ref 0–1.2)
BUN SERPL-MCNC: 16 MG/DL (ref 8–23)
BUN/CREAT SERPL: 16.8 (ref 7–25)
CALCIUM SERPL-MCNC: 9.3 MG/DL (ref 8.6–10.5)
CHLORIDE SERPL-SCNC: 109 MMOL/L (ref 98–107)
CK SERPL-CCNC: 123 U/L (ref 20–180)
CO2 SERPL-SCNC: 24 MMOL/L (ref 22–29)
CREAT SERPL-MCNC: 0.95 MG/DL (ref 0.57–1)
EGFRCR SERPLBLD CKD-EPI 2021: 61.8 ML/MIN/1.73
GLOBULIN SER CALC-MCNC: 2.6 GM/DL
GLUCOSE SERPL-MCNC: 83 MG/DL (ref 65–99)
POTASSIUM SERPL-SCNC: 4.1 MMOL/L (ref 3.5–5.2)
PROT SERPL-MCNC: 7 G/DL (ref 6–8.5)
SODIUM SERPL-SCNC: 144 MMOL/L (ref 136–145)

## 2024-01-02 PROCEDURE — 99214 OFFICE O/P EST MOD 30 MIN: CPT | Performed by: PHYSICIAN ASSISTANT

## 2024-01-02 PROCEDURE — 1160F RVW MEDS BY RX/DR IN RCRD: CPT | Performed by: PHYSICIAN ASSISTANT

## 2024-01-02 PROCEDURE — 1159F MED LIST DOCD IN RCRD: CPT | Performed by: PHYSICIAN ASSISTANT

## 2024-01-02 RX ORDER — UBIDECARENONE 100 MG
100 CAPSULE ORAL DAILY
COMMUNITY

## 2024-01-02 NOTE — PROGRESS NOTES
Subjective   Kourtney Lorenzana is a 78 y.o. female who presents today in follow up of hyperlipidemia and brain imaging with vitamin D deficiency, B12 deficiency, elevated alkaline phosphatase, magnesium, history of UTI, vaginal spotting, GERD, chronic fatigue, sleep, headache, significant back, neck, and joint pain, and vision change.    Hyperlipidemia    Hyperlipidemia- she has been more tired but otherwise doing ok on medication. No AE with Crestor. The 1st week, started with 1/2 tablet to get used to it then started whole pill.  Wants to know about Co Q10. Stopped other supplements.   cholesterol was higher 6/2022- advised she start Lipitor 40 mg nightly. She did not take medication. Diet helped with previous labs.   After [place], she was as good but being more careful again. Still better about limiting bread, staying away from pies, cakes, and cookies to an extent. Better than she was previously.   Patient was diligent with diet and exercise with weight watchers.  Continued with weight loss.    History of CVA, abnormal sound in ears-   Reports she has been having sensation of hearing heartbeat in her ear with yawn and worse at night. Has been there years- 3-4 years. It was rare but is now happening often. She would have daily but tries to keep in yawning. Won't happen if does not yawn. If turns head a certain way, she has will start as well. She will try to stop with putting her finger in ear. Has to run it's course and stops on it's own but lasts up to 1 hour. Has had a few times and was there constantly for an hour and keeping awake. No other associated symptoms with it. Does have some hearing loss. No ringing in ears.   PGF- brain aneurysm. Father with TIAs.     Headache- Having increased headaches. Chronic neck pain. Wants to see if she should see ortho spine in follow up. Also right side of her throat stays sore on the right- sore throat started a couple months ago.   She completed physical therapy- did not  "help much. Went for 6 weeks. She goes for massage once monthly- helps. Lasts a couple weeks. Has not been too bad.   Started 1/2022 with bilateral temporal, parietal, and at the top of head and back of head. Throbbing pain and mild pain. 5/10- lasts about 1 hour or so. Tries not to keep track and tries to stay busy. No associated dizziness, vision changes, nausea, vomiting, confusion, change in speech, gait abnormality, or other neurological symptoms. Tylenol not helping enough and having to use Aleve. She is now going back and forth with Tylenol and Aleve. Worried about her liver. Taking 1-2 every night and sometimes 1 in the morning if bad. She reports it depends on the weather. Rain and sudden changes in weather- cold to hot and humid.     Vision change- She had gradual vision change. She had treatment on both eyes- laser treatment. Helped her vision.     Neck and shoulder pain-continues with neck and shoulder pain.  She goes for massage once monthly- helps. Lasts a couple weeks. Wants to see if she should see ortho spine in follow up.  She completed PT but did not help a lot.  She completed physical therapy for 6 weeks.  She was seen by Dr Napier- told that the bone spurs were not concerning for esophagus and thought no surgery at this time. Recommended PT and ANNE. PT was helping and did not feel she needed ANNE. May want to do \"down the road\" but not right now. Seen by Dr Garcia with injection. Helped some.   6/2022- right side of neck is swollen more than the other side. She has pain and stiffness in neck and goes to right shoulder.   Patient also noted pain in her neck and down her left upper arm. She was not sore to touch or feel but is a deep pain. She also has bilateral hand numbness. We started with labs and advised we would consider PT and additional workup if no improvement or recurrence.  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  She also has persistent neck stiffness with the shoulder pain. I will refer to PT today. If persistent neck, shoulder, and knee symptoms, or persistent numbness, we will consider EMG and further imaging. She should call or return if no resolution of symptoms.    Lifestyle changes-   Moved and living in the walkout basement with her daughter.   Weight watchers-has continued to be compliant and continued with weight loss.  7/1/2022- she had to change diet- cutting sugars completely and decreased carbs. Recently restarted 1 slice of bread every other day- whole wheat with no sugars. If any meat- Tuna, chicken, rarely beef.   Not able to exercise much other than the cleaning and housekeeping and yard work. Swimming some this summer.   Vitamin D deficiency- taking 2000 IU daily. Tolerating without AE  B12 deficiency- stopped rather than taking every other day.   She was taking 1 dropper full daily. 1000 mcg daily.   10/2022-advised to restart B12 100 mcg daily or 500 mcg twice weekly. She did not restart   6/2022-advised decrease B12 to 500 mcg 3 times weekly rather than daily. Women's vitamin with B6- small amount. Stopped vitain D.   Previously 500 mcg daily. Tolerating without AE  Elevated alk phos- taking Aleve and stopped then started again with headaches.  Was taking 2 Tylenol at bedtime.    Cholecystectomy 12/3/2019 with Dr. Schneider.  Went for surgery follow-up with incisions healing well.  She was taking Aleve and stopped. She was also taking 2 Tylenol at bedtime. It was thought elevated alkaline phosphatase could have been related to GB disease. She underwent cholecystectomy 12/3/2019 with Dr. Schneider and went for surgery follow-up with incisions healing well.   Magnesium-No longer taking magnesium oxide 400 mg. Had side effects. Did not restart.   Added Quercetin, dandelion root, and Zinc- reports she thinks these are good for heart, liver, and immune system. She is not  vaccinated for Covid 19.     UTI- no issues at this time.   11/2022- she had left flank pain with radiation to left lower abdomen. Then resolved with Aleve and heating pad.   Started at 2 am 11/19. Burning with urination. She had UTI in the past frequency. She had not had in years but noted fast.  No fever but had chills 11/19/2022 tylenol and Azo every 6 hours. She was worried about not treating UTI.   Patient was seen by SLY Spencer 4/27/2023 with UTI.  She was given Omnicef that was appropriate by culture.  Patient then called again on 5/5/2023 and reported she still had burning sensation with urination and had some back pain and fatigue.  She was advised she needed to come in to leave another urine.  However, Macrobid was called into the pharmacy by SLY Spencer. She had burning with urination, lower back pain. She got relief with Tylenol and Aleve but was not getting relief  at that time. She was unsure if related to urination.   Vaginal spotting- still vaginal spotting.   Previously reported she no longer had spotting but had incontinence of urine.   She also discussed spotting with Urogyn and was advised this was from vaginal dryness. She is rarely has spotting will continue follow up with them as directed.    Last urogynecology appointment 11/2018- negative evaluation for spotting and vaginal bleeding and no erosions on exam.  She was not using HRT or vaginal estrogen.  She also had mixed incontinence that was worsening.  She did not respond to Myrbetriq and had GI side effects-discontinue medication.  Prior sling for JOSEPH that worked for only a couple years.  Discussed PTNS versus Botox versus InterStim for her urge and discussed surgical options for treatment of stress incontinence including mid urethral sling with mesh versus biologic material, urethral injections or Christy procedures.  They started vaginal estrogen for the atrophy.  Advised follow-up in 6 months.  Patient did not  follow-up    GERD- taking Omeprazole every other day or every 3rd day. Still has heartburn if she eats the wrong thing and is on the 3rd day. Not eating hardly any fried foods and allows her to take less often.   She was previously stable on Protonix 40 mg daily. No breakthrough symptoms when taking medication regularly.  She was seen by GI was advised to continue.    She was having increased heartburn and reflux but changing Protonix to Omeprazole helped.   She had improvement with change in eating.  Patient was seen 12/10/2019 by Dr. Mitchell and was given Mobic for back pain. She then had RUQ pain and Dr Schneider advised to increase to 15 mg for 2 weeks.   12/30/2019-patient went to ER with sharp chest pain.  Was given Protonix 40 mg daily and advised to see cardiology.  Appointment was made for March 2020. Thinks it was a side effect from Mobic.  Abdominal pain- Also has talked with GI- Dr Andrade- she has scar tissue in abdomen and was told that she has some crooked places in intestines. No further colonoscopies. Happening more often that she has increased pain in lower abdomen. Flares of pain prevent bending over. Sometimes LLQ and sometimes RLQ. If gets on couch with feet up and heating pad, takes Tylenol and eases up. Never knows when will hit. She will feel limited in how far she goes and how much she does.   She is also struggling with constipation. Advised Miralax and stool softeners and eating prunes.   Did not want to take Miralax all the time. Always trying to find something- came across [employer] complete- has probiotics and was for gut health. Not helping as much as fiber.   When has constipation, she has increased pain.     Sleep-Waking up 4 x nightly to urinate but can usually go back to sleep.  She was taking Benadryl sleep aid 50 mg and has taken Tylenol at bedtime. Was taking Benadryl instead of Zyrtec but now taking Zyrtec daily.   Fatigue- still feeling tired.   Previously, restarted B12 about 1 month  ago with increased fatigue. Helps a little then about 2 pm, she has increased fatigue.   She was seen 6/2019 and had been very fatigued all the time. She could only tolerate being active a few hours then she was tired for the rest of the day, complained of shortness of breath and fatigued with exertion. Patient also noted bilateral toes were blue. She had normal DP and PT pulses bilaterally with normal temperature of the feet. She felt overall cool/ cold all the time. She then joined weight watchers and has improved some. Patient continues waking up 4 x nightly to urinate but states she can usually go back to sleep. She is taking Benadryl sleep aid 50 mg and Tylenol at bedtime as needed. If she has persistent sleep disturbances or fatigue, we will consider sleep study.    Knee pain- The other knee needs surgery but not ready for recovery. Right is ok after operation and left is not too bad.   Patient continues follow up with Dr Catarino De La Cruz, orthopedist, who advised PT and knee injections. Her right knee pain improved and her left knee still hurts. She will return as needed for injections with orthopedist. Patient's last appt was 1/7/2020 for OA knee and shoulder pain.    He advised no surgical intervention at this time for the knee and to continue PT.  She had right knee surgery 5/2021- she did well with surgery and has healed well.   Orthopedic surgery- Dr. Catarino De La Cruz- patient was seen 1/7/2020 for OA knee and shoulder pain.  They advised she was starting to develop frozen shoulder and ordered MRI shoulder to make further recommendations pending results.  Advised no surgical intervention at this time for the knee- continue PT. Reports neck stiffness with the shoulder pain.     Skin lesion face, nose- She also has a change in color on skin of left side of face. If she pulls back on the lesion, she has a pinching pain. She has had another spot on right side of face and place on right side of nose that scabs and the  scab falls off and recurs. Pinching pain if she pushes on it or moves it.   Rash-rash is gone but she continues with pain in the scar area when she is needing to have BM or urinate and is not able to go right away.   At her previous visit, she stated she has a scar on her abdomen with pain in the area of her scar from hysterectomy. When she rubbed it, the skin came off. Also, she had itching under bilateral breasts with a rash. On exam, she appeared to have cutaneous candidiasis and was treated with Nystatin cream and powder in both areas and has had improvement.      The following portions of the patient's history were reviewed and updated as appropriate: allergies, current medications, past family history, past medical history, past social history, past surgical history and problem list.    Review of Systems   Constitutional:  Positive for fatigue.   Eyes:  Positive for visual disturbance.   Respiratory: Negative.     Cardiovascular: Negative.    Musculoskeletal:  Positive for arthralgias, back pain, neck pain and neck stiffness.   Skin:  Positive for color change and rash.   Neurological:  Positive for headaches.   Psychiatric/Behavioral:  Positive for sleep disturbance.        Objective    Vitals:    01/02/24 0851   BP: 120/74   Pulse: 82   Resp: 18   Temp: 98.8 °F (37.1 °C)   SpO2: 99%     BMI is >= 30 and <35. (Class 1 Obesity). The following options were offered after discussion;: exercise counseling/recommendations and nutrition counseling/recommendations    Physical Exam   Constitutional: She is oriented to person, place, and time. She appears well-developed.   HENT:   Head: Normocephalic and atraumatic.   Right Ear: External ear normal.   Left Ear: External ear normal.   Nose: Nose normal.   Eyes: Conjunctivae and lids are normal.   Neck: Carotid bruit is not present.   Cardiovascular: Normal rate, regular rhythm and normal heart sounds. Exam reveals no gallop and no friction rub.   No murmur  heard.  Pulmonary/Chest: Effort normal and breath sounds normal. No respiratory distress. She has no wheezes. She has no rhonchi. She has no rales.   Abdominal: Soft. Normal appearance and bowel sounds are normal. She exhibits no shifting dullness, no distension, no abdominal bruit and no mass. There is no abdominal tenderness. There is no rigidity, no rebound and no guarding. No hernia.   Musculoskeletal: No deformity.   Neurological: She is alert and oriented to person, place, and time. Gait normal.   Skin: Skin is warm and dry. She is not diaphoretic.   Psychiatric: Her speech is normal and behavior is normal. Judgment and thought content normal.   Nursing note and vitals reviewed.      Assessment & Plan   Diagnoses and all orders for this visit:    1. Other hyperlipidemia (Primary)  -     Comprehensive Metabolic Panel  -     CK    2. Abnormal computed tomography angiography of head    3. History of lacunar cerebrovascular accident    4. Chronic nonintractable headache, unspecified headache type    5. Vision changes    6. DDD (degenerative disc disease), cervical    7. Spondylolisthesis, cervical region    8. Spinal stenosis, cervical region    9. Cervicalgia    10. Cervical radiculopathy    11. B12 deficiency             Assessment and plan  Patient will have follow-up labs today. Call if no results in 1 week. Stability of conditions, plan, follow up, and further recommendations pending labs. Follow up in 2 months if labs are stable.  She declines AWV.    Hyperlipidemia- She did not take Lipitor as prescribed and wanted to work on diet and exercise.  I have counseled about her concerns for statins vs risks of not taking medication.  She is now taking Crestor 20 mg daily and tolerating without AE.  She continues medication with co-Q10.  Continued dietary changes with weight loss.  Will check CMP and CK today and she will follow-up 3/2024 for her 3-month follow-up with fasting labs.  History of CVA, small vessel  ischemic disease- We discussed continuing Crestor 20 mg daily and adding aspirin 81 mg daily.  She has no history of GI bleeding, fall risk, or allergy to aspirin.  I will have neurology make further recommendations for treatment and follow-up.  Headache, abnormal sensation/ sound noted- Patient had headaches for about 6 months with no associated symptoms but seem to be more with on weather and temperature changes. She had significant neck pain and stiffness. This could be contributing to her headaches. She underwent PT and headaches were stable. She also has sleep disturbance. I referred for sleep study that was not completed. Patient completed CTA head and neck and MRI brain with lacunar infarct, chronic small vessel ischemic changes, and significant degenerative changes of the cervical spine.  I have referred to neurology.  She should call if she does not receive a call by tomorrow for appointment date.  I did asked that she use Flonase or Nasacort 2 sprays each nostril once daily to see if this helps with sore throat and ear symptoms.  She should otherwise discuss with neurology at her appointment.  She will also discuss headaches and neck issues with orthospine.  To ER if worsening, new or changing symptoms or if any other neurological symptoms.   Neck and shoulder pain, DDD, OA cervical spine- She has had stiffness in her neck to her right shoulder, pain down her left arm, headaches, bilateral hand numbness, and feels there is an asymmetry of her neck. She had MRI cervical spine 2020 with advanced degenerative changes, spondylolisthesis. She was seen by Dr Napier and advised PT and possible ANNE.  She will follow-up with orthopedic spine for reevaluation.  She may also need follow up with Dr Garcia with previous concerns for frozen shoulder.   Vitamin D deficiency- Patient to continue 2000 IU daily.   B12 deficiency- Patient had elevated levels with her last labs.  Instead of decreasing dosage, she stopped  B12.  I asked that she restart every other day as directed on labs.  We can always adjust down with repeat testing.  I will recheck 3/2024 and make further recommendations.   Elevated alk phos- Avoid NSAIDS. Cholecystectomy healed. I will continue to monitor and make further recommendations.   Magnesium- I will continue to monitor and make further recommendations.   Dysuria, recent UTI- She has no symptoms. Await labs for recommendations. We will need to consider return to urogynecology or to be seen by urology if she has recurrent urinary tract infections, as she has postmenopausal atrophy and stress and urge incontinence that is chronic.  Vaginal spotting- Patient was seen by urogynecology but has had continued vaginal spotting after previously reporting this had resolved. I referred back to GYN for evaluation and workup/ treatment recommendations.  She does need to ensure she keeps appointment with them 4/2024.  GERD- Per GI, she was to take omeprazole 40 mg daily, however, she is taking Prilosec 40 mg every 2 to 3 days.  She does have some breakthrough if she waits 3 days or eats chocolate or other foods that trigger her GERD symptoms.  Reconsider daily medication if she has continued breakthrough symptoms.  To see GI in follow up if breakthrough symptoms or any concerns.    Chronic lower abdominal pain, chronic constipation- She has chronic constipation. I will have her try Linzess 72 mcg daily. If no improvement, we can increase to 145 mcg then 290 mcg daily or can adjust dosing to every other day, twice weekly, or once weekly to have regular BM and avoid constipation contributing to abdominal pain with adhesions. Follow up with GI if persistnet, worsening, new, or changing symptoms or to be seen at ER if intractable or severe pain.   Sleep disturbance, hypersomnia, fatigue- I referred for home sleep study, but this was not completed.  She was seen by pulmonology 12/2022 and discharged back here but sleep  disturbance and fatigue was not discussed. We will consider referral to sleep medicine or reordering home sleep study if persistent symptoms.   Knee pain- Right knee healed well. She continues with left knee pain but reports this is not consistent and is stable at this time. Continue follow up with orthopedist, Dr Catarino De La Cruz.   Changing skin lesion- I referred to dermatology for evaluation and annual skin monitoring.   Rash- If recurrence, can use Nystatin cream and powder. She should also see dermatology if recurrence.       I spent 35 minutes caring for Kourtney Lorenzana on this date of service. This time includes time spent by me in the following activities as necessary: preparing for the visit, reviewing tests, specialists records and previous visits, obtaining and/or reviewing a separately obtained history, performing a medically appropriate exam and/or evaluation, counseling and educating the patient, family, caregiver, referring and/or communicating with other healthcare professionals, documenting information in the medical record, independently interpreting results and communicating that information with the patient, family, caregiver, and developing a medically appropriate treatment plan with consideration of other conditions, medications, and treatments.

## 2024-01-04 ENCOUNTER — TELEPHONE (OUTPATIENT)
Dept: FAMILY MEDICINE CLINIC | Facility: CLINIC | Age: 79
End: 2024-01-04
Payer: MEDICARE

## 2024-01-04 NOTE — TELEPHONE ENCOUNTER
left message to call OK FOR HUB TO RELAY    Alkaline phosphatase remains elevated but has improved since previous labs.  Muscle enzymes remain okay and liver tests are okay.  Continue medication and keep follow-up with me in March.

## 2024-01-05 ENCOUNTER — OFFICE VISIT (OUTPATIENT)
Dept: ORTHOPEDIC SURGERY | Facility: CLINIC | Age: 79
End: 2024-01-05
Payer: MEDICARE

## 2024-01-05 VITALS — TEMPERATURE: 97.7 F | BODY MASS INDEX: 30.51 KG/M2 | WEIGHT: 165.8 LBS | HEIGHT: 62 IN

## 2024-01-05 DIAGNOSIS — M47.22 CERVICAL SPONDYLOSIS WITH RADICULOPATHY: Primary | ICD-10-CM

## 2024-01-05 PROCEDURE — 99213 OFFICE O/P EST LOW 20 MIN: CPT | Performed by: NURSE PRACTITIONER

## 2024-01-05 PROCEDURE — 1160F RVW MEDS BY RX/DR IN RCRD: CPT | Performed by: NURSE PRACTITIONER

## 2024-01-05 PROCEDURE — 1159F MED LIST DOCD IN RCRD: CPT | Performed by: NURSE PRACTITIONER

## 2024-01-05 NOTE — PROGRESS NOTES
Patient Name: Kourtney Lorenzana   YOB: 1945  Referring Primary Care Physician: Romina George PA      Chief Complaint:    Chief Complaint   Patient presents with    Cervical Spine - Initial Evaluation, Pain        Previous treatments:   MRI? 06/08/2020  CT Angiogram Neck done 12/21/2023  PT?  YES     FOR NECK PAIN? YES       EFFECTIVE? YES  DRY NEEDLING? YES - NOT EFFECTIVE   LAST PRABHAKAR DONE 2020 WAS THIS EFFECTIVE? YES    Seen Dr. Arana 2013 for lower back pain  Seen [facility] 2022 for neck and lt shoulder pain      Neck Pain   This is a chronic problem. The current episode started more than 1 year ago. The problem occurs constantly. The problem has been gradually worsening. The pain is associated with nothing. The pain is present in the occipital region, right side and left side (RAIMUNDO SHOULDERS). The quality of the pain is described as aching (CLICKING/POPPING/SNAPPING). The pain is at a severity of 6/10. The pain is moderate. Exacerbated by: Lifting any heavy object. The pain is Worse during the night. Stiffness is present All day. Associated symptoms include headaches, numbness (RAIMUNDO HANDS), tingling (RAIMUNDO HANDS), trouble swallowing and weakness (RAIMUNDO HANDS). She has tried acetaminophen, home exercises and NSAIDs (MASSAGES) for the symptoms. The treatment provided mild relief.        HPI:  Kourtney Lorenzana is a 78 y.o. female who presents to [facility] for evaluation of above complaints.  Primarily has neck pain referring into bilateral trapezii and shoulders with numbness in bilateral hands worse at night.  She has been through physical therapy in the past which she says gave her minimal relief.  Currently undergoing massage therapy every 2 weeks that offers her temporary relief.  Known prior history of carpal tunnel syndrome and has utilized wrist splints in the past, not currently using them.  She has also responded well to cervical epidural injections in the past.  She was  getting these through the hospital pain management but apparently was referred to [facility] pain management in 2020.  She was evaluated by SLY Sawyer, however canceled her epidural because of the pandemic.  This is an established patient to the practice, new to me.  Prior pertinent records were reviewed.    PFSH:  See attached    ROS: As per HPI, otherwise negative    Objective:      78 y.o. female  Body mass index is 30.33 kg/m²., 75.2 kg (165 lb 12.8 oz), @HT@  Vitals:    01/05/24 1108   Temp: 97.7 °F (36.5 °C)     Pain Score    01/05/24 1108   PainSc:   6   PainLoc: Neck            Spine Musculoskeletal Exam    Palpation    Cervical Spine    Tenderness: present      Paraspinous: right and left      Trapezius: right and left    Right      Muscle tone: normal    Left      Muscle tone: normal    Strength    Cervical Spine    Cervical spine motor exam is normal.    Sensory    Cervical Spine    Cervical spine sensation is normal.    Special Tests    Cervical Spine      Right      Tinel's - carpal tunnel: positive      Tinel's - cubital tunnel: negative      Left      Tinel's - carpal tunnel: negative      Tinel's - cubital tunnel: negative        IMAGING:       No new imaging today    Assessment:           Diagnoses and all orders for this visit:    1. Cervical spondylosis with radiculopathy (Primary)  -     MRI Cervical Spine Without Contrast; Future  -     Ambulatory Referral to Pain Management Clinic             Plan:  She has neck pain referring to bilateral shoulders with numbness in bilateral hands.  She has a sense of lump in her throat when swallowing.  Could have some anterior osteophyte formations.  Since she has tried physical therapy in the past without much relief, currently undergoing massage therapy on a regular basis and has responded well to epidural injections in the past, will update cervical MRI with an eye towards repeating either cervical epidurals or cervical medial branch  block/ablation.  We discussed injection options including risk and benefit today, but would be further discussed with pain management.  She is interested in trying injections again, but of course if the MRI reveals any cord or nerve root compression, may need surgical evaluation however has no loss of nerve function or myelopathic findings on exam today.  For the bilateral hand numbness, also recommend that she wear wrist splints at night as treatment and diagnostic tool.  Will go ahead and make referral back to [facility] pain [facility] pending the MRI results to avoid delay.  Return for Call with results.    EMR Dragon/Transcription Disclaimer:   Much of this encounter note is an electronic transcription/translation of spoken language to printed text. The electronic translation of spoken language may permit erroneous, or at times, nonsensical words or phrases to be inadvertently transcribed; Although I have reviewed the note for such errors, some may still exist.  Red flags have been discussed at this or previous visits to include but not limited weakness in extremities, worsening pain that does not respond to conservative treatment and bowel or bladder dysfunction. These are reasons to present to ER and patient has been informed.    The diagnosis(es), natural history, pathophysiology and treatment for diagnosis(es) were discussed. Opportunity given and questions answered. Biomechanics of pertinent body areas discussed.    EXERCISES:  Advice on benefits of, and types of regular/moderate exercise pertaining to diagnosis.  Continue HEP. For back or neck pain, recommend pilates and or yoga as tolerated. Generally it is best to start any new exercise under the guidance of a  or therapist.   MEDICATIONS:  When prescribe, the risks, benefits, warnings,side effects and alternatives of medications discussed. Advised against long term use of narcotics.   PAIN CONTROL:  Cold, heat, OTC lidocaine patches and/or  ointment as needed. Avoid direct skin contact with ice. Ice 15-20 minutes 3-4 times daily as needed. For SI joint pain, recommend ice bath in water about 50 degrees for 5 consecutive days, add ice slowly to help with adjustment and may cover with warm towel or robe to help with cold tolerance. If using lidocaine, do not apply heat in conjunction as this can cause a burn.   MEDICAL RECORDS reviewed from other provider(s) for past and current medical history pertinent to this visit..  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 : old age  Primary Reason for Visit (Submitted on 1/3/2024)  What is the primary reason for your visit?: Other

## 2024-01-11 ENCOUNTER — HOSPITAL ENCOUNTER (OUTPATIENT)
Dept: MRI IMAGING | Facility: HOSPITAL | Age: 79
Discharge: HOME | End: 2024-01-11
Admitting: NURSE PRACTITIONER
Payer: MEDICARE

## 2024-01-11 DIAGNOSIS — M47.22 CERVICAL SPONDYLOSIS WITH RADICULOPATHY: ICD-10-CM

## 2024-01-11 PROCEDURE — 72141 MRI NECK SPINE W/O DYE: CPT

## 2024-01-12 ENCOUNTER — OFFICE VISIT (OUTPATIENT)
Dept: PAIN MEDICINE | Facility: CLINIC | Age: 79
End: 2024-01-12
Payer: MEDICARE

## 2024-01-12 ENCOUNTER — PREP FOR SURGERY (OUTPATIENT)
Dept: SURGERY | Facility: SURGERY CENTER | Age: 79
End: 2024-01-12
Payer: MEDICARE

## 2024-01-12 VITALS
RESPIRATION RATE: 18 BRPM | HEART RATE: 80 BPM | HEIGHT: 62 IN | BODY MASS INDEX: 30.25 KG/M2 | DIASTOLIC BLOOD PRESSURE: 80 MMHG | SYSTOLIC BLOOD PRESSURE: 140 MMHG | WEIGHT: 164.4 LBS | TEMPERATURE: 97.5 F | OXYGEN SATURATION: 98 %

## 2024-01-12 DIAGNOSIS — M54.12 CERVICAL RADICULOPATHY: ICD-10-CM

## 2024-01-12 DIAGNOSIS — M48.02 CERVICAL SPINAL STENOSIS: Primary | ICD-10-CM

## 2024-01-12 DIAGNOSIS — M54.12 CERVICAL RADICULOPATHY: Primary | ICD-10-CM

## 2024-01-12 DIAGNOSIS — M48.02 CERVICAL SPINAL STENOSIS: ICD-10-CM

## 2024-01-12 PROCEDURE — 99214 OFFICE O/P EST MOD 30 MIN: CPT | Performed by: NURSE PRACTITIONER

## 2024-01-12 RX ORDER — ASPIRIN 81 MG/1
81 TABLET, CHEWABLE ORAL DAILY
COMMUNITY
Start: 2024-01-02

## 2024-01-12 RX ORDER — EAR PLUGS
1 EACH OTIC (EAR) EVERY OTHER DAY
COMMUNITY

## 2024-01-12 RX ORDER — DIAZEPAM 5 MG/1
5 TABLET ORAL ONCE
OUTPATIENT
Start: 2024-01-12 | End: 2024-01-12

## 2024-01-12 RX ORDER — VIT C/B6/B5/MAGNESIUM/HERB 173 50-5-6-5MG
CAPSULE ORAL
COMMUNITY

## 2024-01-12 NOTE — PROGRESS NOTES
CHIEF COMPLAINT  Follow-up for neck pain.    Subjective   Kourtney Lorenzana is a 78 y.o. female  who presents for follow-up.  She has a history of neck pain.  Patient was last seen by myself on 8/5/2022 for neck pain.  At that time recommendation was for PRABHAKAR with Dr. Allen.  This procedure was then canceled.    Today her pain is 6/10VAS in severity. She describes her neck pain as continuous bilateral aching pain that radiates into her shoulders and upper arms stopping above the elbows. She states that when she wakes up in the morning her hands are numb. She has been diagnosed with carpal tunnel in the past. Her neck pain is worsened by position (looking down at a book), ROM of her neck, and lifting. Her neck pain is improved by OTC aleve and OTC Tylenol. She also utilizes voltaren cream which is helpful.     She does report a history of bladder incontinence which she has had for many years. No acute changes.     Past pain medications: None     Current pain medications: OTC aleve and OTC Tylenol.      Past therapies:  Physical Therapy: Yes, helpful, has been years since last in PT  Chiropractor: None  Massage Therapy: Yes, temporarily helpful  TENS: Yes, at PT, temporary relief  Neck or back surgery: None  Past pain management: Yes, Dr. Sotelo many years ago for low back pain     Previous Injections:   LESI many years ago with Dr. Sotelo, no cervical procedures.     Neck Pain   This is a chronic problem. The current episode started more than 1 month ago. The problem occurs constantly. The problem has been gradually worsening. The pain is associated with lifting a heavy object. The pain is present in the right side and left side (R>L). The quality of the pain is described as aching. The pain is at a severity of 6/10. The symptoms are aggravated by position, twisting and bending. Associated symptoms include headaches, numbness (bilateral hands) and weakness (bilateral hands and arms). Pertinent negatives include no chest  pain or fever. She has tried NSAIDs, acetaminophen, heat and home exercises (PT) for the symptoms.      PEG Assessment   What number best describes your pain on average in the past week?5  What number best describes how, during the past week, pain has interfered with your enjoyment of life?8  What number best describes how, during the past week, pain has interfered with your general activity?  2    Review of Pertinent Medical Data ---  Office visit from 1/5/2024 with SLY Borges reviewed.  Patient referred for evaluation for neck pain.  Neck pain started over 1 year ago and is continuous and worsening.  Her neck pain is referred into her bilateral trapezius and shoulders with numbness in her bilateral hands at night.  She is completed physical therapy with minimal relief and is currently going to massage therapy every 2 weeks with temporary relief.  She has responded well to cervical epidural injections in the past.  She was evaluated by SLY Guallpa in 2020 however canceled her epidural because of the pandemic.  Will refer back to [place] for consideration of injections.    MRI CERVICAL SPINE WITHOUT CONTRAST     HISTORY: Chronic neck pain. Bilateral shoulder and arm pain. Numbness  and tingling in the hands.     TECHNIQUE: MRI cervical spine includes sagittal T1, T2 fat-sat, gradient  echo as well as axial gradient echo and oblique sagittal T2-weighted  sequences through the neural foramina.     COMPARISON: MRI cervical spine 06/08/2020.     FINDINGS: There is straightening of the cervical spine. Prevertebral  soft tissues appear within normal limits.     At C2-C3, the central canal and neuroforamen are patent.     At C3-C4, there is 3 mm anterolisthesis C3 on C4, moderate left facet  arthritis is present and there is left uncovertebral overgrowth with  severe left foraminal narrowing. Central canal and right neuroforamen  are patent.     At C4-C5, there is disc space narrowing with 3 mm  retrolisthesis C4 on  C5. Mild facet arthritis is present and there is uncovertebral  overgrowth with severe right and mild-to-moderate left foraminal  stenosis. There is mild central canal narrowing.     At C5-C6, there is disc space narrowing and there is a broad  disc/osteophyte complex, as well as posterior ligamentous thickening.  There is moderate central canal narrowing which appears progressive  compared to the prior exam 06/08/2020. Mild facet arthritis is present  and there is uncovertebral overgrowth with severe bilateral foraminal  stenosis.     At C6-C7, there is diminished disc space height and there is mild disc  bulge with mild canal narrowing. Uncovertebral overgrowth is present  with moderate bilateral foraminal narrowing.     At C7-T1, the central canal and neuroforamen are patent.     IMPRESSION:  Degenerative disc disease at C5-C6 appears mildly progressed  when compared to the prior exam 06/08/2020. At this level, there is a  broad disc osteophyte complex and posterior ligamentous thickening with  moderate canal narrowing. There is no other evidence for change.  Uncovertebral overgrowth is present and there is multilevel osseous  encroachment on the neural foramina.       This report was finalized on 1/12/2024 10:06 AM by Dr. Gaston Stanley M.D on Workstation: [id number]    The following portions of the patient's history were reviewed and updated as appropriate: allergies, current medications, past family history, past medical history, past social history, past surgical history, and problem list.    Review of Systems   Constitutional:  Negative for fever.   Cardiovascular:  Negative for chest pain.   Gastrointestinal:  Positive for diarrhea. Negative for constipation.   Genitourinary:  Positive for difficulty urinating.   Musculoskeletal:  Positive for neck pain.   Neurological:  Positive for weakness (bilateral hands and arms), numbness (bilateral hands) and headaches.  "  Psychiatric/Behavioral:  Positive for sleep disturbance. Negative for suicidal ideas. The patient is not nervous/anxious.      --  The aforementioned information the Chief Complaint section and above subjective data including any HPI data, and also the Review of Systems data, has been personally reviewed and affirmed.  --    Vitals:    01/12/24 1309   BP: 140/80   Pulse: 80   Resp: 18   Temp: 97.5 °F (36.4 °C)   SpO2: 98%   Weight: 74.6 kg (164 lb 6.4 oz)   Height: 157.5 cm (62\")   PainSc:   3   PainLoc: Neck     Objective   Physical Exam  Vitals and nursing note reviewed.   Constitutional:       Appearance: Normal appearance. She is well-developed.   Eyes:      General: Lids are normal.   Cardiovascular:      Rate and Rhythm: Normal rate.   Pulmonary:      Effort: Pulmonary effort is normal.   Musculoskeletal:      Cervical back: Spasms, tenderness and bony tenderness present. Decreased range of motion.      Comments:   +left spurling   Neurological:      Mental Status: She is alert and oriented to person, place, and time.   Psychiatric:         Attention and Perception: Attention normal.         Mood and Affect: Mood normal.         Speech: Speech normal.         Behavior: Behavior normal.         Judgment: Judgment normal.       Assessment & Plan   Diagnoses and all orders for this visit:    1. Cervical spinal stenosis (Primary)    2. Cervical radiculopathy      Kourtney Lorenzana reports a pain score of 3.  Given her pain assessment as noted, treatment options were discussed and the following options were decided upon as a follow-up plan to address the patient's pain: steroid injections.    --- PRABHAKAR at C7/T1  Reviewed the procedure at length with the patient.  Included in the review was expectations, complications, risk and benefits.The procedure was described in detail and the risks, benefits and alternatives were discussed with the patient (including but not limited to: bleeding, infection, nerve damage, " worsening of pain, inability to perform injection, paralysis, seizures, coma, no pain relief and death) who agreed to proceed.  Discussed the potential for sedation if warranted/wanted.  The procedure will plan to be performed at [facility] with fluoroscopic guidance(unless ultrasound is indicated) and could potentially have steroids and contrast dye used. Questions were answered and in a way the patient could understand.  Patient verbalized understanding and wishes to proceed.  This intervention will be ordered.  Discussed with patient that all procedures are part of a multimodal plan of care and include either formal PT or a home exercise program.  Patient has no evidence of coagulopathy or current infection.    --- Follow-up after procedure     Dictated utilizing Dragon dictation.

## 2024-01-18 ENCOUNTER — TRANSCRIBE ORDERS (OUTPATIENT)
Dept: SURGERY | Facility: SURGERY CENTER | Age: 79
End: 2024-01-18
Payer: MEDICARE

## 2024-01-18 DIAGNOSIS — Z41.9 SURGERY, ELECTIVE: Primary | ICD-10-CM

## 2024-01-18 PROBLEM — M48.02 CERVICAL SPINAL STENOSIS: Status: ACTIVE | Noted: 2024-01-12

## 2024-01-22 ENCOUNTER — TELEPHONE (OUTPATIENT)
Dept: ORTHOPEDIC SURGERY | Facility: CLINIC | Age: 79
End: 2024-01-22
Payer: MEDICARE

## 2024-01-22 NOTE — TELEPHONE ENCOUNTER
Please let her know I reviewed the MRI and it has not changed dramatically since 2020.  No severe cord compression and varying degrees of nerve root impingement.  I see that she saw pain management and is scheduled for an epidural with which I agree.

## 2024-02-02 ENCOUNTER — TELEPHONE (OUTPATIENT)
Dept: NEUROLOGY | Facility: CLINIC | Age: 79
End: 2024-02-02
Payer: MEDICARE

## 2024-02-05 ENCOUNTER — OFFICE VISIT (OUTPATIENT)
Dept: NEUROLOGY | Facility: CLINIC | Age: 79
End: 2024-02-05
Payer: MEDICARE

## 2024-02-05 VITALS
SYSTOLIC BLOOD PRESSURE: 120 MMHG | DIASTOLIC BLOOD PRESSURE: 92 MMHG | BODY MASS INDEX: 30.69 KG/M2 | HEART RATE: 64 BPM | HEIGHT: 62 IN | WEIGHT: 166.8 LBS | OXYGEN SATURATION: 98 %

## 2024-02-05 DIAGNOSIS — Z86.73 HISTORY OF CVA (CEREBROVASCULAR ACCIDENT): Primary | ICD-10-CM

## 2024-02-05 PROCEDURE — 1159F MED LIST DOCD IN RCRD: CPT | Performed by: PSYCHIATRY & NEUROLOGY

## 2024-02-05 PROCEDURE — 99204 OFFICE O/P NEW MOD 45 MIN: CPT | Performed by: PSYCHIATRY & NEUROLOGY

## 2024-02-05 PROCEDURE — 1160F RVW MEDS BY RX/DR IN RCRD: CPT | Performed by: PSYCHIATRY & NEUROLOGY

## 2024-02-12 ENCOUNTER — PATIENT ROUNDING (BHMG ONLY) (OUTPATIENT)
Dept: NEUROLOGY | Facility: CLINIC | Age: 79
End: 2024-02-12
Payer: MEDICARE

## 2024-02-23 ENCOUNTER — OFFICE VISIT (OUTPATIENT)
Dept: GASTROENTEROLOGY | Facility: CLINIC | Age: 79
End: 2024-02-23
Payer: MEDICARE

## 2024-02-23 VITALS
HEART RATE: 71 BPM | TEMPERATURE: 98.1 F | WEIGHT: 161.3 LBS | DIASTOLIC BLOOD PRESSURE: 75 MMHG | BODY MASS INDEX: 29.68 KG/M2 | HEIGHT: 62 IN | SYSTOLIC BLOOD PRESSURE: 124 MMHG

## 2024-02-23 DIAGNOSIS — R10.30 LOWER ABDOMINAL PAIN: Primary | ICD-10-CM

## 2024-02-23 DIAGNOSIS — R10.32 LLQ PAIN: ICD-10-CM

## 2024-02-23 DIAGNOSIS — K92.1 BLOOD IN STOOL: ICD-10-CM

## 2024-02-23 LAB
ERYTHROCYTE [DISTWIDTH] IN BLOOD BY AUTOMATED COUNT: 13.5 % (ref 12.3–15.4)
HCT VFR BLD AUTO: 33.7 % (ref 34–46.6)
HGB BLD-MCNC: 13.2 G/DL (ref 12–15.9)
MCH RBC QN AUTO: 36.2 PG (ref 26.6–33)
MCHC RBC AUTO-ENTMCNC: 39.2 G/DL (ref 31.5–35.7)
MCV RBC AUTO: 92.3 FL (ref 79–97)
PLATELET # BLD AUTO: 200 10*3/MM3 (ref 140–450)
RBC # BLD AUTO: 3.65 10*6/MM3 (ref 3.77–5.28)
WBC # BLD AUTO: 6.07 10*3/MM3 (ref 3.4–10.8)

## 2024-02-23 NOTE — Clinical Note
78-year-old female who presents with acute onset left lower quadrant discomfort, bloating, and bright red blood per rectum and drops of blood noted in the toilet.  Left lower quadrant pain has been improving and is now just a dull ache.  She initially had chills but none since last Thursday.  No fevers.  Went and ordered CT to evaluate for diverticulitis.  Since symptoms were improving I did not send her antibiotics but instructed her to call me back if pain worsens or does not improve.  Recommended she continue bland diet.  For rectal discomfort she did have a couple hemorrhoids so I recommended Preparation H

## 2024-02-23 NOTE — PROGRESS NOTES
"Chief Complaint  Irritable Bowel Syndrome, Constipation, Abdominal Pain, and Nausea    Subjective          History of Present Illness    Kourtney Lorenzana is a  78 y.o. female presents for follow-up for abdominal pain.  She is a patient of Dr. Andrade and was last seen by me in the office 12/13/2023.  Patient called the office last week reporting lower abdominal pain on the left side, some bright red blood per rectum noted in the toilet bowl and rectal discomfort when having a bowel movement.    Seen in the office this morning.  She states that she is feeling much better although she still having a dull achy pain in her left lower quadrant.  She reports associated bloating as well.  She had 1 episode of bright red blood seen in the toilet bowl after straining to have a bowel movement but has not had no further episodes of bleeding.  Her abdominal pain is resolving although she is still feeling bloated.  She had chills last week but has had no further fevers or chills this week.  She is tolerating a bland diet well.  Still reporting rectal discomfort.  She did have intermittent nausea initially but this is better.  No vomiting.    Colonoscopy 2/2021 --> diverticulosis, non bleeding internal hemorrhoids.     She does have a family history of colon cancer in her mother who passed away from this at the age of 66    KUB after last office visit showed quite a bit of stool in her colon.    Objective   Vital Signs:   /75   Pulse 71   Temp 98.1 °F (36.7 °C)   Ht 157.5 cm (62\")   Wt 73.2 kg (161 lb 4.8 oz)   BMI 29.50 kg/m²       Physical Exam  Constitutional:       General: She is not in acute distress.     Appearance: Normal appearance.   Eyes:      General: No scleral icterus.  Cardiovascular:      Rate and Rhythm: Normal rate.   Pulmonary:      Effort: Pulmonary effort is normal.   Abdominal:      General: Abdomen is flat. Bowel sounds are normal. There is no distension.      Tenderness: There is no guarding.      " Comments: Left lower tenderness to palpation   Genitourinary:     Comments: Rectal exam done with Roberta present as chaperone.  A few external hemorrhoids nonthrombosed.  No masses palpated and no gross blood seen on exam  Skin:     Coloration: Skin is not jaundiced.   Neurological:      General: No focal deficit present.      Mental Status: She is alert and oriented to person, place, and time.   Psychiatric:         Mood and Affect: Mood normal.         Behavior: Behavior normal.          Result Review :   The following data was reviewed by: Mona Day PA-C on 02/23/2024:  CMP          5/15/2023    09:24 11/29/2023    11:16 1/2/2024    09:22   CMP   Glucose 85  98  83    BUN 15  16  16    Creatinine 0.78  0.80  0.95    Sodium 145  142  144    Potassium 4.5  3.9  4.1    Chloride 109  106  109    Calcium 9.4  9.7  9.3    Total Protein 6.6  7.1  7.0    Albumin 4.4  4.7  4.4    Globulin 2.2  2.4  2.6    Total Bilirubin 0.5  0.5  0.4    Alkaline Phosphatase 149  155  141    AST (SGOT) 16  14  12    ALT (SGPT) 15  19  21    BUN/Creatinine Ratio 19.2  20.0  16.8      CBC          5/15/2023    09:24 6/14/2023    10:36 11/29/2023    11:16   CBC   WBC 4.83  5.07  7.51    RBC 3.67  4.64  4.74    Hemoglobin 13.3  13.6  14.0    Hematocrit 33.1  41.2  43.3    MCV 90.2  88.8  91.4    MCH 36.2  29.3  29.5    MCHC 40.2  33.0  32.3    RDW 13.1  13.3  13.6    Platelets 197  194  227              Assessment:   Diagnoses and all orders for this visit:    1. Lower abdominal pain (Primary)    2. LLQ pain  -     CBC (No Diff)  -     CT Abdomen Pelvis With Contrast; Future    3. Blood in stool  -     CBC (No Diff)  -     CT Abdomen Pelvis With Contrast; Future          Plan:   -Will order CT Abdominal pelvis to evaluate for diverticulitis   -Recommended Preparation H to help for rectal discomfort.  Offered to send prescription but patient says she has some at home  -Continue bland soft diet for a few weeks while discomfort continues to  resolve  -Will check CBC today given reports of rectal bleeding.  Patient informed that if pain worsens or further bleeding occurs that she could should go to the emergency department for further evaluation  -Recommend follow-up in 4 weeks  -Recommend continuing bowel regimen with MiraLAX and smooth move tea.  In the future if she is not going regularly enough on this regimen may need to consider Amitiza, but would hold off on that for now while this episode resolves  -Patient not having any current fevers, chills and she says pain has greatly improved, so we will hold off on antibiotics for now.  If pain worsens patient to call back and we will consider antibiotics at that time      Follow Up   No follow-ups on file.    Dragon dictation used throughout this note.         Mona Day PA-C  [facility]  [address] [place] [address]  Office: [phone]

## 2024-02-26 ENCOUNTER — TELEPHONE (OUTPATIENT)
Dept: GASTROENTEROLOGY | Facility: CLINIC | Age: 79
End: 2024-02-26
Payer: MEDICARE

## 2024-02-26 NOTE — TELEPHONE ENCOUNTER
----- Message from Mona Day PA-C sent at 2/26/2024  7:59 AM EST -----  Please let patient know white count was normal. Will await CT Scan, recommend continuing tylenol, heating pad, bland diet for now and let us know if symptoms persist or worsen.

## 2024-02-26 NOTE — PROGRESS NOTES
Please let patient know white count was normal. Will await CT Scan, recommend continuing tylenol, heating pad, bland diet for now and let us know if symptoms persist or worsen.

## 2024-02-28 DIAGNOSIS — E78.49 OTHER HYPERLIPIDEMIA: ICD-10-CM

## 2024-03-01 RX ORDER — ROSUVASTATIN CALCIUM 20 MG/1
20 TABLET, COATED ORAL DAILY
Qty: 90 TABLET | Refills: 0 | Status: SHIPPED | OUTPATIENT
Start: 2024-03-01

## 2024-03-04 ENCOUNTER — HOSPITAL ENCOUNTER (OUTPATIENT)
Dept: CT IMAGING | Facility: HOSPITAL | Age: 79
Discharge: HOME | End: 2024-03-04
Payer: MEDICARE

## 2024-03-04 DIAGNOSIS — R10.32 LLQ PAIN: ICD-10-CM

## 2024-03-04 DIAGNOSIS — K92.1 BLOOD IN STOOL: ICD-10-CM

## 2024-03-04 LAB — CREAT BLDA-MCNC: 0.8 MG/DL (ref 0.6–1.3)

## 2024-03-04 PROCEDURE — 25510000001 IOPAMIDOL 61 % SOLUTION

## 2024-03-04 PROCEDURE — 82565 ASSAY OF CREATININE: CPT

## 2024-03-04 PROCEDURE — 0 DIATRIZOATE MEGLUMINE & SODIUM PER 1 ML

## 2024-03-04 PROCEDURE — 74177 CT ABD & PELVIS W/CONTRAST: CPT

## 2024-03-04 RX ADMIN — IOPAMIDOL 85 ML: 612 INJECTION, SOLUTION INTRAVENOUS at 16:56

## 2024-03-04 RX ADMIN — DIATRIZOATE MEGLUMINE AND DIATRIZOATE SODIUM 30 ML: 660; 100 LIQUID ORAL; RECTAL at 16:55

## 2024-03-05 ENCOUNTER — OFFICE VISIT (OUTPATIENT)
Dept: FAMILY MEDICINE CLINIC | Facility: CLINIC | Age: 79
End: 2024-03-05
Payer: MEDICARE

## 2024-03-05 VITALS
RESPIRATION RATE: 14 BRPM | WEIGHT: 160 LBS | HEART RATE: 75 BPM | HEIGHT: 62 IN | BODY MASS INDEX: 29.44 KG/M2 | OXYGEN SATURATION: 98 % | DIASTOLIC BLOOD PRESSURE: 80 MMHG | SYSTOLIC BLOOD PRESSURE: 126 MMHG | TEMPERATURE: 98 F

## 2024-03-05 DIAGNOSIS — G47.19 OTHER HYPERSOMNIA: ICD-10-CM

## 2024-03-05 DIAGNOSIS — M25.562 CHRONIC PAIN OF LEFT KNEE: ICD-10-CM

## 2024-03-05 DIAGNOSIS — G47.9 SLEEP DISTURBANCES: ICD-10-CM

## 2024-03-05 DIAGNOSIS — M43.12 SPONDYLOLISTHESIS, CERVICAL REGION: ICD-10-CM

## 2024-03-05 DIAGNOSIS — M54.2 CERVICALGIA: ICD-10-CM

## 2024-03-05 DIAGNOSIS — E53.8 B12 DEFICIENCY: ICD-10-CM

## 2024-03-05 DIAGNOSIS — M48.02 SPINAL STENOSIS, CERVICAL REGION: ICD-10-CM

## 2024-03-05 DIAGNOSIS — E78.49 OTHER HYPERLIPIDEMIA: Primary | ICD-10-CM

## 2024-03-05 DIAGNOSIS — Z87.440 HISTORY OF UTI: ICD-10-CM

## 2024-03-05 DIAGNOSIS — R74.8 ELEVATED ALKALINE PHOSPHATASE LEVEL: ICD-10-CM

## 2024-03-05 DIAGNOSIS — E55.9 VITAMIN D DEFICIENCY: ICD-10-CM

## 2024-03-05 DIAGNOSIS — R51.9 CHRONIC NONINTRACTABLE HEADACHE, UNSPECIFIED HEADACHE TYPE: ICD-10-CM

## 2024-03-05 DIAGNOSIS — R10.32 LEFT LOWER QUADRANT PAIN: ICD-10-CM

## 2024-03-05 DIAGNOSIS — K59.09 CHRONIC CONSTIPATION: ICD-10-CM

## 2024-03-05 DIAGNOSIS — N32.81 OAB (OVERACTIVE BLADDER): ICD-10-CM

## 2024-03-05 DIAGNOSIS — K21.9 GASTROESOPHAGEAL REFLUX DISEASE, UNSPECIFIED WHETHER ESOPHAGITIS PRESENT: ICD-10-CM

## 2024-03-05 DIAGNOSIS — R93.0 ABNORMAL COMPUTED TOMOGRAPHY ANGIOGRAPHY OF HEAD: ICD-10-CM

## 2024-03-05 DIAGNOSIS — E83.42 HYPOMAGNESEMIA: ICD-10-CM

## 2024-03-05 DIAGNOSIS — R79.89 ABNORMAL CBC: ICD-10-CM

## 2024-03-05 DIAGNOSIS — M50.30 DDD (DEGENERATIVE DISC DISEASE), CERVICAL: ICD-10-CM

## 2024-03-05 DIAGNOSIS — N93.9 VAGINAL SPOTTING: ICD-10-CM

## 2024-03-05 DIAGNOSIS — Z86.73 HISTORY OF LACUNAR CEREBROVASCULAR ACCIDENT: ICD-10-CM

## 2024-03-05 DIAGNOSIS — H53.9 VISION CHANGES: ICD-10-CM

## 2024-03-05 DIAGNOSIS — R53.82 CHRONIC FATIGUE: ICD-10-CM

## 2024-03-05 DIAGNOSIS — G89.29 CHRONIC PAIN OF LEFT KNEE: ICD-10-CM

## 2024-03-05 DIAGNOSIS — M54.12 CERVICAL RADICULOPATHY: ICD-10-CM

## 2024-03-05 DIAGNOSIS — G89.29 CHRONIC NONINTRACTABLE HEADACHE, UNSPECIFIED HEADACHE TYPE: ICD-10-CM

## 2024-03-05 PROCEDURE — 99214 OFFICE O/P EST MOD 30 MIN: CPT | Performed by: PHYSICIAN ASSISTANT

## 2024-03-05 NOTE — PROGRESS NOTES
Subjective   Kourtney Lorenzana is a 78 y.o. female who presents today in follow up of hyperlipidemia and brain imaging with vitamin D deficiency, B12 deficiency, elevated alkaline phosphatase, magnesium, history of UTI, vaginal spotting, GERD, chronic fatigue, sleep, headache, significant back, neck, and joint pain, and vision change.    Hyperlipidemia    Last episode 2/15/2024- lasted a while- happening more frequently and lasting longer. Increased pain with BM and was having a hard time having BM and even vagina was burning with BM. Also mucusy blood with BM. Has lost 10 lbs with increased GI symptoms. With increased nausea, could not eat then eating soft foods. When went to see GI- recommended staying on soft food diet for a couple weeks. Apple sauce, eggs, bread.     Prior, lowered sugar and bread. Hardly ever red meat- maybe once every 2 month, hamburger. Otherwise, not eating a lot of meat- does not tolerate very well.     Stopped Aleve. Tylenol not helping as much.     Pounding in ear- she reports it stopped with stopping Aleve. Resolved completely.         Hyperlipidemia- no cramping more than before in legs with Crestor. She has tolerated well. Negative-   he has been more tired but otherwise doing ok on medication. No AE with Crestor. The 1st week, started with 1/2 tablet to get used to it then started whole pill.  Wants to know about Co Q10. Stopped other supplements.   cholesterol was higher 6/2022- advised she start Lipitor 40 mg nightly. She did not take medication. Diet helped with previous labs.   After Harsh, she was as good but being more careful again. Still better about limiting bread, staying away from pies, cakes, and cookies to an extent. Better than she was previously.   Patient was diligent with diet and exercise with weight watchers.  Continued with weight loss.    History of CVA, abnormal sound in ears-   Reports she has been having sensation of hearing heartbeat in her ear with yawn and  worse at night. Has been there years- 3-4 years. It was rare but is now happening often. She would have daily but tries to keep in yawning. Won't happen if does not yawn. If turns head a certain way, she has will start as well. She will try to stop with putting her finger in ear. Has to run it's course and stops on it's own but lasts up to 1 hour. Has had a few times and was there constantly for an hour and keeping awake. No other associated symptoms with it. Does have some hearing loss. No ringing in ears.   PGF- brain aneurysm. Father with TIAs.     Headache- has eased up and is doing better.   Having increased headaches. Chronic neck pain. Wants to see if she should see ortho spine in follow up. Also right side of her throat stays sore on the right- sore throat started a couple months ago.   She completed physical therapy- did not help much. Went for 6 weeks. She goes for massage once monthly- helps. Lasts a couple weeks. Has not been too bad.   Started 1/2022 with bilateral temporal, parietal, and at the top of head and back of head. Throbbing pain and mild pain. 5/10- lasts about 1 hour or so. Tries not to keep track and tries to stay busy. No associated dizziness, vision changes, nausea, vomiting, confusion, change in speech, gait abnormality, or other neurological symptoms. Tylenol not helping enough and having to use Aleve. She is now going back and forth with Tylenol and Aleve. Worried about her liver. Taking 1-2 every night and sometimes 1 in the morning if bad. She reports it depends on the weather. Rain and sudden changes in weather- cold to hot and humid.     Vision change- told vision was ok 1/2024. Told about issues with focus and driving and told was ok.   She had gradual vision change. She had treatment on both eyes- laser treatment. Helped her vision.     Neck and shoulder pain-continues with neck and shoulder pain.  She goes for massage once monthly- helps. Lasts a couple weeks. Wants to see if  "she should see ortho spine in follow up.  She completed PT but did not help a lot.  She completed physical therapy for 6 weeks.  She was seen by Dr Napier- told that the bone spurs were not concerning for esophagus and thought no surgery at this time. Recommended PT and ANNE. PT was helping and did not feel she needed ANNE. May want to do \"down the road\" but not right now. Seen by Dr Garcia with injection. Helped some.   6/2022- right side of neck is swollen more than the other side. She has pain and stiffness in neck and goes to right shoulder.   Patient also noted pain in her neck and down her left upper arm. She was not sore to touch or feel but is a deep pain. She also has bilateral hand numbness. We started with labs and advised we would consider PT and additional workup if no improvement or recurrence.  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  She also has persistent neck stiffness with the shoulder pain. I will refer to PT today. If persistent neck, shoulder, and knee symptoms, or persistent numbness, we will consider EMG and further imaging. She should call or return if no resolution of symptoms.    Lifestyle changes-   Moved and living in the walkout basement with her daughter.   Weight watchers-has continued to be compliant and continued with weight loss.  7/1/2022- she had to change diet- cutting sugars completely and decreased carbs. Recently restarted 1 slice of bread every other day- whole wheat with no sugars. If any meat- Tuna, chicken, rarely beef.   Not able to exercise much other than the cleaning and housekeeping and yard work. Swimming some this summer.   Vitamin D deficiency- taking 2000 IU daily. Tolerating without AE  B12 deficiency- taking 1 dropper every other day.   She was taking 1 dropper full daily. 1000 mcg daily.   10/2022-advised to restart B12 100 mcg daily or 500 mcg twice " weekly. She did not restart   6/2022-advised decrease B12 to 500 mcg 3 times weekly rather than daily. Women's vitamin with B6- small amount. Stopped vitain D.   Previously 500 mcg daily. Tolerating without AE  Elevated alk phos- no longer on Aleve. Only on Tylenol.    taking Aleve and stopped then started again with headaches.  Was taking 2 Tylenol at bedtime.    Cholecystectomy 12/3/2019 with Dr. Schneider.  Went for surgery follow-up with incisions healing well.  She was taking Aleve and stopped. She was also taking 2 Tylenol at bedtime. It was thought elevated alkaline phosphatase could have been related to GB disease. She underwent cholecystectomy 12/3/2019 with Dr. Schneider and went for surgery follow-up with incisions healing well.   Magnesium- no longer on Mg.   No longer taking magnesium oxide 400 mg. Had side effects. Did not restart.   Added Quercetin, dandelion root, and Zinc- reports she thinks these are good for heart, liver, and immune system. She is not vaccinated for Covid 19.   Added K2.     UTI- no other symptoms.   11/2022- she had left flank pain with radiation to left lower abdomen. Then resolved with Aleve and heating pad.   Started at 2 am 11/19. Burning with urination. She had UTI in the past frequency. She had not had in years but noted fast.  No fever but had chills 11/19/2022 tylenol and Azo every 6 hours. She was worried about not treating UTI.   Patient was seen by SLY Spencer 4/27/2023 with UTI.  She was given Omnicef that was appropriate by culture.  Patient then called again on 5/5/2023 and reported she still had burning sensation with urination and had some back pain and fatigue.  She was advised she needed to come in to leave another urine.  However, Macrobid was called into the pharmacy by SLY Spencer. She had burning with urination, lower back pain. She got relief with Tylenol and Aleve but was not getting relief  at that time. She was unsure if related to urination.    Vaginal spotting- still vaginal spotting.   Previously reported she no longer had spotting but had incontinence of urine.   She also discussed spotting with Urogyn and was advised this was from vaginal dryness. She is rarely has spotting will continue follow up with them as directed.    Last urogynecology appointment 11/2018- negative evaluation for spotting and vaginal bleeding and no erosions on exam.  She was not using HRT or vaginal estrogen.  She also had mixed incontinence that was worsening.  She did not respond to Myrbetriq and had GI side effects-discontinue medication.  Prior sling for JOSEPH that worked for only a couple years.  Discussed PTNS versus Botox versus InterStim for her urge and discussed surgical options for treatment of stress incontinence including mid urethral sling with mesh versus biologic material, urethral injections or Christy procedures.  They started vaginal estrogen for the atrophy.  Advised follow-up in 6 months.  Patient did not follow-up    GERD- continues taking medication. Hates the thought of taking it.    taking Omeprazole every other day or every 3rd day. Still has heartburn if she eats the wrong thing and is on the 3rd day. Not eating hardly any fried foods and allows her to take less often.   She was previously stable on Protonix 40 mg daily. No breakthrough symptoms when taking medication regularly.  She was seen by GI was advised to continue.    She was having increased heartburn and reflux but changing Protonix to Omeprazole helped.   She had improvement with change in eating.  Patient was seen 12/10/2019 by Dr. Mitchell and was given Mobic for back pain. She then had RUQ pain and Dr Schneider advised to increase to 15 mg for 2 weeks.   12/30/2019-patient went to ER with sharp chest pain.  Was given Protonix 40 mg daily and advised to see cardiology.  Appointment was made for March 2020. Thinks it was a side effect from Mobic.  Abdominal pain- Also has talked with GI-   Lupe- she has scar tissue in abdomen and was told that she has some crooked places in intestines. No further colonoscopies. Happening more often that she has increased pain in lower abdomen. Flares of pain prevent bending over. Sometimes LLQ and sometimes RLQ. If gets on couch with feet up and heating pad, takes Tylenol and eases up. Never knows when will hit. She will feel limited in how far she goes and how much she does.   She is also struggling with constipation. Advised Miralax and stool softeners and eating prunes.   Did not want to take Miralax all the time. Always trying to find something- came across bio3 complete- has probiotics and was for gut health. Not helping as much as fiber.   When has constipation, she has increased pain.     Sleep-Waking up 4 x nightly to urinate but can usually go back to sleep.  She was taking Benadryl sleep aid 50 mg and has taken Tylenol at bedtime. Was taking Benadryl instead of Zyrtec but now taking Zyrtec daily.   Fatigue- still feeling tired.   Previously, restarted B12 about 1 month ago with increased fatigue. Helps a little then about 2 pm, she has increased fatigue.   She was seen 6/2019 and had been very fatigued all the time. She could only tolerate being active a few hours then she was tired for the rest of the day, complained of shortness of breath and fatigued with exertion. Patient also noted bilateral toes were blue. She had normal DP and PT pulses bilaterally with normal temperature of the feet. She felt overall cool/ cold all the time. She then joined weight watchers and has improved some. Patient continues waking up 4 x nightly to urinate but states she can usually go back to sleep. She is taking Benadryl sleep aid 50 mg and Tylenol at bedtime as needed. If she has persistent sleep disturbances or fatigue, we will consider sleep study.    Knee pain- The other knee needs surgery but not ready for recovery. Right is ok after operation and left is not too bad.    Patient continues follow up with Dr Catarino De La Cruz, orthopedist, who advised PT and knee injections. Her right knee pain improved and her left knee still hurts. She will return as needed for injections with orthopedist. Patient's last appt was 1/7/2020 for OA knee and shoulder pain.    He advised no surgical intervention at this time for the knee and to continue PT.  She had right knee surgery 5/2021- she did well with surgery and has healed well.   Orthopedic surgery- Dr. Catarino De La Cruz- patient was seen 1/7/2020 for OA knee and shoulder pain.  They advised she was starting to develop frozen shoulder and ordered MRI shoulder to make further recommendations pending results.  Advised no surgical intervention at this time for the knee- continue PT. Reports neck stiffness with the shoulder pain.     Skin lesion face, nose- She also has a change in color on skin of left side of face. If she pulls back on the lesion, she has a pinching pain. She has had another spot on right side of face and place on right side of nose that scabs and the scab falls off and recurs. Pinching pain if she pushes on it or moves it.   Rash-rash is gone but she continues with pain in the scar area when she is needing to have BM or urinate and is not able to go right away.   At her previous visit, she stated she has a scar on her abdomen with pain in the area of her scar from hysterectomy. When she rubbed it, the skin came off. Also, she had itching under bilateral breasts with a rash. On exam, she appeared to have cutaneous candidiasis and was treated with Nystatin cream and powder in both areas and has had improvement.      The following portions of the patient's history were reviewed and updated as appropriate: allergies, current medications, past family history, past medical history, past social history, past surgical history and problem list.    Review of Systems   Constitutional:  Positive for fatigue.   Eyes:  Positive for visual disturbance.    Respiratory: Negative.     Cardiovascular: Negative.    Musculoskeletal:  Positive for arthralgias, back pain, neck pain and neck stiffness.   Skin:  Positive for color change and rash.   Neurological:  Positive for headaches.   Psychiatric/Behavioral:  Positive for sleep disturbance.        Objective    Vitals:    03/05/24 1012   BP: 126/80   Pulse: 75   Resp: 14   Temp: 98 °F (36.7 °C)   SpO2: 98%     BMI is >= 30 and <35. (Class 1 Obesity). The following options were offered after discussion;: exercise counseling/recommendations and nutrition counseling/recommendations    Physical Exam   Constitutional: She is oriented to person, place, and time. She appears well-developed.   HENT:   Head: Normocephalic and atraumatic.   Right Ear: External ear normal.   Left Ear: External ear normal.   Nose: Nose normal.   Eyes: Conjunctivae and lids are normal.   Neck: Carotid bruit is not present.   Cardiovascular: Normal rate, regular rhythm and normal heart sounds. Exam reveals no gallop and no friction rub.   No murmur heard.  Pulmonary/Chest: Effort normal and breath sounds normal. No respiratory distress. She has no wheezes. She has no rhonchi. She has no rales.   Abdominal: Soft. Normal appearance and bowel sounds are normal. She exhibits no shifting dullness, no distension, no abdominal bruit and no mass. There is no abdominal tenderness. There is no rigidity, no rebound and no guarding. No hernia.   Musculoskeletal: No deformity.   Neurological: She is alert and oriented to person, place, and time. Gait normal.   Skin: Skin is warm and dry. She is not diaphoretic.   Psychiatric: Her speech is normal and behavior is normal. Judgment and thought content normal.   Nursing note and vitals reviewed.      Assessment & Plan   Diagnoses and all orders for this visit:    1. Other hyperlipidemia (Primary)  -     Comprehensive Metabolic Panel  -     CK  -     Lipid Panel With LDL / HDL Ratio    2. History of lacunar  cerebrovascular accident  -     Comprehensive Metabolic Panel  -     CK  -     Lipid Panel With LDL / HDL Ratio    3. Abnormal computed tomography angiography of head  -     Comprehensive Metabolic Panel  -     CK  -     Lipid Panel With LDL / HDL Ratio    4. Vision changes  -     Comprehensive Metabolic Panel  -     CK  -     Lipid Panel With LDL / HDL Ratio    5. Chronic nonintractable headache, unspecified headache type    6. DDD (degenerative disc disease), cervical    7. Spondylolisthesis, cervical region    8. Spinal stenosis, cervical region    9. Cervicalgia    10. Cervical radiculopathy    11. Vitamin D deficiency  -     Comprehensive Metabolic Panel  -     Vitamin D,25-Hydroxy    12. B12 deficiency  -     CBC & Differential  -     Ferritin    13. Abnormal CBC  -     Vitamin B12 & Folate  -     Iron and TIBC  -     Ferritin    14. Elevated alkaline phosphatase level  -     Comprehensive Metabolic Panel    15. Hypomagnesemia  -     Magnesium    16. History of UTI    17. OAB (overactive bladder)    18. Vaginal spotting    19. Gastroesophageal reflux disease, unspecified whether esophagitis present    20. Left lower quadrant pain    21. Chronic constipation    22. Sleep disturbances  -     TSH    23. Other hypersomnia     24. Chronic fatigue  -     TSH    25. Chronic pain of left knee               Assessment and plan  Patient will have follow-up labs today. Call if no results in 1 week. Stability of conditions, plan, follow up, and further recommendations pending labs. Follow up in 2 months if labs are stable.  She declines AWV.    Hyperlipidemia- She did not take Lipitor as prescribed and wanted to work on diet and exercise.  I have counseled about her concerns for statins vs risks of not taking medication.  She is now taking Crestor 20 mg daily and tolerating without AE.  She continues medication with co-Q10.  Continued dietary changes with weight loss.  Will check CMP and CK today and she will follow-up  3/2024 for her 3-month follow-up with fasting labs.  History of CVA, small vessel ischemic disease- We discussed continuing Crestor 20 mg daily and adding aspirin 81 mg daily.  She has no history of GI bleeding, fall risk, or allergy to aspirin.  I will have neurology make further recommendations for treatment and follow-up.  Headache, abnormal sensation/ sound noted- Patient had headaches for about 6 months with no associated symptoms but seem to be more with on weather and temperature changes. She had significant neck pain and stiffness. This could be contributing to her headaches. She underwent PT and headaches were stable. She also has sleep disturbance. I referred for sleep study that was not completed. Patient completed CTA head and neck and MRI brain with lacunar infarct, chronic small vessel ischemic changes, and significant degenerative changes of the cervical spine.  I have referred to neurology.  She should call if she does not receive a call by tomorrow for appointment date.  I did asked that she use Flonase or Nasacort 2 sprays each nostril once daily to see if this helps with sore throat and ear symptoms.  She should otherwise discuss with neurology at her appointment.  She will also discuss headaches and neck issues with orthospine.  To ER if worsening, new or changing symptoms or if any other neurological symptoms.   Neck and shoulder pain, DDD, OA cervical spine- She has had stiffness in her neck to her right shoulder, pain down her left arm, headaches, bilateral hand numbness, and feels there is an asymmetry of her neck. She had MRI cervical spine 2020 with advanced degenerative changes, spondylolisthesis. She was seen by Dr Napier and advised PT and possible ANNE.  She will follow-up with orthopedic spine for reevaluation.  She may also need follow up with Dr Garcia with previous concerns for frozen shoulder.   Vitamin D deficiency- Patient to continue 2000 IU daily.   B12 deficiency- Patient had  elevated levels with her last labs.  Instead of decreasing dosage, she stopped B12.  I asked that she restart every other day as directed on labs.  We can always adjust down with repeat testing.  I will recheck 3/2024 and make further recommendations.   Elevated alk phos- Avoid NSAIDS. Cholecystectomy healed. I will continue to monitor and make further recommendations.   Magnesium- I will continue to monitor and make further recommendations.   Dysuria, recent UTI- She has no symptoms. Await labs for recommendations. We will need to consider return to urogynecology or to be seen by urology if she has recurrent urinary tract infections, as she has postmenopausal atrophy and stress and urge incontinence that is chronic.  Vaginal spotting- Patient was seen by urogynecology but has had continued vaginal spotting after previously reporting this had resolved. I referred back to GYN for evaluation and workup/ treatment recommendations.  She does need to ensure she keeps appointment with them 4/2024.  GERD- Per GI, she was to take omeprazole 40 mg daily, however, she is taking Prilosec 40 mg every 2 to 3 days.  She does have some breakthrough if she waits 3 days or eats chocolate or other foods that trigger her GERD symptoms.  Reconsider daily medication if she has continued breakthrough symptoms.  To see GI in follow up if breakthrough symptoms or any concerns.    Chronic lower abdominal pain, chronic constipation- She has chronic constipation. I will have her try Linzess 72 mcg daily. If no improvement, we can increase to 145 mcg then 290 mcg daily or can adjust dosing to every other day, twice weekly, or once weekly to have regular BM and avoid constipation contributing to abdominal pain with adhesions. Follow up with GI if persistnet, worsening, new, or changing symptoms or to be seen at ER if intractable or severe pain.   Sleep disturbance, hypersomnia, fatigue- I referred for home sleep study, but this was not  completed.  She was seen by pulmonology 12/2022 and discharged back here but sleep disturbance and fatigue was not discussed. We will consider referral to sleep medicine or reordering home sleep study if persistent symptoms.   Knee pain- Right knee healed well. She continues with left knee pain but reports this is not consistent and is stable at this time. Continue follow up with orthopedist, Dr Catarino De La Cruz.   Changing skin lesion- I referred to dermatology for evaluation and annual skin monitoring.   Rash- If recurrence, can use Nystatin cream and powder. She should also see dermatology if recurrence.       I spent 35 minutes caring for Kourtney Lorenzana on this date of service. This time includes time spent by me in the following activities as necessary: preparing for the visit, reviewing tests, specialists records and previous visits, obtaining and/or reviewing a separately obtained history, performing a medically appropriate exam and/or evaluation, counseling and educating the patient, family, caregiver, referring and/or communicating with other healthcare professionals, documenting information in the medical record, independently interpreting results and communicating that information with the patient, family, caregiver, and developing a medically appropriate treatment plan with consideration of other conditions, medications, and treatments.

## 2024-03-05 NOTE — DISCHARGE INSTRUCTIONS
[facility] - Post-procedure Instructions          --  While there are no absolute restrictions, it is recommended that you do not perform strenuous activity today. In the morning, you may resume your level of activity as before your block.    --  If you have a band-aid at your injection site, please remove it later today. Observe the area for any redness, swelling, pus-like drainage, or a temperature over 101°. If any of these symptoms occur, please call your doctor at [phone]. If after office hours, leave a message and the on-call provider will return your call.    --  Ice may be applied to your injection site. It is recommended you avoid direct heat (heating pad; hot tub) for 1-2 days.    --  Call [facility] at [phone] if you experience persistent headache, persistent bleeding from the injection site, or severe pain not relieved by heat or oral medication.    --  Do not make important decisions today.    --  Due to the effects of the block and/or the I.V. Sedation, DO NOT drive or operate hazardous machinery for 12 hours.  Local anesthetics may cause numbness after procedure and precautions must be taken with regards to operating equipment as well as with walking, even if ambulating with assistance of another person or with an assistive device.    --  Do not drink alcohol for 12 hours.    -- You may return to work tomorrow, or as directed by your referring doctor.    --  Occasionally you may notice a slight increase in your pain after the procedure. This should start to improve within the next 24-48 hours. Radiofrequency ablation procedure pain may last 3-4 weeks.    --  It may take as long as 3-4 days before you notice a gradual improvement in your pain and/or other symptoms.    -- You may continue to take your prescribed pain medication as needed.    --  Some normal possible side effects of steroid use could include fluid retention, increased blood sugar, dull headache,  increased sweating, increased appetite, mood swings and flushing.    --  Diabetics are recommended to watch their blood glucose level closely for 24-48 hours after the injection.    --  Must stay in PACU for 20 min upon arrival and prove no leg weakness before being discharged.    --  IN THE EVENT OF A LIFE THREATENING EMERGENCY, (CHEST PAIN, BREATHING DIFFICULTIES, PARALYSIS…) YOU SHOULD GO TO YOUR NEAREST EMERGENCY ROOM.    --  You should be contacted by our office within 2-3 days to schedule follow up or next appointment date.  If not contacted within 7 days, please call the office at [phone]

## 2024-03-05 NOTE — H&P
[facility]   HISTORY AND PHYSICAL    Patient Name: Kourtney Lorenzana  : 1945  MRN: [id number]  Primary Care Physician:  Romina George PA  Date of admission: 3/6/2024    Subjective   Subjective     Chief Complaint: Chronic neck pain    History of Present Illness  Chronic neck pain that radiates into bilateral upper extremities, stopping at the elbows.      Review of Systems   Constitutional:  Negative for chills and fever.   Respiratory:  Negative for cough and shortness of breath.    Musculoskeletal:  Positive for neck pain.        Personal History     Past Medical History:   Diagnosis Date    Arthritis     OSTEO. KNEES AND BACK SEES DR MIKE DOUGLAS    Arthritis of back     Arthritis of neck     Bloating     WITH SOME NAUSEA    Bruising tendency     Bursitis of hip     Cervical disc disorder     Cholelithiasis 2019    Discovered by liver ultrasound    Chronic low back pain     Chronic pain of both feet     CTS (carpal tunnel syndrome)     Diverticular disease     Diverticulitis of colon 2015    Diverticulosis     Elevated liver enzymes     Fracture, foot     Gastroesophageal reflux disease without esophagitis 2016    Hip arthrosis     History of anxiety     BEEN A LONG TIME    History of depression     SITUATIONAL. NOW RESOLVED    History of panic disorder     Hyperlipidemia     IBS (irritable bowel syndrome)     Insomnia     Internal hemorrhoids     Knee pain     Knee swelling     Lumbar canal stenosis     Lumbosacral neuritis     Lung nodules     Multiple vitamin deficiency     Neck pain     Nephrolithiasis     2014 passage of kidney stone.    Osteoarthritis     Periarthritis of shoulder     Postmenopausal HRT (hormone replacement therapy)     PSVT (paroxysmal supraventricular tachycardia)     Pulmonary nodules 2021    Rotator cuff syndrome     Seasonal allergies     Shoulder pain     SOB (shortness of breath) on exertion     Stroke 2023    Tennis elbow     Trouble  swallowing     INTERMITTENTLY    Urine incontinence        Past Surgical History:   Procedure Laterality Date    CATARACT EXTRACTION Bilateral 09/05/2017    Dr. Jefe Barakat OD, MD    CHOLECYSTECTOMY      CHOLECYSTECTOMY LAPAROSCOPIC INTRAOPERATIVE CHOLANGIOGRAM WITH LIVER BIOPSY N/A 12/03/2019    Procedure: CHOLECYSTECTOMY LAPAROSCOPIC INTRAOPERATIVE CHOLANGIOGRAM AND LIVER BIOPSY;  Surgeon: Benny Schneider MD;  Location: [facility];  Service: General    COLONOSCOPY N/A 11/16/2012    Benign cecal polyp-Dr. Mak Rodriguez    COLONOSCOPY N/A 2015    Dr. Seema Andrade    ENDOSCOPY N/A 04/27/2018    HH, no specimen collected-Dr. Seema Andrade    FOOT SURGERY      HYSTERECTOMY Bilateral 1997    JOINT REPLACEMENT      PUBOVAGINAL SLING N/A 06/17/2003    Dr. Ziggy Saba    TOE SURGERY Right     METATARASAL SURGERY GREAT TOE FOR FRACTUE    TOTAL KNEE ARTHROPLASTY Right 05/14/2021    Procedure: RIGHT TOTAL KNEE ARTHROPLASTY;  Surgeon: Catarino De La Cruz MD;  Location: [facility];  Service: Orthopedics;  Laterality: Right;    UPPER GASTROINTESTINAL ENDOSCOPY      WRIST SURGERY         Family History: family history includes Aneurysm in her paternal grandfather; Atrial fibrillation in her brother, father, and sister; Cancer in her mother; Colon cancer in her mother; Colon polyps in her mother; Diabetes in her father; Liver disease in her paternal aunt; Other in her brother. Otherwise pertinent FHx was reviewed and not pertinent to current issue.    Social History:  reports that she has never smoked. She has never been exposed to tobacco smoke. She has never used smokeless tobacco. She reports that she does not drink alcohol and does not use drugs.    Home Medications:  Cholecalciferol, Vitamin B-12, acetaminophen, aspirin, coenzyme Q10, docusate sodium, fish oil, melatonin, omeprazole, rosuvastatin, vitamin C, and vitamin k    Allergies:  Allergies   Allergen Reactions    Atenolol Shortness Of Breath and Other (See  "Comments)     Shortness of breath, leg cramps    Hydrocodone-Acetaminophen Nausea Only and GI Intolerance    Metoprolol Itching    Other Nausea And Vomiting     Unknown \"strong\" pain killer (POSSIBLY VICODIN)    Tramadol Nausea Only and GI Intolerance       Objective    Objective     Vitals:   Temp:  [97.3 °F (36.3 °C)] 97.3 °F (36.3 °C)  Heart Rate:  [65] 65  Resp:  [18] 18  BP: (135)/(90) 135/90    Physical Exam  Constitutional:       General: She is not in acute distress.  Pulmonary:      Effort: Pulmonary effort is normal. No respiratory distress.   Skin:     General: Skin is warm and dry.   Neurological:      Mental Status: She is alert.   Psychiatric:         Mood and Affect: Mood normal.         Thought Content: Thought content normal.         Result Review    Result Review:  I have personally reviewed the results from the time of this admission to 3/6/2024 10:36 EST and agree with these findings:  []  Laboratory list / accordion  []  Microbiology  []  Radiology  []  EKG/Telemetry   []  Cardiology/Vascular   []  Pathology  []  Old records  []  Other:  Most notable findings include: No new      Assessment & Plan   Assessment / Plan     Brief Patient Summary:  Kourtney Lorenzana is a 78 y.o. female who has chronic neck pain that radiates into bilateral upper extremities.    Active Hospital Problems:  Active Hospital Problems    Diagnosis     **Cervical spinal stenosis     Cervical radiculopathy      Plan: Cervical epidural steroid injection      DVT prophylaxis:  No DVT prophylaxis order currently exists.      Frannie Allen MD  "

## 2024-03-06 ENCOUNTER — HOSPITAL ENCOUNTER (OUTPATIENT)
Facility: SURGERY CENTER | Age: 79
Setting detail: HOSPITAL OUTPATIENT SURGERY
Discharge: HOME | End: 2024-03-06
Attending: ANESTHESIOLOGY | Admitting: ANESTHESIOLOGY
Payer: MEDICARE

## 2024-03-06 ENCOUNTER — HOSPITAL ENCOUNTER (OUTPATIENT)
Dept: GENERAL RADIOLOGY | Facility: SURGERY CENTER | Age: 79
Setting detail: HOSPITAL OUTPATIENT SURGERY
End: 2024-03-06
Payer: MEDICARE

## 2024-03-06 VITALS
BODY MASS INDEX: 29.44 KG/M2 | HEART RATE: 74 BPM | WEIGHT: 160 LBS | SYSTOLIC BLOOD PRESSURE: 126 MMHG | DIASTOLIC BLOOD PRESSURE: 97 MMHG | HEIGHT: 62 IN | RESPIRATION RATE: 16 BRPM | OXYGEN SATURATION: 97 % | TEMPERATURE: 98.4 F

## 2024-03-06 DIAGNOSIS — Z41.9 SURGERY, ELECTIVE: ICD-10-CM

## 2024-03-06 DIAGNOSIS — M48.02 CERVICAL SPINAL STENOSIS: ICD-10-CM

## 2024-03-06 DIAGNOSIS — M54.12 CERVICAL RADICULOPATHY: ICD-10-CM

## 2024-03-06 LAB
25(OH)D3+25(OH)D2 SERPL-MCNC: 53.5 NG/ML (ref 30–100)
ALBUMIN SERPL-MCNC: 4.6 G/DL (ref 3.8–4.8)
ALBUMIN/GLOB SERPL: 1.8 {RATIO} (ref 1.2–2.2)
ALP SERPL-CCNC: 131 IU/L (ref 44–121)
ALT SERPL-CCNC: 21 IU/L (ref 0–32)
AST SERPL-CCNC: 15 IU/L (ref 0–40)
BASOPHILS # BLD AUTO: 0 X10E3/UL (ref 0–0.2)
BASOPHILS NFR BLD AUTO: 1 %
BILIRUB SERPL-MCNC: 0.5 MG/DL (ref 0–1.2)
BUN SERPL-MCNC: 9 MG/DL (ref 8–27)
BUN/CREAT SERPL: 11 (ref 12–28)
CALCIUM SERPL-MCNC: 9.8 MG/DL (ref 8.7–10.3)
CHLORIDE SERPL-SCNC: 106 MMOL/L (ref 96–106)
CHOLEST SERPL-MCNC: 143 MG/DL (ref 100–199)
CK SERPL-CCNC: 151 U/L (ref 32–182)
CO2 SERPL-SCNC: 20 MMOL/L (ref 20–29)
CREAT SERPL-MCNC: 0.83 MG/DL (ref 0.57–1)
EGFRCR SERPLBLD CKD-EPI 2021: 73 ML/MIN/1.73
EOSINOPHIL # BLD AUTO: 0.1 X10E3/UL (ref 0–0.4)
EOSINOPHIL NFR BLD AUTO: 2 %
ERYTHROCYTE [DISTWIDTH] IN BLOOD BY AUTOMATED COUNT: 13.3 % (ref 11.7–15.4)
FERRITIN SERPL-MCNC: 146 NG/ML (ref 15–150)
FOLATE SERPL-MCNC: 13.8 NG/ML
GLOBULIN SER CALC-MCNC: 2.6 G/DL (ref 1.5–4.5)
GLUCOSE SERPL-MCNC: 84 MG/DL (ref 70–99)
HCT VFR BLD AUTO: 42.4 % (ref 34–46.6)
HDLC SERPL-MCNC: 61 MG/DL
HGB BLD-MCNC: 13.4 G/DL (ref 11.1–15.9)
IMM GRANULOCYTES # BLD AUTO: 0 X10E3/UL (ref 0–0.1)
IMM GRANULOCYTES NFR BLD AUTO: 0 %
IRON SATN MFR SERPL: 29 % (ref 15–55)
IRON SERPL-MCNC: 84 UG/DL (ref 27–139)
LDLC SERPL CALC-MCNC: 65 MG/DL (ref 0–99)
LDLC/HDLC SERPL: 1.1 RATIO (ref 0–3.2)
LYMPHOCYTES # BLD AUTO: 1.9 X10E3/UL (ref 0.7–3.1)
LYMPHOCYTES NFR BLD AUTO: 33 %
MAGNESIUM SERPL-MCNC: 2.4 MG/DL (ref 1.6–2.3)
MCH RBC QN AUTO: 28.8 PG (ref 26.6–33)
MCHC RBC AUTO-ENTMCNC: 31.6 G/DL (ref 31.5–35.7)
MCV RBC AUTO: 91 FL (ref 79–97)
MONOCYTES # BLD AUTO: 0.4 X10E3/UL (ref 0.1–0.9)
MONOCYTES NFR BLD AUTO: 7 %
NEUTROPHILS # BLD AUTO: 3.4 X10E3/UL (ref 1.4–7)
NEUTROPHILS NFR BLD AUTO: 57 %
PLATELET # BLD AUTO: 197 X10E3/UL (ref 150–450)
POTASSIUM SERPL-SCNC: 3.8 MMOL/L (ref 3.5–5.2)
PROT SERPL-MCNC: 7.2 G/DL (ref 6–8.5)
RBC # BLD AUTO: 4.66 X10E6/UL (ref 3.77–5.28)
SODIUM SERPL-SCNC: 143 MMOL/L (ref 134–144)
TIBC SERPL-MCNC: 294 UG/DL (ref 250–450)
TRIGL SERPL-MCNC: 91 MG/DL (ref 0–149)
TSH SERPL DL<=0.005 MIU/L-ACNC: 1.72 UIU/ML (ref 0.45–4.5)
UIBC SERPL-MCNC: 210 UG/DL (ref 118–369)
VIT B12 SERPL-MCNC: 1017 PG/ML (ref 232–1245)
VLDLC SERPL CALC-MCNC: 17 MG/DL (ref 5–40)
WBC # BLD AUTO: 5.8 X10E3/UL (ref 3.4–10.8)

## 2024-03-06 PROCEDURE — 25510000001 IOPAMIDOL 61 % SOLUTION 30 ML VIAL: Performed by: ANESTHESIOLOGY

## 2024-03-06 PROCEDURE — 25010000002 DEXAMETHASONE SODIUM PHOSPHATE 100 MG/10ML SOLUTION 10 ML VIAL: Performed by: ANESTHESIOLOGY

## 2024-03-06 PROCEDURE — 77002 NEEDLE LOCALIZATION BY XRAY: CPT

## 2024-03-06 PROCEDURE — 76000 FLUOROSCOPY <1 HR PHYS/QHP: CPT

## 2024-03-06 PROCEDURE — 62321 NJX INTERLAMINAR CRV/THRC: CPT | Performed by: ANESTHESIOLOGY

## 2024-03-06 RX ORDER — DIAZEPAM 5 MG/1
5 TABLET ORAL ONCE
Status: COMPLETED | OUTPATIENT
Start: 2024-03-06 | End: 2024-03-06

## 2024-03-06 RX ADMIN — DIAZEPAM 5 MG: 5 TABLET ORAL at 10:16

## 2024-03-06 NOTE — OP NOTE
Cervical Epidural Steroid Injection   [facility]    PREOPERATIVE DIAGNOSIS:  Cervical Radiculopathy and Cervical Spinal Stenosis  POSTOPERATIVE DIAGNOSIS:  Same as preop diagnosis    PROCEDURE:   Cervical Epidural Steroid Injection, Therapeutic Interlaminar Injection, with epidurogram, at  C7-T1    PRE-PROCEDURE DISCUSSION WITH PATIENT:    Risks and complications were discussed with the patient prior to starting the procedure and informed consent was obtained.  We discussed various topics including but not limited to bleeding, infection, injury, paralysis, nerve injury, dural puncture, coma, death, worsening of clinical picture, lack of pain relief, and postprocedural soreness.    SURGEON:  Frannie Allen MD    REASON FOR PROCEDURE:   Diagnostic injection at this level is needed    SEDATION:  Anxiolysis was used for this procedure which included PO Valium 5mg  ANESTHETIC:  None  STEROID:   10mg Dexamethasone    DESCRIPTON OF PROCEDURE:  After obtaining informed consent, the patient was taken to the operating room and placed in the prone position.  EKG, blood pressure, and pulse oximeter were monitored throughout, and sedation was provided as needed by the RN under my guidance. All pressure points were well padded.  The cervicothoracic spine area was prepped with Chloraprep and draped in a sterile fashion.      AP fluoroscopic image was used to visualize the C7-T1 interspace.  The skin and subcutaneous tissue over the area was anesthetized with 1% Lidocaine.  An 18-Gauge Tuohy needle was then advanced through the anesthetized skin tract under fluoroscopic guidance in a coaxial view using a loss of resistance technique.  Lateral fluoroscopy was used to verify appropriate needle depth.  Once the needle tip was felt to be in the posterior epidural space, aspiration was noted to be negative for blood or CSF.  A volume of 1mL of Isovue 61% was then injected under live fluoroscopy in an AP view which  produced good epidural spread with no evidence of loculation, vascular run-off or intrathecal spread.  Subsequently, a total volume of 3mL consisting of 10mg of dexamethasone mixed with normal saline was injected without resistance.      ESTIMATED BLOOD LOSS:  <5 mL  SPECIMENS:  None    COMPLICATIONS:     No complications were noted., There was no indication of vascular uptake on live injection of contrast dye., and There was no indication of intrathecal uptake on live injection of contrast dye.    TOLERANCE & DISCHARGE CONDITION:    The patient tolerated the procedure well.  The patient was transported to the recovery area without difficulties.  The patient was discharged to home under the care of family in stable and satisfactory condition.    PLAN OF CARE:  The patient was given our standard instruction sheet.        2.   The patient will Return to clinic 4-6 wks.  3.    The patient will resume all medications as per the medication reconciliation sheet.

## 2024-03-07 ENCOUNTER — TELEPHONE (OUTPATIENT)
Dept: GASTROENTEROLOGY | Facility: CLINIC | Age: 79
End: 2024-03-07
Payer: MEDICARE

## 2024-03-07 NOTE — TELEPHONE ENCOUNTER
----- Message from Mona Day PA-C sent at 3/7/2024  7:56 AM EST -----  CT showed findings of diverticulosis without evidence of diverticulitis.  Are her symptoms better?

## 2024-03-07 NOTE — TELEPHONE ENCOUNTER
Patient notified of results and recommendations and verbalized understanding  Pt stated she is feeling better.  Tomorrow will be 2 weeks on a soft diet then she will try to advance her diet as tolerated.  She is scheduled to see you on 3/22/24

## 2024-03-14 ENCOUNTER — OFFICE VISIT (OUTPATIENT)
Dept: GASTROENTEROLOGY | Facility: CLINIC | Age: 79
End: 2024-03-14
Payer: MEDICARE

## 2024-03-14 VITALS
DIASTOLIC BLOOD PRESSURE: 78 MMHG | WEIGHT: 160 LBS | OXYGEN SATURATION: 96 % | TEMPERATURE: 98.6 F | SYSTOLIC BLOOD PRESSURE: 121 MMHG | BODY MASS INDEX: 29.44 KG/M2 | HEART RATE: 81 BPM | HEIGHT: 62 IN

## 2024-03-14 DIAGNOSIS — R10.30 LOWER ABDOMINAL PAIN: Primary | ICD-10-CM

## 2024-03-14 DIAGNOSIS — K58.1 IRRITABLE BOWEL SYNDROME WITH CONSTIPATION: ICD-10-CM

## 2024-03-14 DIAGNOSIS — K21.9 GASTROESOPHAGEAL REFLUX DISEASE WITHOUT ESOPHAGITIS: ICD-10-CM

## 2024-03-14 NOTE — PROGRESS NOTES
"Chief Complaint  Heartburn    Subjective          History of Present Illness    Kourtney Lorenzana is a  78 y.o. female presents for follow up.  The patient Dr. Andrade and was last seen by me in the office on 2/23/2024.    At last visit she was reporting lower abdominal pain on the left side and some bright red blood per rectum noted in the toilet bowl as well as rectal discomfort when having a bowel movement.  Seen in the office this afternoon with daughter present with patient permission.  She reports that she has been feeling much better.  She still has some abdominal tenderness but no further blood in the stool.  She has been tolerating a diet well but has only been allowing herself to eat a soft diet, as she is afraid to eat anything else due to fear of abdominal pain.  She reports she has 1 bowel movement every 2 to 4 days.  She drinks smooth move tea and takes MiraLAX daily.  If she goes 4 days without a bowel movement she will take Dulcolax.  She reports that she does have a history of reflux but this is well-controlled on omeprazole once daily-she was also requiring Pepcid in the past but no longer needs this at this time.  No difficulty swallowing.  CT abdomen pelvis with contrast was obtained after last visit showing mild sigmoid diverticulosis without evidence of diverticulitis and previous cholecystectomy.    Colonoscopy 2/2021 --> diverticulosis, non bleeding internal hemorrhoids.      Objective   Vital Signs:   /78   Pulse 81   Temp 98.6 °F (37 °C)   Ht 157.5 cm (62\")   Wt 72.6 kg (160 lb)   SpO2 96%   BMI 29.26 kg/m²       Physical Exam  Constitutional:       General: She is not in acute distress.     Appearance: Normal appearance.   Eyes:      General: No scleral icterus.  Cardiovascular:      Rate and Rhythm: Normal rate.   Pulmonary:      Effort: Pulmonary effort is normal.   Abdominal:      General: Abdomen is flat. Bowel sounds are normal. There is no distension.      Tenderness: There " is no abdominal tenderness. There is no guarding.   Skin:     Coloration: Skin is not jaundiced.   Neurological:      General: No focal deficit present.      Mental Status: She is alert and oriented to person, place, and time.   Psychiatric:         Mood and Affect: Mood normal.         Behavior: Behavior normal.          Result Review :   The following data was reviewed by: Mona Day PA-C on 03/14/2024:  CMP          1/2/2024    09:22 3/4/2024    16:54 3/5/2024    11:32   CMP   Glucose 83   84    BUN 16   9    Creatinine 0.95  0.80  0.83    Sodium 144   143    Potassium 4.1   3.8    Chloride 109   106    Calcium 9.3   9.8    Total Protein 7.0   7.2    Albumin 4.4   4.6    Globulin 2.6   2.6    Total Bilirubin 0.4   0.5    Alkaline Phosphatase 141   131    AST (SGOT) 12   15    ALT (SGPT) 21   21    BUN/Creatinine Ratio 16.8   11      CBC          11/29/2023    11:16 2/23/2024    09:34 3/5/2024    11:32   CBC   WBC 7.51  6.07  5.8    RBC 4.74  3.65  4.66    Hemoglobin 14.0  13.2  13.4    Hematocrit 43.3  33.7  42.4    MCV 91.4  92.3  91    MCH 29.5  36.2  28.8    MCHC 32.3  39.2  31.6    RDW 13.6  13.5  13.3    Platelets 227  200  197              Assessment:   Diagnoses and all orders for this visit:    1. Lower abdominal pain (Primary)    2. Irritable bowel syndrome with constipation    3. Gastroesophageal reflux disease without esophagitis          Plan:   -At last visit we were concerned that patient's pain was due to diverticulitis versus constipation.  CT clear and does not show any diverticulitis.  Patient's primary care provider attempted to send her some Linzess however insurance denied this.  Will talk to pharmacy team and find out if Amitiza or Trulance is approved and sent on these medications for her to try.  I encouraged her to try eating more foods and told her that hopefully if we can get her bowels moving more regularly some of her discomfort will subside.  Discussed proceeding with  colonoscopy but will try 1 of these medications for 2 weeks first.      Follow Up   No follow-ups on file.    Dragon dictation used throughout this note.         Mona Day PA-C  [facility]  [address] [place] [address]  Office: [phone]

## 2024-03-14 NOTE — Clinical Note
Hello, is there any way that you could see if her insurance will improve Amitiza or Trulance?  Her primary care provider tried to get Linzess approved but apparently that was denied previously.  Thank you!

## 2024-03-14 NOTE — Clinical Note
78-year-old female who I saw about a month ago for acute onset left lower quadrant discomfort and a few episodes of rectal bleeding.  CT scan obtained which showed diverticulosis without diverticulitis.  She is only having a bowel movement once every 2 to 4 days despite MiraLAX, smooth move tea and occasional Dulcolax.  Linzess was denied by insurance so I will see if Amitiza or Trulance is approved.  Recommended trialing 1 of these medications for about 2 weeks but I did tell her we may need to proceed with colonoscopy if this does not help.

## 2024-03-15 ENCOUNTER — SPECIALTY PHARMACY (OUTPATIENT)
Dept: GASTROENTEROLOGY | Facility: CLINIC | Age: 79
End: 2024-03-15
Payer: MEDICARE

## 2024-03-15 ENCOUNTER — TELEPHONE (OUTPATIENT)
Dept: GASTROENTEROLOGY | Facility: CLINIC | Age: 79
End: 2024-03-15
Payer: MEDICARE

## 2024-03-15 DIAGNOSIS — K58.1 IRRITABLE BOWEL SYNDROME WITH CONSTIPATION: Primary | ICD-10-CM

## 2024-03-15 NOTE — PROGRESS NOTES
Linzess PA approved  Rivera LÓPEZ approved  01/01/2024 - 12/31/2024    Prescription insurance:  BIN: [id number]  PCN: [id number]  GROUP: [id number]  ID: [id number]     Rashida Chi  Specialty Pharmacy Technician

## 2024-03-15 NOTE — TELEPHONE ENCOUNTER
Zehra pena approved for Linzess and Amitiza. Will re-send prescription for Linzess and see if this helps relieve constipation. Patient should call with update in 1-2 weeks (Or can send update via Spiration) and we can discuss further plans at that point.     -Mona Day PA-C

## 2024-03-19 DIAGNOSIS — K58.1 IRRITABLE BOWEL SYNDROME WITH CONSTIPATION: Primary | ICD-10-CM

## 2024-03-19 RX ORDER — LUBIPROSTONE 8 UG/1
8 CAPSULE ORAL 2 TIMES DAILY WITH MEALS
Qty: 60 CAPSULE | Refills: 2 | Status: SHIPPED | OUTPATIENT
Start: 2024-03-19

## 2024-03-26 ENCOUNTER — TELEPHONE (OUTPATIENT)
Dept: FAMILY MEDICINE CLINIC | Facility: CLINIC | Age: 79
End: 2024-03-26

## 2024-03-26 ENCOUNTER — TELEPHONE (OUTPATIENT)
Dept: FAMILY MEDICINE CLINIC | Facility: CLINIC | Age: 79
End: 2024-03-26
Payer: MEDICARE

## 2024-03-26 NOTE — TELEPHONE ENCOUNTER
left message to call OK FOR HUB TO RELAY      She should not be taking stimulant based laxative.  If she is needing a laxative, she should consider MiraLAX.  She should also ensure stool softener and fiber supplement to have normal bowel movements and use laxative less.

## 2024-03-26 NOTE — TELEPHONE ENCOUNTER
Then she needs to follow the directions with her GI.  She also has 2 appointments that have been canceled with them.  They have prescribed medication for constipation that do not involve magnesium.  She should reach out to GI for recommendations.

## 2024-03-26 NOTE — TELEPHONE ENCOUNTER
"  Caller: Kourtney Lorenzana    Relationship: Self    Best call back number: [phone]     What is the best time to reach you: ANYTIME    Who are you requesting to speak with (clinical staff, provider,  specific staff member): CARI HARLEY    Do you know the name of the person who called: VALENTE    What was the call regarding: HUB RELAYED MESSAGE TO PATIENT FROM VALENTE, \"She should not be taking stimulant based laxative.  If she is needing a laxative, she should consider MiraLAX.  She should also ensure stool softener and fiber supplement to have normal bowel movements and use laxative less.\"    PATIENT STATED THIS CONFLICTS WITH ADVICE GIVEN BY HER GASTROENTEROLOGIST.    PATIENT ALSO STATED MIRALAX DOES NOT SEEM TO WORK FOR HER.    PATIENT IS REQUESTING TO SPEAK FURTHER WITH CARI HARLEY REGARDING THIS.    PLEASE CALL TO DISCUSS AND ADVISE.  "

## 2024-04-03 ENCOUNTER — OFFICE VISIT (OUTPATIENT)
Dept: PAIN MEDICINE | Facility: CLINIC | Age: 79
End: 2024-04-03
Payer: MEDICARE

## 2024-04-03 VITALS
HEART RATE: 72 BPM | HEIGHT: 62 IN | TEMPERATURE: 96.7 F | DIASTOLIC BLOOD PRESSURE: 82 MMHG | WEIGHT: 163.2 LBS | BODY MASS INDEX: 30.03 KG/M2 | RESPIRATION RATE: 18 BRPM | SYSTOLIC BLOOD PRESSURE: 131 MMHG | OXYGEN SATURATION: 98 %

## 2024-04-03 DIAGNOSIS — M54.50 CHRONIC BILATERAL LOW BACK PAIN WITHOUT SCIATICA: Primary | ICD-10-CM

## 2024-04-03 DIAGNOSIS — M48.02 CERVICAL SPINAL STENOSIS: ICD-10-CM

## 2024-04-03 DIAGNOSIS — G89.29 CHRONIC BILATERAL LOW BACK PAIN WITHOUT SCIATICA: Primary | ICD-10-CM

## 2024-04-03 DIAGNOSIS — M25.511 RIGHT SHOULDER PAIN, UNSPECIFIED CHRONICITY: ICD-10-CM

## 2024-04-03 DIAGNOSIS — M54.12 CERVICAL RADICULOPATHY: ICD-10-CM

## 2024-04-03 DIAGNOSIS — R29.818 NEUROGENIC CLAUDICATION: ICD-10-CM

## 2024-04-03 PROCEDURE — 1126F AMNT PAIN NOTED NONE PRSNT: CPT | Performed by: NURSE PRACTITIONER

## 2024-04-03 PROCEDURE — 1159F MED LIST DOCD IN RCRD: CPT | Performed by: NURSE PRACTITIONER

## 2024-04-03 PROCEDURE — 99213 OFFICE O/P EST LOW 20 MIN: CPT | Performed by: NURSE PRACTITIONER

## 2024-04-03 PROCEDURE — 1160F RVW MEDS BY RX/DR IN RCRD: CPT | Performed by: NURSE PRACTITIONER

## 2024-04-03 NOTE — PROGRESS NOTES
CHIEF COMPLAINT  cervical epidural steroid injection at C7-T1   Patient reports 95% pain relief after CE.      Subjective   Kourtney Lorenzana is a 78 y.o. female  who presents to the office for follow-up of procedure.  She completed a PRABHAKAR at C7/T1   on  3/6/2024 performed by Dr. Allen for management of neck pain. Patient reports 95% ONGOING relief from the procedure.     Today her pain is 0/10VAS in severity. She states that she has no neck pain at this time. She does complaint of right shoulder pain that started after waking up a few days ago. She states that this pain radiated from her shoulder blade down her back and around her ribs. She has been using voltaren gel and OTC aleve. She has also been using a heating pad. She is tender over the right scapula. She has seen Dr. Garcia for her shoulder pain.     Her primary pain complaint today is her low back pain. This is 7/10VAS in severity. Her pain radiates across her low back in a band-like fashion. This pain does not radiate into her lower extremities. She does report heaviness and weakness with standing and walking.     Past pain medications: None     Current pain medications: OTC aleve and OTC Tylenol.      Past therapies:  Physical Therapy: Yes, helpful, has been years since last in PT  Chiropractor: None  Massage Therapy: Yes, temporarily helpful  TENS: Yes, at PT, temporary relief  Neck or back surgery: None  Past pain management: Yes, Dr. Sotelo many years ago for low back pain     Previous Injections:   LESI many years ago with Dr. Sotelo, no cervical procedures.     Neck Pain   This is a chronic problem. The current episode started more than 1 month ago. The problem occurs constantly. Progression since onset: improved since last office visit. The pain is associated with lifting a heavy object. The pain is present in the right side and left side (R>L). The quality of the pain is described as aching. The pain is at a severity of 0/10. The symptoms are  "aggravated by position, twisting and bending. Pertinent negatives include no chest pain, fever, headaches, numbness or weakness. She has tried NSAIDs, acetaminophen, heat and home exercises (PT) for the symptoms.      PEG Assessment   What number best describes your pain on average in the past week?0  What number best describes how, during the past week, pain has interfered with your enjoyment of life?0  What number best describes how, during the past week, pain has interfered with your general activity?  0    The following portions of the patient's history were reviewed and updated as appropriate: allergies, current medications, past family history, past medical history, past social history, past surgical history, and problem list.    Review of Systems   Constitutional:  Negative for activity change, fatigue and fever.   HENT:  Negative for congestion.    Respiratory:  Negative for chest tightness and shortness of breath.    Cardiovascular:  Negative for chest pain.   Gastrointestinal:  Positive for constipation and diarrhea. Negative for abdominal pain.   Genitourinary:  Negative for difficulty urinating and dysuria.   Musculoskeletal:  Positive for neck pain.   Neurological:  Negative for dizziness, weakness, light-headedness, numbness and headaches.   Psychiatric/Behavioral:  Positive for sleep disturbance. Negative for agitation and suicidal ideas. The patient is not nervous/anxious.      --  The aforementioned information the Chief Complaint section and above subjective data including any HPI data, and also the Review of Systems data, has been personally reviewed and affirmed.  --     Vitals:    04/03/24 1053   BP: 131/82   BP Location: Left arm   Patient Position: Sitting   Cuff Size: Adult   Pulse: 72   Resp: 18   Temp: 96.7 °F (35.9 °C)   TempSrc: Temporal   SpO2: 98%   Weight: 74 kg (163 lb 3.2 oz)   Height: 157.5 cm (62\")   PainSc: 0-No pain     Objective   Physical Exam  Vitals and nursing note reviewed. "   Constitutional:       Appearance: Normal appearance. She is well-developed.   Eyes:      General: Lids are normal.   Cardiovascular:      Rate and Rhythm: Normal rate.   Pulmonary:      Effort: Pulmonary effort is normal.   Musculoskeletal:      Cervical back: No tenderness. Normal range of motion.      Lumbar back: Tenderness and bony tenderness present. Decreased range of motion. Negative right straight leg raise test and negative left straight leg raise test.      Comments:   +Chapo SI Compression  -Chapo Rashawn  -Chapo Thigh Thrust  -Chapo Gaenslen's   Neurological:      Mental Status: She is alert and oriented to person, place, and time.      Motor: No weakness.   Psychiatric:         Attention and Perception: Attention normal.         Mood and Affect: Mood normal.         Speech: Speech normal.         Behavior: Behavior normal.         Judgment: Judgment normal.       Assessment & Plan   Diagnoses and all orders for this visit:    1. Chronic bilateral low back pain without sciatica (Primary)  -     MRI Lumbar Spine Without Contrast; Future    2. Neurogenic claudication  -     MRI Lumbar Spine Without Contrast; Future    3. Cervical radiculopathy    4. Cervical spinal stenosis    5. Right shoulder pain, unspecified chronicity      Kourtney Lorenzana reports a pain score of 0.  Given her pain assessment as noted, treatment options were discussed and the following options were decided upon as a follow-up plan to address the patient's pain:  update imaging .    --- Update lumbar MRI d/t reports of neurogenic like symptoms with standing and walking  --- May repeat CESIs every 3 months PRN  --- Follow up with Dr. Garcia for right shoulder pain  --- Follow-up after completion of lumbar MRI     Dictated utilizing Dragon dictation.

## 2024-04-05 ENCOUNTER — TELEPHONE (OUTPATIENT)
Dept: GASTROENTEROLOGY | Facility: CLINIC | Age: 79
End: 2024-04-05
Payer: MEDICARE

## 2024-04-05 NOTE — TELEPHONE ENCOUNTER
[facility] received a fax that requires your attention. The document has been indexed to the patient’s chart for your review.    Documents Description: PRIOR AUTHORIZATION    Name of Sender: [facility]    Date Indexed: 04-.

## 2024-04-10 ENCOUNTER — OFFICE VISIT (OUTPATIENT)
Dept: OBSTETRICS AND GYNECOLOGY | Facility: CLINIC | Age: 79
End: 2024-04-10
Payer: MEDICARE

## 2024-04-10 VITALS
BODY MASS INDEX: 29.63 KG/M2 | HEIGHT: 62 IN | DIASTOLIC BLOOD PRESSURE: 88 MMHG | SYSTOLIC BLOOD PRESSURE: 142 MMHG | WEIGHT: 161 LBS

## 2024-04-10 DIAGNOSIS — Z12.31 ENCOUNTER FOR SCREENING MAMMOGRAM FOR MALIGNANT NEOPLASM OF BREAST: ICD-10-CM

## 2024-04-10 DIAGNOSIS — N89.8 VAGINAL DISCHARGE: ICD-10-CM

## 2024-04-10 DIAGNOSIS — N95.2 VAGINAL ATROPHY: ICD-10-CM

## 2024-04-10 DIAGNOSIS — N93.9 VAGINAL BLEEDING: Primary | ICD-10-CM

## 2024-04-10 LAB
BILIRUB BLD-MCNC: NEGATIVE MG/DL
CLARITY, POC: CLEAR
COLOR UR: YELLOW
GLUCOSE UR STRIP-MCNC: NEGATIVE MG/DL
KETONES UR QL: NEGATIVE
LEUKOCYTE EST, POC: NEGATIVE
NITRITE UR-MCNC: NEGATIVE MG/ML
PH UR: 5 [PH] (ref 5–8)
PROT UR STRIP-MCNC: NEGATIVE MG/DL
RBC # UR STRIP: NEGATIVE /UL
SP GR UR: 1 (ref 1–1.03)
UROBILINOGEN UR QL: NORMAL

## 2024-04-10 RX ORDER — ESTRADIOL 0.1 MG/G
1 CREAM VAGINAL 2 TIMES WEEKLY
Qty: 45 G | Refills: 2 | Status: SHIPPED | OUTPATIENT
Start: 2024-04-11

## 2024-04-10 NOTE — PROGRESS NOTES
New GYN Exam    CC- Here for vaginal spotting    Kourtney Lorenzana is a 78 y.o. female new patient who presents for vaginal spotting. She had a JASPREET BSO at age 51 for fibroids. She was on ERT for 20 years, none now.  She has also had a bladder sling previously. She has noticed some faint pink spots on her her underwear. She has had chronic constipation and has noticed more discharge with straining. Dr Albright said she is not a candidate for a C/S. She had a CT scan in 3/2024 that showed diverticulosis.      OB History          2    Para   2    Term   2            AB        Living   2         SAB        IAB        Ectopic        Molar        Multiple        Live Births              Obstetric Comments   2                Menarche:14  Menopause:51  HRT:took > 20 years. None now  Current contraception: post menopausal status and status post hysterectomy  History of abnormal Pap smear: no  History of abnormal mammogram: no  Family history of uterine, colon or ovarian cancer: yes - mom colon age 66  Family history of breast cancer: no  STD's: none  Last pap smear:2017- nl pap/HPV  Gardasil: missed  LATA: none  2 bladder slings      Health Maintenance   Topic Date Due    ZOSTER VACCINE (1 of 2) Never done    DXA SCAN  2019    Pneumococcal Vaccine 65+ (1 of 1 - PCV) 2024 (Originally 10/30/2011)    TDAP/TD VACCINES (1 - Tdap) 2024 (Originally 10/30/1964)    ANNUAL WELLNESS VISIT  2024 (Originally 2/15/2019)    COVID-19 Vaccine (1 - -24 season) 2024 (Originally 2023)    RSV Vaccine - Adults (1 - 1-dose 60+ series) 2025 (Originally 10/30/2006)    INFLUENZA VACCINE  2024    LIPID PANEL  2025    BMI FOLLOWUP  2025    COLORECTAL CANCER SCREENING  2031    HEPATITIS C SCREENING  Completed       Past Medical History:   Diagnosis Date    Arthritis     OSTEO. KNEES AND BACK SEES DR MIKE DOUGLAS    Arthritis of back     Arthritis of neck     Bloating      WITH SOME NAUSEA    Bruising tendency     Bursitis of hip     Cervical disc disorder     Cholelithiasis 11/07/2019    Discovered by liver ultrasound    Chronic low back pain     Chronic pain of both feet     CTS (carpal tunnel syndrome)     Diverticular disease     Diverticulitis of colon 2015    Diverticulosis     Elevated liver enzymes     Fibroid     Fracture, foot     Gastroesophageal reflux disease without esophagitis 02/11/2016    Hip arthrosis     History of anxiety     BEEN A LONG TIME    History of depression     SITUATIONAL. NOW RESOLVED    History of panic disorder     Hyperlipidemia 2015    IBS (irritable bowel syndrome)     Insomnia     Internal hemorrhoids     Knee pain     Knee swelling     Lumbar canal stenosis     Lumbosacral neuritis     Lung nodules     Multiple vitamin deficiency     Neck pain     Nephrolithiasis     July 2014 passage of kidney stone.    Osteoarthritis     Periarthritis of shoulder     Postmenopausal HRT (hormone replacement therapy)     PSVT (paroxysmal supraventricular tachycardia)     Pulmonary nodules 03/25/2021    Rotator cuff syndrome     Seasonal allergies     Shoulder pain     SOB (shortness of breath) on exertion     Stroke 11/2023    Tennis elbow     Trouble swallowing     INTERMITTENTLY    Urine incontinence        Past Surgical History:   Procedure Laterality Date    CATARACT EXTRACTION Bilateral 09/05/2017    Dr. Jefe Barakat OD, MD    CERVICAL EPIDURAL N/A 03/06/2024    Procedure: cervical epidural steroid injection at C7-T1;  Surgeon: Frannie Allen MD;  Location: [facility] MAIN OR;  Service: Pain Management;  Laterality: N/A;    CHOLECYSTECTOMY      CHOLECYSTECTOMY LAPAROSCOPIC INTRAOPERATIVE CHOLANGIOGRAM WITH LIVER BIOPSY N/A 12/03/2019    Procedure: CHOLECYSTECTOMY LAPAROSCOPIC INTRAOPERATIVE CHOLANGIOGRAM AND LIVER BIOPSY;  Surgeon: Benny Schneider MD;  Location: [facility] MAIN OR;  Service: General    COLONOSCOPY N/A 11/16/2012    Benign cecal polyp-Dr. Corado  Michael    COLONOSCOPY N/A 2015    Dr. Seema Andrade    ENDOSCOPY N/A 04/27/2018    HH, no specimen collected-Dr. Seema Andrade    FOOT SURGERY      HYSTERECTOMY Bilateral 1997    JOINT REPLACEMENT      PUBOVAGINAL SLING N/A 06/17/2003    Dr. Ziggy Saba    TOE SURGERY Right     METATARASAL SURGERY GREAT TOE FOR FRACTUE    TOTAL ABDOMINAL HYSTERECTOMY WITH SALPINGO OOPHORECTOMY      TOTAL KNEE ARTHROPLASTY Right 05/14/2021    Procedure: RIGHT TOTAL KNEE ARTHROPLASTY;  Surgeon: Catarino De La Cruz MD;  Location: [facility] OR;  Service: Orthopedics;  Laterality: Right;    UPPER GASTROINTESTINAL ENDOSCOPY      WISDOM TOOTH EXTRACTION      WRIST SURGERY           Current Outpatient Medications:     acetaminophen (TYLENOL) 500 MG tablet, Take 1-2 tablets by mouth Every 6 (Six) Hours As Needed for Mild Pain., Disp: , Rfl:     aspirin 81 MG chewable tablet, Chew 1 tablet Daily., Disp: , Rfl:     Cholecalciferol (D3) 50 MCG (2000 UT) tablet, Take  by mouth., Disp: , Rfl:     coenzyme Q10 100 MG capsule, Take 1 capsule by mouth Daily., Disp: , Rfl:     Cyanocobalamin (Vitamin B-12) 1000 MCG/15ML liquid, Take 1 mL by mouth Every Other Day., Disp: , Rfl:     docusate sodium (COLACE) 100 MG capsule, Take 1 capsule by mouth 2 (Two) Times a Day., Disp: , Rfl:     estradiol (ESTRACE) 0.1 MG/GM vaginal cream, Insert 1 g into the vagina 2 (Two) Times a Week., Disp: 45 g, Rfl: 2    lubiprostone (Amitiza) 8 MCG capsule, Take 1 capsule by mouth 2 (Two) Times a Day With Meals., Disp: 60 capsule, Rfl: 2    melatonin 5 MG tablet tablet, Take 1 tablet by mouth., Disp: , Rfl:     Omega-3 Fatty Acids (fish oil) 1000 MG capsule capsule, Take  by mouth Daily With Breakfast., Disp: , Rfl:     omeprazole (priLOSEC) 40 MG capsule, Take 1 capsule by mouth Daily., Disp: 90 capsule, Rfl: 3    rosuvastatin (CRESTOR) 20 MG tablet, TAKE 1 TABLET BY MOUTH EVERY DAY, Disp: 90 tablet, Rfl: 0    vitamin C (ASCORBIC ACID) 250 MG tablet, Take 1 tablet by mouth  "Daily., Disp: , Rfl:     vitamin k 100 MCG tablet, Take 1 tablet by mouth Daily., Disp: , Rfl:     Allergies   Allergen Reactions    Atenolol Shortness Of Breath and Other (See Comments)     Shortness of breath, leg cramps    Hydrocodone-Acetaminophen Nausea Only and GI Intolerance    Metoprolol Itching    Other Nausea And Vomiting     Unknown \"strong\" pain killer (POSSIBLY VICODIN)    Tramadol Nausea Only and GI Intolerance       Social History     Tobacco Use    Smoking status: Never     Passive exposure: Never    Smokeless tobacco: Never   Vaping Use    Vaping status: Never Used   Substance Use Topics    Alcohol use: No     Comment: caffeine use: 1 cups daily    Drug use: No       Family History   Problem Relation Age of Onset    Atrial fibrillation Father     Diabetes Father     Colon cancer Mother     Colon polyps Mother     Cancer Mother     Atrial fibrillation Brother     Other Brother         Coronary arteriosclerosis    Atrial fibrillation Sister     Aneurysm Paternal Grandfather     Liver disease Paternal Aunt     Malig Hyperthermia Neg Hx     Breast cancer Neg Hx     Ovarian cancer Neg Hx     Uterine cancer Neg Hx     Deep vein thrombosis Neg Hx     Pulmonary embolism Neg Hx        Review of Systems   Constitutional:  Negative for activity change, appetite change, fatigue, fever and unexpected weight change.   Eyes:  Negative for photophobia and visual disturbance.   Respiratory:  Negative for cough and shortness of breath.    Cardiovascular:  Negative for chest pain and palpitations.   Gastrointestinal:  Positive for constipation. Negative for abdominal distention, abdominal pain, diarrhea and nausea.   Endocrine: Negative for cold intolerance and heat intolerance.   Genitourinary:  Positive for vaginal bleeding. Negative for dyspareunia, dysuria, menstrual problem, pelvic pain, vaginal discharge and vaginal pain.   Musculoskeletal:  Negative for back pain.   Skin:  Negative for color change and rash. " "  Neurological:  Negative for headaches.   Hematological:  Negative for adenopathy. Does not bruise/bleed easily.   Psychiatric/Behavioral:  Negative for dysphoric mood. The patient is not nervous/anxious.        /88   Ht 157.5 cm (62\")   Wt 73 kg (161 lb)   BMI 29.45 kg/m²     Physical Exam  Vitals and nursing note reviewed. Exam conducted with a chaperone present.   Constitutional:       Appearance: Normal appearance. She is well-developed and normal weight.   HENT:      Head: Normocephalic and atraumatic.   Eyes:      General: No scleral icterus.     Conjunctiva/sclera: Conjunctivae normal.   Neck:      Thyroid: No thyromegaly.   Abdominal:      General: Bowel sounds are normal. There is no distension.      Palpations: Abdomen is soft. There is no mass.      Tenderness: There is no abdominal tenderness. There is no guarding or rebound.      Hernia: No hernia is present.   Genitourinary:     Labia:         Right: No rash, tenderness, lesion or injury.         Left: No rash, tenderness, lesion or injury.       Urethra: No prolapse, urethral pain, urethral swelling or urethral lesion.      Vagina: No signs of injury and foreign body. Vaginal discharge present. No erythema, tenderness, bleeding, lesions or prolapsed vaginal walls.      Uterus: Absent.       Adnexa: Right adnexa normal and left adnexa normal.      Rectum: Normal.      Comments: Uterus and cervix absent  Moderate atrophy  No bleeding  Skin:     General: Skin is warm and dry.   Neurological:      Mental Status: She is alert and oriented to person, place, and time.   Psychiatric:         Behavior: Behavior normal.         Thought Content: Thought content normal.         Judgment: Judgment normal.            Assessment/Plan  1)Vaginal bleeding- will check for infections and treat atrophy. No obvious source of VB seen.  2) Vaginal discharge- check NuSwab Y/M/B  3) Vaginal atrophy- ERX Estrace x2/week.Patient counseled that vaginal estrogen rings, " creams and tablets are available and highly effective at treating local vaginal symptoms such as atrophy and vaginal dryness.  Vaginal estrogen does not cause uterine overgrowth and does not require a progestogen to protect the uterus.  Very small amounts of estrogen are absorbed systemically.  For patients with a history of an estrogen dependent cancer such as breast cancer, the decision to use local estrogen for local vaginal symptoms should be made after consultation with her oncologist.  Possible side effects include local irritation or burning and/or vaginal bleeding and should always be reported.    4) MMG- UTD 5/2017. Schedule MMG now  5) RTO 3 months annual and recheck spotting.   6) EPIC LOS calculator used to determine coding level.       Diagnoses and all orders for this visit:    1. Vaginal bleeding (Primary)  -     POC Urinalysis Dipstick  -     NuSwab BV & Candida - Swab, Vagina  -     Genital Mycoplasmas BOB, Swab - Swab, Vagina    2. Vaginal atrophy    3. Vaginal discharge    4. Encounter for screening mammogram for malignant neoplasm of breast  -     Mammo Screening Digital Tomosynthesis Bilateral With CAD; Future    Other orders  -     estradiol (ESTRACE) 0.1 MG/GM vaginal cream; Insert 1 g into the vagina 2 (Two) Times a Week.  Dispense: 45 g; Refill: 2        Maria Luisa Rojas MD  04/10/2024        18:30 EDT

## 2024-04-17 LAB
A VAGINAE DNA VAG QL NAA+PROBE: NORMAL SCORE
BVAB2 DNA VAG QL NAA+PROBE: NORMAL SCORE
C ALBICANS DNA VAG QL NAA+PROBE: NEGATIVE
C GLABRATA DNA VAG QL NAA+PROBE: NEGATIVE
M GENITALIUM DNA SPEC QL NAA+PROBE: NEGATIVE
M HOMINIS DNA SPEC QL NAA+PROBE: NEGATIVE
MEGA1 DNA VAG QL NAA+PROBE: NORMAL SCORE
UREAPLASMA DNA SPEC QL NAA+PROBE: NEGATIVE

## 2024-05-02 ENCOUNTER — HOSPITAL ENCOUNTER (OUTPATIENT)
Dept: MRI IMAGING | Facility: HOSPITAL | Age: 79
Discharge: HOME | End: 2024-05-02
Admitting: NURSE PRACTITIONER
Payer: MEDICARE

## 2024-05-02 DIAGNOSIS — M54.50 CHRONIC BILATERAL LOW BACK PAIN WITHOUT SCIATICA: ICD-10-CM

## 2024-05-02 DIAGNOSIS — G89.29 CHRONIC BILATERAL LOW BACK PAIN WITHOUT SCIATICA: ICD-10-CM

## 2024-05-02 DIAGNOSIS — R29.818 NEUROGENIC CLAUDICATION: ICD-10-CM

## 2024-05-02 PROCEDURE — 72148 MRI LUMBAR SPINE W/O DYE: CPT

## 2024-05-06 ENCOUNTER — OFFICE VISIT (OUTPATIENT)
Dept: ORTHOPEDIC SURGERY | Facility: CLINIC | Age: 79
End: 2024-05-06
Payer: MEDICARE

## 2024-05-06 VITALS — WEIGHT: 162.3 LBS | TEMPERATURE: 98.3 F | BODY MASS INDEX: 29.87 KG/M2 | HEIGHT: 62 IN

## 2024-05-06 DIAGNOSIS — M25.511 CHRONIC RIGHT SHOULDER PAIN: ICD-10-CM

## 2024-05-06 DIAGNOSIS — M25.511 RIGHT SHOULDER PAIN, UNSPECIFIED CHRONICITY: Primary | ICD-10-CM

## 2024-05-06 DIAGNOSIS — G89.29 CHRONIC RIGHT SHOULDER PAIN: ICD-10-CM

## 2024-05-06 PROCEDURE — 1159F MED LIST DOCD IN RCRD: CPT | Performed by: ORTHOPAEDIC SURGERY

## 2024-05-06 PROCEDURE — 1160F RVW MEDS BY RX/DR IN RCRD: CPT | Performed by: ORTHOPAEDIC SURGERY

## 2024-05-06 PROCEDURE — 73030 X-RAY EXAM OF SHOULDER: CPT | Performed by: ORTHOPAEDIC SURGERY

## 2024-05-06 PROCEDURE — 99212 OFFICE O/P EST SF 10 MIN: CPT | Performed by: ORTHOPAEDIC SURGERY

## 2024-05-06 NOTE — PROGRESS NOTES
MRI of lumbar spine shows varying degrees of narrowing and degenerative changes. Please schedule a follow up to discuss further in office. Thanks

## 2024-05-13 ENCOUNTER — PATIENT ROUNDING (BHMG ONLY) (OUTPATIENT)
Dept: URGENT CARE | Facility: CLINIC | Age: 79
End: 2024-05-13
Payer: MEDICARE

## 2024-05-13 NOTE — ED NOTES
Thank you for letting us care for you in your recent visit to our urgent care center. We would love to hear about your experience with us. Was this the first time you have visited our location?    We’re always looking for ways to make our patients’ experiences even better. Do you have any recommendations on ways we may improve?     I appreciate you taking the time to respond. Please be on the lookout for a survey about your recent visit from [facility] via text or email. We would greatly appreciate if you could fill that out and turn it back in. We want your voice to be heard and we value your feedback.   Thank you for choosing [facility] for your healthcare needs.

## 2024-05-14 ENCOUNTER — PREP FOR SURGERY (OUTPATIENT)
Dept: SURGERY | Facility: SURGERY CENTER | Age: 79
End: 2024-05-14
Payer: MEDICARE

## 2024-05-14 ENCOUNTER — OFFICE VISIT (OUTPATIENT)
Dept: PAIN MEDICINE | Facility: CLINIC | Age: 79
End: 2024-05-14
Payer: MEDICARE

## 2024-05-14 ENCOUNTER — TRANSCRIBE ORDERS (OUTPATIENT)
Dept: SURGERY | Facility: SURGERY CENTER | Age: 79
End: 2024-05-14
Payer: MEDICARE

## 2024-05-14 VITALS
SYSTOLIC BLOOD PRESSURE: 137 MMHG | DIASTOLIC BLOOD PRESSURE: 77 MMHG | OXYGEN SATURATION: 99 % | RESPIRATION RATE: 12 BRPM | TEMPERATURE: 96.9 F | BODY MASS INDEX: 29.63 KG/M2 | WEIGHT: 161 LBS | HEIGHT: 62 IN | HEART RATE: 62 BPM

## 2024-05-14 DIAGNOSIS — G89.29 CHRONIC MIDLINE LOW BACK PAIN, UNSPECIFIED WHETHER SCIATICA PRESENT: ICD-10-CM

## 2024-05-14 DIAGNOSIS — M48.062 LUMBAR STENOSIS WITH NEUROGENIC CLAUDICATION: Primary | ICD-10-CM

## 2024-05-14 DIAGNOSIS — M53.3 SACROILIAC JOINT DYSFUNCTION OF LEFT SIDE: ICD-10-CM

## 2024-05-14 DIAGNOSIS — Z41.9 SURGERY, ELECTIVE: Primary | ICD-10-CM

## 2024-05-14 DIAGNOSIS — M54.50 CHRONIC MIDLINE LOW BACK PAIN, UNSPECIFIED WHETHER SCIATICA PRESENT: ICD-10-CM

## 2024-05-14 DIAGNOSIS — M48.02 CERVICAL SPINAL STENOSIS: ICD-10-CM

## 2024-05-14 PROCEDURE — 1159F MED LIST DOCD IN RCRD: CPT | Performed by: NURSE PRACTITIONER

## 2024-05-14 PROCEDURE — 1160F RVW MEDS BY RX/DR IN RCRD: CPT | Performed by: NURSE PRACTITIONER

## 2024-05-14 PROCEDURE — 99214 OFFICE O/P EST MOD 30 MIN: CPT | Performed by: NURSE PRACTITIONER

## 2024-05-14 PROCEDURE — 1125F AMNT PAIN NOTED PAIN PRSNT: CPT | Performed by: NURSE PRACTITIONER

## 2024-05-14 RX ORDER — DIAZEPAM 5 MG/1
10 TABLET ORAL ONCE
OUTPATIENT
Start: 2024-05-14 | End: 2024-05-14

## 2024-05-14 NOTE — PROGRESS NOTES
CHIEF COMPLAINT  Pt reports low back pain shooting upwards to her thoracic.    Subjective   Kourtney Lorenzana is a 78 y.o. female  who presents for follow-up.  She has a history of neck and back pain.  Today her pain is 8/10VAS in severity. She describes her back pain as intermittent aching pain. This pain is worse in the morning when she first gets up. Her pain is also worsened with prolonged standing, walking, and sitting. She denies any lower extremity pain, but does note that with prolonged standing and walking her legs will feel heavy.     She does have bladder incontinence, this is chronic. She states she has had this since her 40s. She denies any saddle paresthesia.     She notes that her neck pain remains controlled since her epidural in March.     Procedure list:  3/6/2024-PRABHAKAR at C7/T1-95% ONGOING relief    Past pain medications: None     Current pain medications: OTC aleve and OTC Tylenol.      Past therapies:  Physical Therapy: Yes, helpful, has been years since last in PT  Chiropractor: None  Massage Therapy: Yes, temporarily helpful  TENS: Yes, at PT, temporary relief  Neck or back surgery: None  Past pain management: Yes, Dr. Sotelo many years ago for low back pain     Previous Injections:   LESI many years ago with Dr. Sotelo (these were helpful), no cervical procedures.     Neck Pain   This is a chronic problem. The current episode started more than 1 month ago. The problem occurs constantly. Progression since onset: improved since last office visit. The pain is associated with lifting a heavy object. The pain is present in the right side and left side (R>L). The quality of the pain is described as aching. The symptoms are aggravated by position, twisting and bending. Associated symptoms include weakness. Pertinent negatives include no chest pain, fever, headaches or numbness. She has tried NSAIDs, acetaminophen, heat and home exercises (PT) for the symptoms.   Back Pain  This is a chronic problem. The  current episode started more than 1 month ago. The problem occurs intermittently. The pain is present in the lumbar spine and sacro-iliac. The quality of the pain is described as aching. Radiates to: no pain, but legs become heavy/tired with prolonged standing. The pain is at a severity of 8/10. The symptoms are aggravated by standing and sitting (walking). Associated symptoms include weakness. Pertinent negatives include no abdominal pain, chest pain, fever, headaches or numbness. She has tried NSAIDs (tylenol, PT) for the symptoms.      PEG Assessment   What number best describes your pain on average in the past week?8  What number best describes how, during the past week, pain has interfered with your enjoyment of life?8  What number best describes how, during the past week, pain has interfered with your general activity?  8    Review of Pertinent Medical Data ---  Narrative & Impression   LUMBAR SPINE MRI     HISTORY: Lumbar pain with neurogenic claudication, bilateral low back  pain.     TECHNIQUE: Multiphase multisequence MR images of the lumbar spine were  obtained without contrast.     COMPARISON: CT abdomen and pelvis 11/21/2022 and 03/04/2024.     FINDINGS:  For the purposes of this dictation the last well-formed disc space will  be called L5-S1.     No suspicious marrow replacing signal.     The conus medullaris ends at L1-L2.     The spinal cord is normal in signal and morphology.     There are multilevel degenerative changes as follows:  T12-L1: There is no significant central canal or neuroforaminal  stenosis.     L1-L2: Bilateral facet and ligamentum flavum hypertrophy is present.  There appears to be diffuse disc bulge at this level. No significant  central canal stenosis. Mild left lateral recess stenosis is present. No  significant neuroforaminal encroachment.     L2-L3: Significant degenerative changes with broad-based posterior disc  osteophyte complex and ligamentum flavum and facet hypertrophy  results  in moderate central canal and bilateral lateral recess stenosis. There  is mild to moderate left and moderate right neuroforaminal encroachment.     L3-L4: Suggestion of mild retrolisthesis of L3 on L4. Bilateral facet  and ligamentum flavum hypertrophy is present with small broad-based  posterior disc bulge. Associated epidural lipomatosis is present as  well. There is mild central canal stenosis. Moderate to severe bilateral  lateral recess stenosis is present with ligamentum flavum hypertrophy  and disc bulge, in close approximation with the traversing nerve roots  at at this level. There is moderate left and mild right neuroforaminal  encroachment.     L4-L5: Significant degenerative disc changes are present. Broad-based  posterior disc bulge without significant central canal stenosis. Mild to  moderate right and moderate neuroforaminal encroachment. Lateral recess  stenosis, right greater than left. Mild to moderate right and mild left  lateral recess stenosis. No significant central canal stenosis.     L5-S1: Broad-based posterior disc bulge without significant central  canal stenosis.. There is moderate bilateral neuroforaminal  encroachment, left greater than right.     IMPRESSION:  Significant multilevel degenerative changes resulting in varying degrees  of stenosis as above.     This report was finalized on 5/3/2024 5:22 PM by Dr. Karri Rice M.D  on Workstation: [id number]       The following portions of the patient's history were reviewed and updated as appropriate: allergies, current medications, past family history, past medical history, past social history, past surgical history, and problem list.    Review of Systems   Constitutional:  Negative for activity change (less), fatigue and fever.   Respiratory:  Negative for cough and chest tightness.    Cardiovascular:  Negative for chest pain.   Gastrointestinal:  Positive for constipation and diarrhea. Negative for abdominal pain.  "  Musculoskeletal:  Positive for back pain and neck pain.   Neurological:  Positive for weakness. Negative for dizziness, light-headedness, numbness and headaches.   Psychiatric/Behavioral:  Positive for sleep disturbance. Negative for agitation and suicidal ideas. The patient is not nervous/anxious.      --  The aforementioned information the Chief Complaint section and above subjective data including any HPI data, and also the Review of Systems data, has been personally reviewed and affirmed.  --    Vitals:    05/14/24 1303   BP: 137/77   BP Location: Left arm   Patient Position: Sitting   Cuff Size: Adult   Pulse: 62   Resp: 12   Temp: 96.9 °F (36.1 °C)   TempSrc: Temporal   SpO2: 99%   Weight: 73 kg (161 lb)   Height: 157.5 cm (62\")   PainSc:   8     Objective   Physical Exam  Vitals and nursing note reviewed.   Constitutional:       Appearance: Normal appearance. She is well-developed.   Eyes:      General: Lids are normal.   Cardiovascular:      Rate and Rhythm: Normal rate.   Pulmonary:      Effort: Pulmonary effort is normal.   Musculoskeletal:      Lumbar back: Tenderness and bony tenderness present. Decreased range of motion. Negative right straight leg raise test and negative left straight leg raise test.      Comments:   +Shopping cart sign  +Left Rashawn, -Right  +Bilateral SI Compression  +Left Gaenslen's, -Right  +Left Thigh Thrust, -Right   Neurological:      Mental Status: She is alert and oriented to person, place, and time.   Psychiatric:         Attention and Perception: Attention normal.         Mood and Affect: Mood normal.         Speech: Speech normal.         Behavior: Behavior normal.         Judgment: Judgment normal.         Assessment & Plan   Diagnoses and all orders for this visit:    1. Lumbar stenosis with neurogenic claudication (Primary)    2. Chronic midline low back pain, unspecified whether sciatica present    3. Sacroiliac joint dysfunction of left side    4. Cervical spinal " stenosis      Kourtney Lorenzana reports a pain score of 8.  Given her pain assessment as noted, treatment options were discussed and the following options were decided upon as a follow-up plan to address the patient's pain: steroid injections.    --- LESI at L3/4 vs. L2/3  Reviewed the procedure at length with the patient.  Included in the review was expectations, complications, risk and benefits.The procedure was described in detail and the risks, benefits and alternatives were discussed with the patient (including but not limited to: bleeding, infection, nerve damage, worsening of pain, inability to perform injection, paralysis, seizures, coma, no pain relief and death) who agreed to proceed.  Discussed the potential for sedation if warranted/wanted.  The procedure will plan to be performed at [facility] with fluoroscopic guidance(unless ultrasound is indicated) and could potentially have steroids and contrast dye used. Questions were answered and in a way the patient could understand.  Patient verbalized understanding and wishes to proceed.  This intervention will be ordered.  Discussed with patient that all procedures are part of a multimodal plan of care and include either formal PT or a home exercise program.  Patient has no evidence of coagulopathy or current infection.    --- Consider Left SI injection in the future  --- May repeat PRABHAKAR every 3 months PRN  --- Follow-up after procedure     Dictated utilizing Dragon dictation.

## 2024-06-07 ENCOUNTER — OFFICE VISIT (OUTPATIENT)
Dept: FAMILY MEDICINE CLINIC | Facility: CLINIC | Age: 79
End: 2024-06-07
Payer: MEDICARE

## 2024-06-07 VITALS
HEART RATE: 63 BPM | SYSTOLIC BLOOD PRESSURE: 142 MMHG | OXYGEN SATURATION: 100 % | WEIGHT: 159 LBS | DIASTOLIC BLOOD PRESSURE: 90 MMHG | RESPIRATION RATE: 16 BRPM | HEIGHT: 62 IN | BODY MASS INDEX: 29.26 KG/M2

## 2024-06-07 DIAGNOSIS — G89.29 CHRONIC NONINTRACTABLE HEADACHE, UNSPECIFIED HEADACHE TYPE: ICD-10-CM

## 2024-06-07 DIAGNOSIS — M54.12 CERVICAL RADICULOPATHY: ICD-10-CM

## 2024-06-07 DIAGNOSIS — R53.82 CHRONIC FATIGUE: ICD-10-CM

## 2024-06-07 DIAGNOSIS — E83.42 HYPOMAGNESEMIA: ICD-10-CM

## 2024-06-07 DIAGNOSIS — G89.29 CHRONIC PAIN OF LEFT KNEE: ICD-10-CM

## 2024-06-07 DIAGNOSIS — G47.19 OTHER HYPERSOMNIA: ICD-10-CM

## 2024-06-07 DIAGNOSIS — E78.2 MIXED HYPERLIPIDEMIA: Primary | ICD-10-CM

## 2024-06-07 DIAGNOSIS — H53.9 VISION CHANGES: ICD-10-CM

## 2024-06-07 DIAGNOSIS — Z87.440 HISTORY OF UTI: ICD-10-CM

## 2024-06-07 DIAGNOSIS — Z86.73 HISTORY OF LACUNAR CEREBROVASCULAR ACCIDENT: ICD-10-CM

## 2024-06-07 DIAGNOSIS — E53.8 B12 DEFICIENCY: ICD-10-CM

## 2024-06-07 DIAGNOSIS — R79.89 ABNORMAL CBC: ICD-10-CM

## 2024-06-07 DIAGNOSIS — R93.0 ABNORMAL COMPUTED TOMOGRAPHY ANGIOGRAPHY OF HEAD: ICD-10-CM

## 2024-06-07 DIAGNOSIS — R51.9 CHRONIC NONINTRACTABLE HEADACHE, UNSPECIFIED HEADACHE TYPE: ICD-10-CM

## 2024-06-07 DIAGNOSIS — R10.32 LEFT LOWER QUADRANT PAIN: ICD-10-CM

## 2024-06-07 DIAGNOSIS — M25.562 CHRONIC PAIN OF LEFT KNEE: ICD-10-CM

## 2024-06-07 DIAGNOSIS — M50.30 DDD (DEGENERATIVE DISC DISEASE), CERVICAL: ICD-10-CM

## 2024-06-07 DIAGNOSIS — N93.9 VAGINAL SPOTTING: ICD-10-CM

## 2024-06-07 DIAGNOSIS — M54.2 CERVICALGIA: ICD-10-CM

## 2024-06-07 DIAGNOSIS — N32.81 OAB (OVERACTIVE BLADDER): ICD-10-CM

## 2024-06-07 DIAGNOSIS — M48.02 SPINAL STENOSIS, CERVICAL REGION: ICD-10-CM

## 2024-06-07 DIAGNOSIS — G47.9 SLEEP DISTURBANCES: ICD-10-CM

## 2024-06-07 DIAGNOSIS — E55.9 VITAMIN D DEFICIENCY: ICD-10-CM

## 2024-06-07 DIAGNOSIS — M43.12 SPONDYLOLISTHESIS, CERVICAL REGION: ICD-10-CM

## 2024-06-07 DIAGNOSIS — R74.8 ELEVATED ALKALINE PHOSPHATASE LEVEL: ICD-10-CM

## 2024-06-07 DIAGNOSIS — K59.09 CHRONIC CONSTIPATION: ICD-10-CM

## 2024-06-07 DIAGNOSIS — K21.9 GASTROESOPHAGEAL REFLUX DISEASE, UNSPECIFIED WHETHER ESOPHAGITIS PRESENT: ICD-10-CM

## 2024-06-07 PROCEDURE — 1126F AMNT PAIN NOTED NONE PRSNT: CPT | Performed by: PHYSICIAN ASSISTANT

## 2024-06-07 PROCEDURE — 1159F MED LIST DOCD IN RCRD: CPT | Performed by: PHYSICIAN ASSISTANT

## 2024-06-07 PROCEDURE — 1160F RVW MEDS BY RX/DR IN RCRD: CPT | Performed by: PHYSICIAN ASSISTANT

## 2024-06-07 PROCEDURE — 99214 OFFICE O/P EST MOD 30 MIN: CPT | Performed by: PHYSICIAN ASSISTANT

## 2024-06-07 NOTE — PROGRESS NOTES
Subjective   Kourtney Lorenzana is a 78 y.o. female who presents today in follow up of hyperlipidemia, vitamin D deficiency, B12 deficiency, elevated alkaline phosphatase, magnesium, history of UTI, vaginal spotting, GERD, abdominal symptoms, chronic fatigue, sleep, history of CVA, headache, significant back, neck, and joint pain, and vision change.    Hyperlipidemia       Has always had cramps in her feet- she has had more cramping in her feet and heaviness in her legs. Not sure if worse with statin or coming from her back. Pain management thought could be from her back.     Has the sensation in her ears very rarely. Tried to come back then resolved. Not as bad.     Lifestyle changes-   Prior, she lowered sugar and bread. Hardly ever red meat- maybe once every 2 month, hamburger. Otherwise, not eating a lot of meat- does not tolerate very well.   Moved and living in the walkout basement with her daughter.   Weight watchers-has continued to be compliant and continued with weight loss.  7/1/2022- she had to change diet- cutting sugars completely and decreased carbs. Recently restarted 1 slice of bread every other day- whole wheat with no sugars. If any meat- Tuna, chicken, rarely beef.   Not able to exercise much other than the cleaning and housekeeping and yard work. Swimming some this summer.   Hyperlipidemia- taking Crestor- having some increased cramping in feet. Changed diet and would like to see about decreasing to 10 mg daily if low cholesterol.   She was having no cramping more than before in legs with Crestor. She tolerated well. Negative-   She was more tired but otherwise doing ok on medication. No AE with Crestor. The 1st week, started with 1/2 tablet to get used to it then started whole pill.  Wants to know about Co Q10. Stopped other supplements.   cholesterol was higher 6/2022- advised she start Lipitor 40 mg nightly. She did not take medication. Diet helped with previous labs.   After Harsh, she was  as good but being more careful again. Still better about limiting bread, staying away from pies, cakes, and cookies to an extent. Better than she was previously.   Patient was diligent with diet and exercise with weight watchers.  Continued with weight loss.  Vitamin D deficiency- taking 2000 IU daily. Also K2 with vitamin D. Tolerating without AE  B12 deficiency- taking 1 dropper every other day.   She was taking 1 dropper full daily. 1000 mcg daily.   10/2022-advised to restart B12 100 mcg daily or 500 mcg twice weekly. She did not restart   6/2022-advised decrease B12 to 500 mcg 3 times weekly rather than daily. Women's vitamin with B6- small amount. Stopped vitain D.   Previously 500 mcg daily. Tolerating without AE  Elevated alk phos- back on Aleve 7-8 days per month- takes 2 at night then a couple days of one nightly. Not taking as much but helps more than Tylenol. Has abnormal sensation in the ears but not as bad and not as often.   no longer on Aleve. Only on Tylenol. Tylenol not helping as much.    taking Aleve and stopped then started again with headaches.  Was taking 2 Tylenol at bedtime.  Pounding in ear- she reports it stopped with stopping Aleve. Resolved completely.   Cholecystectomy 12/3/2019 with Dr. Schneider.  Went for surgery follow-up with incisions healing well.  She was taking Aleve and stopped. She was also taking 2 Tylenol at bedtime. It was thought elevated alkaline phosphatase could have been related to GB disease. She underwent cholecystectomy 12/3/2019 with Dr. Schneider and went for surgery follow-up with incisions healing well.   Magnesium- no longer on Mg.   No longer taking magnesium oxide 400 mg. Had side effects. Did not restart.   Added Quercetin, dandelion root, and Zinc- reports she thinks these are good for heart, liver, and immune system. She is not vaccinated for Covid 19.   Added K2.     UTI- had UTI after GYN exam 4/10/2024- went to urgent care 4/17/2024.   11/2022- she had  left flank pain with radiation to left lower abdomen. Then resolved with Aleve and heating pad.   Started at 2 am 11/19. Burning with urination. She had UTI in the past frequency. She had not had in years but noted fast.  No fever but had chills 11/19/2022 tylenol and Azo every 6 hours. She was worried about not treating UTI.   Patient was seen by SLY Spencer 4/27/2023 with UTI.  She was given Omnicef that was appropriate by culture.  Patient then called again on 5/5/2023 and reported she still had burning sensation with urination and had some back pain and fatigue.  She was advised she needed to come in to leave another urine.  However, Macrobid was called into the pharmacy by SLY Spencer. She had burning with urination, lower back pain. She got relief with Tylenol and Aleve but was not getting relief  at that time. She was unsure if related to urination.   Vaginal spotting- still vaginal spotting.   Previously reported she no longer had spotting but had incontinence of urine.   She also discussed spotting with Urogyn and was advised this was from vaginal dryness. She is rarely has spotting will continue follow up with them as directed.    Last urogynecology appointment 11/2018- negative evaluation for spotting and vaginal bleeding and no erosions on exam.  She was not using HRT or vaginal estrogen.  She also had mixed incontinence that was worsening.  She did not respond to Myrbetriq and had GI side effects-discontinue medication.  Prior sling for JOSEPH that worked for only a couple years.  Discussed PTNS versus Botox versus InterStim for her urge and discussed surgical options for treatment of stress incontinence including mid urethral sling with mesh versus biologic material, urethral injections or Christy procedures.  They started vaginal estrogen for the atrophy.  Advised follow-up in 6 months.  Patient did not follow-up    GERD- continues taking medication. Hates the thought of taking it. If misses a  day, she knows by night-time. With medication, feels ok.   taking Omeprazole every other day or every 3rd day. Still has heartburn if she eats the wrong thing and is on the 3rd day. Not eating hardly any fried foods and allows her to take less often.   She was previously stable on Protonix 40 mg daily. No breakthrough symptoms when taking medication regularly.  She was seen by GI was advised to continue.    She was having increased heartburn and reflux but changing Protonix to Omeprazole helped.   She had improvement with change in eating.  Patient was seen 12/10/2019 by Dr. Mitchell and was given Mobic for back pain. She then had RUQ pain and Dr Schneider advised to increase to 15 mg for 2 weeks.   12/30/2019-patient went to ER with sharp chest pain.  Was given Protonix 40 mg daily and advised to see cardiology.  Appointment was made for March 2020. Thinks it was a side effect from Mobic.  Pain with bowel movements-no pain with BM. Now taking tea- smooth move tea- organic traditional medicine. 1 cup per evening with 5 prunes- going every morning.   3/2024-she reported her last episode 2/15/2024- lasted a while- happening more frequently and lasting longer. Increased pain with BM and was having a hard time having BM and even vagina was burning with BM. Also mucusy blood with BM. Has lost 10 lbs with increased GI symptoms. With increased nausea, could not eat then eating soft foods. When went to see GI- recommended staying on soft food diet for a couple weeks. Apple sauce, eggs, bread.  Abdominal pain- Also has talked with GI- Dr Andrade- she has scar tissue in abdomen and was told that she has some crooked places in intestines. No further colonoscopies. Happening more often that she has increased pain in lower abdomen. Flares of pain prevent bending over. Sometimes LLQ and sometimes RLQ. If gets on couch with feet up and heating pad, takes Tylenol and eases up. Never knows when will hit. She will feel limited in how far  she goes and how much she does.   She is also struggling with constipation. Advised Miralax and stool softeners and eating prunes.   Did not want to take Miralax all the time. Always trying to find something- came across [employer] complete- has probiotics and was for gut health. Not helping as much as fiber.   When has constipation, she has increased pain.   Sleep-Waking up 4 x nightly to urinate but can usually go back to sleep.  She was taking Benadryl sleep aid 50 mg and has taken Tylenol at bedtime. Was taking Benadryl instead of Zyrtec but now taking Zyrtec daily.   Fatigue- still feeling tired.   Previously, restarted B12 about 1 month ago with increased fatigue. Helps a little then about 2 pm, she has increased fatigue.   She was seen 6/2019 and had been very fatigued all the time. She could only tolerate being active a few hours then she was tired for the rest of the day, complained of shortness of breath and fatigued with exertion. Patient also noted bilateral toes were blue. She had normal DP and PT pulses bilaterally with normal temperature of the feet. She felt overall cool/ cold all the time. She then joined weight watchers and has improved some. Patient continues waking up 4 x nightly to urinate but states she can usually go back to sleep. She is taking Benadryl sleep aid 50 mg and Tylenol at bedtime as needed. If she has persistent sleep disturbances or fatigue, we will consider sleep study.     History of CVA, abnormal sound in ears- Pounding in ear- she reports it stopped with stopping Aleve. Resolved completely.   She had sensation of hearing heartbeat in her ear with yawn and worse at night.  It was there years- 3-4 years. It was rare but started happening often. She would have daily but tries to keep in yawning.  It does not happen if she does not yawn. If turns head a certain way, she has will start as well. She will try to stop with putting her finger in ear.  It has to run it's course and stops on  it's own but lasts up to 1 hour.  She had a few times and was there constantly for an hour and keeping awake. No other associated symptoms with it. Does have some hearing loss. No ringing in ears.   PGF- brain aneurysm. Father with TIAs.     Vision change- told vision was ok 1/2024. Told about issues with focus and driving and told was ok.   She had gradual vision change. She had treatment on both eyes- laser treatment. Helped her vision.    Headache- has eased up and is doing better.   She was having increased headaches. Chronic neck pain.  She wanted to see ortho spine in follow up.   She completed physical therapy- did not help much. Went for 6 weeks. She goes for massage once monthly- helps.   Patient then was seen by orthospine who ordered MRI cervical spine and referred to pain management.  Did have improvement with ANNE cervical spine.  Started 1/2022 with bilateral temporal, parietal, and at the top of head and back of head. Throbbing pain and mild pain. 5/10- lasts about 1 hour or so. Tries not to keep track and tries to stay busy. No associated dizziness, vision changes, nausea, vomiting, confusion, change in speech, gait abnormality, or other neurological symptoms. Tylenol not helping enough and having to use Aleve. She is now going back and forth with Tylenol and Aleve. Worried about her liver. Taking 1-2 every night and sometimes 1 in the morning if bad. She reports it depends on the weather. Rain and sudden changes in weather- cold to hot and humid.   Neck and shoulder pain-continues with neck and shoulder pain.  She goes for massage once monthly- helps. Lasts a couple weeks. Wants to see if she should see ortho spine in follow up.  She completed PT but did not help a lot.  She completed physical therapy for 6 weeks.  She was seen by Dr Napier- told that the bone spurs were not concerning for esophagus and thought no surgery at this time. Recommended PT and ANNE. PT was helping and did not feel she  "needed ANNE. May want to do \"down the road\" but not right now. Seen by Dr Garcia with injection. Helped some.   6/2022- right side of neck is swollen more than the other side. She has pain and stiffness in neck and goes to right shoulder.   Patient also noted pain in her neck and down her left upper arm. She was not sore to touch or feel but is a deep pain. She also has bilateral hand numbness. We started with labs and advised we would consider PT and additional workup if no improvement or recurrence.  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  She also has persistent neck stiffness with the shoulder pain. I will refer to PT today. If persistent neck, shoulder, and knee symptoms, or persistent numbness, we will consider EMG and further imaging. She should call or return if no resolution of symptoms.  Knee pain- The other knee needs surgery but not ready for recovery. Right is ok after operation and left is not too bad.   Patient continues follow up with Dr Catarino De La Cruz, orthopedist, who advised PT and knee injections. Her right knee pain improved and her left knee still hurts. She will return as needed for injections with orthopedist. Patient's last appt was 1/7/2020 for OA knee and shoulder pain.    He advised no surgical intervention at this time for the knee and to continue PT.  She had right knee surgery 5/2021- she did well with surgery and has healed well.   Orthopedic surgery- Dr. Catarino De La Cruz- patient was seen 1/7/2020 for OA knee and shoulder pain.  They advised she was starting to develop frozen shoulder and ordered MRI shoulder to make further recommendations pending results.  Advised no surgical intervention at this time for the knee- continue PT. Reports neck stiffness with the shoulder pain.     Skin lesion face, nose- She also has a change in color on skin of left side of face. If she pulls back on the " lesion, she has a pinching pain. She has had another spot on right side of face and place on right side of nose that scabs and the scab falls off and recurs. Pinching pain if she pushes on it or moves it.   Rash-rash is gone but she continues with pain in the scar area when she is needing to have BM or urinate and is not able to go right away.   At her previous visit, she stated she has a scar on her abdomen with pain in the area of her scar from hysterectomy. When she rubbed it, the skin came off. Also, she had itching under bilateral breasts with a rash. On exam, she appeared to have cutaneous candidiasis and was treated with Nystatin cream and powder in both areas and has had improvement.      The following portions of the patient's history were reviewed and updated as appropriate: allergies, current medications, past family history, past medical history, past social history, past surgical history and problem list.    Review of Systems   Constitutional:  Positive for fatigue.   Eyes:  Positive for visual disturbance.   Respiratory: Negative.     Cardiovascular: Negative.    Musculoskeletal:  Positive for arthralgias, back pain, neck pain and neck stiffness.   Skin:  Positive for color change and rash.   Neurological:  Positive for headaches.   Psychiatric/Behavioral:  Positive for sleep disturbance.        Objective    Vitals:    06/07/24 0948   BP: 142/90   Pulse: 63   Resp: 16   SpO2: 100%   Body mass index is 29.08 kg/m².     Physical Exam   Constitutional: She is oriented to person, place, and time. She appears well-developed.   HENT:   Head: Normocephalic and atraumatic.   Right Ear: External ear normal.   Left Ear: External ear normal.   Nose: Nose normal.   Eyes: Conjunctivae and lids are normal.   Neck: Carotid bruit is not present.   Cardiovascular: Normal rate, regular rhythm and normal heart sounds. Exam reveals no gallop and no friction rub.   No murmur heard.  Pulmonary/Chest: Effort normal and  breath sounds normal. No respiratory distress. She has no wheezes. She has no rhonchi. She has no rales.   Abdominal: Soft. Normal appearance and bowel sounds are normal. She exhibits no shifting dullness, no distension, no abdominal bruit and no mass. There is no abdominal tenderness. There is no rigidity, no rebound and no guarding. No hernia.   Musculoskeletal: No deformity.   Neurological: She is alert and oriented to person, place, and time. Gait normal.   Skin: Skin is warm and dry. She is not diaphoretic.   Psychiatric: Her speech is normal and behavior is normal. Judgment and thought content normal.   Nursing note and vitals reviewed.      Assessment & Plan   Diagnoses and all orders for this visit:    1. Mixed hyperlipidemia (Primary)  -     Comprehensive Metabolic Panel  -     CK  -     Lipid Panel With LDL / HDL Ratio  -     TSH    2. Vitamin D deficiency  -     Comprehensive Metabolic Panel  -     Vitamin D,25-Hydroxy    3. B12 deficiency  -     CBC & Differential  -     Vitamin B12 & Folate    4. Abnormal CBC  -     CBC & Differential    5. Elevated alkaline phosphatase level  -     Comprehensive Metabolic Panel  -     Urinalysis With Culture If Indicated -    6. Hypomagnesemia  -     Magnesium    7. History of UTI  -     Urinalysis With Culture If Indicated -    8. OAB (overactive bladder)  -     Urinalysis With Culture If Indicated -    9. Vaginal spotting    10. Gastroesophageal reflux disease, unspecified whether esophagitis present    11. Left lower quadrant pain    12. Chronic constipation    13. Sleep disturbances    14. Other hypersomnia     15. Chronic fatigue    16. History of lacunar cerebrovascular accident    17. Abnormal computed tomography angiography of head    18. Vision changes    19. Chronic nonintractable headache, unspecified headache type    20. DDD (degenerative disc disease), cervical    21. Spondylolisthesis, cervical region    22. Spinal stenosis, cervical region    23.  Cervicalgia    24. Cervical radiculopathy    25. Chronic pain of left knee                 Assessment and plan  Patient will have follow-up labs today. Call if no results in 1 week. Stability of conditions, plan, follow up, and further recommendations pending labs. Follow up in 3 months if labs are stable.  She declines AWV.    Hyperlipidemia- She did not take Lipitor as prescribed and wanted to work on diet and exercise.  I have counseled about her concerns for statins vs risks of not taking medication.  She then started Crestor 20 mg daily and was tolerating without AE.  She was taking medication with co-Q10.  She is now reporting increased cramping in her feet that she is wondering if it is related to her Crestor.  She would like to see about decreasing to 10 mg.  I advised she will need to stay on statin with history of CVA.  I will recommend decreasing statin only if cholesterol is low.  Continued dietary changes with weight loss, ensure no caffeine and proper hydration.    Vitamin D deficiency- Patient to continue 2000 IU daily.  Dosing recommendations pending lab results.  B12 deficiency- Patient had elevated levels-instead of decreasing dosage, she stopped B12.  I asked that she restart every other day as directed on labs.  Further recommendations pending lab results.   Elevated alk phos- Avoid NSAIDS. Cholecystectomy healed. I will continue to monitor and make further recommendations.   Magnesium- I will continue to monitor and make further recommendations.   Dysuria, recent UTI- She had UTI after GYN exam 4/10/2024.  I will recheck-await labs for recommendations. We will need to consider return to urogynecology or to be seen by urology if she has recurrent urinary tract infections, as she has postmenopausal atrophy and stress and urge incontinence that is chronic.  Vaginal spotting- Patient was seen by urogynecology but has had continued vaginal spotting after previously reporting this had resolved. I  referred back to GYN for evaluation and workup/ treatment recommendations.  She was given topical estrogen to use as needed.  I discussed risks of HRT including but not limited to DVT, PE, MI, CVA, breast cancer.  She is not currently using treatment but could consider using it as needed if she has recurrence of symptoms.  GERD- Per GI, she was to take omeprazole 40 mg daily, however, she is taking Prilosec 40 mg every 2 to 3 days.  She does have some breakthrough if she waits 3 days or eats chocolate or other foods that trigger her GERD symptoms.  I advised she reconsider daily medication if she has continued breakthrough symptoms.  To see GI in follow up if breakthrough symptoms or any concerns.    Chronic lower abdominal pain, chronic constipation- She has chronic constipation. I offered Linzess 72 mcg daily with the ability to titrate if needed.  She is currently having success with over-the-counter regimen with prunes smooth move tea.  Follow up with GI if persistent, worsening, new, or changing symptoms or to be seen at ER if intractable or severe pain.   Sleep disturbance, hypersomnia, fatigue- I referred for home sleep study, but this was not completed.  She was seen by pulmonology 12/2022 and discharged back here but sleep disturbance and fatigue was not discussed. We will consider referral to sleep medicine or reordering home sleep study if persistent symptoms.  History of CVA, small vessel ischemic disease- We discussed continuing Crestor 20 mg daily and adding aspirin 81 mg daily.  She has no history of GI bleeding, fall risk, or allergy to aspirin.  We can consider decreasing Crestor to 10 mg only if her cholesterol is low.  I will have neurology make further recommendations for treatment and follow-up.  Headache, abnormal sensation/ sound noted- Patient had headaches for about 6 months with no associated symptoms but seem to be more with on weather and temperature changes. She had significant neck pain  and stiffness. This could be contributing to her headaches. She underwent PT and headaches were stable. She also has sleep disturbance. I referred for sleep study that was not completed. Patient completed CTA head and neck and MRI brain with lacunar infarct, chronic small vessel ischemic changes, and significant degenerative changes of the cervical spine.  I referred to neurology.  She did have some improvement with cervical ANNE following MRI, neurosurgery consult, and referral to pain management.  To ER if worsening, new or changing symptoms or if any other neurological symptoms.   Neck and shoulder pain, DDD, OA cervical spine- She has had stiffness in her neck to her right shoulder, pain down her left arm, headaches, bilateral hand numbness, and feels there is an asymmetry of her neck. She had MRI cervical spine 2020 with advanced degenerative changes, spondylolisthesis. She was seen by Dr Napier and advised PT and possible ANNE.  She was seen in follow-up by orthopedic spine for reevaluation as well as Dr Garcia with previous concerns for frozen shoulder.  She had improvement with cervical ANNE and will undergo a lumbar ANNE.  I have counseled her about the need to avoid NSAIDs regularly.  She should discuss recommendations with pain management for medications to take on bad days.   Knee pain- Right knee healed well. She continues with left knee pain but reports this is not consistent and is stable at this time. Continue follow up with orthopedist, Dr Catarino De La Cruz.   Changing skin lesion- I referred to dermatology for evaluation and annual skin monitoring.   Rash- If recurrence, can use Nystatin cream and powder. She should also see dermatology if recurrence.       I spent 35 minutes caring for Kourtney Lorenzana on this date of service. This time includes time spent by me in the following activities as necessary: preparing for the visit, reviewing tests, specialists records and previous visits, obtaining and/or  reviewing a separately obtained history, performing a medically appropriate exam and/or evaluation, counseling and educating the patient, family, caregiver, referring and/or communicating with other healthcare professionals, documenting information in the medical record, independently interpreting results and communicating that information with the patient, family, caregiver, and developing a medically appropriate treatment plan with consideration of other conditions, medications, and treatments.

## 2024-06-08 LAB
25(OH)D3+25(OH)D2 SERPL-MCNC: 48.4 NG/ML (ref 30–100)
ALBUMIN SERPL-MCNC: 4.3 G/DL (ref 3.5–5.2)
ALBUMIN/GLOB SERPL: 1.9 G/DL
ALP SERPL-CCNC: 132 U/L (ref 39–117)
ALT SERPL-CCNC: 18 U/L (ref 1–33)
APPEARANCE UR: CLEAR
AST SERPL-CCNC: 15 U/L (ref 1–32)
BACTERIA #/AREA URNS HPF: NORMAL /[HPF]
BASOPHILS # BLD AUTO: 0.02 10*3/MM3 (ref 0–0.2)
BASOPHILS NFR BLD AUTO: 0.4 % (ref 0–1.5)
BILIRUB SERPL-MCNC: 0.6 MG/DL (ref 0–1.2)
BILIRUB UR QL STRIP: NEGATIVE
BUN SERPL-MCNC: 12 MG/DL (ref 8–23)
BUN/CREAT SERPL: 14 (ref 7–25)
CALCIUM SERPL-MCNC: 9.4 MG/DL (ref 8.6–10.5)
CASTS URNS QL MICRO: NORMAL /LPF
CHLORIDE SERPL-SCNC: 107 MMOL/L (ref 98–107)
CHOLEST SERPL-MCNC: 165 MG/DL (ref 0–200)
CK SERPL-CCNC: 103 U/L (ref 20–180)
CO2 SERPL-SCNC: 24.4 MMOL/L (ref 22–29)
COLOR UR: YELLOW
CREAT SERPL-MCNC: 0.86 MG/DL (ref 0.57–1)
EGFRCR SERPLBLD CKD-EPI 2021: 69.7 ML/MIN/1.73
EOSINOPHIL # BLD AUTO: 0.09 10*3/MM3 (ref 0–0.4)
EOSINOPHIL NFR BLD AUTO: 1.7 % (ref 0.3–6.2)
EPI CELLS #/AREA URNS HPF: NORMAL /HPF (ref 0–10)
ERYTHROCYTE [DISTWIDTH] IN BLOOD BY AUTOMATED COUNT: 14.4 % (ref 12.3–15.4)
FOLATE SERPL-MCNC: 8.67 NG/ML (ref 4.78–24.2)
GLOBULIN SER CALC-MCNC: 2.3 GM/DL
GLUCOSE SERPL-MCNC: 95 MG/DL (ref 65–99)
GLUCOSE UR QL STRIP: NEGATIVE
HCT VFR BLD AUTO: 39.8 % (ref 34–46.6)
HDLC SERPL-MCNC: 56 MG/DL (ref 40–60)
HGB BLD-MCNC: 13.4 G/DL (ref 12–15.9)
HGB UR QL STRIP: NEGATIVE
IMM GRANULOCYTES # BLD AUTO: 0.02 10*3/MM3 (ref 0–0.05)
IMM GRANULOCYTES NFR BLD AUTO: 0.4 % (ref 0–0.5)
KETONES UR QL STRIP: NEGATIVE
LDLC SERPL CALC-MCNC: 86 MG/DL (ref 0–100)
LDLC/HDLC SERPL: 1.47 {RATIO}
LEUKOCYTE ESTERASE UR QL STRIP: NEGATIVE
LYMPHOCYTES # BLD AUTO: 1.79 10*3/MM3 (ref 0.7–3.1)
LYMPHOCYTES NFR BLD AUTO: 33.8 % (ref 19.6–45.3)
MAGNESIUM SERPL-MCNC: 2.4 MG/DL (ref 1.6–2.4)
MCH RBC QN AUTO: 31.4 PG (ref 26.6–33)
MCHC RBC AUTO-ENTMCNC: 33.7 G/DL (ref 31.5–35.7)
MCV RBC AUTO: 93.2 FL (ref 79–97)
MICRO URNS: NORMAL
MICRO URNS: NORMAL
MONOCYTES # BLD AUTO: 0.44 10*3/MM3 (ref 0.1–0.9)
MONOCYTES NFR BLD AUTO: 8.3 % (ref 5–12)
NEUTROPHILS # BLD AUTO: 2.93 10*3/MM3 (ref 1.7–7)
NEUTROPHILS NFR BLD AUTO: 55.4 % (ref 42.7–76)
NITRITE UR QL STRIP: NEGATIVE
PH UR STRIP: 6.5 [PH] (ref 5–7.5)
PLATELET # BLD AUTO: 197 10*3/MM3 (ref 140–450)
POTASSIUM SERPL-SCNC: 4.4 MMOL/L (ref 3.5–5.2)
PROT SERPL-MCNC: 6.6 G/DL (ref 6–8.5)
PROT UR QL STRIP: NEGATIVE
RBC # BLD AUTO: 4.27 10*6/MM3 (ref 3.77–5.28)
RBC #/AREA URNS HPF: NORMAL /HPF (ref 0–2)
SODIUM SERPL-SCNC: 142 MMOL/L (ref 136–145)
SP GR UR STRIP: 1.01 (ref 1–1.03)
TRIGL SERPL-MCNC: 133 MG/DL (ref 0–150)
TSH SERPL DL<=0.005 MIU/L-ACNC: 1.47 UIU/ML (ref 0.27–4.2)
URINALYSIS REFLEX: NORMAL
UROBILINOGEN UR STRIP-MCNC: 0.2 MG/DL (ref 0.2–1)
VIT B12 SERPL-MCNC: 594 PG/ML (ref 211–946)
VLDLC SERPL CALC-MCNC: 23 MG/DL (ref 5–40)
WBC # BLD AUTO: 5.29 10*3/MM3 (ref 3.4–10.8)
WBC #/AREA URNS HPF: NORMAL /HPF (ref 0–5)

## 2024-06-12 NOTE — DISCHARGE INSTRUCTIONS
[facility] - Post-procedure Instructions          --  While there are no absolute restrictions, it is recommended that you do not perform strenuous activity today. In the morning, you may resume your level of activity as before your block.    --  If you have a band-aid at your injection site, please remove it later today. Observe the area for any redness, swelling, pus-like drainage, or a temperature over 101°. If any of these symptoms occur, please call your doctor at [phone]. If after office hours, leave a message and the on-call provider will return your call.    --  Ice may be applied to your injection site. It is recommended you avoid direct heat (heating pad; hot tub) for 1-2 days.    --  Call [facility] at [phone] if you experience persistent headache, persistent bleeding from the injection site, or severe pain not relieved by heat or oral medication.    --  Do not make important decisions today.    --  Due to the effects of the block and/or the I.V. Sedation, DO NOT drive or operate hazardous machinery for 12 hours.  Local anesthetics may cause numbness after procedure and precautions must be taken with regards to operating equipment as well as with walking, even if ambulating with assistance of another person or with an assistive device.    --  Do not drink alcohol for 12 hours.    -- You may return to work tomorrow, or as directed by your referring doctor.    --  Occasionally you may notice a slight increase in your pain after the procedure. This should start to improve within the next 24-48 hours. Radiofrequency ablation procedure pain may last 3-4 weeks.    --  It may take as long as 3-4 days before you notice a gradual improvement in your pain and/or other symptoms.    -- You may continue to take your prescribed pain medication as needed.    --  Some normal possible side effects of steroid use could include fluid retention, increased blood sugar, dull headache,  increased sweating, increased appetite, mood swings and flushing.    --  Diabetics are recommended to watch their blood glucose level closely for 24-48 hours after the injection.    --  Must stay in PACU for 20 min upon arrival and prove no leg weakness before being discharged.    --  IN THE EVENT OF A LIFE THREATENING EMERGENCY, (CHEST PAIN, BREATHING DIFFICULTIES, PARALYSIS…) YOU SHOULD GO TO YOUR NEAREST EMERGENCY ROOM.    --  You should be contacted by our office within 2-3 days to schedule follow up or next appointment date.  If not contacted within 7 days, please call the office at [phone]

## 2024-06-12 NOTE — H&P
[facility]   HISTORY AND PHYSICAL    Patient Name: Kourtney Lorenzana  : 1945  MRN: [id number]  Primary Care Physician:  Romina George PA  Date of admission: 2024    Subjective   Subjective     Chief Complaint: Low back pain    History of Present Illness  Chronic low back pain with radiation into the sacroiliac region and radiates stenotic symptoms into the lower extremities.       Review of Systems   Constitutional:  Negative for chills and fever.   Respiratory:  Negative for cough and shortness of breath.    Musculoskeletal:  Positive for back pain.        Personal History     Past Medical History:   Diagnosis Date    Arthritis     OSTEO. KNEES AND BACK SEES DR MIKE DOUGLAS    Arthritis of back     Arthritis of neck     Bloating     WITH SOME NAUSEA    Bruising tendency     Bursitis of hip     Cervical disc disorder     Cholelithiasis 2019    Discovered by liver ultrasound    Chronic low back pain     Chronic pain of both feet     CTS (carpal tunnel syndrome)     Diverticular disease     Diverticulitis of colon 2015    Diverticulosis     Elevated liver enzymes     Fibroid     Fracture, foot     Gastroesophageal reflux disease without esophagitis 2016    Hip arthrosis     History of anxiety     BEEN A LONG TIME    History of depression     SITUATIONAL. NOW RESOLVED    History of panic disorder     Hyperlipidemia 2015    IBS (irritable bowel syndrome)     Insomnia     Internal hemorrhoids     Knee pain     Knee swelling     Lumbar canal stenosis     Lumbosacral neuritis     Lung nodules     Multiple vitamin deficiency     Neck pain     Nephrolithiasis     2014 passage of kidney stone.    Osteoarthritis     Periarthritis of shoulder     Postmenopausal HRT (hormone replacement therapy)     PSVT (paroxysmal supraventricular tachycardia)     Pulmonary nodules 2021    Rotator cuff syndrome     Seasonal allergies     Shoulder pain     SOB (shortness of breath) on exertion     Stroke  11/2023    Tennis elbow     Trouble swallowing     INTERMITTENTLY    Urine incontinence        Past Surgical History:   Procedure Laterality Date    CATARACT EXTRACTION Bilateral 09/05/2017    Dr. Jefe Barakat OD, MD    CERVICAL EPIDURAL N/A 03/06/2024    Procedure: cervical epidural steroid injection at C7-T1;  Surgeon: Frannie Allen MD;  Location: [facility] OR;  Service: Pain Management;  Laterality: N/A;    CHOLECYSTECTOMY      CHOLECYSTECTOMY LAPAROSCOPIC INTRAOPERATIVE CHOLANGIOGRAM WITH LIVER BIOPSY N/A 12/03/2019    Procedure: CHOLECYSTECTOMY LAPAROSCOPIC INTRAOPERATIVE CHOLANGIOGRAM AND LIVER BIOPSY;  Surgeon: Benny Schneider MD;  Location: [facility] OR;  Service: General    COLONOSCOPY N/A 11/16/2012    Benign cecal polyp-Dr. Mak Rodriguez    COLONOSCOPY N/A 2015    Dr. Seema Andrade    ENDOSCOPY N/A 04/27/2018    HH, no specimen collected-Dr. Seema Andrade    FOOT SURGERY      HYSTERECTOMY Bilateral 1997    JOINT REPLACEMENT      PUBOVAGINAL SLING N/A 06/17/2003    Dr. Ziggy Saba    TOE SURGERY Right     METATARASAL SURGERY GREAT TOE FOR FRACTUE    TOTAL ABDOMINAL HYSTERECTOMY WITH SALPINGO OOPHORECTOMY      TOTAL KNEE ARTHROPLASTY Right 05/14/2021    Procedure: RIGHT TOTAL KNEE ARTHROPLASTY;  Surgeon: Catarino De La Cruz MD;  Location: [facility];  Service: Orthopedics;  Laterality: Right;    UPPER GASTROINTESTINAL ENDOSCOPY      WISDOM TOOTH EXTRACTION      WRIST SURGERY         Family History: family history includes Aneurysm in her paternal grandfather; Atrial fibrillation in her brother, father, and sister; Cancer in her mother; Colon cancer in her mother; Colon polyps in her mother; Diabetes in her father; Liver disease in her paternal aunt; Other in her brother. Otherwise pertinent FHx was reviewed and not pertinent to current issue.    Social History:  reports that she has never smoked. She has never been exposed to tobacco smoke. She has never used smokeless tobacco. She reports that she  "does not drink alcohol and does not use drugs.    Home Medications:  Cholecalciferol, Vitamin B-12, acetaminophen, aspirin, coenzyme Q10, docusate sodium, fish oil, melatonin, omeprazole, rosuvastatin, vitamin C, and vitamin k    Allergies:  Allergies   Allergen Reactions    Atenolol Shortness Of Breath and Other (See Comments)     Shortness of breath, leg cramps    Hydrocodone-Acetaminophen Nausea Only and GI Intolerance    Metoprolol Itching    Other Nausea And Vomiting     Unknown \"strong\" pain killer (POSSIBLY VICODIN)    Tramadol Nausea Only and GI Intolerance       Objective    Objective     Vitals:        Physical Exam  Constitutional:       General: She is not in acute distress.  Pulmonary:      Effort: Pulmonary effort is normal. No respiratory distress.   Skin:     General: Skin is warm and dry.   Neurological:      Mental Status: She is alert.   Psychiatric:         Mood and Affect: Mood normal.         Thought Content: Thought content normal.         Result Review    Result Review:  I have personally reviewed the results from the time of this admission to 6/17/2024 08:18 EDT and agree with these findings:  []  Laboratory list / accordion  []  Microbiology  []  Radiology  []  EKG/Telemetry   []  Cardiology/Vascular   []  Pathology  []  Old records  []  Other:  Most notable findings include: No new       Assessment & Plan   Assessment / Plan     Brief Patient Summary:  Kourtney Lorenzana is a 78 y.o. female who has chronic low back pain and stenosis symptoms in her lower extremities.     Active Hospital Problems:  Active Hospital Problems    Diagnosis     **Lumbar stenosis with neurogenic claudication      Plan: Lumbar Epidural steroid injection at ~L2-3 or L3-4      VTE Prophylaxis:  No VTE prophylaxis order currently exists.    Frannie Allen MD  "

## 2024-06-17 ENCOUNTER — HOSPITAL ENCOUNTER (OUTPATIENT)
Dept: GENERAL RADIOLOGY | Facility: SURGERY CENTER | Age: 79
End: 2024-06-17
Payer: MEDICARE

## 2024-06-17 ENCOUNTER — HOSPITAL ENCOUNTER (OUTPATIENT)
Facility: SURGERY CENTER | Age: 79
Setting detail: HOSPITAL OUTPATIENT SURGERY
Discharge: HOME | End: 2024-06-17
Attending: ANESTHESIOLOGY | Admitting: ANESTHESIOLOGY
Payer: MEDICARE

## 2024-06-17 VITALS
DIASTOLIC BLOOD PRESSURE: 85 MMHG | SYSTOLIC BLOOD PRESSURE: 131 MMHG | TEMPERATURE: 98.4 F | OXYGEN SATURATION: 96 % | HEART RATE: 76 BPM | RESPIRATION RATE: 16 BRPM

## 2024-06-17 DIAGNOSIS — M48.062 LUMBAR STENOSIS WITH NEUROGENIC CLAUDICATION: ICD-10-CM

## 2024-06-17 DIAGNOSIS — Z41.9 SURGERY, ELECTIVE: ICD-10-CM

## 2024-06-17 PROBLEM — M54.16 LUMBAR RADICULITIS: Status: ACTIVE | Noted: 2024-06-17

## 2024-06-17 PROCEDURE — 25010000002 DEXAMETHASONE SODIUM PHOSPHATE 100 MG/10ML SOLUTION 10 ML VIAL: Performed by: ANESTHESIOLOGY

## 2024-06-17 PROCEDURE — 76000 FLUOROSCOPY <1 HR PHYS/QHP: CPT

## 2024-06-17 PROCEDURE — 25510000001 IOPAMIDOL 61 % SOLUTION 30 ML VIAL: Performed by: ANESTHESIOLOGY

## 2024-06-17 PROCEDURE — 77002 NEEDLE LOCALIZATION BY XRAY: CPT

## 2024-06-17 PROCEDURE — 62323 NJX INTERLAMINAR LMBR/SAC: CPT | Performed by: ANESTHESIOLOGY

## 2024-06-17 PROCEDURE — 25010000002 BUPIVACAINE (PF) 0.25 % SOLUTION 10 ML VIAL: Performed by: ANESTHESIOLOGY

## 2024-06-17 RX ORDER — DIAZEPAM 5 MG/1
10 TABLET ORAL ONCE
Status: COMPLETED | OUTPATIENT
Start: 2024-06-17 | End: 2024-06-17

## 2024-06-17 RX ADMIN — DIAZEPAM 10 MG: 5 TABLET ORAL at 08:13

## 2024-06-17 NOTE — OP NOTE
L2/L3 Interlaminar Lumbar Epidural Steroid Injection   [facility]    PREOPERATIVE DIAGNOSIS:   Lumbar Spinal Stenosis WITH Neurogenic Claudication and Lumbar radiculitis  POSTOPERATIVE DIAGNOSIS:  Same as preop diagnosis    PROCEDURE:   Lumbar Epidural Steroid Injection, Therapeutic Interlaminar Injection, with epidurogram, at  L2/L3 level    PRE-PROCEDURE DISCUSSION WITH PATIENT:    Risks and complications were discussed with the patient prior to starting the procedure and informed consent was obtained.  We discussed various topics including but not limited to bleeding, infection, injury, paralysis, nerve injury, dural puncture, coma, death, worsening of clinical picture, lack of pain relief, and postprocedural soreness.    SURGEON:  Frannie Allen MD    REASON FOR PROCEDURE:    Diagnostic injection at this level is needed    SEDATION:  Anxiolysis was used for this procedure which included PO Valium 10mg  ANESTHETIC:  Marcaine 0.25%  STEROID:   10mg dexamethasone    DESCRIPTON OF PROCEDURE:    After obtaining informed consent, I.V. was not started in the preop area.   The patient was taken to the operating room and placed in the prone position.  Heart rate, blood pressure, and pulse oximeter were monitored throughout, and sedation was provided as needed by the RN under my guidance. All pressure points were well padded.  The lumbar spine area was prepped with Chloraprep and draped in a sterile fashion.      AP fluoroscopic image was used to visualize the L2/L3 interspace.  The skin and subcutaneous tissue over the area was anesthetized with 1% Lidocaine.  An 18-Gauge Tuohy needle was then advanced through the anesthetized skin tract under fluoroscopic guidance in a coaxial view using a loss of resistance technique.  Lateral fluoroscopy was used to verify appropriate needle depth.  Once the needle tip was felt to be in the posterior epidural space, aspiration was noted to be negative for blood or  CSF.  A volume of 1mL of Isovue was then injected under live fluoroscopy in an AP view which produced good epidural spread with no evidence of loculation, vascular run-off or intrathecal spread.  Subsequently, a total volume of 5mL consisting of 10mg of dexamethasone, 0.5mL of 0.25% bupivcacaine and normal saline was injected without resistance.  The needle was removed intact.     ESTIMATED BLOOD LOSS:  <5 mL  SPECIMENS:  None    COMPLICATIONS:     No complications were noted., There was no indication of vascular uptake on live injection of contrast dye., and There was no indication of intrathecal uptake on live injection of contrast dye.    TOLERANCE & DISCHARGE CONDITION:    The patient tolerated the procedure well.  The patient was transported to the recovery area without difficulties.  The patient was discharged to home under the care of family in stable and satisfactory condition.    PLAN OF CARE:  The patient was given our standard instruction sheet.  The patient will Return to clinic 4-6 wks  The patient will resume all medications as per the medication reconciliation sheet.

## 2024-07-16 ENCOUNTER — OFFICE VISIT (OUTPATIENT)
Dept: PAIN MEDICINE | Facility: CLINIC | Age: 79
End: 2024-07-16
Payer: MEDICARE

## 2024-07-16 VITALS
BODY MASS INDEX: 29.52 KG/M2 | RESPIRATION RATE: 12 BRPM | SYSTOLIC BLOOD PRESSURE: 156 MMHG | OXYGEN SATURATION: 98 % | TEMPERATURE: 96.8 F | WEIGHT: 160.4 LBS | HEART RATE: 68 BPM | HEIGHT: 62 IN | DIASTOLIC BLOOD PRESSURE: 83 MMHG

## 2024-07-16 DIAGNOSIS — M54.50 CHRONIC BILATERAL LOW BACK PAIN WITHOUT SCIATICA: ICD-10-CM

## 2024-07-16 DIAGNOSIS — M48.062 LUMBAR STENOSIS WITH NEUROGENIC CLAUDICATION: ICD-10-CM

## 2024-07-16 DIAGNOSIS — M48.02 CERVICAL SPINAL STENOSIS: ICD-10-CM

## 2024-07-16 DIAGNOSIS — R09.89 DIMINISHED PULSES IN LOWER EXTREMITY: ICD-10-CM

## 2024-07-16 DIAGNOSIS — G89.29 CHRONIC BILATERAL LOW BACK PAIN WITHOUT SCIATICA: ICD-10-CM

## 2024-07-16 DIAGNOSIS — M47.816 LUMBAR FACET ARTHROPATHY: Primary | ICD-10-CM

## 2024-07-16 PROCEDURE — 1160F RVW MEDS BY RX/DR IN RCRD: CPT | Performed by: NURSE PRACTITIONER

## 2024-07-16 PROCEDURE — 1159F MED LIST DOCD IN RCRD: CPT | Performed by: NURSE PRACTITIONER

## 2024-07-16 PROCEDURE — 1125F AMNT PAIN NOTED PAIN PRSNT: CPT | Performed by: NURSE PRACTITIONER

## 2024-07-16 PROCEDURE — 99214 OFFICE O/P EST MOD 30 MIN: CPT | Performed by: NURSE PRACTITIONER

## 2024-07-16 NOTE — PROGRESS NOTES
CHIEF COMPLAINT  Lumbar epidural steroid injection at L2/3     Subjective   Kourtney Lorenzana is a 78 y.o. female  who presents to the office for follow-up of procedure.  She completed a LESI at L2/3   on  6/17/2024 performed by Dr. Allen for management of back pain. Patient reports nearly complete relief from the procedure to her back pain lasting 1 week and then her pain returned to baseline.     Today her pain is 8/10VAS in severity. She continues to report low back pain that is largely axial. She also continues to report heaviness and weakness in her lower extremities.     She was advised by her PCP to avoid Aleve.     Procedure list:  3/6/2024-PRABHAKAR at C7/T1-95% ONGOING relief     Past pain medications: None     Current pain medications: OTC aleve and OTC Tylenol.      Past therapies:  Physical Therapy: Yes, helpful, has been years since last in PT  Chiropractor: None  Massage Therapy: Yes, temporarily helpful  TENS: Yes, at PT, temporary relief  Neck or back surgery: None  Past pain management: Yes, Dr. Sotelo many years ago for low back pain     Previous Injections:   LESI many years ago with Dr. Sotelo (these were helpful), no cervical procedures.     Neck Pain   This is a chronic problem. The current episode started more than 1 month ago. The problem occurs constantly. Progression since onset: improved since last office visit. The pain is associated with lifting a heavy object. The pain is present in the right side and left side (R>L). The quality of the pain is described as aching. The symptoms are aggravated by position, twisting and bending. Pertinent negatives include no chest pain, fever, headaches, numbness or weakness. She has tried NSAIDs, acetaminophen, heat and home exercises (PT) for the symptoms.   Back Pain  This is a chronic problem. The current episode started more than 1 month ago. The problem occurs intermittently. The problem has been worse since onset. The pain is present in the lumbar spine  and sacro-iliac. The quality of the pain is described as aching. Radiates to: no pain, but legs become heavy/tired with prolonged standing. The pain is at a severity of 8/10. The symptoms are aggravated by standing and sitting (walking). Pertinent negatives include no abdominal pain, chest pain, dysuria, fever, headaches, numbness or weakness. She has tried NSAIDs (tylenol, PT) for the symptoms.      PEG Assessment   What number best describes your pain on average in the past week?8  What number best describes how, during the past week, pain has interfered with your enjoyment of life?9  What number best describes how, during the past week, pain has interfered with your general activity?  9    The following portions of the patient's history were reviewed and updated as appropriate: allergies, current medications, past family history, past medical history, past social history, past surgical history, and problem list.    Review of Systems   Constitutional:  Negative for activity change (less), fatigue and fever.   Respiratory:  Negative for cough and chest tightness.    Cardiovascular:  Negative for chest pain.   Gastrointestinal:  Negative for abdominal pain, constipation and diarrhea.   Genitourinary:  Negative for difficulty urinating and dysuria.   Musculoskeletal:  Positive for back pain and neck pain.   Neurological:  Negative for dizziness, weakness, light-headedness, numbness and headaches.   Psychiatric/Behavioral:  Positive for sleep disturbance. Negative for agitation and suicidal ideas. The patient is not nervous/anxious.        --  The aforementioned information the Chief Complaint section and above subjective data including any HPI data, and also the Review of Systems data, has been personally reviewed and affirmed.  --     Vitals:    07/16/24 1404   BP: 156/83   BP Location: Left arm   Patient Position: Sitting   Cuff Size: Adult   Pulse: 68   Resp: 12   Temp: 96.8 °F (36 °C)   TempSrc: Temporal   SpO2:  "98%   Weight: 72.8 kg (160 lb 6.4 oz)   Height: 157.5 cm (62\")   PainSc:   8     Objective   Physical Exam  Vitals and nursing note reviewed.   Constitutional:       Appearance: Normal appearance. She is well-developed.   Eyes:      General: Lids are normal.   Cardiovascular:      Rate and Rhythm: Normal rate.      Pulses:           Dorsalis pedis pulses are 0 on the right side and 0 on the left side.        Posterior tibial pulses are 0 on the right side and 0 on the left side.   Pulmonary:      Effort: Pulmonary effort is normal.   Musculoskeletal:      Cervical back: Normal range of motion.      Lumbar back: Tenderness and bony tenderness present. Decreased range of motion.      Comments:   +lumbar facet loading   Skin:     General: Skin is warm and dry.   Neurological:      Mental Status: She is alert and oriented to person, place, and time.   Psychiatric:         Attention and Perception: Attention normal.         Mood and Affect: Mood normal.         Speech: Speech normal.         Behavior: Behavior normal.         Judgment: Judgment normal.       Assessment & Plan   Diagnoses and all orders for this visit:    1. Lumbar facet arthropathy (Primary)    2. Cervical spinal stenosis    3. Chronic bilateral low back pain without sciatica    4. Lumbar stenosis with neurogenic claudication      Kourtney Lorenzana reports a pain score of 8.  Given her pain assessment as noted, treatment options were discussed and the following options were decided upon as a follow-up plan to address the patient's pain: continuation of current treatment plan for pain.    --- I recommend Bilateral L3-L5 MBB with goal of RFA. She would like to think about this.     Reviewed the procedure at length with the patient.  Included in the review was expectations, complications, risk and benefits.The procedure was described in detail and the risks, benefits and alternatives were discussed with the patient (including but not limited to: bleeding, " infection, nerve damage, worsening of pain, inability to perform injection, paralysis, seizures, coma, no pain relief and death) who agreed to proceed.  Discussed the potential for sedation if warranted/wanted.  The procedure will plan to be performed at [facility] with fluoroscopic guidance(unless ultrasound is indicated) and could potentially have steroids and contrast dye used. Questions were answered and in a way the patient could understand.  Patient verbalized understanding and wishes to proceed.  This intervention will be ordered.  Discussed with patient that all procedures are part of a multimodal plan of care and include either formal PT or a home exercise program.  Patient has no evidence of coagulopathy or current infection.    --- I do recommend we check arterial dopplers to rule out a vascular cause to the claudication like symptoms she is experiencing in her lower extremities  --- May repeat PRABHAKAR every 3 months PRN  --- Follow-up If she decides she would like to proceed with Lumbar MBB/RFA or if her pain returns/worsens.      Dictated utilizing Dragon dictation.

## 2024-07-16 NOTE — PATIENT INSTRUCTIONS
"-------  Education about Medial Branch Blockade and RF Therapy:    This medial branch blockade (MBB) suggested is intended for diagnostic purposes, with the intent of offering the patient Radiofrequency thermal rhizotomy (RF) if the MBB is diagnostically effective.  The diagnostic blockade is necessary to determine the likelihood that RF therapy could be efficacious in providing long term relief to the patient.    Medial branches are sensory nerve branches that connect to a facet joint and transmit sensations & pain signals from that joint.  Facet is a term for the type of joints found in the spine.  Medial branches are the nerves that go to a facet, and therefore are also sometimes called \"facet joint nerves\" (FJNs).      In a medial branch blockade procedure, xray fluoroscopy is used to verify the locations of the outside of the joint lines which are being targeted.  Under xray guidance, needles are placed to these areas. Local anesthetic is injected to block the medial branch at that joint level.      If MBBs are diagnostically successful in blocking pain, then the patient is most likely a great candidate for Radiofrequency of those facet joint nerves.  In the RF procedure, needles are placed to the joint lines in the same fashion, and after testing, the needle tips are heated to thermally treat the nerves, blocking the nerves by in essence damaging the nerves with the heat treatment.       Medically, a successful RF procedure should provide a patient with 50% pain relief or more for at least 6 months.  Clinical experience suggests that successful patients receive relief more in the range of 12 months on average.  We also discussed that a fortunate minority of patients receive therapeutic success from the MBB, and may not require RF ablation.  If a patient receives more than 8 weeks of relief from MBB, then occasional repeat MBB for therapeutic purposes is a very reasonable alternative therapy.  This course of " therapy is consistent with our LCDs according to our CMS  in the area, and therefore other insurance providers should follow accordingly.  We will monitor our patients to screen for these therapeutic responders and will offer RF therapy only when necessary.      We discussed that MBB & RF are not without risks.  Guidelines regarding anticoagulant use & neuraxial procedures will be respected.  Patients that are ill or otherwise may be at risk for sepsis will not have their spines accessed by neuraxial injections of any type.  This patient will not be offered these therapies if there is an increased risk.   We discussed that there is a risk of postprocedural pain and also a risk of worsening of clinical picture with these procedures as with any neuraxial procedure.    -------

## 2024-07-20 ENCOUNTER — APPOINTMENT (OUTPATIENT)
Dept: CT IMAGING | Facility: HOSPITAL | Age: 79
End: 2024-07-20
Payer: MEDICARE

## 2024-07-20 ENCOUNTER — HOSPITAL ENCOUNTER (OUTPATIENT)
Facility: HOSPITAL | Age: 79
Setting detail: OBSERVATION
Discharge: HOME | End: 2024-07-21
Attending: EMERGENCY MEDICINE | Admitting: EMERGENCY MEDICINE
Payer: MEDICARE

## 2024-07-20 ENCOUNTER — APPOINTMENT (OUTPATIENT)
Dept: MRI IMAGING | Facility: HOSPITAL | Age: 79
End: 2024-07-20
Payer: MEDICARE

## 2024-07-20 DIAGNOSIS — R47.9 SPEECH PROBLEM: ICD-10-CM

## 2024-07-20 DIAGNOSIS — R20.0 RIGHT FACIAL NUMBNESS: Primary | ICD-10-CM

## 2024-07-20 DIAGNOSIS — R10.30 LOWER ABDOMINAL PAIN: ICD-10-CM

## 2024-07-20 DIAGNOSIS — Z91.89 AT RISK FOR OBSTRUCTIVE SLEEP APNEA: ICD-10-CM

## 2024-07-20 PROBLEM — R29.90 STROKE-LIKE SYMPTOM: Status: ACTIVE | Noted: 2024-07-20

## 2024-07-20 LAB
ALBUMIN SERPL-MCNC: 4.4 G/DL (ref 3.5–5.2)
ALBUMIN/GLOB SERPL: 1.5 G/DL
ALP SERPL-CCNC: 154 U/L (ref 39–117)
ALT SERPL W P-5'-P-CCNC: 18 U/L (ref 1–33)
ANION GAP SERPL CALCULATED.3IONS-SCNC: 13.3 MMOL/L (ref 5–15)
AST SERPL-CCNC: 15 U/L (ref 1–32)
BASOPHILS # BLD AUTO: 0.02 10*3/MM3 (ref 0–0.2)
BASOPHILS NFR BLD AUTO: 0.2 % (ref 0–1.5)
BILIRUB SERPL-MCNC: 0.6 MG/DL (ref 0–1.2)
BILIRUB UR QL STRIP: NEGATIVE
BUN SERPL-MCNC: 12 MG/DL (ref 8–23)
BUN/CREAT SERPL: 14.6 (ref 7–25)
CALCIUM SPEC-SCNC: 9.3 MG/DL (ref 8.6–10.5)
CHLORIDE SERPL-SCNC: 107 MMOL/L (ref 98–107)
CLARITY UR: CLEAR
CO2 SERPL-SCNC: 20.7 MMOL/L (ref 22–29)
COLOR UR: YELLOW
CREAT SERPL-MCNC: 0.82 MG/DL (ref 0.57–1)
DEPRECATED RDW RBC AUTO: 43.2 FL (ref 37–54)
EGFRCR SERPLBLD CKD-EPI 2021: 73.8 ML/MIN/1.73
EOSINOPHIL # BLD AUTO: 0.15 10*3/MM3 (ref 0–0.4)
EOSINOPHIL NFR BLD AUTO: 1.8 % (ref 0.3–6.2)
ERYTHROCYTE [DISTWIDTH] IN BLOOD BY AUTOMATED COUNT: 13.5 % (ref 12.3–15.4)
GEN 5 2HR TROPONIN T REFLEX: 7 NG/L
GLOBULIN UR ELPH-MCNC: 3 GM/DL
GLUCOSE BLDC GLUCOMTR-MCNC: 91 MG/DL (ref 70–130)
GLUCOSE SERPL-MCNC: 100 MG/DL (ref 65–99)
GLUCOSE UR STRIP-MCNC: NEGATIVE MG/DL
HCT VFR BLD AUTO: 41.5 % (ref 34–46.6)
HGB BLD-MCNC: 14.4 G/DL (ref 12–15.9)
HGB UR QL STRIP.AUTO: NEGATIVE
HOLD SPECIMEN: NORMAL
IMM GRANULOCYTES # BLD AUTO: 0.02 10*3/MM3 (ref 0–0.05)
IMM GRANULOCYTES NFR BLD AUTO: 0.2 % (ref 0–0.5)
INR PPP: 0.88 (ref 0.9–1.1)
KETONES UR QL STRIP: NEGATIVE
LEUKOCYTE ESTERASE UR QL STRIP.AUTO: NEGATIVE
LIPASE SERPL-CCNC: 31 U/L (ref 13–60)
LYMPHOCYTES # BLD AUTO: 1.79 10*3/MM3 (ref 0.7–3.1)
LYMPHOCYTES NFR BLD AUTO: 21.6 % (ref 19.6–45.3)
MAGNESIUM SERPL-MCNC: 2.3 MG/DL (ref 1.6–2.4)
MCH RBC QN AUTO: 32.4 PG (ref 26.6–33)
MCHC RBC AUTO-ENTMCNC: 34.7 G/DL (ref 31.5–35.7)
MCV RBC AUTO: 93.3 FL (ref 79–97)
MONOCYTES # BLD AUTO: 0.53 10*3/MM3 (ref 0.1–0.9)
MONOCYTES NFR BLD AUTO: 6.4 % (ref 5–12)
NEUTROPHILS NFR BLD AUTO: 5.76 10*3/MM3 (ref 1.7–7)
NEUTROPHILS NFR BLD AUTO: 69.8 % (ref 42.7–76)
NITRITE UR QL STRIP: NEGATIVE
NRBC BLD AUTO-RTO: 0 /100 WBC (ref 0–0.2)
PH UR STRIP.AUTO: 7.5 [PH] (ref 5–8)
PLATELET # BLD AUTO: 185 10*3/MM3 (ref 140–450)
PMV BLD AUTO: 9.7 FL (ref 6–12)
POTASSIUM SERPL-SCNC: 4 MMOL/L (ref 3.5–5.2)
PROT SERPL-MCNC: 7.4 G/DL (ref 6–8.5)
PROT UR QL STRIP: NEGATIVE
PROTHROMBIN TIME: 12.2 SECONDS (ref 11.7–14.2)
QT INTERVAL: 417 MS
QTC INTERVAL: 447 MS
RBC # BLD AUTO: 4.45 10*6/MM3 (ref 3.77–5.28)
SODIUM SERPL-SCNC: 141 MMOL/L (ref 136–145)
SP GR UR STRIP: <=1.005 (ref 1–1.03)
TROPONIN T DELTA: -2 NG/L
TROPONIN T SERPL HS-MCNC: 9 NG/L
UROBILINOGEN UR QL STRIP: NORMAL
WBC NRBC COR # BLD AUTO: 8.27 10*3/MM3 (ref 3.4–10.8)
WHOLE BLOOD HOLD COAG: NORMAL
WHOLE BLOOD HOLD SPECIMEN: NORMAL

## 2024-07-20 PROCEDURE — 74177 CT ABD & PELVIS W/CONTRAST: CPT

## 2024-07-20 PROCEDURE — 70551 MRI BRAIN STEM W/O DYE: CPT

## 2024-07-20 PROCEDURE — G0378 HOSPITAL OBSERVATION PER HR: HCPCS

## 2024-07-20 PROCEDURE — 96361 HYDRATE IV INFUSION ADD-ON: CPT

## 2024-07-20 PROCEDURE — 25810000003 SODIUM CHLORIDE 0.9 % SOLUTION: Performed by: EMERGENCY MEDICINE

## 2024-07-20 PROCEDURE — 96374 THER/PROPH/DIAG INJ IV PUSH: CPT

## 2024-07-20 PROCEDURE — 99285 EMERGENCY DEPT VISIT HI MDM: CPT

## 2024-07-20 PROCEDURE — 25510000001 IOPAMIDOL 61 % SOLUTION: Performed by: EMERGENCY MEDICINE

## 2024-07-20 PROCEDURE — 84484 ASSAY OF TROPONIN QUANT: CPT | Performed by: EMERGENCY MEDICINE

## 2024-07-20 PROCEDURE — 36415 COLL VENOUS BLD VENIPUNCTURE: CPT

## 2024-07-20 PROCEDURE — 85025 COMPLETE CBC W/AUTO DIFF WBC: CPT | Performed by: EMERGENCY MEDICINE

## 2024-07-20 PROCEDURE — 70450 CT HEAD/BRAIN W/O DYE: CPT

## 2024-07-20 PROCEDURE — 93005 ELECTROCARDIOGRAM TRACING: CPT | Performed by: EMERGENCY MEDICINE

## 2024-07-20 PROCEDURE — 93010 ELECTROCARDIOGRAM REPORT: CPT | Performed by: INTERNAL MEDICINE

## 2024-07-20 PROCEDURE — 80053 COMPREHEN METABOLIC PANEL: CPT | Performed by: EMERGENCY MEDICINE

## 2024-07-20 PROCEDURE — 82948 REAGENT STRIP/BLOOD GLUCOSE: CPT

## 2024-07-20 PROCEDURE — 81003 URINALYSIS AUTO W/O SCOPE: CPT | Performed by: EMERGENCY MEDICINE

## 2024-07-20 PROCEDURE — 25010000002 ONDANSETRON PER 1 MG: Performed by: EMERGENCY MEDICINE

## 2024-07-20 PROCEDURE — 85610 PROTHROMBIN TIME: CPT | Performed by: EMERGENCY MEDICINE

## 2024-07-20 PROCEDURE — 83735 ASSAY OF MAGNESIUM: CPT | Performed by: EMERGENCY MEDICINE

## 2024-07-20 PROCEDURE — 83690 ASSAY OF LIPASE: CPT | Performed by: EMERGENCY MEDICINE

## 2024-07-20 RX ORDER — POLYETHYLENE GLYCOL 3350 17 G/17G
17 POWDER, FOR SOLUTION ORAL DAILY PRN
Status: DISCONTINUED | OUTPATIENT
Start: 2024-07-20 | End: 2024-07-21 | Stop reason: HOSPADM

## 2024-07-20 RX ORDER — ASPIRIN 81 MG/1
81 TABLET, CHEWABLE ORAL DAILY
Status: DISCONTINUED | OUTPATIENT
Start: 2024-07-20 | End: 2024-07-21 | Stop reason: HOSPADM

## 2024-07-20 RX ORDER — AMOXICILLIN 250 MG
2 CAPSULE ORAL 2 TIMES DAILY PRN
Status: DISCONTINUED | OUTPATIENT
Start: 2024-07-20 | End: 2024-07-21 | Stop reason: HOSPADM

## 2024-07-20 RX ORDER — PANTOPRAZOLE SODIUM 40 MG/1
40 TABLET, DELAYED RELEASE ORAL
Status: DISCONTINUED | OUTPATIENT
Start: 2024-07-21 | End: 2024-07-21 | Stop reason: HOSPADM

## 2024-07-20 RX ORDER — SODIUM CHLORIDE 0.9 % (FLUSH) 0.9 %
10 SYRINGE (ML) INJECTION AS NEEDED
Status: DISCONTINUED | OUTPATIENT
Start: 2024-07-20 | End: 2024-07-21 | Stop reason: HOSPADM

## 2024-07-20 RX ORDER — SODIUM CHLORIDE 9 MG/ML
40 INJECTION, SOLUTION INTRAVENOUS AS NEEDED
Status: DISCONTINUED | OUTPATIENT
Start: 2024-07-20 | End: 2024-07-21 | Stop reason: HOSPADM

## 2024-07-20 RX ORDER — ROSUVASTATIN CALCIUM 20 MG/1
20 TABLET, COATED ORAL DAILY
Status: DISCONTINUED | OUTPATIENT
Start: 2024-07-20 | End: 2024-07-21 | Stop reason: HOSPADM

## 2024-07-20 RX ORDER — DOCUSATE SODIUM 100 MG/1
100 CAPSULE, LIQUID FILLED ORAL 2 TIMES DAILY
Status: DISCONTINUED | OUTPATIENT
Start: 2024-07-20 | End: 2024-07-21 | Stop reason: HOSPADM

## 2024-07-20 RX ORDER — BISACODYL 5 MG/1
5 TABLET, DELAYED RELEASE ORAL DAILY PRN
Status: DISCONTINUED | OUTPATIENT
Start: 2024-07-20 | End: 2024-07-21 | Stop reason: HOSPADM

## 2024-07-20 RX ORDER — SODIUM CHLORIDE 9 MG/ML
125 INJECTION, SOLUTION INTRAVENOUS CONTINUOUS
Status: DISCONTINUED | OUTPATIENT
Start: 2024-07-20 | End: 2024-07-20

## 2024-07-20 RX ORDER — ONDANSETRON 2 MG/ML
4 INJECTION INTRAMUSCULAR; INTRAVENOUS ONCE
Status: COMPLETED | OUTPATIENT
Start: 2024-07-20 | End: 2024-07-20

## 2024-07-20 RX ORDER — SODIUM CHLORIDE 0.9 % (FLUSH) 0.9 %
10 SYRINGE (ML) INJECTION EVERY 12 HOURS SCHEDULED
Status: DISCONTINUED | OUTPATIENT
Start: 2024-07-20 | End: 2024-07-21 | Stop reason: HOSPADM

## 2024-07-20 RX ORDER — BISACODYL 10 MG
10 SUPPOSITORY, RECTAL RECTAL DAILY PRN
Status: DISCONTINUED | OUTPATIENT
Start: 2024-07-20 | End: 2024-07-21 | Stop reason: HOSPADM

## 2024-07-20 RX ADMIN — ROSUVASTATIN CALCIUM 20 MG: 20 TABLET, FILM COATED ORAL at 20:06

## 2024-07-20 RX ADMIN — SODIUM CHLORIDE 500 ML: 9 INJECTION, SOLUTION INTRAVENOUS at 11:20

## 2024-07-20 RX ADMIN — ONDANSETRON 4 MG: 2 INJECTION INTRAMUSCULAR; INTRAVENOUS at 11:21

## 2024-07-20 RX ADMIN — IOPAMIDOL 100 ML: 612 INJECTION, SOLUTION INTRAVENOUS at 14:16

## 2024-07-20 RX ADMIN — ASPIRIN 81 MG: 81 TABLET, CHEWABLE ORAL at 20:06

## 2024-07-20 RX ADMIN — DOCUSATE SODIUM 100 MG: 100 CAPSULE, LIQUID FILLED ORAL at 20:06

## 2024-07-20 RX ADMIN — SODIUM CHLORIDE 125 ML/HR: 9 INJECTION, SOLUTION INTRAVENOUS at 12:30

## 2024-07-20 RX ADMIN — Medication 10 ML: at 20:07

## 2024-07-20 NOTE — PLAN OF CARE
Goal Outcome Evaluation:           Progress: improving          Patient states sxs have resolved. NIH is zero. Patient understands plan of care and is in agreement. Patient is alert and oriented, she is ambulatory.

## 2024-07-20 NOTE — ED PROVIDER NOTES
EMERGENCY DEPARTMENT ENCOUNTER    Room Number:  28/28  Date of encounter:  7/20/2024  PCP: Romina George PA  Historian: Patient as well as daughter.  Relevant information and history provided by sources other than the patient will be included below and in the ED Course.  Review of pertinent past medical records may also be included in record below and ED Course.    HPI:  Chief Complaint: Initially started with nausea, abdominal pain, and then facial numbness  A complete HPI/ROS/PMH/PSH/SH/FH are unobtainable due to: Not applicable  Context: Kourtney Lorenzana is a 78 y.o. female who presents to the ED c/o this patient awoke about 430 this morning and noticed some nausea.  The nausea persisted.  She states that she never vomits and did not vomit today.  She then started getting some periumbilical and lower abdominal pain.  That pain subsequently has resolved.  It was described as a crampy pain.  She then developed some altered sensation to the right side of her face.  This happened again early this morning somewhere around 5 or 530.  It is mainly where her ear is and a little below under her jaw.  But it does involve also more medial aspect of the right side of her face.  She has no visual change or speech change.  She felt that sometimes she had a hard time finding the right word to say.  During our conversation she seems to be speaking appropriately and she feels that that is better.  She denied any weakness in arms.  She stated though that she felt kind of weak in both legs.  She wanted a wheelchair to ambulate from triage back to the room.  Her pain in her abdomen is 100% resolved.  She never has had chest pain.  No chest pain currently no shortness of breath.  She reports no fevers or chills or coughs or colds.        Previous Episodes: No  Current Symptoms: Complains mainly of nausea and right-sided facial numbness.    MEDICAL HISTORY REVIEWED  She does have a history of spinal stenosis in the cervical region  and cervical radiculopathy.  She does have a history of osteoarthritis PSVT kidney stones and chronic fatigue.  Also history of GERD and hyperlipidemia.  I did review her medicine list.      PAST MEDICAL HISTORY  Active Ambulatory Problems     Diagnosis Date Noted    Anxiety 02/11/2016    Diverticula of intestine 02/11/2016    Gastroesophageal reflux disease without esophagitis 02/11/2016    Internal hemorrhoids 02/11/2016    Irritable bowel syndrome 02/11/2016    Lumbar stenosis with neurogenic claudication 02/11/2016    Psychophysiological insomnia 02/11/2016    Osteoarthritis of carpometacarpal joint of thumb 02/11/2016    Chronic fatigue  02/11/2016    Kidney stones 06/20/2016    OAB (overactive bladder) 05/08/2017    Epigastric pain 11/26/2019    Calculus of gallbladder without cholecystitis without obstruction 11/26/2019    Elevated LFTs 11/26/2019    PSVT (paroxysmal supraventricular tachycardia) 07/21/2020    Primary osteoarthritis of right knee 03/10/2021    Pulmonary nodules 03/25/2021    Lower extremity edema     Chronic pain of left knee 07/07/2022    Spinal stenosis, cervical region 08/09/2022    Cervical radiculopathy 08/09/2022    Mixed hyperlipidemia 08/10/2022    Vitamin D deficiency 11/29/2023    B12 deficiency 11/29/2023    Abnormal CBC 11/29/2023    Elevated alkaline phosphatase level 11/29/2023    Hypomagnesemia 11/29/2023    History of UTI 11/29/2023    Spondylolisthesis, cervical region 11/29/2023    DDD (degenerative disc disease), cervical 11/29/2023    Cervicalgia 11/29/2023    Cervical spinal stenosis 01/12/2024    Lumbar radiculitis 06/17/2024     Resolved Ambulatory Problems     Diagnosis Date Noted    Coagulation disorder 02/11/2016    Adjustment reaction with anxiety and depression 02/11/2016    Fatigue due to excessive exertion 02/11/2016    Health maintenance alteration 02/11/2016    Encounter for screening for cardiovascular disorders  02/11/2016    Primary insomnia 06/20/2016     Hormone replacement therapy 05/08/2017    Vaginal discharge 08/14/2018    Pelvic pain 08/14/2018     Past Medical History:   Diagnosis Date    Arthritis     Arthritis of back     Arthritis of neck     Bloating     Bruising tendency     Bursitis of hip     Cervical disc disorder     Cholelithiasis 11/07/2019    Chronic low back pain     Chronic pain of both feet     CTS (carpal tunnel syndrome)     Diverticular disease     Diverticulitis of colon 2015    Diverticulosis     Elevated liver enzymes     Fibroid     Fracture, foot     Hip arthrosis     History of anxiety     History of depression     History of panic disorder     Hyperlipidemia 2015    IBS (irritable bowel syndrome)     Insomnia     Knee pain     Knee swelling     Lumbar canal stenosis     Lumbosacral neuritis     Lung nodules     Multiple vitamin deficiency     Neck pain     Nephrolithiasis     Osteoarthritis     Periarthritis of shoulder     Postmenopausal HRT (hormone replacement therapy)     Rotator cuff syndrome     Seasonal allergies     Shoulder pain     SOB (shortness of breath) on exertion     Stroke 11/2023    Tennis elbow     Trouble swallowing     Urine incontinence          PAST SURGICAL HISTORY  Past Surgical History:   Procedure Laterality Date    CATARACT EXTRACTION Bilateral 09/05/2017    Dr. Jefe Barakat OD, MD    CERVICAL EPIDURAL N/A 03/06/2024    Procedure: cervical epidural steroid injection at C7-T1;  Surgeon: Frannie Allen MD;  [place] [facility] [place];  Service: Pain Management;  Laterality: N/A;    CHOLECYSTECTOMY      CHOLECYSTECTOMY LAPAROSCOPIC INTRAOPERATIVE CHOLANGIOGRAM WITH LIVER BIOPSY N/A 12/03/2019    Procedure: CHOLECYSTECTOMY LAPAROSCOPIC INTRAOPERATIVE CHOLANGIOGRAM AND LIVER BIOPSY;  Surgeon: Benny Schneider MD;  [place] [facility] [place];  Service: General    COLONOSCOPY N/A 11/16/2012    Benign cecal polyp-Dr. Mak Rodriguez    COLONOSCOPY N/A 2015    Dr. Seema Andrade    ENDOSCOPY N/A 04/27/2018    HH, no  specimen collected-Dr. Seema Andrade    FOOT SURGERY      HYSTERECTOMY Bilateral 1997    JOINT REPLACEMENT      LUMBAR EPIDURAL INJECTION N/A 6/17/2024    Procedure: Lumbar epidural steroid injection at L2/3 vs. L3/4 [id number];  Surgeon: Frannie Allen MD;  [place] [facility] [place]  Service: Pain Management;  Laterality: N/A;    PUBOVAGINAL SLING N/A 06/17/2003    Dr. Ziggy Saba    TOE SURGERY Right     METATARASAL SURGERY GREAT TOE FOR FRACTUE    TOTAL ABDOMINAL HYSTERECTOMY WITH SALPINGO OOPHORECTOMY      TOTAL KNEE ARTHROPLASTY Right 05/14/2021    Procedure: RIGHT TOTAL KNEE ARTHROPLASTY;  Surgeon: Catarino De La Cruz MD;  Location: [facility] MAIN OR;  Service: Orthopedics;  Laterality: Right;    UPPER GASTROINTESTINAL ENDOSCOPY      WISDOM TOOTH EXTRACTION      WRIST SURGERY           FAMILY HISTORY  Family History   Problem Relation Age of Onset    Atrial fibrillation Father     Diabetes Father     Colon cancer Mother     Colon polyps Mother     Cancer Mother     Atrial fibrillation Brother     Other Brother         Coronary arteriosclerosis    Atrial fibrillation Sister     Aneurysm Paternal Grandfather     Liver disease Paternal Aunt     Malig Hyperthermia Neg Hx     Breast cancer Neg Hx     Ovarian cancer Neg Hx     Uterine cancer Neg Hx     Deep vein thrombosis Neg Hx     Pulmonary embolism Neg Hx          SOCIAL HISTORY  Social History     Socioeconomic History    Marital status:    Tobacco Use    Smoking status: Never     Passive exposure: Never    Smokeless tobacco: Never   Vaping Use    Vaping status: Never Used   Substance and Sexual Activity    Alcohol use: No     Comment: caffeine use: 1 cups daily    Drug use: No    Sexual activity: Not Currently     Partners: Male     Birth control/protection: Hysterectomy, Post-menopausal     Comment:          ALLERGIES  Atenolol, Hydrocodone-acetaminophen, Metoprolol, Other, and Tramadol        REVIEW OF SYSTEMS  Review of Systems     All systems reviewed  and negative except for those discussed in HPI.       PHYSICAL EXAM    I have reviewed the triage vital signs and nursing notes.    ED Triage Vitals   Temp Heart Rate Resp BP SpO2   07/20/24 1017 07/20/24 1017 07/20/24 1017 07/20/24 1026 07/20/24 1017   97.3 °F (36.3 °C) 82 16 169/99 100 %      Temp src Heart Rate Source Patient Position BP Location FiO2 (%)   07/20/24 1017 07/20/24 1017 07/20/24 1029 07/20/24 1029 --   Tympanic Monitor Sitting Right arm        GENERAL: This is an elderly female.  She appears little anxious.  .Vital signs on my initial evaluation have been reviewed.  O2 sats 100% on room air.  HENT: nares patent  Head/neck/ face are symmetric without gross deformity, signs of trauma, or swelling  EYES: no scleral icterus, no conjunctival pallor.  Pupils equal round reactive light extraocular muscle intact.  No visual impairment no visual field deficit no facial droop.  Has some mild altered sensation to the right side of her face.  NECK: Supple, no meningismus  CV: regular rhythm, regular rate with intact distal pulses.  RESPIRATORY: normal effort and no respiratory distress.  To auscultation bilaterally  ABDOMEN: soft and nontender.  Obese.  No tenderness on abdomen diffusely.  MUSCULOSKELETAL: no deformity.  Intact distal pulses that are equal strong and symmetric.  Coolness or cyanosis.  NEURO: alert and appropriate, moves all extremities, follows commands.  NIH stroke scale is 1 just for facial numbness on the right side.  She is a little weak in both legs bilaterally but it is symmetric.  She is able to raise both legs off the bed.  But they gradually go down over 10 seconds.  They again are symmetric.  No sensory change.  SKIN: warm, dry    Vital signs and nursing notes reviewed.        LAB RESULTS  Recent Results (from the past 24 hour(s))   POC Glucose Once    Collection Time: 07/20/24 10:28 AM    Specimen: Blood   Result Value Ref Range    Glucose 91 70 - 130 mg/dL   Green Top (Gel)     Collection Time: 07/20/24 10:37 AM   Result Value Ref Range    Extra Tube Hold for add-ons.    Lavender Top    Collection Time: 07/20/24 10:37 AM   Result Value Ref Range    Extra Tube hold for add-on    Gold Top - SST    Collection Time: 07/20/24 10:37 AM   Result Value Ref Range    Extra Tube Hold for add-ons.    Gray Top    Collection Time: 07/20/24 10:37 AM   Result Value Ref Range    Extra Tube Hold for add-ons.    Light Blue Top    Collection Time: 07/20/24 10:37 AM   Result Value Ref Range    Extra Tube Hold for add-ons.    Comprehensive Metabolic Panel    Collection Time: 07/20/24 10:37 AM    Specimen: Arm, Left; Blood   Result Value Ref Range    Glucose 100 (H) 65 - 99 mg/dL    BUN 12 8 - 23 mg/dL    Creatinine 0.82 0.57 - 1.00 mg/dL    Sodium 141 136 - 145 mmol/L    Potassium 4.0 3.5 - 5.2 mmol/L    Chloride 107 98 - 107 mmol/L    CO2 20.7 (L) 22.0 - 29.0 mmol/L    Calcium 9.3 8.6 - 10.5 mg/dL    Total Protein 7.4 6.0 - 8.5 g/dL    Albumin 4.4 3.5 - 5.2 g/dL    ALT (SGPT) 18 1 - 33 U/L    AST (SGOT) 15 1 - 32 U/L    Alkaline Phosphatase 154 (H) 39 - 117 U/L    Total Bilirubin 0.6 0.0 - 1.2 mg/dL    Globulin 3.0 gm/dL    A/G Ratio 1.5 g/dL    BUN/Creatinine Ratio 14.6 7.0 - 25.0    Anion Gap 13.3 5.0 - 15.0 mmol/L    eGFR 73.8 >60.0 mL/min/1.73   Protime-INR    Collection Time: 07/20/24 10:37 AM    Specimen: Arm, Left; Blood   Result Value Ref Range    Protime 12.2 11.7 - 14.2 Seconds    INR 0.88 (L) 0.90 - 1.10   Lipase    Collection Time: 07/20/24 10:37 AM    Specimen: Arm, Left; Blood   Result Value Ref Range    Lipase 31 13 - 60 U/L   High Sensitivity Troponin T    Collection Time: 07/20/24 10:37 AM    Specimen: Arm, Left; Blood   Result Value Ref Range    HS Troponin T 9 <14 ng/L   Magnesium    Collection Time: 07/20/24 10:37 AM    Specimen: Arm, Left; Blood   Result Value Ref Range    Magnesium 2.3 1.6 - 2.4 mg/dL   CBC Auto Differential    Collection Time: 07/20/24 10:37 AM    Specimen: Arm, Left;  Blood   Result Value Ref Range    WBC 8.27 3.40 - 10.80 10*3/mm3    RBC 4.45 3.77 - 5.28 10*6/mm3    Hemoglobin 14.4 12.0 - 15.9 g/dL    Hematocrit 41.5 34.0 - 46.6 %    MCV 93.3 79.0 - 97.0 fL    MCH 32.4 26.6 - 33.0 pg    MCHC 34.7 31.5 - 35.7 g/dL    RDW 13.5 12.3 - 15.4 %    RDW-SD 43.2 37.0 - 54.0 fl    MPV 9.7 6.0 - 12.0 fL    Platelets 185 140 - 450 10*3/mm3    Neutrophil % 69.8 42.7 - 76.0 %    Lymphocyte % 21.6 19.6 - 45.3 %    Monocyte % 6.4 5.0 - 12.0 %    Eosinophil % 1.8 0.3 - 6.2 %    Basophil % 0.2 0.0 - 1.5 %    Immature Grans % 0.2 0.0 - 0.5 %    Neutrophils, Absolute 5.76 1.70 - 7.00 10*3/mm3    Lymphocytes, Absolute 1.79 0.70 - 3.10 10*3/mm3    Monocytes, Absolute 0.53 0.10 - 0.90 10*3/mm3    Eosinophils, Absolute 0.15 0.00 - 0.40 10*3/mm3    Basophils, Absolute 0.02 0.00 - 0.20 10*3/mm3    Immature Grans, Absolute 0.02 0.00 - 0.05 10*3/mm3    nRBC 0.0 0.0 - 0.2 /100 WBC   ECG 12 Lead Other; Nausea, numbness to right side of face.    Collection Time: 07/20/24 10:50 AM   Result Value Ref Range    QT Interval 417 ms    QTC Interval 447 ms   Urinalysis With Microscopic If Indicated (No Culture) - Urine, Clean Catch    Collection Time: 07/20/24 11:18 AM    Specimen: Urine, Clean Catch   Result Value Ref Range    Color, UA Yellow Yellow, Straw    Appearance, UA Clear Clear    pH, UA 7.5 5.0 - 8.0    Specific Gravity, UA <=1.005 1.005 - 1.030    Glucose, UA Negative Negative    Ketones, UA Negative Negative    Bilirubin, UA Negative Negative    Blood, UA Negative Negative    Protein, UA Negative Negative    Leuk Esterase, UA Negative Negative    Nitrite, UA Negative Negative    Urobilinogen, UA 0.2 E.U./dL 0.2 - 1.0 E.U./dL   High Sensitivity Troponin T 2Hr    Collection Time: 07/20/24  1:52 PM    Specimen: Arm, Left; Blood   Result Value Ref Range    HS Troponin T 7 <14 ng/L    Troponin T Delta -2 >=-4 - <+4 ng/L       Ordered the above labs and independently reviewed the results.        RADIOLOGY  CT  Abdomen Pelvis With Contrast    Result Date: 7/20/2024  CT ABDOMEN AND PELVIS WITH IV CONTRAST  HISTORY: Lower abdominal pain  TECHNIQUE: Radiation dose reduction techniques were utilized, including automated exposure control and exposure modulation based on body size. 3 mm images were obtained through the abdomen and pelvis after the administration of IV contrast.  COMPARISON: 3/4/2024   FINDINGS: Lumbar spine disc degeneration. Bilateral extrarenal pelvises. No hydronephrosis. Small sliding hiatal hernia. Post cholecystectomy. Nondilated biliary tree. Hysterectomy. Sigmoid colon diverticulosis. Remaining solid organs and bowel including the nondilated appendix are normal. No abdominal pelvic adenopathy.       No acute abdominopelvic abnormality.  This report was finalized on 7/20/2024 2:49 PM by Dr. Ian Echavarria M.D on Workstation: [id number]      CT Head Without Contrast    Result Date: 7/20/2024  CT HEAD WITHOUT CONTRAST  CLINICAL HISTORY: Right face numbness and nausea  TECHNIQUE: CT scan of the head was obtained with 3 mm axial soft tissue algorithm images. No intravenous contrast was administered. Sagittal and coronal reconstructions were obtained.  COMPARISON: CT head neck angiogram 12/21/2023  FINDINGS:  No intracranial hemorrhage.  No midline shift or mass effect.  No CT evidence of acute territorial infarction. Unchanged small chronic lacunar infarct in the left internal capsule anterior limb. No extraaxial collection.  No hydrocephalus.  Clear visualized portions of the paranasal sinuses and mastoid air cells.        No acute intracranial abnormality.     Radiation dose reduction techniques were utilized, including automated exposure control and exposure modulation based on body size.  This report was finalized on 7/20/2024 12:18 PM by Dr. Ian Echavarria M.D on Workstation: [id number]       I ordered the above noted radiological studies. Reviewed by me and discussed with radiologist.  See dictation for  official radiology interpretation.      PROCEDURES    Procedures      MEDICATIONS GIVEN IN ER    Medications   sodium chloride 0.9 % flush 10 mL (has no administration in time range)   sodium chloride 0.9 % infusion (125 mL/hr Intravenous New Bag 7/20/24 1230)   sodium chloride 0.9 % bolus 500 mL (0 mL Intravenous Stopped 7/20/24 1230)   ondansetron (ZOFRAN) injection 4 mg (4 mg Intravenous Given 7/20/24 1121)   iopamidol (ISOVUE-300) 61 % injection 100 mL (100 mL Intravenous Given by Other 7/20/24 1416)         All labs have been independently reviewed by me.  All radiology studies have been reviewed by me and I discussed with radiologist dictating the report when indicated below.  All EKG's independently viewed and interpreted by me.  Discussion below represents my analysis of pertinent findings related to patient's condition, differential diagnosis, treatment plan and final disposition.        PROGRESS, DATA ANALYSIS, CONSULTS, AND MEDICAL DECISION MAKING    This is a patient that presents initially with nausea followed quickly by abdominal pain and facial numbness.  The only thing is continual now is the nausea and the facial numbness.  I do not anticipate that this is a stroke.  She mentioned that she did have some word finding issues but on my exam I do not find that at all.  She has no dysarthria or aphasia.  Informed her and the daughter of the test that we will order.  She does not have any abdominal pain at this time.  All questions answered at this time.      ED Course as of 07/20/24 1614   Sat Jul 20, 2024   1041 My NIH stroke scale was done within 2 to 3 minutes of me assigning myself to this patient.  I do not believe this patient has had a stroke I think is unlikely.  Her only symptoms is altered sensation to the right side of her face.  Her symptoms of nausea and then abdominal pain this morning which with the pain resolving is very atypical for a stroke.  She does not have strong enough profound  neurologic deficits that would require intervention or require thrombolytics.  I discussed this with the patient as well as family at bedside.  Informed them of the test that we will order. [MM]   1112 My own independent interpretation of the EKG that was done at 1050 reveals rate of 69 it is sinus rhythm mild infusion conduction delay with borderline left axis deviation not improved any acute injury pattern QT looks normal some nonspecific changes  Compared to the previous EKG from 4/30/2020 and looks very similar. [MM]   1113 Patient was able to ambulate to the restroom pendantly without any gross ataxia and without any assistant. [MM]   1427 CT head showed no acute abnormality.  Please see complete dictated report from radiologist. [MM]   1504 CT scan of the abdomen and pelvis report was reviewed.  No acute abnormality appreciated.  Please see complete dictated report from radiologist. [MM]   1504 Troponin T Delta: -2 [MM]   1504 HS Troponin T: 7 [MM]   1508 On reevaluation of the patient.  Her numbness is much improved but is still present to a lighter degree.  She has had no recurrent abdominal pain.  That is 100% resolved.  She also feels that she is able to talk better and has no word finding issues.  Informed her as well as family in the room I plan to admit her to the observation unit.  All questions answered. [MM]   1517 I did discuss the case with Marian with the midlevel provider in the observation unit.  He agrees to accept the patient to the observation unit.  Informed of the patient presenting symptoms and results of test. [MM]   1518 Patient's nausea has resolved. [MM]      ED Course User Index  [MM] Michael Chau MD       AS OF 16:14 EDT VITALS:    BP - 142/75  HR - 61  TEMP - 97.3 °F (36.3 °C) (Tympanic)  02 SATS - 97%    SOCIAL DETERMINANTS OF HEALTH THAT IMPACT OR LIMIT CARE (For example..Homelessness,safe discharge, inability to obtain care, follow up, or prescriptions):      DIAGNOSIS  Final  diagnoses:   Right facial numbness   Speech problem   Lower abdominal pain         DISPOSITION  Patient will be admitted to the observation unit to a monitored bed.          DICTATED UTILIZING DRAGON DICTATION    Note Disclaimer: At [facility], we believe that sharing information builds trust and better relationships. You are receiving this note because you recently visited [facility]. It is possible you will see health information before a provider has talked with you about it. This kind of information can be easy to misunderstand. To help you fully understand what it means for your health, we urge you to discuss this note with your provider.       Michael Chau MD  07/20/24 1507

## 2024-07-20 NOTE — ED NOTES
"Nursing report ED to floor  Kourtney Lorenzana  78 y.o.  female    HPI :  HPI (Adult)  Stated Reason for Visit: nausea, abd pain, numbness  History Obtained From: patient    Chief Complaint  Chief Complaint   Patient presents with    Numbness       Admitting doctor:   Ariel Candelaria II, MD    Admitting diagnosis:   The primary encounter diagnosis was Right facial numbness. Diagnoses of Speech problem and Lower abdominal pain were also pertinent to this visit.    Code status:   Current Code Status       Date Active Code Status Order ID Comments User Context       Prior            Allergies:   Atenolol, Hydrocodone-acetaminophen, Metoprolol, Other, and Tramadol    Isolation:   No active isolations    Intake and Output    Intake/Output Summary (Last 24 hours) at 7/20/2024 1529  Last data filed at 7/20/2024 1230  Gross per 24 hour   Intake 500 ml   Output --   Net 500 ml       Weight:       07/20/24  1029   Weight: 72.6 kg (160 lb)       Most recent vitals:   Vitals:    07/20/24 1029 07/20/24 1031 07/20/24 1121 07/20/24 1331   BP: 169/99 162/90 139/87 137/77   BP Location: Right arm      Patient Position: Sitting      Pulse:  80 66 64   Resp:       Temp:       TempSrc:       SpO2:  100% 99% 99%   Weight: 72.6 kg (160 lb)      Height: 157.5 cm (62\")          Active LDAs/IV Access:   Lines, Drains & Airways       Active LDAs       Name Placement date Placement time Site Days    Peripheral IV 07/20/24 1031 Left Antecubital 07/20/24  1031  Antecubital  less than 1                    Labs (abnormal labs have a star):   Labs Reviewed   COMPREHENSIVE METABOLIC PANEL - Abnormal; Notable for the following components:       Result Value    Glucose 100 (*)     CO2 20.7 (*)     Alkaline Phosphatase 154 (*)     All other components within normal limits    Narrative:     GFR Normal >60  Chronic Kidney Disease <60  Kidney Failure <15    The GFR formula is only valid for adults with stable renal function between ages 18 and 70. "   PROTIME-INR - Abnormal; Notable for the following components:    INR 0.88 (*)     All other components within normal limits   LIPASE - Normal   URINALYSIS W/ MICROSCOPIC IF INDICATED (NO CULTURE) - Normal    Narrative:     Urine microscopic not indicated.   TROPONIN - Normal    Narrative:     High Sensitive Troponin T Reference Range:  <14.0 ng/L- Negative Female for AMI  <22.0 ng/L- Negative Male for AMI  >=14 - Abnormal Female indicating possible myocardial injury.  >=22 - Abnormal Male indicating possible myocardial injury.   Clinicians would have to utilize clinical acumen, EKG, Troponin, and serial changes to determine if it is an Acute Myocardial Infarction or myocardial injury due to an underlying chronic condition.        MAGNESIUM - Normal   CBC WITH AUTO DIFFERENTIAL - Normal   HIGH SENSITIVITIY TROPONIN T 2HR - Normal    Narrative:     High Sensitive Troponin T Reference Range:  <14.0 ng/L- Negative Female for AMI  <22.0 ng/L- Negative Male for AMI  >=14 - Abnormal Female indicating possible myocardial injury.  >=22 - Abnormal Male indicating possible myocardial injury.   Clinicians would have to utilize clinical acumen, EKG, Troponin, and serial changes to determine if it is an Acute Myocardial Infarction or myocardial injury due to an underlying chronic condition.        POCT GLUCOSE FINGERSTICK - Normal   RAINBOW DRAW    Narrative:     The following orders were created for panel order [place] Draw.  Procedure                               Abnormality         Status                     ---------                               -----------         ------                     Green Top (Gel)[[id number]]                                  Final result               Lavender Top[[id number]]                                     Final result               Gold Top - SST[[id number]]                                   Final result               Randhawa Top[[id number]]                                         Final result                Light Blue Top[[id number]]                                   Final result                 Please view results for these tests on the individual orders.   GREEN TOP   LAVENDER TOP   GOLD TOP - SST   GRAY TOP   LIGHT BLUE TOP   CBC AND DIFFERENTIAL    Narrative:     The following orders were created for panel order CBC & Differential.  Procedure                               Abnormality         Status                     ---------                               -----------         ------                     CBC Auto Differential[[id number]]        Normal              Final result                 Please view results for these tests on the individual orders.       EKG:   ECG 12 Lead Other; Nausea, numbness to right side of face.   Preliminary Result   HEART RATE=69  bpm   RR Nitlppnq=532  ms   CA Cwxrxrii=023  ms   P Horizontal Axis=30  deg   P Front Axis=36  deg   QRSD Interval=92  ms   QT Odgiixuw=689  ms   WJtO=619  ms   QRS Axis=-26  deg   T Wave Axis=41  deg   - OTHERWISE NORMAL ECG -   Sinus rhythm   Borderline left axis deviation   Low voltage, precordial leads   Date and Time of Study:2024-07-20 10:50:15          Meds given in ED:   Medications   sodium chloride 0.9 % flush 10 mL (has no administration in time range)   sodium chloride 0.9 % infusion (125 mL/hr Intravenous New Bag 7/20/24 1230)   sodium chloride 0.9 % bolus 500 mL (0 mL Intravenous Stopped 7/20/24 1230)   ondansetron (ZOFRAN) injection 4 mg (4 mg Intravenous Given 7/20/24 1121)   iopamidol (ISOVUE-300) 61 % injection 100 mL (100 mL Intravenous Given by Other 7/20/24 1416)       Imaging results:  CT Abdomen Pelvis With Contrast    Result Date: 7/20/2024  No acute abdominopelvic abnormality.  This report was finalized on 7/20/2024 2:49 PM by Dr. Ian Echavarria M.D on Workstation: [id number]      CT Head Without Contrast    Result Date: 7/20/2024  No acute intracranial abnormality.     Radiation dose reduction techniques were utilized,  including automated exposure control and exposure modulation based on body size.  This report was finalized on 7/20/2024 12:18 PM by Dr. Ian Echavarria M.D on Workstation: [id number]       Ambulatory status:   - up stand-by assist    Social issues:   Social History     Socioeconomic History    Marital status:    Tobacco Use    Smoking status: Never     Passive exposure: Never    Smokeless tobacco: Never   Vaping Use    Vaping status: Never Used   Substance and Sexual Activity    Alcohol use: No     Comment: caffeine use: 1 cups daily    Drug use: No    Sexual activity: Not Currently     Partners: Male     Birth control/protection: Hysterectomy, Post-menopausal     Comment:        Peripheral Neurovascular  Peripheral Neurovascular (Adult)  Peripheral Neurovascular WDL: .WDL except, all  LUE Neurovascular Assessment  Temperature LUE: warm  Color LUE: no discoloration  Sensation LUE: no numbness, no tenderness, no tingling  RUE Neurovascular Assessment  Temperature RUE: warm  Color RUE: no discoloration  Sensation RUE: no numbness, no tenderness, no tingling  LLE Neurovascular Assessment  Temperature LLE: warm  Color LLE: no discoloration  Sensation LLE: no numbness, no tenderness, no tingling  RLE Neurovascular Assessment  Temperature RLE: warm  Color RLE: no discoloration  Sensation RLE: no numbness, no tenderness, no tingling    Neuro Cognitive  Neuro Cognitive (Adult)  Cognitive/Neuro/Behavioral WDL: all, .WDL except (Patient states she feels like she is having difficulty finding her words when she is conversating.)  Level of Consciousness: Alert  Orientation: oriented x 4  NIH Stroke Scale  Interval: baseline  1a. Level of Consciousness: 0-->Alert, keenly responsive  1b. LOC Questions: 0-->Answers both questions correctly  1c. LOC Commands: 0-->Performs both tasks correctly  2. Best Gaze: 0-->Normal  3. Visual: 0-->No visual loss  4. Facial Palsy: 0-->Normal symmetrical movements  5a. Motor Arm,  Left: 0-->No drift, limb holds 90 (or 45) degrees for full 10 secs  5b. Motor Arm, Right: 0-->No drift, limb holds 90 (or 45) degrees for full 10 secs  6a. Motor Leg, Left: 0-->No drift, leg holds 30 degree position for full 5 secs  6b. Motor Leg, Right: 0-->No drift, leg holds 30 degree position for full 5 secs  7. Limb Ataxia: 0-->Absent  8. Sensory: 1-->Mild-to-moderate sensory loss, patient feels pinprick is less sharp or is dull on the affected side, or there is a loss of superficial pain with pinprick, but patient is aware of being touched  9. Best Language: 0-->No aphasia, normal  10. Dysarthria: 0-->Normal  11. Extinction and Inattention (formerly Neglect): 0-->No abnormality  Total (NIH Stroke Scale): 1    Learning  Learning Assessment (Adult)  Learning Readiness and Ability: no barriers identified    Respiratory  Respiratory WDL  Respiratory WDL: WDL    Abdominal Pain       Pain Assessments  Pain (Adult)  (0-10) Pain Rating: Rest: 0  (0-10) Pain Rating: Activity: 0    NIH Stroke Scale  NIH Stroke Scale  Interval: baseline  1a. Level of Consciousness: 0-->Alert, keenly responsive  1b. LOC Questions: 0-->Answers both questions correctly  1c. LOC Commands: 0-->Performs both tasks correctly  2. Best Gaze: 0-->Normal  3. Visual: 0-->No visual loss  4. Facial Palsy: 0-->Normal symmetrical movements  5a. Motor Arm, Left: 0-->No drift, limb holds 90 (or 45) degrees for full 10 secs  5b. Motor Arm, Right: 0-->No drift, limb holds 90 (or 45) degrees for full 10 secs  6a. Motor Leg, Left: 0-->No drift, leg holds 30 degree position for full 5 secs  6b. Motor Leg, Right: 0-->No drift, leg holds 30 degree position for full 5 secs  7. Limb Ataxia: 0-->Absent  8. Sensory: 1-->Mild-to-moderate sensory loss, patient feels pinprick is less sharp or is dull on the affected side, or there is a loss of superficial pain with pinprick, but patient is aware of being touched  9. Best Language: 0-->No aphasia, normal  10.  Dysarthria: 0-->Normal  11. Extinction and Inattention (formerly Neglect): 0-->No abnormality  Total (NIH Stroke Scale): 1    Urszula Gruber RN  07/20/24 15:29 EDT

## 2024-07-20 NOTE — H&P
[facility]   HISTORY AND PHYSICAL    Patient Name: Kourtney Lorenzana  : 1945  MRN: [id number]  Primary Care Physician:  Romina George PA  Date of admission: 2024    Subjective   Subjective     Chief Complaint: Numbness    HPI:    Kuortney Lorenzana is a 78 y.o. female with past medical history including but not limited to hyperlipidemia and GERD presents to [facility] with right-sided facial and neck numbness.  Patient reports that she was about 430 using the bathroom when she became nauseous, states that she went and slept on the couch until 830 and when she woke up she was having right sided facial and neck numbness that persisted throughout the day.  States while en route to the hospital she developed diffuse upper abdominal pain that she describes as sharp pain that lasted for about 15 minutes and resolved.  Patient currently asymptomatic.  Denies associated vomiting or diarrhea.  Denies visual disturbance, dysarthria, aphasia, or unilateral weakness.  States that she has had a TIA in the past.  Denies being anticoagulated.  Denies chest pain, palpitation or shortness of breath.    ED workup reviewed: High-sensitivity troponin 9 and 7 with a delta of -2, CBC and CMP relatively unremarkable.  EKG nonischemic.  UA clean.  CT head and CT abdomen negative acute.  MRI brain without pending    Review of Systems   All systems were reviewed and negative except for: That mentioned above in HPI    Personal History     Past Medical History:   Diagnosis Date    Arthritis     OSTEO. KNEES AND BACK SEES DR MIKE DOUGLAS    Arthritis of back     Arthritis of neck     Bloating     WITH SOME NAUSEA    Bruising tendency     Bursitis of hip     Cervical disc disorder     Cholelithiasis 2019    Discovered by liver ultrasound    Chronic low back pain     Chronic pain of both feet     CTS (carpal tunnel syndrome)     Diverticular disease     Diverticulitis of colon 2015    Diverticulosis     Elevated  liver enzymes     Fibroid     Fracture, foot     Gastroesophageal reflux disease without esophagitis 02/11/2016    Hip arthrosis     History of anxiety     BEEN A LONG TIME    History of depression     SITUATIONAL. NOW RESOLVED    History of panic disorder     Hyperlipidemia 2015    IBS (irritable bowel syndrome)     Insomnia     Internal hemorrhoids     Knee pain     Knee swelling     Lumbar canal stenosis     Lumbosacral neuritis     Lung nodules     Multiple vitamin deficiency     Neck pain     Nephrolithiasis     July 2014 passage of kidney stone.    Osteoarthritis     Periarthritis of shoulder     Postmenopausal HRT (hormone replacement therapy)     PSVT (paroxysmal supraventricular tachycardia)     Pulmonary nodules 03/25/2021    Rotator cuff syndrome     Seasonal allergies     Shoulder pain     SOB (shortness of breath) on exertion     Stroke 11/2023    Tennis elbow     Trouble swallowing     INTERMITTENTLY    Urine incontinence        Past Surgical History:   Procedure Laterality Date    CATARACT EXTRACTION Bilateral 09/05/2017    Dr. Jefe Barakat OD, MD    CERVICAL EPIDURAL N/A 03/06/2024    Procedure: cervical epidural steroid injection at C7-T1;  Surgeon: Frannie Allen MD;  Location: [facility] MAIN OR;  Service: Pain Management;  Laterality: N/A;    CHOLECYSTECTOMY      CHOLECYSTECTOMY LAPAROSCOPIC INTRAOPERATIVE CHOLANGIOGRAM WITH LIVER BIOPSY N/A 12/03/2019    Procedure: CHOLECYSTECTOMY LAPAROSCOPIC INTRAOPERATIVE CHOLANGIOGRAM AND LIVER BIOPSY;  Surgeon: Benny Schneider MD;  Location: [facility] MAIN OR;  Service: General    COLONOSCOPY N/A 11/16/2012    Benign cecal polyp-Dr. Mak Rodriguez    COLONOSCOPY N/A 2015    Dr. Seema Andrade    ENDOSCOPY N/A 04/27/2018    , no specimen collected-Dr. Seema Andrade    FOOT SURGERY      HYSTERECTOMY Bilateral 1997    JOINT REPLACEMENT      LUMBAR EPIDURAL INJECTION N/A 6/17/2024    Procedure: Lumbar epidural steroid injection at L2/3 vs. L3/4 29464;  Surgeon:  "Frannie Allen MD;  Location: [facility] MAIN OR;  Service: Pain Management;  Laterality: N/A;    PUBOVAGINAL SLING N/A 06/17/2003    Dr. Ziggy Saba    TOE SURGERY Right     METATARASAL SURGERY GREAT TOE FOR FRACTUE    TOTAL ABDOMINAL HYSTERECTOMY WITH SALPINGO OOPHORECTOMY      TOTAL KNEE ARTHROPLASTY Right 05/14/2021    Procedure: RIGHT TOTAL KNEE ARTHROPLASTY;  Surgeon: Catarino De La Cruz MD;  Location: [facility] MAIN OR;  Service: Orthopedics;  Laterality: Right;    UPPER GASTROINTESTINAL ENDOSCOPY      WISDOM TOOTH EXTRACTION      WRIST SURGERY         Family History: family history includes Aneurysm in her paternal grandfather; Atrial fibrillation in her brother, father, and sister; Cancer in her mother; Colon cancer in her mother; Colon polyps in her mother; Diabetes in her father; Liver disease in her paternal aunt; Other in her brother. Otherwise pertinent FHx was reviewed and not pertinent to current issue.    Social History:  reports that she has never smoked. She has never been exposed to tobacco smoke. She has never used smokeless tobacco. She reports that she does not drink alcohol and does not use drugs.    Home Medications:  Cholecalciferol, Melatonin, Vitamin B-12, acetaminophen, aspirin, coenzyme Q10, docusate sodium, fish oil, omeprazole, rosuvastatin, vitamin C, and vitamin k    Allergies:  Allergies   Allergen Reactions    Atenolol Shortness Of Breath and Other (See Comments)     Shortness of breath, leg cramps    Hydrocodone-Acetaminophen Nausea Only and GI Intolerance    Metoprolol Itching    Other Nausea And Vomiting     Unknown \"strong\" pain killer (POSSIBLY VICODIN)    Tramadol Nausea Only and GI Intolerance       Objective   Objective     Vitals:   Temp:  [97.3 °F (36.3 °C)] 97.3 °F (36.3 °C)  Heart Rate:  [61-89] 68  Resp:  [16] 16  BP: (137-169)/(75-99) 142/83  Physical Exam    Constitutional: Awake, alert   Eyes: PERRLA, sclerae anicteric, no conjunctival injection   HENT: NCAT, mucous membranes " moist   Neck: Supple, no thyromegaly, no lymphadenopathy, trachea midline   Respiratory: Clear to auscultation bilaterally, nonlabored respirations    Cardiovascular: RRR, no murmurs, rubs, or gallops, palpable pedal pulses bilaterally   Gastrointestinal: Positive bowel sounds, soft, nontender, nondistended   Musculoskeletal: No bilateral ankle edema, no clubbing or cyanosis to extremities   Psychiatric: Appropriate affect, cooperative   Neurologic: Oriented x 3, strength symmetric in all extremities, Cranial Nerves grossly intact to confrontation, speech clear   Skin: No rashes     NIH Stroke Scale      Interval: Baseline  Time: 18:01 EDT  Person Administering Scale: SLY Hurst    Administer stroke scale items in the order listed. Record performance in each category after each subscale exam. Do not go back and change scores. Follow directions provided for each exam technique. Scores should reflect what the patient does, not what the clinician thinks the patient can do. The clinician should record answers while administering the exam and work quickly. Except where indicated, the patient should not be coached (i.e., repeated requests to patient to make a special effort).      1a  Level of consciousness: 0=alert; keenly responsive   1b. LOC questions:  0=Performs both tasks correctly   1c. LOC commands: 0=Answers both questions correctly   2.  Best Gaze: 0=normal   3.  Visual: 0=No visual loss   4. Facial Palsy: 0=Normal symmetric movement   5a.  Motor left arm: 0=No drift, limb holds 90 (or 45) degrees for full 10 seconds   5b.  Motor right arm: 0=No drift, limb holds 90 (or 45) degrees for full 10 seconds   6a. motor left le=No drift, limb holds 90 (or 45) degrees for full 10 seconds   6b  Motor right le=No drift, limb holds 90 (or 45) degrees for full 10 seconds   7. Limb Ataxia: 0=Absent   8.  Sensory: 1=Mild to moderate sensory loss; patient feels pinprick is less sharp or is dull on the  affected side; there is a loss of superficial pain with pinprick but patient is aware She is being touched   9. Best Language:  0=No aphasia, normal   10. Dysarthria: 0=Normal   11. Extinction and Inattention: 0=No abnormality   12. Distal motor function: 0=Normal    Total:   1     Result Review    Result Review:  I have personally reviewed the results from the time of this admission to 7/20/2024 17:22 EDT and agree with these findings:  [x]  Laboratory list / accordion  []  Microbiology  [x]  Radiology  []  EKG/Telemetry   []  Cardiology/Vascular   []  Pathology  []  Old records  []  Other:    Assessment & Plan   Assessment / Plan     Brief Patient Summary:  Kourtney Lorenzana is a 78 y.o. female who is being admitted to the observation unit due to right-sided facial and neck paresthesia    Active Hospital Problems:  Active Hospital Problems    Diagnosis     **Stroke-like symptom      Plan:   Right-sided facial and neck paresthesia  -CT head without negative  -MRI brain without pending  -Neurology consult  -NIH 1  -Neurocheck every 4 hours  -A1c and lipid panel    Nausea and abdominal pain  -CT abdomen negative acute  -Antiemetic as needed      VTE Prophylaxis:  Mechanical VTE prophylaxis orders are present.      CODE STATUS:    Level Of Support Discussed With: Patient  Code Status (Patient has no pulse and is not breathing): CPR (Attempt to Resuscitate)  Medical Interventions (Patient has pulse or is breathing): Full Support    Admission Status:  I believe this patient meets observation status.    During patient visit, I utilized appropriate personal protective equipment including gloves. Appropriate PPE was worn during the entire visit.  Hand hygiene was completed before and after    74 minutes has been spent by [facility] providers in the care of this patient while under observation status    Electronically signed by SLY Hurst, 07/20/24, 5:22 PM EDT.

## 2024-07-20 NOTE — PROGRESS NOTES
Clinical Pharmacy Services: Medication History    Kourtney Lorenzana is a 78 y.o. female presenting to [facility] for Stroke-like symptom [R29.90]    She  has a past medical history of Arthritis, Arthritis of back, Arthritis of neck, Bloating, Bruising tendency, Bursitis of hip, Cervical disc disorder, Cholelithiasis (11/07/2019), Chronic low back pain, Chronic pain of both feet, CTS (carpal tunnel syndrome), Diverticular disease, Diverticulitis of colon (2015), Diverticulosis, Elevated liver enzymes, Fibroid, Fracture, foot, Gastroesophageal reflux disease without esophagitis (02/11/2016), Hip arthrosis, History of anxiety, History of depression, History of panic disorder, Hyperlipidemia (2015), IBS (irritable bowel syndrome), Insomnia, Internal hemorrhoids, Knee pain, Knee swelling, Lumbar canal stenosis, Lumbosacral neuritis, Lung nodules, Multiple vitamin deficiency, Neck pain, Nephrolithiasis, Osteoarthritis, Periarthritis of shoulder, Postmenopausal HRT (hormone replacement therapy), PSVT (paroxysmal supraventricular tachycardia), Pulmonary nodules (03/25/2021), Rotator cuff syndrome, Seasonal allergies, Shoulder pain, SOB (shortness of breath) on exertion, Stroke (11/2023), Tennis elbow, Trouble swallowing, and Urine incontinence.    Allergies as of 07/20/2024 - Reviewed 07/20/2024   Allergen Reaction Noted    Atenolol Shortness Of Breath and Other (See Comments) 03/25/2021    Hydrocodone-acetaminophen Nausea Only and GI Intolerance 02/11/2016    Metoprolol Itching 08/07/2020    Other Nausea And Vomiting 02/11/2016    Tramadol Nausea Only and GI Intolerance 02/11/2016       Medication information was obtained from: Patient  Pharmacy and Phone Number:     Prior to Admission Medications       Prescriptions Last Dose Informant Patient Reported? Taking?    acetaminophen (TYLENOL) 500 MG tablet 7/19/2024 Self Yes Yes    Take 1-2 tablets by mouth At Night As Needed for Mild Pain.    aspirin 81 MG chewable  tablet 7/19/2024 Self Yes Yes    Chew 1 tablet Daily.    Cholecalciferol (D3) 50 MCG (2000 UT) tablet 7/19/2024 Self Yes Yes    Take 1 tablet by mouth Daily.    coenzyme Q10 100 MG capsule 7/19/2024 Self Yes Yes    Take 1 capsule by mouth Daily.    Cyanocobalamin (Vitamin B-12) 1000 MCG/15ML liquid 7/19/2024 Self Yes Yes    Take 1 mL by mouth Every Other Day.    docusate sodium (COLACE) 100 MG capsule 7/19/2024 Self Yes Yes    Take 1 capsule by mouth 2 (Two) Times a Day.    Melatonin 12 MG tablet 7/19/2024 Self Yes Yes    Take 5 mg by mouth At Night As Needed.    Omega-3 Fatty Acids (fish oil) 1000 MG capsule capsule 7/19/2024 Self Yes Yes    Take 1 capsule by mouth Daily.    omeprazole (priLOSEC) 40 MG capsule 7/19/2024 Self No Yes    Take 1 capsule by mouth Daily.    rosuvastatin (CRESTOR) 20 MG tablet 7/19/2024 Self No Yes    TAKE 1 TABLET BY MOUTH EVERY DAY    vitamin C (ASCORBIC ACID) 250 MG tablet 7/19/2024 Self Yes Yes    Take 1 tablet by mouth Daily.    vitamin k 100 MCG tablet 7/19/2024 Self Yes Yes    Take 1 tablet by mouth Daily.              Medication notes: Patient has not taken any medications today prior to coming to ED.     This medication list is complete to the best of my knowledge as of 7/20/2024    Please call if questions.    Miguel Cannon  Pharmacy Intern  Phone [phone]  7/20/2024 16:25 EDT

## 2024-07-21 ENCOUNTER — READMISSION MANAGEMENT (OUTPATIENT)
Dept: CALL CENTER | Facility: HOSPITAL | Age: 79
End: 2024-07-21
Payer: MEDICARE

## 2024-07-21 VITALS
HEART RATE: 69 BPM | BODY MASS INDEX: 30.73 KG/M2 | OXYGEN SATURATION: 91 % | SYSTOLIC BLOOD PRESSURE: 115 MMHG | HEIGHT: 62 IN | DIASTOLIC BLOOD PRESSURE: 74 MMHG | RESPIRATION RATE: 20 BRPM | WEIGHT: 167 LBS | TEMPERATURE: 97.6 F

## 2024-07-21 PROBLEM — R29.90 STROKE-LIKE SYMPTOM: Status: RESOLVED | Noted: 2024-07-20 | Resolved: 2024-07-21

## 2024-07-21 LAB
ANION GAP SERPL CALCULATED.3IONS-SCNC: 10.2 MMOL/L (ref 5–15)
BASOPHILS # BLD AUTO: 0.01 10*3/MM3 (ref 0–0.2)
BASOPHILS NFR BLD AUTO: 0.2 % (ref 0–1.5)
BUN SERPL-MCNC: 8 MG/DL (ref 8–23)
BUN/CREAT SERPL: 10.5 (ref 7–25)
CALCIUM SPEC-SCNC: 8.6 MG/DL (ref 8.6–10.5)
CHLORIDE SERPL-SCNC: 111 MMOL/L (ref 98–107)
CHOLEST SERPL-MCNC: 131 MG/DL (ref 0–200)
CO2 SERPL-SCNC: 20.8 MMOL/L (ref 22–29)
CREAT SERPL-MCNC: 0.76 MG/DL (ref 0.57–1)
DEPRECATED RDW RBC AUTO: 41 FL (ref 37–54)
EGFRCR SERPLBLD CKD-EPI 2021: 80.8 ML/MIN/1.73
EOSINOPHIL # BLD AUTO: 0.16 10*3/MM3 (ref 0–0.4)
EOSINOPHIL NFR BLD AUTO: 3.2 % (ref 0.3–6.2)
ERYTHROCYTE [DISTWIDTH] IN BLOOD BY AUTOMATED COUNT: 13.2 % (ref 12.3–15.4)
GLUCOSE SERPL-MCNC: 84 MG/DL (ref 65–99)
HCT VFR BLD AUTO: 33.4 % (ref 34–46.6)
HDLC SERPL-MCNC: 44 MG/DL (ref 40–60)
HGB BLD-MCNC: 11.9 G/DL (ref 12–15.9)
IMM GRANULOCYTES # BLD AUTO: 0.01 10*3/MM3 (ref 0–0.05)
IMM GRANULOCYTES NFR BLD AUTO: 0.2 % (ref 0–0.5)
LDLC SERPL CALC-MCNC: 65 MG/DL (ref 0–100)
LDLC/HDLC SERPL: 1.43 {RATIO}
LYMPHOCYTES # BLD AUTO: 1.89 10*3/MM3 (ref 0.7–3.1)
LYMPHOCYTES NFR BLD AUTO: 38.1 % (ref 19.6–45.3)
MCH RBC QN AUTO: 33.1 PG (ref 26.6–33)
MCHC RBC AUTO-ENTMCNC: 35.6 G/DL (ref 31.5–35.7)
MCV RBC AUTO: 93 FL (ref 79–97)
MONOCYTES # BLD AUTO: 0.38 10*3/MM3 (ref 0.1–0.9)
MONOCYTES NFR BLD AUTO: 7.7 % (ref 5–12)
NEUTROPHILS NFR BLD AUTO: 2.51 10*3/MM3 (ref 1.7–7)
NEUTROPHILS NFR BLD AUTO: 50.6 % (ref 42.7–76)
NRBC BLD AUTO-RTO: 0 /100 WBC (ref 0–0.2)
PLATELET # BLD AUTO: 160 10*3/MM3 (ref 140–450)
PMV BLD AUTO: 9.8 FL (ref 6–12)
POTASSIUM SERPL-SCNC: 3.8 MMOL/L (ref 3.5–5.2)
RBC # BLD AUTO: 3.59 10*6/MM3 (ref 3.77–5.28)
SODIUM SERPL-SCNC: 142 MMOL/L (ref 136–145)
TRIGL SERPL-MCNC: 120 MG/DL (ref 0–150)
VLDLC SERPL-MCNC: 22 MG/DL (ref 5–40)
WBC NRBC COR # BLD AUTO: 4.96 10*3/MM3 (ref 3.4–10.8)

## 2024-07-21 PROCEDURE — 99213 OFFICE O/P EST LOW 20 MIN: CPT | Performed by: STUDENT IN AN ORGANIZED HEALTH CARE EDUCATION/TRAINING PROGRAM

## 2024-07-21 PROCEDURE — 85025 COMPLETE CBC W/AUTO DIFF WBC: CPT | Performed by: NURSE PRACTITIONER

## 2024-07-21 PROCEDURE — 83036 HEMOGLOBIN GLYCOSYLATED A1C: CPT | Performed by: NURSE PRACTITIONER

## 2024-07-21 PROCEDURE — G0378 HOSPITAL OBSERVATION PER HR: HCPCS

## 2024-07-21 PROCEDURE — 80048 BASIC METABOLIC PNL TOTAL CA: CPT | Performed by: NURSE PRACTITIONER

## 2024-07-21 PROCEDURE — 80061 LIPID PANEL: CPT | Performed by: NURSE PRACTITIONER

## 2024-07-21 RX ADMIN — ASPIRIN 81 MG: 81 TABLET, CHEWABLE ORAL at 08:18

## 2024-07-21 RX ADMIN — PANTOPRAZOLE SODIUM 40 MG: 40 TABLET, DELAYED RELEASE ORAL at 05:52

## 2024-07-21 RX ADMIN — DOCUSATE SODIUM 100 MG: 100 CAPSULE, LIQUID FILLED ORAL at 08:17

## 2024-07-21 NOTE — PROGRESS NOTES
"PAULA RIVAS ATTESTATION NOTE    SHARED VISIT: This visit was performed by BOTH a physician and an APC. The substantive portion of the medical decision making was performed by this attesting physician who made or approved the management plan and takes responsibility for patient management. All studies in the APC note (if performed) were independently interpreted by me.     The RIDDHI and I have discussed this patient's history, physical exam, and treatment plan.  I have reviewed the documentation and personally had a face to face interaction with the patient. I provided a substantive portion of the care of the patient.  I affirm the documentation and agree with the treatment and plan.  The note below describes my personal findings:         S: Feels good repeating that she \"feels bact to herself\".  No new issues overnight except for need for supp oxygen for borderline low oxygen sat while asleep     O:  Exam  General : pleasant cooperative well appearing f, conversant  HEENT: NCAT, eomi, no scleral icterus  Neck: supple, trachea midline  Resp: relaxed breathing  Exts: no obvious deformities, no le edema, no calf ttp  Neuro: alert and appropriate, face symmetric, nl speech, moves all 4 exts equally    Diagnostic tests: MRI neg     Assessment and Plan: 79 yo F with h/o HLD and GERD admitted to obs unit for further eval of stroke-like symptoms.    Stroke-like symptoms - most concerning for TIA with complete resolution currently, MRI neg except for old lacunar infarct, cont neuro checks and await neurology recommendations--likely home today  Nausea/upper ab pain in ED - resolved, h/o GERD, ct a/p neg for acute findings in ED  GERD - no current issues (see #2)  HLD - statin  Low overnight sleep oxygen level - outpatient referral made  "

## 2024-07-21 NOTE — PLAN OF CARE
Goal Outcome Evaluation:  Plan of Care Reviewed With: patient           Outcome Evaluation: oriented, no c/o pain, IVF d/c'd, and Neuro and PT consulted. Possible d/c 7/21.

## 2024-07-21 NOTE — OUTREACH NOTE
Prep Survey      Flowsheet Row Responses   [facility] patient discharged from? [facility]   Is LACE score < 7 ? Yes   Eligibility [facility]   Date of Admission 07/20/24   Date of Discharge 07/21/24   Discharge Disposition Home or Self Care   Discharge diagnosis Stroke-like symptom   Does the patient have one of the following disease processes/diagnoses(primary or secondary)? Other   Does the patient have Home health ordered? No   Is there a DME ordered? No   Prep survey completed? Yes            MERY BAUTISTA - Registered Nurse

## 2024-07-21 NOTE — PLAN OF CARE
Goal Outcome Evaluation:         Pt educated on follow up appointments, meds, and symptoms for return in discharge paperwork. All belongings returned. Daughter present at discharge.

## 2024-07-21 NOTE — SIGNIFICANT NOTE
07/21/24 0912   OTHER   Discipline physical therapist   Therapy Assessment/Plan (PT)   Criteria for Skilled Interventions Met (PT) no;no problems identified which require skilled intervention  (Noted [facility] 0, [facility] 24. Pt ambulating indep'ly. Currently no skilled PT warranted, will defer eval- RN notified.)

## 2024-07-21 NOTE — PROGRESS NOTES
ED OBSERVATION PROGRESS/DISCHARGE SUMMARY    Date of Admission: 7/20/2024   LOS: 0 days   PCP: Romina George PA      Subjective    Hospital Outcome:   78-year-old female been to the observation unit for further evaluation of right-sided facial and neck numbness that lasted for at least 2 hours.  In the ER, CT head without showed no acute intracranial abnormality.  Patient also reported a short-lived episode of abdominal pain with associated nausea.  A CT of the abdomen and pelvis with showed no acute abnormality.  Urinalysis was clean.    An MRI of the brain without was obtained that shows no evidence for acute intracranial pathology with note of mild chronic small vessel ischemic disease, old lacunar disease within the anterior limb of the left internal capsule, and a chronic microhemorrhage in the right temporal occipital junction.  Patient symptoms have completely resolved at this time.    Neurology evaluated patient and has recommended continuing aspirin and Crestor and to follow-up with Dr. Zuluaga at [facility].     ROS:  General: no fevers, chills  Respiratory: no cough, dyspnea  Cardiovascular: no chest pain, palpitations  Abdomen: No abdominal pain, nausea, vomiting, or diarrhea  Neurologic: No focal weakness    Objective   Physical Exam:  I have reviewed the vital signs.  Temp:  [97.3 °F (36.3 °C)-98.4 °F (36.9 °C)] 98.4 °F (36.9 °C)  Heart Rate:  [58-89] 69  Resp:  [16-20] 18  BP: (114-169)/(57-99) 114/57  General Appearance:    Alert, cooperative, no distress  Head:    Normocephalic, atraumatic  Eyes:    Sclerae anicteric  Neck:   Supple, no mass  Lungs: Clear to auscultation bilaterally, respirations unlabored  Heart: Regular rate and rhythm, S1 and S2 normal, no murmur, rub or gallop  Abdomen:  Soft, nontender, bowel sounds active, nondistended  Extremities: No clubbing, cyanosis, or edema to lower extremities  Pulses:  2+ and symmetric in distal lower extremities  Skin: No rashes    Neurologic: Oriented x3, Normal strength to extremities    Results Review:    I have reviewed the labs, radiology results and diagnostic studies.    Results from last 7 days   Lab Units 07/21/24  0311   WBC 10*3/mm3 4.96   HEMOGLOBIN g/dL 11.9*   HEMATOCRIT % 33.4*   PLATELETS 10*3/mm3 160     Results from last 7 days   Lab Units 07/21/24  0311 07/20/24  1037   SODIUM mmol/L 142 141   POTASSIUM mmol/L 3.8 4.0   CHLORIDE mmol/L 111* 107   CO2 mmol/L 20.8* 20.7*   BUN mg/dL 8 12   CREATININE mg/dL 0.76 0.82   CALCIUM mg/dL 8.6 9.3   BILIRUBIN mg/dL  --  0.6   ALK PHOS U/L  --  154*   ALT (SGPT) U/L  --  18   AST (SGOT) U/L  --  15   GLUCOSE mg/dL 84 100*     Imaging Results (Last 24 Hours)       Procedure Component Value Units Date/Time    MRI Brain Without Contrast [[id number]] Collected: 07/20/24 1745     Updated: 07/20/24 1824    Narrative:      MRI OF THE BRAIN WITHOUT CONTRAST     CLINICAL HISTORY: numbness; R20.0-Anesthesia of skin; R47.9-Unspecified  speech disturbances; R10.30-Lower abdominal pain, unspecified     TECHNIQUE: MRI of the brain was obtained with sagittal T1, axial T1,  axial FLAIR, axial T2, axial diffusion, and susceptibility weighted  images.     COMPARISON: Brain MRI dated 12/26/2023     FINDINGS:     There are mild changes of chronic small vessel ischemic phenomena that  are stable in appearance when compared to the prior exam. Old lacunar  disease is again noted within the anterior limb of the left internal  capsule. Otherwise, there are no abnormal foci of restricted diffusion.  The major intracranial flow related signal voids are unremarkable. There  is a punctate focus of susceptibility artifact within the  corticomedullary interface of the right temporal occipital junction that  is likely at the site of underlying amyloid. This finding was also noted  on the prior exam.       Impression:         No evidence for acute intracranial pathology. Stable findings when  compared to the prior  study dated 12/26/2023.     Mild changes of chronic small vessel ischemic phenomena. Old lacunar  disease within the anterior limb of the left internal capsule.     Chronic microhemorrhage within the corticomedullary interface of the  right temporal occipital junction likely at the site of underlying  amyloid.              This report was finalized on 7/20/2024 6:21 PM by Dr. Chip Magana M.D  on Workstation: [id number]       CT Abdomen Pelvis With Contrast [[id number]] Collected: 07/20/24 1443     Updated: 07/20/24 1452    Narrative:      CT ABDOMEN AND PELVIS WITH IV CONTRAST     HISTORY: Lower abdominal pain     TECHNIQUE: Radiation dose reduction techniques were utilized, including  automated exposure control and exposure modulation based on body size.   3 mm images were obtained through the abdomen and pelvis after the  administration of IV contrast.     COMPARISON: 3/4/2024        FINDINGS: Lumbar spine disc degeneration. Bilateral extrarenal pelvises.  No hydronephrosis. Small sliding hiatal hernia. Post cholecystectomy.  Nondilated biliary tree. Hysterectomy. Sigmoid colon diverticulosis.  Remaining solid organs and bowel including the nondilated appendix are  normal. No abdominal pelvic adenopathy.          Impression:      No acute abdominopelvic abnormality.     This report was finalized on 7/20/2024 2:49 PM by Dr. Ian Echavarria M.D on Workstation: [id number]       CT Head Without Contrast [[id number]] Collected: 07/20/24 1211     Updated: 07/20/24 1221    Narrative:      CT HEAD WITHOUT CONTRAST     CLINICAL HISTORY: Right face numbness and nausea     TECHNIQUE: CT scan of the head was obtained with 3 mm axial soft tissue  algorithm images. No intravenous contrast was administered. Sagittal and  coronal reconstructions were obtained.     COMPARISON: CT head neck angiogram 12/21/2023     FINDINGS:  No intracranial hemorrhage.  No midline shift or mass effect.   No CT evidence of acute territorial  infarction. Unchanged small chronic  lacunar infarct in the left internal capsule anterior limb. No  extraaxial collection.  No hydrocephalus.  Clear visualized portions of  the paranasal sinuses and mastoid air cells.             Impression:      No acute intracranial abnormality.              Radiation dose reduction techniques were utilized, including automated  exposure control and exposure modulation based on body size.     This report was finalized on 7/20/2024 12:18 PM by Dr. Ian Echavarria M.D on Workstation: [id number]               I have reviewed the medications.  ---------------------------------------------------------------------------------------------  Assessment & Plan   Assessment/Problem List    Stroke-like symptom      Plan:      Right-sided facial and neck paresthesia  -Symptoms resolved  -CT head without negative  -MRI brain without negative acute  -Neurology consult  -Neurocheck every 4 hours  -Lipid panel unremarkable, A1c pending     Nausea and abdominal pain  -CT abdomen negative acute  -Antiemetic as needed    Disposition: Home    Follow-up after Discharge: PCP    This note will serve as discharge summary      45 minutes have been spent by [facility] providers in the care of this patient while under observation status.    Appropriate PPE worn during patient encounter.  Hand hygeine performed before and after seeing the patient.      Edna Rodriguez PA-C 07/21/24 04:21 EDT

## 2024-07-21 NOTE — DISCHARGE INSTRUCTIONS
Your MRI brain shows no evidence of stroke, lesions or hemorrhage  Follow-up with sleep medicine outpatient since your oxygen level dropped overnight  To the emergency department for symptoms recurrence

## 2024-07-21 NOTE — CONSULTS
Neurology Consult Note    Consult Date: 7/21/2024    Referring MD: Ariel Candelaria II*    Reason for Consult I have been asked to see the patient in neurological consultation to render advice and opinion regarding right facial and neck numbness    Kourtney Lorenzana is a 78 y.o. female with past medical history of hyperlipidemia and GERD scented to the [facility] ER with chief complaints of right-sided facial and neck numbness.  Neurology has been consulted for the same.  Patient states that yesterday she felt nauseous and went to bed and after waking up in a couple of hours she noticed her right face was numb also felt weak in her legs.  Denies any other weakness numbness speech or vision problems.    Patient states that in 2023 during Thanksgiving she had an onset of bandlike headache and dizziness she did not go to the ER and later her PCP got a CT head and it showed a lacunar infarct.  She later followed up with Dr. Castillo with [facility] for stroke care, he got a CTA head and neck which was unremarkable and she was started on aspirin and Crestor.  Today morning patient is awake and alert I could not appreciate any deficits on my examination    Past Medical History:   Diagnosis Date    Arthritis     OSTEO. KNEES AND BACK SEES DR MIKE DOUGLAS    Arthritis of back     Arthritis of neck     Bloating     WITH SOME NAUSEA    Bruising tendency     Bursitis of hip     Cervical disc disorder     Cholelithiasis 11/07/2019    Discovered by liver ultrasound    Chronic low back pain     Chronic pain of both feet     CTS (carpal tunnel syndrome)     Diverticular disease     Diverticulitis of colon 2015    Diverticulosis     Elevated liver enzymes     Fibroid     Fracture, foot     Gastroesophageal reflux disease without esophagitis 02/11/2016    Hip arthrosis     History of anxiety     BEEN A LONG TIME    History of depression     SITUATIONAL. NOW RESOLVED    History of panic disorder     Hyperlipidemia 2015  "   IBS (irritable bowel syndrome)     Insomnia     Internal hemorrhoids     Knee pain     Knee swelling     Lumbar canal stenosis     Lumbosacral neuritis     Lung nodules     Multiple vitamin deficiency     Neck pain     Nephrolithiasis     July 2014 passage of kidney stone.    Osteoarthritis     Periarthritis of shoulder     Postmenopausal HRT (hormone replacement therapy)     PSVT (paroxysmal supraventricular tachycardia)     Pulmonary nodules 03/25/2021    Rotator cuff syndrome     Seasonal allergies     Shoulder pain     SOB (shortness of breath) on exertion     Stroke 11/2023    Tennis elbow     Trouble swallowing     INTERMITTENTLY    Urine incontinence        Exam  /74 (BP Location: Right arm, Patient Position: Sitting)   Pulse 69   Temp 97.6 °F (36.4 °C) (Oral)   Resp 20   Ht 158.5 cm (62.4\")   Wt 75.8 kg (167 lb)   SpO2 91%   BMI 30.15 kg/m²   Gen: NAD, vitals reviewed  MS: oriented x3, recent/remote memory intact, normal attention/concentration, language intact, no neglect.  CN: visual acuity grossly normal, PERRL, EOMI, no facial droop, no dysarthria  Motor: 5/5 throughout upper and lower extremities, normal tone    DATA:    Lab Results   Component Value Date    GLUCOSE 84 07/21/2024    CALCIUM 8.6 07/21/2024     07/21/2024    K 3.8 07/21/2024    CO2 20.8 (L) 07/21/2024     (H) 07/21/2024    BUN 8 07/21/2024    CREATININE 0.76 07/21/2024    EGFRIFAFRI 79 01/13/2020    EGFRIFNONA 78 04/29/2021    BCR 10.5 07/21/2024    ANIONGAP 10.2 07/21/2024     Lab Results   Component Value Date    WBC 4.96 07/21/2024    HGB 11.9 (L) 07/21/2024    HCT 33.4 (L) 07/21/2024    MCV 93.0 07/21/2024     07/21/2024       Lab review:   Sodium 142  Creatinine 0.76  Blood glucose 84  Normal LFTs    A1c pending  LDL 65    WBC 4.9  Hemoglobin 11.9 and platelets 160    Urinalysis negative    Imaging review:   CT abdomen pelvis showed no acute pathology    CT head showed no acute hypodensity or " hypodensity    MRI brain no acute diffusion restriction, T2 flair changes suggestive of small vessel disease but an old lacunar infarct in the left subcortical region, SWI sequence showed small cortical microhemorrhage along the right temporoparietal region    Diagnoses:  Right facial numbness  Concern for transient ischemic attack    Chronic left internal capsule lacunar stroke  Hyperlipidemia    Pre-stroke MRS: 0  NIHSS: 0    Patient had symptoms of nausea, dizziness, right facial numbness and also later felt weak in both her legs.  In November she had an episode of headache and dizziness.  Although transient ischemic attack is in the differential I feel this is less likely.  She had a complete stroke workup including CTAs which were done recently in December 2023 I do not think we need to repeat it.    I would continue home dose aspirin 81 mg and Crestor home dose.    Neurology will sign off kindly call us back with any questions.    Follow-up with Dr. Zuluaga at [facility].      MDM   Reviewed: Previous charts, nursing notes and vitals   Reviewed: Previous labs and CT/MRI scan    Interpretation: Labs and CT/MRI scan   Total time providing care is :30-74 minutes. This excluded time spent performing separately reportable procedures and services  Consults :Neurology/Stroke    Please note that portions of this note were completed with a voice recognition program.     Seamus Zamora MD  Neuro Hospitalist /Vascular Neurology.

## 2024-07-22 ENCOUNTER — TRANSITIONAL CARE MANAGEMENT TELEPHONE ENCOUNTER (OUTPATIENT)
Dept: CALL CENTER | Facility: HOSPITAL | Age: 79
End: 2024-07-22
Payer: MEDICARE

## 2024-07-22 LAB — HBA1C MFR BLD: 5.5 % (ref 4.8–5.6)

## 2024-07-22 NOTE — OUTREACH NOTE
Call Center TCM Note      Flowsheet Row Responses   [facility] [place] patient discharged from? [facility]   Does the patient have one of the following disease processes/diagnoses(primary or secondary)? Other   TCM attempt successful? Yes   Call start time 1333   Call end time 1334   Discharge diagnosis Stroke-like symptom   Is patient permission given to speak with other caregiver? Yes   List who call center can speak with Dagoberto priestdaughter   Person spoke with today (if not patient) and relationship Dagoberto granddaughter   Meds reviewed with patient/caregiver? Yes   Is the patient having any side effects they believe may be caused by any medication additions or changes? No   Does the patient have all medications ordered at discharge? N/A   Is the patient taking all medications as directed (includes completed medication regime)? Yes   Comments Unable to reach pt-granddaughter states pt schedules her own apts   Does the patient have an appointment with their PCP within 7-14 days of discharge? No   Nursing Interventions Routed TCM call to PCP office, PCP office requested to make appointment - message sent   Did the patient receive a copy of their discharge instructions? Yes   Nursing interventions Reviewed instructions with patient   What is the patient's perception of their health status since discharge? Improving   Is the patient/caregiver able to teach back signs and symptoms related to disease process for when to call PCP? Yes   Is the patient/caregiver able to teach back signs and symptoms related to disease process for when to call 911? Yes   Is the patient/caregiver able to teach back the hierarchy of who to call/visit for symptoms/problems? PCP, Specialist, Home health nurse, Urgent Care, ED, 911 Yes   If the patient is a current smoker, are they able to teach back resources for cessation? Not a smoker   TCM call completed? Yes   Call end time 1334            Ellyn Loco RN    7/22/2024, 13:35 EDT

## 2024-08-15 ENCOUNTER — HOSPITAL ENCOUNTER (OUTPATIENT)
Dept: CARDIOLOGY | Facility: HOSPITAL | Age: 79
Discharge: HOME | End: 2024-08-15
Admitting: NURSE PRACTITIONER
Payer: MEDICARE

## 2024-08-15 DIAGNOSIS — R09.89 DIMINISHED PULSES IN LOWER EXTREMITY: ICD-10-CM

## 2024-08-15 LAB
BH CV LOWER ARTERIAL LEFT ABI RATIO: 0.98
BH CV LOWER ARTERIAL LEFT DORSALIS PEDIS SYS MAX: 127
BH CV LOWER ARTERIAL LEFT GREAT TOE SYS MAX: 136
BH CV LOWER ARTERIAL LEFT POST TIBIAL SYS MAX: 135
BH CV LOWER ARTERIAL LEFT TBI RATIO: 0.99
BH CV LOWER ARTERIAL RIGHT ABI RATIO: 0.97
BH CV LOWER ARTERIAL RIGHT DORSALIS PEDIS SYS MAX: 134
BH CV LOWER ARTERIAL RIGHT GREAT TOE SYS MAX: 152
BH CV LOWER ARTERIAL RIGHT POST TIBIAL SYS MAX: 127
BH CV LOWER ARTERIAL RIGHT TBI RATIO: 1.1
UPPER ARTERIAL LEFT ARM BRACHIAL SYS MAX: 138
UPPER ARTERIAL RIGHT ARM BRACHIAL SYS MAX: 137

## 2024-08-15 PROCEDURE — 93922 UPR/L XTREMITY ART 2 LEVELS: CPT

## 2024-08-19 ENCOUNTER — OFFICE VISIT (OUTPATIENT)
Dept: CARDIOLOGY | Facility: CLINIC | Age: 79
End: 2024-08-19
Payer: MEDICARE

## 2024-08-19 VITALS
HEART RATE: 69 BPM | BODY MASS INDEX: 29.59 KG/M2 | WEIGHT: 160.8 LBS | HEIGHT: 62 IN | DIASTOLIC BLOOD PRESSURE: 90 MMHG | OXYGEN SATURATION: 98 % | SYSTOLIC BLOOD PRESSURE: 138 MMHG

## 2024-08-19 DIAGNOSIS — R00.2 PALPITATIONS: ICD-10-CM

## 2024-08-19 DIAGNOSIS — R60.0 BILATERAL LEG EDEMA: ICD-10-CM

## 2024-08-19 DIAGNOSIS — G45.9 TIA (TRANSIENT ISCHEMIC ATTACK): ICD-10-CM

## 2024-08-19 DIAGNOSIS — I47.10 PSVT (PAROXYSMAL SUPRAVENTRICULAR TACHYCARDIA): ICD-10-CM

## 2024-08-19 DIAGNOSIS — R01.1 HEART MURMUR: ICD-10-CM

## 2024-08-19 DIAGNOSIS — R06.02 SHORTNESS OF BREATH: ICD-10-CM

## 2024-08-19 DIAGNOSIS — Z09 HOSPITAL DISCHARGE FOLLOW-UP: Primary | ICD-10-CM

## 2024-08-19 DIAGNOSIS — R20.0 RIGHT FACIAL NUMBNESS: ICD-10-CM

## 2024-08-19 PROCEDURE — 1159F MED LIST DOCD IN RCRD: CPT | Performed by: NURSE PRACTITIONER

## 2024-08-19 PROCEDURE — 1160F RVW MEDS BY RX/DR IN RCRD: CPT | Performed by: NURSE PRACTITIONER

## 2024-08-19 PROCEDURE — 93000 ELECTROCARDIOGRAM COMPLETE: CPT | Performed by: NURSE PRACTITIONER

## 2024-08-19 PROCEDURE — 99214 OFFICE O/P EST MOD 30 MIN: CPT | Performed by: NURSE PRACTITIONER

## 2024-08-19 NOTE — PROGRESS NOTES
Date of Office Visit: 2024  Encounter Provider: SLY Barker  Place of Service: [facility] CARDIOLOGY  Patient Name: Kourtney Lorenzana  :1945  Primary Cardiologist: Dr. Anum Hicks    Chief Complaint   Patient presents with    Hospital Follow Up Visit   :     HPI: Kourtney Lorenzana is a 78 y.o. female who presents today for cardiac follow-up visit.  I reviewed her medical records.    In May 2020, she was having palpitations and was diagnosed with nonsustained SVT on Holter monitor.  (40 episodes with the longest 5 beats in duration at a rate of 195). Echocardiogram showed normal LVEF of 65% and grade 1 diastolic dysfunction.  She was intolerant to metoprolol and magnesium (itching) and atenolol (leg cramps, shortness of breath, and fatigue).    In 2024, she presented to the [facility] ED with right-sided facial numbness.  MRI showed chronic small vessel ischemic phenomena, old lacunar disease, and chronic microhemorrhage within the corticomedullary interface of the right temporal occipital junction likely at the site of underlying amyloid.  The last progress note in the hospital said most concerning for TIA with complete resolution.    She recently had ABIs completed which were normal.    She presents today for a follow-up visit.  She says she has good and bad days.  She has been under an increased amount of stress.  She reports occasional shortness of breath, fluttering sensations that lasts less than a minute, ankle edema, dizziness, chronic back pain, and numbness of her hands.  Blood pressure and heart rate are normal.      Past Medical History:   Diagnosis Date    Arthritis     OSTEO. KNEES AND BACK SEES DR MIKE DOUGLAS    Arthritis of back     Arthritis of neck     Bloating     WITH SOME NAUSEA    Bruising tendency     Bursitis of hip     Cervical disc disorder     Cholelithiasis 2019    Discovered by liver ultrasound    Chronic low back pain      Chronic pain of both feet     CTS (carpal tunnel syndrome)     Diverticular disease     Diverticulitis of colon 2015    Diverticulosis     Elevated liver enzymes     Fibroid     Fracture, foot     Gastroesophageal reflux disease without esophagitis 02/11/2016    Hip arthrosis     History of anxiety     BEEN A LONG TIME    History of depression     SITUATIONAL. NOW RESOLVED    History of panic disorder     Hyperlipidemia 2015    IBS (irritable bowel syndrome)     Insomnia     Internal hemorrhoids     Knee pain     Knee swelling     Lumbar canal stenosis     Lumbosacral neuritis     Lung nodules     Multiple vitamin deficiency     Neck pain     Nephrolithiasis     July 2014 passage of kidney stone.    Osteoarthritis     Periarthritis of shoulder     Postmenopausal HRT (hormone replacement therapy)     PSVT (paroxysmal supraventricular tachycardia)     Pulmonary nodules 03/25/2021    Rotator cuff syndrome     Seasonal allergies     Shoulder pain     SOB (shortness of breath) on exertion     Stroke 11/2023    Tennis elbow     Trouble swallowing     INTERMITTENTLY    Urine incontinence        Past Surgical History:   Procedure Laterality Date    CATARACT EXTRACTION Bilateral 09/05/2017    Dr. Jefe Barakat OD, MD    CERVICAL EPIDURAL N/A 03/06/2024    Procedure: cervical epidural steroid injection at C7-T1;  Surgeon: Frannie Allen MD;  Location: [facility] MAIN OR;  Service: Pain Management;  Laterality: N/A;    CHOLECYSTECTOMY      CHOLECYSTECTOMY LAPAROSCOPIC INTRAOPERATIVE CHOLANGIOGRAM WITH LIVER BIOPSY N/A 12/03/2019    Procedure: CHOLECYSTECTOMY LAPAROSCOPIC INTRAOPERATIVE CHOLANGIOGRAM AND LIVER BIOPSY;  Surgeon: Benny Schneider MD;  Location: [facility] MAIN OR;  Service: General    COLONOSCOPY N/A 11/16/2012    Benign cecal polyp-Dr. Mak Rodriguez    COLONOSCOPY N/A 2015    Dr. Seema Andrade    ENDOSCOPY N/A 04/27/2018    HH, no specimen collected-Dr. Seema Andrade    FOOT SURGERY      HYSTERECTOMY Bilateral 1997     JOINT REPLACEMENT      LUMBAR EPIDURAL INJECTION N/A 6/17/2024    Procedure: Lumbar epidural steroid injection at L2/3 vs. L3/4 99402;  Surgeon: Frannie Allen MD;  Location: [facility] MAIN OR;  Service: Pain Management;  Laterality: N/A;    PUBOVAGINAL SLING N/A 06/17/2003    Dr. Ziggy Saba    TOE SURGERY Right     METATARASAL SURGERY GREAT TOE FOR FRACTUE    TOTAL ABDOMINAL HYSTERECTOMY WITH SALPINGO OOPHORECTOMY      TOTAL KNEE ARTHROPLASTY Right 05/14/2021    Procedure: RIGHT TOTAL KNEE ARTHROPLASTY;  Surgeon: Catarino De La Cruz MD;  Location: [facility] MAIN OR;  Service: Orthopedics;  Laterality: Right;    UPPER GASTROINTESTINAL ENDOSCOPY      WISDOM TOOTH EXTRACTION      WRIST SURGERY         Social History     Socioeconomic History    Marital status:    Tobacco Use    Smoking status: Never     Passive exposure: Never    Smokeless tobacco: Never   Vaping Use    Vaping status: Never Used   Substance and Sexual Activity    Alcohol use: No     Comment: caffeine use: 1 cups daily    Drug use: No    Sexual activity: Not Currently     Partners: Male     Birth control/protection: Hysterectomy, Post-menopausal     Comment:        Family History   Problem Relation Age of Onset    Atrial fibrillation Father     Diabetes Father     Colon cancer Mother     Colon polyps Mother     Cancer Mother     Atrial fibrillation Brother     Other Brother         Coronary arteriosclerosis    Atrial fibrillation Sister     Aneurysm Paternal Grandfather     Liver disease Paternal Aunt     Malig Hyperthermia Neg Hx     Breast cancer Neg Hx     Ovarian cancer Neg Hx     Uterine cancer Neg Hx     Deep vein thrombosis Neg Hx     Pulmonary embolism Neg Hx        The following portion of the patient's history were reviewed and updated as appropriate: past medical history, past surgical history, past social history, past family history, allergies, current medications, and problem list.    Review of Systems   Constitutional: Negative.  "  Cardiovascular:  Positive for dyspnea on exertion, irregular heartbeat, leg swelling and palpitations.   Respiratory:  Positive for shortness of breath.    Neurological:  Positive for dizziness and numbness.       Allergies   Allergen Reactions    Atenolol Shortness Of Breath and Other (See Comments)     Shortness of breath, leg cramps    Hydrocodone-Acetaminophen Nausea Only and GI Intolerance    Metoprolol Itching    Other Nausea And Vomiting     Unknown \"strong\" pain killer (POSSIBLY VICODIN)    Tramadol Nausea Only and GI Intolerance         Current Outpatient Medications:     acetaminophen (TYLENOL) 500 MG tablet, Take 1-2 tablets by mouth At Night As Needed for Mild Pain., Disp: , Rfl:     aspirin 81 MG chewable tablet, Chew 1 tablet Daily., Disp: , Rfl:     Cholecalciferol (D3) 50 MCG (2000 UT) tablet, Take 1 tablet by mouth Daily., Disp: , Rfl:     coenzyme Q10 100 MG capsule, Take 1 capsule by mouth Daily., Disp: , Rfl:     Cyanocobalamin (Vitamin B-12) 1000 MCG/15ML liquid, Take 1 mL by mouth Every Other Day., Disp: , Rfl:     docusate sodium (COLACE) 100 MG capsule, Take 1 capsule by mouth 2 (Two) Times a Day., Disp: , Rfl:     Melatonin 12 MG tablet, Take 5 mg by mouth At Night As Needed., Disp: , Rfl:     Omega-3 Fatty Acids (fish oil) 1000 MG capsule capsule, Take 1 capsule by mouth Daily., Disp: , Rfl:     omeprazole (priLOSEC) 40 MG capsule, Take 1 capsule by mouth Daily., Disp: 90 capsule, Rfl: 3    rosuvastatin (CRESTOR) 20 MG tablet, TAKE 1 TABLET BY MOUTH EVERY DAY, Disp: 90 tablet, Rfl: 0    vitamin C (ASCORBIC ACID) 250 MG tablet, Take 1 tablet by mouth Daily., Disp: , Rfl:     vitamin k 100 MCG tablet, Take 1 tablet by mouth Daily., Disp: , Rfl:          Objective:     Vitals:    08/19/24 1145   BP: 138/90   BP Location: Left arm   Patient Position: Sitting   Cuff Size: Adult   Pulse: 69   SpO2: 98%   Weight: 72.9 kg (160 lb 12.8 oz)   Height: 158.5 cm (62.4\")     Body mass index is 29.03 " kg/m².    PHYSICAL EXAM:    Vitals Reviewed.   General Appearance: No acute distress, well developed and well nourished.   HENT: No hearing loss noted.    Respiratory: No signs of respiratory distress. Respiration rhythm and depth normal.  Clear to auscultation.   Cardiovascular:  Jugular Venous Pressure: Normal  Heart Rate and Rhythm: Normal, Heart Sounds: Normal S1 and S2. No S3 or S4 noted.  Murmurs: RUSB grade 1/6 systolic murmur present.  Lower Extremities: Trace doughy lower extremity edema noted.  Musculoskeletal: Normal movement of extremities.  Skin: General appearance normal.    Psychiatric: Patient alert and oriented to person, place, and time. Speech and behavior appropriate. Normal mood and affect.       ECG 12 Lead    Date/Time: 8/19/2024 11:49 AM  Performed by: Elizabeth Grullon APRN    Authorized by: Elizabeth Grullon APRN  Comparison: compared with previous ECG from 7/20/2024  Similar to previous ECG  Rhythm: sinus rhythm  Rate: normal  BPM: 69  Conduction: conduction normal  ST Segments: ST segments normal  T Waves: T waves normal  QRS axis: normal    Clinical impression: normal ECG            Assessment:       Diagnosis Plan   1. Hospital discharge follow-up        2. Right facial numbness  Adult Transthoracic Echo Complete W/ Cont if Necessary Per Protocol    Cardiac Event Monitor      3. TIA (transient ischemic attack)        4. Palpitations  Adult Transthoracic Echo Complete W/ Cont if Necessary Per Protocol    Cardiac Event Monitor      5. PSVT (paroxysmal supraventricular tachycardia)        6. Shortness of breath  Adult Transthoracic Echo Complete W/ Cont if Necessary Per Protocol    Cardiac Event Monitor      7. Heart murmur        8. Bilateral leg edema  Adult Transthoracic Echo Complete W/ Cont if Necessary Per Protocol    Cardiac Event Monitor             Plan:       1-3.  She was recently hospitalized for right facial numbness.  Noted to have an old lacunar infarct and no new stroke.   Consideration for possible TIA.  Neurological symptoms have resolved.  She remains on aspirin and rosuvastatin.    4/5.  Palpitations: She reports fluttering sensations less than a minute long.  With the recent TIA symptoms, I recommended an echocardiogram and 30-day Holter monitor.  She has a history of paroxysmal supraventricular tachycardia.    6/7.  Shortness of breath and mild heart murmur present: Check echocardiogram.    8.  Bilateral lower extremity edema: Described as doughy and I do not think diuretic therapy will help this.  Looks more like venous insufficiency and she would benefit from compression/support hose.    9.  Further recommendations to follow after cardiac testing is completed    As always, it has been a pleasure to participate in your patient's care. Thank you.         Sincerely,         SLY Holloway  [facility] Cardiology      Dictated utilizing Dragon Dictation

## 2024-08-23 DIAGNOSIS — E78.49 OTHER HYPERLIPIDEMIA: ICD-10-CM

## 2024-08-23 RX ORDER — ROSUVASTATIN CALCIUM 20 MG/1
20 TABLET, COATED ORAL DAILY
Qty: 90 TABLET | Refills: 0 | Status: SHIPPED | OUTPATIENT
Start: 2024-08-23

## 2024-09-05 ENCOUNTER — TELEPHONE (OUTPATIENT)
Dept: CARDIOLOGY | Facility: CLINIC | Age: 79
End: 2024-09-05

## 2024-09-05 NOTE — TELEPHONE ENCOUNTER
Caller: Kourtney Lorenzana    Relationship to patient: Self    Best call back number: [phone]    Patient is needing: PATIENT REQUESTING CALL BACK TO RESCHEDULE HOLTER AND ECHO

## 2024-09-13 NOTE — TELEPHONE ENCOUNTER
Caller: Kourtney Lorenzana    Relationship: Self    Best call back number: [phone]    What is the best time to reach you: ANYTIME    Who are you requesting to speak with (clinical staff, provider,  specific staff member): ANYONE    What was the call regarding: PT IS CALLING BACK AGAIN TO RESCHEDULE ECHO AND HOLTER MONITOR TESTS

## 2024-09-23 ENCOUNTER — APPOINTMENT (OUTPATIENT)
Dept: GENERAL RADIOLOGY | Facility: HOSPITAL | Age: 79
End: 2024-09-23
Payer: MEDICARE

## 2024-09-23 ENCOUNTER — HOSPITAL ENCOUNTER (OUTPATIENT)
Facility: HOSPITAL | Age: 79
Setting detail: OBSERVATION
Discharge: HOME | End: 2024-09-24
Attending: EMERGENCY MEDICINE | Admitting: EMERGENCY MEDICINE
Payer: MEDICARE

## 2024-09-23 DIAGNOSIS — R07.9 CHEST PAIN, UNSPECIFIED TYPE: Primary | ICD-10-CM

## 2024-09-23 LAB
ALBUMIN SERPL-MCNC: 3.8 G/DL (ref 3.5–5.2)
ALBUMIN/GLOB SERPL: 1.6 G/DL
ALP SERPL-CCNC: 144 U/L (ref 39–117)
ALT SERPL W P-5'-P-CCNC: 11 U/L (ref 1–33)
ANION GAP SERPL CALCULATED.3IONS-SCNC: 10 MMOL/L (ref 5–15)
AST SERPL-CCNC: 8 U/L (ref 1–32)
BASOPHILS # BLD AUTO: 0.03 10*3/MM3 (ref 0–0.2)
BASOPHILS NFR BLD AUTO: 0.5 % (ref 0–1.5)
BILIRUB SERPL-MCNC: 0.4 MG/DL (ref 0–1.2)
BUN SERPL-MCNC: 16 MG/DL (ref 8–23)
BUN/CREAT SERPL: 21.9 (ref 7–25)
CALCIUM SPEC-SCNC: 8.9 MG/DL (ref 8.6–10.5)
CHLORIDE SERPL-SCNC: 110 MMOL/L (ref 98–107)
CO2 SERPL-SCNC: 22 MMOL/L (ref 22–29)
CREAT SERPL-MCNC: 0.73 MG/DL (ref 0.57–1)
D DIMER PPP FEU-MCNC: 0.49 MCGFEU/ML (ref 0–0.77)
DEPRECATED RDW RBC AUTO: 43.2 FL (ref 37–54)
EGFRCR SERPLBLD CKD-EPI 2021: 84.8 ML/MIN/1.73
EOSINOPHIL # BLD AUTO: 0.1 10*3/MM3 (ref 0–0.4)
EOSINOPHIL NFR BLD AUTO: 1.6 % (ref 0.3–6.2)
ERYTHROCYTE [DISTWIDTH] IN BLOOD BY AUTOMATED COUNT: 13.1 % (ref 12.3–15.4)
GEN 5 2HR TROPONIN T REFLEX: 9 NG/L
GLOBULIN UR ELPH-MCNC: 2.4 GM/DL
GLUCOSE SERPL-MCNC: 102 MG/DL (ref 65–99)
HCT VFR BLD AUTO: 42.3 % (ref 34–46.6)
HGB BLD-MCNC: 13.3 G/DL (ref 12–15.9)
HOLD SPECIMEN: NORMAL
HOLD SPECIMEN: NORMAL
IMM GRANULOCYTES # BLD AUTO: 0.02 10*3/MM3 (ref 0–0.05)
IMM GRANULOCYTES NFR BLD AUTO: 0.3 % (ref 0–0.5)
LYMPHOCYTES # BLD AUTO: 1.52 10*3/MM3 (ref 0.7–3.1)
LYMPHOCYTES NFR BLD AUTO: 24.7 % (ref 19.6–45.3)
MCH RBC QN AUTO: 29 PG (ref 26.6–33)
MCHC RBC AUTO-ENTMCNC: 31.4 G/DL (ref 31.5–35.7)
MCV RBC AUTO: 92.2 FL (ref 79–97)
MONOCYTES # BLD AUTO: 0.52 10*3/MM3 (ref 0.1–0.9)
MONOCYTES NFR BLD AUTO: 8.4 % (ref 5–12)
NEUTROPHILS NFR BLD AUTO: 3.97 10*3/MM3 (ref 1.7–7)
NEUTROPHILS NFR BLD AUTO: 64.5 % (ref 42.7–76)
NRBC BLD AUTO-RTO: 0 /100 WBC (ref 0–0.2)
PLATELET # BLD AUTO: 185 10*3/MM3 (ref 140–450)
PMV BLD AUTO: 9.7 FL (ref 6–12)
POTASSIUM SERPL-SCNC: 4.2 MMOL/L (ref 3.5–5.2)
PROT SERPL-MCNC: 6.2 G/DL (ref 6–8.5)
QT INTERVAL: 423 MS
QTC INTERVAL: 447 MS
RBC # BLD AUTO: 4.59 10*6/MM3 (ref 3.77–5.28)
SODIUM SERPL-SCNC: 142 MMOL/L (ref 136–145)
TROPONIN T DELTA: 1 NG/L
TROPONIN T SERPL HS-MCNC: 8 NG/L
WBC NRBC COR # BLD AUTO: 6.16 10*3/MM3 (ref 3.4–10.8)
WHOLE BLOOD HOLD COAG: NORMAL
WHOLE BLOOD HOLD SPECIMEN: NORMAL

## 2024-09-23 PROCEDURE — 93005 ELECTROCARDIOGRAM TRACING: CPT

## 2024-09-23 PROCEDURE — 80053 COMPREHEN METABOLIC PANEL: CPT | Performed by: EMERGENCY MEDICINE

## 2024-09-23 PROCEDURE — 93005 ELECTROCARDIOGRAM TRACING: CPT | Performed by: EMERGENCY MEDICINE

## 2024-09-23 PROCEDURE — G0378 HOSPITAL OBSERVATION PER HR: HCPCS

## 2024-09-23 PROCEDURE — 71045 X-RAY EXAM CHEST 1 VIEW: CPT

## 2024-09-23 PROCEDURE — 85379 FIBRIN DEGRADATION QUANT: CPT | Performed by: EMERGENCY MEDICINE

## 2024-09-23 PROCEDURE — 99285 EMERGENCY DEPT VISIT HI MDM: CPT

## 2024-09-23 PROCEDURE — 85025 COMPLETE CBC W/AUTO DIFF WBC: CPT

## 2024-09-23 PROCEDURE — 93010 ELECTROCARDIOGRAM REPORT: CPT | Performed by: INTERNAL MEDICINE

## 2024-09-23 PROCEDURE — 84484 ASSAY OF TROPONIN QUANT: CPT | Performed by: EMERGENCY MEDICINE

## 2024-09-23 PROCEDURE — 36415 COLL VENOUS BLD VENIPUNCTURE: CPT

## 2024-09-23 RX ORDER — ASPIRIN 81 MG/1
81 TABLET, CHEWABLE ORAL DAILY
Status: DISCONTINUED | OUTPATIENT
Start: 2024-09-24 | End: 2024-09-24 | Stop reason: HOSPADM

## 2024-09-23 RX ORDER — ROSUVASTATIN CALCIUM 20 MG/1
20 TABLET, COATED ORAL NIGHTLY
Status: DISCONTINUED | OUTPATIENT
Start: 2024-09-24 | End: 2024-09-23

## 2024-09-23 RX ORDER — PANTOPRAZOLE SODIUM 40 MG/1
40 TABLET, DELAYED RELEASE ORAL
Status: DISCONTINUED | OUTPATIENT
Start: 2024-09-24 | End: 2024-09-23

## 2024-09-23 RX ORDER — ROSUVASTATIN CALCIUM 20 MG/1
20 TABLET, COATED ORAL NIGHTLY
Status: DISCONTINUED | OUTPATIENT
Start: 2024-09-23 | End: 2024-09-24 | Stop reason: HOSPADM

## 2024-09-23 RX ORDER — SODIUM CHLORIDE 9 MG/ML
40 INJECTION, SOLUTION INTRAVENOUS AS NEEDED
Status: DISCONTINUED | OUTPATIENT
Start: 2024-09-23 | End: 2024-09-24 | Stop reason: HOSPADM

## 2024-09-23 RX ORDER — ROSUVASTATIN CALCIUM 20 MG/1
20 TABLET, COATED ORAL DAILY
Status: DISCONTINUED | OUTPATIENT
Start: 2024-09-24 | End: 2024-09-23

## 2024-09-23 RX ORDER — SODIUM CHLORIDE 0.9 % (FLUSH) 0.9 %
10 SYRINGE (ML) INJECTION EVERY 12 HOURS SCHEDULED
Status: DISCONTINUED | OUTPATIENT
Start: 2024-09-23 | End: 2024-09-24 | Stop reason: HOSPADM

## 2024-09-23 RX ORDER — SODIUM CHLORIDE 0.9 % (FLUSH) 0.9 %
10 SYRINGE (ML) INJECTION AS NEEDED
Status: DISCONTINUED | OUTPATIENT
Start: 2024-09-23 | End: 2024-09-24 | Stop reason: HOSPADM

## 2024-09-23 RX ORDER — PANTOPRAZOLE SODIUM 40 MG/1
40 TABLET, DELAYED RELEASE ORAL NIGHTLY
Status: DISCONTINUED | OUTPATIENT
Start: 2024-09-23 | End: 2024-09-24 | Stop reason: HOSPADM

## 2024-09-23 RX ADMIN — PANTOPRAZOLE SODIUM 40 MG: 40 TABLET, DELAYED RELEASE ORAL at 20:00

## 2024-09-23 RX ADMIN — ROSUVASTATIN CALCIUM 20 MG: 20 TABLET, FILM COATED ORAL at 19:54

## 2024-09-23 RX ADMIN — Medication 10 ML: at 15:50

## 2024-09-23 RX ADMIN — Medication 10 ML: at 20:00

## 2024-09-24 ENCOUNTER — APPOINTMENT (OUTPATIENT)
Dept: CARDIOLOGY | Facility: HOSPITAL | Age: 79
End: 2024-09-24
Payer: MEDICARE

## 2024-09-24 ENCOUNTER — APPOINTMENT (OUTPATIENT)
Dept: CT IMAGING | Facility: HOSPITAL | Age: 79
End: 2024-09-24
Payer: MEDICARE

## 2024-09-24 ENCOUNTER — READMISSION MANAGEMENT (OUTPATIENT)
Dept: CALL CENTER | Facility: HOSPITAL | Age: 79
End: 2024-09-24
Payer: MEDICARE

## 2024-09-24 VITALS
OXYGEN SATURATION: 99 % | BODY MASS INDEX: 29.44 KG/M2 | WEIGHT: 160 LBS | SYSTOLIC BLOOD PRESSURE: 97 MMHG | TEMPERATURE: 97.9 F | HEART RATE: 74 BPM | DIASTOLIC BLOOD PRESSURE: 61 MMHG | RESPIRATION RATE: 18 BRPM | HEIGHT: 62 IN

## 2024-09-24 LAB
ALBUMIN SERPL-MCNC: 3.9 G/DL (ref 3.5–5.2)
ALBUMIN/GLOB SERPL: 1.6 G/DL
ALP SERPL-CCNC: 131 U/L (ref 39–117)
ALT SERPL W P-5'-P-CCNC: 14 U/L (ref 1–33)
ANION GAP SERPL CALCULATED.3IONS-SCNC: 11.8 MMOL/L (ref 5–15)
AST SERPL-CCNC: 12 U/L (ref 1–32)
BH CV ECHO MEAS - ACS: 1.29 CM
BH CV ECHO MEAS - AO MAX PG: 7.3 MMHG
BH CV ECHO MEAS - AO MEAN PG: 4 MMHG
BH CV ECHO MEAS - AO ROOT DIAM: 2.9 CM
BH CV ECHO MEAS - AO V2 MAX: 135.3 CM/SEC
BH CV ECHO MEAS - AO V2 VTI: 30.7 CM
BH CV ECHO MEAS - AVA(I,D): 1.61 CM2
BH CV ECHO MEAS - EDV(CUBED): 53.7 ML
BH CV ECHO MEAS - EDV(MOD-SP2): 57 ML
BH CV ECHO MEAS - EDV(MOD-SP4): 32 ML
BH CV ECHO MEAS - EF(MOD-BP): 64 %
BH CV ECHO MEAS - EF(MOD-SP2): 87.7 %
BH CV ECHO MEAS - EF(MOD-SP4): 75 %
BH CV ECHO MEAS - ESV(CUBED): 30.1 ML
BH CV ECHO MEAS - ESV(MOD-SP2): 7 ML
BH CV ECHO MEAS - ESV(MOD-SP4): 8 ML
BH CV ECHO MEAS - FS: 17.6 %
BH CV ECHO MEAS - IVS/LVPW: 1.04 CM
BH CV ECHO MEAS - IVSD: 0.84 CM
BH CV ECHO MEAS - LAT PEAK E' VEL: 10.1 CM/SEC
BH CV ECHO MEAS - LV MASS(C)D: 88.1 GRAMS
BH CV ECHO MEAS - LV MAX PG: 2.7 MMHG
BH CV ECHO MEAS - LV MEAN PG: 1.48 MMHG
BH CV ECHO MEAS - LV V1 MAX: 81.5 CM/SEC
BH CV ECHO MEAS - LV V1 VTI: 18.6 CM
BH CV ECHO MEAS - LVIDD: 3.8 CM
BH CV ECHO MEAS - LVIDS: 3.1 CM
BH CV ECHO MEAS - LVOT AREA: 2.7 CM2
BH CV ECHO MEAS - LVOT DIAM: 1.84 CM
BH CV ECHO MEAS - LVPWD: 0.81 CM
BH CV ECHO MEAS - MED PEAK E' VEL: 6.3 CM/SEC
BH CV ECHO MEAS - MV A DUR: 0.19 SEC
BH CV ECHO MEAS - MV A MAX VEL: 90.8 CM/SEC
BH CV ECHO MEAS - MV DEC SLOPE: 209.4 CM/SEC2
BH CV ECHO MEAS - MV DEC TIME: 0.35 SEC
BH CV ECHO MEAS - MV E MAX VEL: 65.6 CM/SEC
BH CV ECHO MEAS - MV E/A: 0.72
BH CV ECHO MEAS - MV MAX PG: 3.9 MMHG
BH CV ECHO MEAS - MV MEAN PG: 1.21 MMHG
BH CV ECHO MEAS - MV P1/2T: 101.7 MSEC
BH CV ECHO MEAS - MV V2 VTI: 27 CM
BH CV ECHO MEAS - MVA(P1/2T): 2.16 CM2
BH CV ECHO MEAS - MVA(VTI): 1.83 CM2
BH CV ECHO MEAS - PA ACC TIME: 0.17 SEC
BH CV ECHO MEAS - PA V2 MAX: 76.9 CM/SEC
BH CV ECHO MEAS - PULM A REVS DUR: 0.1 SEC
BH CV ECHO MEAS - PULM A REVS VEL: 33.1 CM/SEC
BH CV ECHO MEAS - PULM DIAS VEL: 35.4 CM/SEC
BH CV ECHO MEAS - PULM S/D: 1.77
BH CV ECHO MEAS - PULM SYS VEL: 62.8 CM/SEC
BH CV ECHO MEAS - QP/QS: 0.82
BH CV ECHO MEAS - RAP SYSTOLE: 3 MMHG
BH CV ECHO MEAS - RV MAX PG: 1.09 MMHG
BH CV ECHO MEAS - RV V1 MAX: 52.3 CM/SEC
BH CV ECHO MEAS - RV V1 VTI: 14.2 CM
BH CV ECHO MEAS - RVOT DIAM: 1.91 CM
BH CV ECHO MEAS - RVSP: 17.5 MMHG
BH CV ECHO MEAS - SV(LVOT): 49.5 ML
BH CV ECHO MEAS - SV(MOD-SP2): 50 ML
BH CV ECHO MEAS - SV(MOD-SP4): 24 ML
BH CV ECHO MEAS - SV(RVOT): 40.6 ML
BH CV ECHO MEAS - TAPSE (>1.6): 2.15 CM
BH CV ECHO MEAS - TR MAX PG: 14.5 MMHG
BH CV ECHO MEAS - TR MAX VEL: 190.5 CM/SEC
BH CV ECHO MEASUREMENTS AVERAGE E/E' RATIO: 8
BH CV STRESS BP STAGE 1: NORMAL
BH CV STRESS BP STAGE 2: NORMAL
BH CV STRESS DURATION MIN STAGE 1: 3
BH CV STRESS DURATION MIN STAGE 2: 2
BH CV STRESS DURATION SEC STAGE 1: 0
BH CV STRESS DURATION SEC STAGE 2: 0
BH CV STRESS ECHO POST STRESS EJECTION FRACTION EF: 83 %
BH CV STRESS GRADE STAGE 1: 10
BH CV STRESS GRADE STAGE 2: 12
BH CV STRESS HR STAGE 1: 117
BH CV STRESS HR STAGE 2: 125
BH CV STRESS METS STAGE 1: 5
BH CV STRESS METS STAGE 2: 7.5
BH CV STRESS PROTOCOL 1: NORMAL
BH CV STRESS RECOVERY BP: NORMAL MMHG
BH CV STRESS RECOVERY HR: 79 BPM
BH CV STRESS SPEED STAGE 1: 1.7
BH CV STRESS SPEED STAGE 2: 2.5
BH CV STRESS STAGE 1: 1
BH CV STRESS STAGE 2: 2
BH CV XLRA - RV BASE: 2.9 CM
BH CV XLRA - RV LENGTH: 7.4 CM
BH CV XLRA - RV MID: 2.7 CM
BH CV XLRA - TDI S': 8.5 CM/SEC
BILIRUB SERPL-MCNC: 0.6 MG/DL (ref 0–1.2)
BUN SERPL-MCNC: 13 MG/DL (ref 8–23)
BUN/CREAT SERPL: 15.1 (ref 7–25)
CALCIUM SPEC-SCNC: 9.5 MG/DL (ref 8.6–10.5)
CHLORIDE SERPL-SCNC: 109 MMOL/L (ref 98–107)
CHOLEST SERPL-MCNC: 174 MG/DL (ref 0–200)
CK SERPL-CCNC: 51 U/L (ref 20–180)
CO2 SERPL-SCNC: 21.2 MMOL/L (ref 22–29)
CREAT SERPL-MCNC: 0.86 MG/DL (ref 0.57–1)
DEPRECATED RDW RBC AUTO: 42.5 FL (ref 37–54)
EGFRCR SERPLBLD CKD-EPI 2021: 69.7 ML/MIN/1.73
ERYTHROCYTE [DISTWIDTH] IN BLOOD BY AUTOMATED COUNT: 13.9 % (ref 12.3–15.4)
GLOBULIN UR ELPH-MCNC: 2.4 GM/DL
GLUCOSE SERPL-MCNC: 93 MG/DL (ref 65–99)
HCT VFR BLD AUTO: 37.7 % (ref 34–46.6)
HDLC SERPL-MCNC: 50 MG/DL (ref 40–60)
HGB BLD-MCNC: 13.4 G/DL (ref 12–15.9)
LDLC SERPL CALC-MCNC: 102 MG/DL (ref 0–100)
LDLC/HDLC SERPL: 1.98 {RATIO}
LEFT ATRIUM VOLUME INDEX: 13.2 ML/M2
MAXIMAL PREDICTED HEART RATE: 143 BPM
MCH RBC QN AUTO: 33.2 PG (ref 26.6–33)
MCHC RBC AUTO-ENTMCNC: 35.5 G/DL (ref 31.5–35.7)
MCV RBC AUTO: 93.3 FL (ref 79–97)
PERCENT MAX PREDICTED HR: 89.51 %
PLATELET # BLD AUTO: 176 10*3/MM3 (ref 140–450)
PMV BLD AUTO: 9.8 FL (ref 6–12)
POTASSIUM SERPL-SCNC: 4.1 MMOL/L (ref 3.5–5.2)
PROT SERPL-MCNC: 6.3 G/DL (ref 6–8.5)
QT INTERVAL: 422 MS
QTC INTERVAL: 462 MS
RBC # BLD AUTO: 4.04 10*6/MM3 (ref 3.77–5.28)
SINUS: 2.8 CM
SODIUM SERPL-SCNC: 142 MMOL/L (ref 136–145)
STJ: 2.6 CM
STRESS BASELINE BP: NORMAL MMHG
STRESS BASELINE HR: 94 BPM
STRESS PERCENT HR: 105 %
STRESS POST ESTIMATED WORKLOAD: 5.5 METS
STRESS POST EXERCISE DUR MIN: 5 MIN
STRESS POST EXERCISE DUR SEC: 0 SEC
STRESS POST PEAK BP: NORMAL MMHG
STRESS POST PEAK HR: 128 BPM
STRESS TARGET HR: 122 BPM
TRIGL SERPL-MCNC: 124 MG/DL (ref 0–150)
TROPONIN T SERPL HS-MCNC: 9 NG/L
VLDLC SERPL-MCNC: 22 MG/DL (ref 5–40)
WBC NRBC COR # BLD AUTO: 6.32 10*3/MM3 (ref 3.4–10.8)

## 2024-09-24 PROCEDURE — 80053 COMPREHEN METABOLIC PANEL: CPT

## 2024-09-24 PROCEDURE — 93325 DOPPLER ECHO COLOR FLOW MAPG: CPT

## 2024-09-24 PROCEDURE — 25510000001 PERFLUTREN 6.52 MG/ML SUSPENSION 2 ML VIAL: Performed by: NURSE PRACTITIONER

## 2024-09-24 PROCEDURE — 93018 CV STRESS TEST I&R ONLY: CPT | Performed by: INTERNAL MEDICINE

## 2024-09-24 PROCEDURE — 85027 COMPLETE CBC AUTOMATED: CPT | Performed by: NURSE PRACTITIONER

## 2024-09-24 PROCEDURE — 93017 CV STRESS TEST TRACING ONLY: CPT

## 2024-09-24 PROCEDURE — 25810000003 SODIUM CHLORIDE 0.9 % SOLUTION: Performed by: NURSE PRACTITIONER

## 2024-09-24 PROCEDURE — 93350 STRESS TTE ONLY: CPT

## 2024-09-24 PROCEDURE — 93320 DOPPLER ECHO COMPLETE: CPT

## 2024-09-24 PROCEDURE — 93005 ELECTROCARDIOGRAM TRACING: CPT | Performed by: NURSE PRACTITIONER

## 2024-09-24 PROCEDURE — 80061 LIPID PANEL: CPT

## 2024-09-24 PROCEDURE — 93010 ELECTROCARDIOGRAM REPORT: CPT | Performed by: INTERNAL MEDICINE

## 2024-09-24 PROCEDURE — 25510000001 IOPAMIDOL PER 1 ML: Performed by: EMERGENCY MEDICINE

## 2024-09-24 PROCEDURE — 82550 ASSAY OF CK (CPK): CPT

## 2024-09-24 PROCEDURE — G0378 HOSPITAL OBSERVATION PER HR: HCPCS

## 2024-09-24 PROCEDURE — 93325 DOPPLER ECHO COLOR FLOW MAPG: CPT | Performed by: INTERNAL MEDICINE

## 2024-09-24 PROCEDURE — 93352 ADMIN ECG CONTRAST AGENT: CPT | Performed by: INTERNAL MEDICINE

## 2024-09-24 PROCEDURE — 93320 DOPPLER ECHO COMPLETE: CPT | Performed by: INTERNAL MEDICINE

## 2024-09-24 PROCEDURE — 93016 CV STRESS TEST SUPVJ ONLY: CPT | Performed by: INTERNAL MEDICINE

## 2024-09-24 PROCEDURE — 84484 ASSAY OF TROPONIN QUANT: CPT | Performed by: NURSE PRACTITIONER

## 2024-09-24 PROCEDURE — 99214 OFFICE O/P EST MOD 30 MIN: CPT | Performed by: INTERNAL MEDICINE

## 2024-09-24 PROCEDURE — 93350 STRESS TTE ONLY: CPT | Performed by: INTERNAL MEDICINE

## 2024-09-24 PROCEDURE — 71275 CT ANGIOGRAPHY CHEST: CPT

## 2024-09-24 RX ORDER — ISOSORBIDE MONONITRATE 30 MG/1
30 TABLET, EXTENDED RELEASE ORAL
Status: DISCONTINUED | OUTPATIENT
Start: 2024-09-24 | End: 2024-09-24

## 2024-09-24 RX ORDER — ISOSORBIDE MONONITRATE 30 MG/1
15 TABLET, EXTENDED RELEASE ORAL
Qty: 30 TABLET | Refills: 0 | Status: SHIPPED | OUTPATIENT
Start: 2024-09-25

## 2024-09-24 RX ORDER — ISOSORBIDE MONONITRATE 30 MG/1
15 TABLET, EXTENDED RELEASE ORAL
Status: DISCONTINUED | OUTPATIENT
Start: 2024-09-25 | End: 2024-09-24 | Stop reason: HOSPADM

## 2024-09-24 RX ORDER — IOPAMIDOL 755 MG/ML
100 INJECTION, SOLUTION INTRAVASCULAR
Status: COMPLETED | OUTPATIENT
Start: 2024-09-24 | End: 2024-09-24

## 2024-09-24 RX ADMIN — Medication 10 ML: at 10:30

## 2024-09-24 RX ADMIN — ASPIRIN 81 MG: 81 TABLET, CHEWABLE ORAL at 10:30

## 2024-09-24 RX ADMIN — ISOSORBIDE MONONITRATE 30 MG: 30 TABLET, EXTENDED RELEASE ORAL at 12:11

## 2024-09-24 RX ADMIN — SODIUM CHLORIDE 500 ML: 9 INJECTION, SOLUTION INTRAVENOUS at 15:20

## 2024-09-24 RX ADMIN — PERFLUTREN 2 ML: 6.52 INJECTION, SUSPENSION INTRAVENOUS at 08:47

## 2024-09-24 RX ADMIN — IOPAMIDOL 95 ML: 755 INJECTION, SOLUTION INTRAVENOUS at 14:46

## 2024-09-25 ENCOUNTER — TRANSITIONAL CARE MANAGEMENT TELEPHONE ENCOUNTER (OUTPATIENT)
Dept: CALL CENTER | Facility: HOSPITAL | Age: 79
End: 2024-09-25
Payer: MEDICARE

## 2024-10-03 RX ORDER — OMEPRAZOLE 40 MG/1
40 CAPSULE, DELAYED RELEASE ORAL DAILY
Qty: 90 CAPSULE | Refills: 0 | Status: SHIPPED | OUTPATIENT
Start: 2024-10-03

## (undated) DEVICE — ENDOPOUCH RETRIEVER SPECIMEN RETRIEVAL BAGS: Brand: ENDOPOUCH RETRIEVER

## (undated) DEVICE — NDL EPID TUOHY 18G 31/2IN

## (undated) DEVICE — Device: Brand: PORTEX

## (undated) DEVICE — GLV SURG SENSICARE PI MIC PF SZ7 LF STRL

## (undated) DEVICE — GLV SURG SENSICARE W/ALOE PF LF 8 STRL

## (undated) DEVICE — EPIDURAL TRAY: Brand: MEDLINE INDUSTRIES, INC.

## (undated) DEVICE — DRSNG TELFA PAD NONADH STR 1S 3X4IN

## (undated) DEVICE — CATH CHOLANG 4.5F18IN BRGNDY

## (undated) DEVICE — STPCK 3WY D201 DISCOFIX

## (undated) DEVICE — LOU LAP CHOLE: Brand: MEDLINE INDUSTRIES, INC.

## (undated) DEVICE — PREP SOL POVIDONE/IODINE BT 4OZ

## (undated) DEVICE — GOWN ,SIRUS,NONREINFORCED SMALL: Brand: MEDLINE

## (undated) DEVICE — NEEDLE, QUINCKE, 22GX5": Brand: MEDLINE

## (undated) DEVICE — STERILE PATIENT PROTECTIVE PAD FOR IMP® KNEE POSITIONERS & COHESIVE WRAP (10 / CASE): Brand: DE MAYO KNEE POSITIONER®

## (undated) DEVICE — GLV SURG BIOGEL LTX PF 6

## (undated) DEVICE — SUT VIC 0 TN 27IN DYED JTN0G

## (undated) DEVICE — PREMIUM WET SKIN PREP TRAY: Brand: MEDLINE INDUSTRIES, INC.

## (undated) DEVICE — 3M™ IOBAN™ 2 ANTIMICROBIAL INCISE DRAPE 6640EZ: Brand: IOBAN™ 2

## (undated) DEVICE — ENDOPATH XCEL BLADELESS TROCARS WITH STABILITY SLEEVES: Brand: ENDOPATH XCEL

## (undated) DEVICE — APPL DURAPREP IODOPHOR APL 26ML

## (undated) DEVICE — GLV SURG PREMIERPRO ORTHO LTX PF SZ7.5 BRN

## (undated) DEVICE — BNDG ELAS ELITE V/CLOSE 6IN 5YD LF STRL

## (undated) DEVICE — GLV SURG SENSICARE W/ALOE PF LF 7.5 STRL

## (undated) DEVICE — GLV SURG TRIUMPH PF LTX 7 STRL

## (undated) DEVICE — SUT VIC 1 CT1 36IN J947H

## (undated) DEVICE — CONTAINER,SPECIMEN,OR STERILE,4OZ: Brand: MEDLINE

## (undated) DEVICE — NDL BIOP W/DEPTH MARK 14G 6IN

## (undated) DEVICE — CATH IV INSYTE AUTOGARD 14G 1 1/2IN ORNG

## (undated) DEVICE — ENDOCUT SCISSOR TIP, DISPOSABLE: Brand: RENEW

## (undated) DEVICE — STPLR SKIN VISISTAT WD 35CT

## (undated) DEVICE — EXTENSION SET, MALE LUER LOCK ADAPTER WITH RETRACTABLE COLLAR

## (undated) DEVICE — SUT VIC 5/0 PS2 18IN J495H

## (undated) DEVICE — PK KN TOTL 40

## (undated) DEVICE — MAT FLR ABSORBENT LG 4FT 10 2.5FT

## (undated) DEVICE — UNDERCAST PADDING: Brand: DEROYAL

## (undated) DEVICE — KT DRN EVAC WND PVC PCH WTROC RND 10F400

## (undated) DEVICE — SOL NACL 0.9PCT 1000ML

## (undated) DEVICE — SUT VIC 0 CT1 36IN J946H

## (undated) DEVICE — ADHS SKIN DERMABOND TOP ADVANCED

## (undated) DEVICE — TRAP FLD MINIVAC MEGADYNE 100ML

## (undated) DEVICE — SKIN PREP TRAY W/CHG: Brand: MEDLINE INDUSTRIES, INC.

## (undated) DEVICE — ENDOPATH PNEUMONEEDLE INSUFFLATION NEEDLES WITH LUER LOCK CONNECTORS 120MM: Brand: ENDOPATH

## (undated) DEVICE — SOL NACL 0.9PCT 100ML SGL

## (undated) DEVICE — NEEDLE, QUINCKE 22GX3.5": Brand: MEDLINE INDUSTRIES, INC.

## (undated) DEVICE — MEDI-VAC YANKAUER SUCTION HANDLE W/BULBOUS TIP: Brand: CARDINAL HEALTH

## (undated) DEVICE — GLV SURG SENSICARE PI PF LF 7 GRN STRL

## (undated) DEVICE — DUAL CUT SAGITTAL BLADE

## (undated) DEVICE — PENCL E/S ULTRAVAC TELESCP NOSE HOLSTR 10FT